# Patient Record
Sex: FEMALE | Race: WHITE | Employment: OTHER | URBAN - METROPOLITAN AREA
[De-identification: names, ages, dates, MRNs, and addresses within clinical notes are randomized per-mention and may not be internally consistent; named-entity substitution may affect disease eponyms.]

---

## 2018-03-27 ENCOUNTER — ASSISTED LIVING VISIT (OUTPATIENT)
Dept: GERIATRICS | Facility: CLINIC | Age: 78
End: 2018-03-27

## 2018-03-27 DIAGNOSIS — Z53.9 ERRONEOUS ENCOUNTER--DISREGARD: Primary | ICD-10-CM

## 2018-04-02 NOTE — PROGRESS NOTES
[place] [facility]  PRIMARY CARE PROVIDER AND CLINIC:  Hannah Gardner [address] / [facility] [address]  Chief Complaint   Patient presents with     [facility]       HPI:    Chelsey Casillas is a 78 year old  (1940), is seen today in her apartment at [facility].  [facility].  Due to multiple falls and worsening cognitive function, she was admitted to this facility for  medical management and nursing care.  HPI information obtained from: facility chart records, facility staff, patient report (unreliable), [facility] chart review and Care Everywhere [facility] chart review.      Current issues are:      Severe major depression with psychotic features (H)  Dementia with psychosis  States she is not happy with her current living situation, preferred her former apartment.  Unable to elaborate why.  She is preoccupied with being hot in her apartment.  When asked if she knew who to report the problem to, she just wanted the window open.  Essentially she is tracking with her present situation, but lacks insight and problem solving.  She does not appear to be hallucinating.    Essential hypertension  Reasonable control with medications    Primary osteoarthritis involving multiple joints  Chronic low back pain without sciatica, unspecified back pain laterality  Falling episodes  Reviewed care everywhere x-rays over the years, no fractures, but + for osteoarthritis, knees, hips, spine.  She does take tylenol which only takes the edge off.  Due to progressing dementia, narcotics would be used cautiously    Functional urinary incontinence  Wears pads.  She denies it being much of a problem      CODE STATUS/ADVANCE DIRECTIVES DISCUSSION:   DNR / DNI  Patient's living condition: lives in an assisted living facility    ALLERGIES:Amlodipine  PAST MEDICAL HISTORY:  has a past medical history of Arthritis; Chronic low back pain  (3/17/2015); COPD (chronic obstructive pulmonary disease) (H); Dementia with psychosis (11/1/2016); Fall (2/27/2013); Falling episodes (9/24/2016); Functional urinary incontinence (3/7/2013); GERD (gastroesophageal reflux disease) (4/3/2018); HTN (hypertension) (4/3/2018); Knee pain (3/7/2013); Major depression, single episode (4/16/2015); Pain in both knees (1/30/2014); Physical deconditioning (3/7/2013); Severe major depression with psychotic features (H) (7/12/2016); Skin tear (3/7/2013); and Weakness (6/18/2015).  PAST SURGICAL HISTORY:  has no past surgical history on file.  FAMILY HISTORY: Family history is unknown by patient.  SOCIAL HISTORY:  reports that she has been smoking Cigarettes.  She has never used smokeless tobacco. She reports that she does not drink alcohol.    Post Discharge Medication Reconciliation Status: patient was not discharged from an inpatient facility.  Current Outpatient Prescriptions   Medication Sig Dispense Refill     albuterol (PROAIR HFA/PROVENTIL HFA/VENTOLIN HFA) 108 (90 BASE) MCG/ACT Inhaler Inhale 1-2 puffs into the lungs every 4 hours as needed for shortness of breath / dyspnea or wheezing       ASPIRIN EC PO Take 81 mg by mouth daily       CARVEDILOL PO Take 6.25 mg by mouth 2 times daily       ferrous sulfate (IRON) 325 (65 FE) MG tablet Take 325 mg by mouth daily       FOLIC ACID PO Take 1 mg by mouth daily       LISINOPRIL PO Take 5 mg by mouth daily       Acetaminophen (TYLENOL PO) Take 1,000 mg by mouth 2 times daily       OMEPRAZOLE PO Take 20 mg by mouth daily       RISPERIDONE PO Take 0.5 mg by mouth daily       SERTRALINE HCL PO Take 100 mg by mouth daily       vitamin D3 (CHOLECALCIFEROL) 31953 UNITS capsule Take 50,000 Units by mouth every 7 days         ROS:  Limited secondary to cognitive impairment but today pt reports back and knees hurt    Exam:  /84  Pulse 63  Temp 98.2  F (36.8  C)  Resp 20  Ht 5' (1.524 m)  Wt 150 lb (68 kg)  LMP   BMI 29.29  kg/m2  GENERAL APPEARANCE:  Alert, cooperative  ENT:  Mouth and posterior oropharynx normal, moist mucous membranes, normal hearing acuity  EYES:  EOM, conjunctivae, lids, pupils and irises normal  NECK:  No adenopathy,masses or thyromegaly  RESP:  respiratory effort and palpation of chest normal, lungs clear to auscultation , no respiratory distress  CV:  Palpation and auscultation of heart done , regular rate and rhythm, no murmur, rub, or gallop, no edema  ABDOMEN:  normal bowel sounds, soft, nontender, no hepatosplenomegaly or other masses  M/S:   Gait and station abnormal requires 4 wheeled walker for balance  Digits and nails normal  SKIN:  Inspection of skin and subcutaneous tissue baseline, Palpation of skin and subcutaneous tissue baseline  NEURO:   Examination of sensation by touch normal  PSYCH:  insight and judgement impaired, memory impaired     Lab/Diagnostic data: None available since 2016, reviewed care everywhere       ASSESSMENT/PLAN:  (F03.91) Dementia with psychosis  (primary encounter diagnosis)  (F32.3) Severe major depression with psychotic features (H)  (R44.3) Hallucinations  Comment: requires 24 hour observation with medication administration  Plan: medication administration per contract.  Will continue current Risperidone, hallucinations are controlled at current dose of 0.5 mg daily.  No apparent adverse side effects noted per staff or patient.  Sertraline 100 mg daily appropriate dose and no noted tremors or adverse side effects noted.  Will reassess both medications with each visit.      (I10) Essential hypertension  Comment: Based on JNC-8 goals,  patients age of 78 year old, no presence of diabetes or CKD, and goals of care goal BP is <150/90 mm Hg. Patient is stable with current plan of care and routine assessment..  Plan: continue Carvedilol, Lisinopril.  BMP next lab day    (M15.0) Primary osteoarthritis involving multiple joints  (M54.5,  G89.29) Chronic low back pain without  sciatica, unspecified back pain laterality  (R29.6) Falling episodes  Comment: chronic  Plan: ambulate with walker for safety, Tylenol for pain.      (R39.81) Functional urinary incontinence  Comment: chronic, increased risk for UTI  Plan: assist with incontinent brief changes per plan contract with [facility]        Electronically signed by:  ALCIRA Lopez CNP

## 2018-04-03 ENCOUNTER — ASSISTED LIVING VISIT (OUTPATIENT)
Dept: GERIATRICS | Facility: CLINIC | Age: 78
End: 2018-04-03
Payer: COMMERCIAL

## 2018-04-03 DIAGNOSIS — R39.81 FUNCTIONAL URINARY INCONTINENCE: ICD-10-CM

## 2018-04-03 DIAGNOSIS — R44.3 HALLUCINATIONS: ICD-10-CM

## 2018-04-03 DIAGNOSIS — R29.6 FALLING EPISODES: ICD-10-CM

## 2018-04-03 DIAGNOSIS — F32.3 SEVERE MAJOR DEPRESSION WITH PSYCHOTIC FEATURES (H): ICD-10-CM

## 2018-04-03 DIAGNOSIS — F03.92 DEMENTIA WITH PSYCHOSIS (H): Primary | ICD-10-CM

## 2018-04-03 DIAGNOSIS — G89.29 CHRONIC LOW BACK PAIN WITHOUT SCIATICA, UNSPECIFIED BACK PAIN LATERALITY: ICD-10-CM

## 2018-04-03 DIAGNOSIS — M15.0 PRIMARY OSTEOARTHRITIS INVOLVING MULTIPLE JOINTS: ICD-10-CM

## 2018-04-03 DIAGNOSIS — M54.50 CHRONIC LOW BACK PAIN WITHOUT SCIATICA, UNSPECIFIED BACK PAIN LATERALITY: ICD-10-CM

## 2018-04-03 DIAGNOSIS — I10 ESSENTIAL HYPERTENSION: ICD-10-CM

## 2018-04-03 PROBLEM — K21.9 GERD (GASTROESOPHAGEAL REFLUX DISEASE): Status: ACTIVE | Noted: 2018-04-03

## 2018-04-03 RX ORDER — ALBUTEROL SULFATE 90 UG/1
2 AEROSOL, METERED RESPIRATORY (INHALATION) EVERY 4 HOURS PRN
COMMUNITY

## 2018-04-03 RX ORDER — FERROUS SULFATE 325(65) MG
325 TABLET ORAL DAILY
COMMUNITY
End: 2019-02-04

## 2018-04-03 RX ORDER — CHOLECALCIFEROL (VITAMIN D3) 1250 MCG
50000 CAPSULE ORAL
COMMUNITY
End: 2018-09-17

## 2018-04-10 VITALS
DIASTOLIC BLOOD PRESSURE: 84 MMHG | RESPIRATION RATE: 20 BRPM | TEMPERATURE: 98.2 F | WEIGHT: 150 LBS | HEART RATE: 63 BPM | BODY MASS INDEX: 29.45 KG/M2 | SYSTOLIC BLOOD PRESSURE: 143 MMHG | HEIGHT: 60 IN

## 2018-04-10 PROBLEM — M15.0 PRIMARY OSTEOARTHRITIS INVOLVING MULTIPLE JOINTS: Status: ACTIVE | Noted: 2018-04-10

## 2018-06-19 ENCOUNTER — ASSISTED LIVING VISIT (OUTPATIENT)
Dept: GERIATRICS | Facility: CLINIC | Age: 78
End: 2018-06-19
Payer: COMMERCIAL

## 2018-06-19 VITALS
SYSTOLIC BLOOD PRESSURE: 131 MMHG | RESPIRATION RATE: 18 BRPM | HEART RATE: 69 BPM | TEMPERATURE: 97.5 F | DIASTOLIC BLOOD PRESSURE: 92 MMHG

## 2018-06-19 DIAGNOSIS — F03.92 DEMENTIA WITH PSYCHOSIS (H): Primary | ICD-10-CM

## 2018-06-19 DIAGNOSIS — M15.0 PRIMARY OSTEOARTHRITIS INVOLVING MULTIPLE JOINTS: ICD-10-CM

## 2018-06-19 RX ORDER — LOPERAMIDE HCL 2 MG
2-4 CAPSULE ORAL PRN
COMMUNITY
End: 2018-07-27

## 2018-06-19 NOTE — PROGRESS NOTES
[place] [facility]    Chief Complaint   Patient presents with     RAI       [place] Medical Record Number:  [id number]    HPI:    Chelsey Casillas is a 78 year old  (1940), who is being seen today for an episodic care visit at [facility].      HPI information obtained from: facility chart records, facility staff, patient report and [facility] chart review.    Today's concern is:  Dementia with psychosis  Has had increased delusion that a snake is trying to get in to her apartment an eat her cat.  She has her windows taped so it can't get in.  She has had a couple of falls in her apartment forgetting to use her walker.      Primary osteoarthritis involving multiple joints  Likely increased knee pain from falls.      ALLERGIES: Amlodipine  Past Medical, Surgical, Family and Social History reviewed and updated in [facility].    Current Outpatient Prescriptions   Medication Sig Dispense Refill     Acetaminophen (TYLENOL PO) Take 1,000 mg by mouth 2 times daily       albuterol (PROAIR HFA/PROVENTIL HFA/VENTOLIN HFA) 108 (90 BASE) MCG/ACT Inhaler Inhale 1-2 puffs into the lungs every 4 hours as needed for shortness of breath / dyspnea or wheezing       ASPIRIN EC PO Take 81 mg by mouth daily       CARVEDILOL PO Take 6.25 mg by mouth 2 times daily       ferrous sulfate (IRON) 325 (65 FE) MG tablet Take 325 mg by mouth daily       FOLIC ACID PO Take 1 mg by mouth daily       LISINOPRIL PO Take 5 mg by mouth daily       loperamide (IMODIUM) 2 MG capsule Take 2-4 mg by mouth as needed for diarrhea       OMEPRAZOLE PO Take 20 mg by mouth daily       RISPERIDONE PO Take 0.5 mg by mouth 2 times daily        SERTRALINE HCL PO Take 100 mg by mouth daily       vitamin D3 (CHOLECALCIFEROL) 42729 UNITS capsule Take 50,000 Units by mouth every 7 days       Medications reconciled to facility chart and changes were made to reflect current medications as identified as above med list. Below are the  changes that were made:   Medications stopped since last EPIC medication reconciliation:   There are no discontinued medications.    Medications started since last [facility] medication reconciliation:  Orders Placed This Encounter   Medications     loperamide (IMODIUM) 2 MG capsule     Sig: Take 2-4 mg by mouth as needed for diarrhea       REVIEW OF SYSTEMS:  Unobtainable secondary to cognitive impairment.     Physical Exam:  BP (!) 131/92  Pulse 69  Temp 97.5  F (36.4  C)  Resp 18  GENERAL APPEARANCE:  Alert, in no distress  ENT:  Mouth and posterior oropharynx normal, moist mucous membranes, [place]  RESP:  respiratory effort and palpation of chest normal, lungs clear to auscultation , no respiratory distress  CV:  Palpation and auscultation of heart done , regular rate and rhythm, no murmur, rub, or gallop  ABDOMEN:  normal bowel sounds, soft, nontender, no hepatosplenomegaly or other masses  M/S:   Gait and station abnormal unsteady without walker  Examination of:   right lower extremity and left lower extremity  Inspection, ROM, stability and muscle strength no bruising noted, + crepitus, limited extension and flexion  PSYCH:  insight and judgement impaired, memory impaired     Recent Labs: None available at this time.     Assessment/Plan:  (F03.91) Dementia with psychosis  (primary encounter diagnosis)  Comment: acute exacerbation  Plan: will increase Risperidone by adding a morning dose.  Monitor for side effects.  F/U in 2-4 weeks.    (M15.0) Primary osteoarthritis involving multiple joints  Comment: increased knee pain  Plan: Tylenol PRN    Electronically signed by  ALCIRA Wynn CNP

## 2018-07-02 ENCOUNTER — TELEPHONE (OUTPATIENT)
Dept: GERIATRICS | Facility: CLINIC | Age: 78
End: 2018-07-02

## 2018-07-02 DIAGNOSIS — I10 HYPERTENSION, UNCONTROLLED: Primary | ICD-10-CM

## 2018-07-02 RX ORDER — LISINOPRIL 10 MG/1
10 TABLET ORAL DAILY
Qty: 90 TABLET | Refills: 1 | Status: SHIPPED | OUTPATIENT
Start: 2018-07-02 | End: 2018-07-04 | Stop reason: DRUGHIGH

## 2018-07-02 NOTE — TELEPHONE ENCOUNTER
[place] [facility] TELEPHONE ENCOUNTER    Chief Complaint   Patient presents with     uncontrolled hypertension       Chelsey Casillas is a 78 year old  (1940),Nurse called today to report: blood pressures twice systolic is > 100    ASSESSMENT/PLAN  Hypertension, uncontrolled    - lisinopril (PRINIVIL/ZESTRIL) 10 MG tablet; Take 1 tablet (10 mg) by mouth daily    Check blood pressure both arms twice daily for 2 days    F/u in 2 weeks for blood pressure check    Hannah Gardner, ALCIRA LONG  j

## 2018-07-04 ENCOUNTER — TELEPHONE (OUTPATIENT)
Dept: GERIATRICS | Facility: CLINIC | Age: 78
End: 2018-07-04

## 2018-07-04 DIAGNOSIS — I10 HYPERTENSION, UNSPECIFIED TYPE: Primary | ICD-10-CM

## 2018-07-04 DIAGNOSIS — I48.20 CHRONIC ATRIAL FIBRILLATION (H): ICD-10-CM

## 2018-07-04 RX ORDER — LISINOPRIL 10 MG/1
15 TABLET ORAL DAILY
Qty: 45 TABLET | Refills: 1 | Status: SHIPPED | OUTPATIENT
Start: 2018-07-04 | End: 2018-12-19

## 2018-07-04 RX ORDER — CARVEDILOL 3.12 MG/1
3.12 TABLET ORAL 2 TIMES DAILY WITH MEALS
Qty: 60 TABLET | Refills: 3 | Status: SHIPPED | OUTPATIENT
Start: 2018-07-04 | End: 2020-04-24 | Stop reason: SINTOL

## 2018-07-04 NOTE — TELEPHONE ENCOUNTER
[place] [facility] TELEPHONE ENCOUNTER    Chief Complaint   Patient presents with     fall with low heart rate/^ BP       Chelsey Casillas is a 78 year old  (1940),Nurse called today to report: had a fall walking to the bathroom without her walker.  Chelsey told the RA she just got tired and sat down, HR was 55 and /90.    ASSESSMENT/PLAN  Hypertension, unspecified type    - lisinopril (PRINIVIL/ZESTRIL) 10 MG tablet; Take 1.5 tablets (15 mg) by mouth daily  - carvedilol (COREG) 3.125 MG tablet; Take 1 tablet (3.125 mg) by mouth 2 times daily (with meals)    Chronic atrial fibrillation (H)    - carvedilol (COREG) 3.125 MG tablet; Take 1 tablet (3.125 mg) by mouth 2 times daily (with meals)    ALCIRA Jerome CNP

## 2018-07-27 DIAGNOSIS — R19.7 DIARRHEA, UNSPECIFIED TYPE: Primary | ICD-10-CM

## 2018-07-30 RX ORDER — LOPERAMIDE HCL 2 MG
CAPSULE ORAL
Qty: 20 CAPSULE | Refills: 98 | Status: SHIPPED | OUTPATIENT
Start: 2018-07-30 | End: 2019-12-14

## 2018-08-15 ENCOUNTER — PATIENT OUTREACH (OUTPATIENT)
Dept: GERIATRIC MEDICINE | Facility: CLINIC | Age: 78
End: 2018-08-15

## 2018-08-15 PROBLEM — Z76.89 HEALTH CARE HOME: Status: ACTIVE | Noted: 2018-08-15

## 2018-08-15 NOTE — PROGRESS NOTES
[facility] Care Coordination Contact  Member new to [facility] with [employer]. CC change letter sent.   Alyssa Flores  Case Management Specialist  [facility]  [phone]

## 2018-08-15 NOTE — LETTER
August 15, 2018    Important Plan Information    [facility] [address]  [address]  [facility] [address]    Your New Care Coordinator  Dear Chelsey,  My name is MYA Gamez and I am your new Care Coordinator. You may reach me by calling [phone]. I will be in touch with you shortly to address any questions you may have.   I have also been in contact with Josi Ortiz, your previous care coordinator, to ensure a smooth transition.  Questions?  Call me at [phone] Monday-Friday between 8am and 5pm. TTY/TDD: 711. I look forward to working with you as a [employer]  member.  Sincerely,      MYA Gamez  [facility]  [phone]    cc: member records            American Indians can continue to use [place] and [facility] Services ([facility]) clinics. We will not require prior approval or impose any conditions for you to get services at these clinics. For elders age 65 years and older this includes Elderly Waiver (EW) services accessed through the [facility]. If a doctor or other provider in a [place] or S clinic refers you to a provider in our network, we will not require you to see your primary care provider prior to the referral.    For accessible formats of this publication or assistance with equal or access to our services, visit [email or web address], or call [phone] (toll free) or use your preferred relay service.    Auxiliary Aids and Services.   [employer] provides auxiliary aids and services, like qualified interpreters or information in accessible formats, free of charge and in a timely manner, to ensure an equal opportunity to participate in our health care programs. Contact [employer] Customer Service at [email or web address] or call [phone] (toll free) or use your preferred relay service.    Language Assistance Services.   [employer] provides translated documents and spoken language interpreting, free of charge and in a timely manner, when  language assistance services are necessary to ensure limited English speakers have meaningful access to our information and services. Contact [employer] Customer Service at [email or web address] or call [phone] (toll free) or use your preferred relay service.     Civil Rights Notice  Discrimination is against the law. [employer] does not discriminate on the basis of any of the following:    Race    Color    National Origin    Creed    Pentecostalism    Age    Public Assistance Status    Receipt of Health Care Services    Disability (including physical or mental impairment)    Sex (including sex stereotypes and gender identity)    Marital Status    Political Beliefs    Medical Condition    Genetic Information    Sexual Orientation    Claims Experience    Medical History    Health Status    Civil Rights Complaints.   You have the right to file a discrimination complaint if you believe you were treated in a discriminatory way by [employer]. You may contact any of the following four agencies directly to file a discrimination complaint.    U.S. Department of Health and Human Services  Office for Civil Rights (OCR)  You have the right to file a complaint with the OCR, a federal agency, if you believe you have been discriminated against because of any of the following:    Race    Disability    Color    Sex (including sex stereotypes and gender identity)    National Origin    Age    Contact the OCR directly to file a complaint:         Director         U.S. Department of Health and Human Services  Office for Civil Rights         [address] [place] [address]         [phone] (Voice)         [phone] (TDD)         Complaint Portal - https://ocrportal.hhs.gov/ocr/portal/lobby.jsf     [place] Department of Human Rights (MDHR)  In [place], you have the right to file a complaint with the MD if you believe you have been discriminated against because of any  of the following:      Race    Color    National Origin    Christian    Creed    Sex    Sexual Orientation    Marital Status    Public Assistance Status    Contact the [facility] directly to file a complaint:         [place] Department of Human Rights         [facility], [address] [place] [address]         [phone] (voice)          [phone] (toll free)         711 or [phone] ([place] Relay)         [phone] (Fax)         [email or web address] (Email)     [place] Department of Human Services (DHS)  You have the right to file a complaint with [facility] if you believe you have been discriminated against in our health care programs because of any of the following:    Race    Color    National Origin    Creed    Christian    Age    Public Assistance Status    Receipt of Health Care Services    Disability (including physical or mental impairment)    Sex (including sex stereotypes and gender identity)    Marital Status    Political Beliefs    Medical Condition    Genetic Information    Sexual Orientation    Claims Experience    Medical History    Health Status    Complaints must be in writing and filed within 180 days of the date you discovered the alleged discrimination. The complaint must contain your name and address and describe the discrimination you are complaining about. After we get your complaint, we will review it and notify you in writing about whether we have authority to investigate. If we do, we will investigate the complaint.      [facility] will notify you in writing of the investigation s outcome. You have a right to appeal the outcome if you disagree with the decision. To appeal, you must send a written request to have [facility] review the investigation outcome period. Be brief and state why you disagree with the decision. Include additional information you think is important.      If you file a complaint in this way, the people who work for the agency named in the complaint cannot  retaliate against you. This means they cannot punish you in any way for filing a complaint. Filing a complaint in this way does not stop you from seeking out other legal or administration actions.     Contact DHS directly to file a discrimination complaint:        ATTN: Civil Rights Coordinator        [facility]        Equal Opportunity and Access Division        [address] [address]        [place] [address] [phone] (voice) or use your preferred relay service     [employer] Complaint Notice   Contact [employer] directly to file a discrimination complaint:  [employer] Civil Rights Coordinator  Mail Route   [address]  [place] [address] [phone] (voice) or use your preferred relay service  [email or web address]

## 2018-08-20 ENCOUNTER — PATIENT OUTREACH (OUTPATIENT)
Dept: GERIATRIC MEDICINE | Facility: CLINIC | Age: 78
End: 2018-08-20

## 2018-08-20 NOTE — PROGRESS NOTES
[facility] Care Coordination Contact  Communication History     User: Rina George, LSW Date/time: 8/20/2018 11:09 AM    Context:  Outcome: Left Message    Phone number: [phone] Phone Type:     Comm. type: Telephone Call type: Outgoing    Contact: [facility]: [facility] Relation to patient: Other    User: Rina George, LSW Date/time: 8/20/2018 11:07 AM    Context:  Outcome: Left Message    Phone number: [phone] Phone Type:     Comm. type: Telephone Call type: Outgoing    Contact: [facility]: Gian -  Rep Payee Relation to patient: Other    User: Rina George, LSW Date/time: 8/20/2018 10:56 AM    Context:  Outcome:     Phone number: [phone] Phone Type:     Comm. type: Telephone Call type: Incoming    Contact: [employer]: john Prater CM Relation to patient: Other    User: Rina George, LSW Date/time: 8/20/2018 10:50 AM    Context:  Outcome: Left Message    Phone number: [phone] Phone Type:     Comm. type: Telephone Call type: Outgoing    Contact: [facility]:  Sheri Relation to patient: Other    User: Rina George, LSW Date/time: 8/20/2018 10:50 AM    Context:  Outcome: Left Message    Phone number: [phone] Phone Type:     Comm. type: Telephone Call type: Outgoing    Contact: Oly Newsome: Ada, Director of Health Services Relation to patient: Assisted Living    User: Rina George, LSW Date/time: 8/20/2018 10:49 AM    Context:  Outcome:     Phone number: [phone] Phone Type:     Comm. type: Telephone Call type: Outgoing    Contact: [employer] Relation to patient: Other    User: Rina George, LSW Date/time: 8/20/2018 10:49 AM    Context:  Outcome: Left Message    Phone number: [phone] Phone Type: Home Phone    Comm. type: Telephone Call type: Outgoing    Contact: Chelsey Casillas Relation to patient: Self            Client is new enrollee to [facility] effective 08- with [employer] [facility] health plan. [facility]    No home visit required because this CM has received all required documentation's from the previous CM.    First Attempt to reach Chelsey. LM introducing self and requested a call back.    Spoke w/ [employer] and verified monthly supply orders.  LM for volunteer service [facility] and [facility]isted Living introducing self as new CM. Left contact information.    Requested a call back from the Assisted Living to see if they would provide shopping services for Chelsey if time was given on the RS tool for it. Chelsey currently has ILS and ILS isn't an appropriate service for her current needs.    Received a call from Chelsey's previous CM providing the following update:  - Previous CM received call from AL stating that Chelsey is needing more assistance.  - AL has gone over and above to provide assistance for Chelsey.  - Chelsey does what she wants in her own time.  - Chelsey has a cat named CowGridPointjo-ann that is her entire world. Chelsey isn't able to care for the cat very well, but her ILS worker and AL staff assist with this. There have been conversations about her giving the cat to someone that can care for the cat.  - Has a hospital bed that she doesn't use. Mattress is on the floor so the cat doesn't jump up on the hospital bed. Chelsey prefers to sleep in her lift chair.  - Chelsey just received a new lift chair, because the previous one was urine soaked even after being cleaned. Now has washable incontinent pads and staff place pull-ups on the lift chair to prevent the chair from becoming urine soaked.  - Previous CM states ILS is vital to Chelsey remaining independent, because they shop for her and assist with MA paperwork. Before ILS the CM was assisting with MA paperwork.  - Chelsey doesn't have anyone in her life. Has a sister that she isn't able to locate, an ex that lives out of state, and an elderly friend that has health issues.  "Previously CM described Chelsey as an \"orphaned senior.\"   - Chelsey is unable to transfer into vehicles or go to the store d/t the amount of assistance is needed.    LM for Rep Payee Gian at [facility] to verify that he is the rep payee and to give him writer's name/contact info.  LM with [employer] requesting a call back with the name/contact info of Chelsey's ILS worker. Also updated [employer] w/ writer's name/contact info.    MYA Gamez  [employer]  [phone]  Fax: [phone]      "

## 2018-08-20 NOTE — PROGRESS NOTES
[facility] Care Coordination Contact  Communication History     User: Rina George [facility] [place] Date/time: 8/20/2018 12:27 PM    Context:  Outcome:     Phone number: [phone] Phone Type:     Comm. type: Telephone Call type: Incoming    Contact: [facility]: Ada RN Relation to patient: Assisted Living     Received call back from AL. AL reported the following in re:to Chelsey:    - Requires a lot of cares. EXTREMELY time consuming (emphasized)  - Exhibits Behaviors - Refusing cares/meds  - AL is barely able to continue to care for her. Border line on needing SNF.  - 100% incontinent of bowl and bladder. AL toilets Chelsey, but she will refuse. Chelsey will try to toilet herself after refusing and falls.  - Falls A LOT. Emphasized a lot a falls.  - Has a cat that she can't care for. Is extremely devoted to the cat. Cat has been used as bargaining for Chelsey to allow staff to care for her and her cat.  - Refuses to take her medication. Will only take one medication at a time 5 minutes apart.  - Is sort of still ambulating on her own. Emphasized walking VERY slowly.    Chelsey had a friend that was helping her, but is now not part of the picture. Friend is elderly and has their own health issues.    Writer questioned if Chelsey was in need of a SNF referral now or if the AL was willing to continue to provide care for her. Ada thought that a CC at least needed to be set up. CC arranged for Thursday, August 23rd at 10:00am.    Rina George DANIEL  [facility]  [phone]  Fax: [phone]

## 2018-08-23 ENCOUNTER — PATIENT OUTREACH (OUTPATIENT)
Dept: GERIATRIC MEDICINE | Facility: CLINIC | Age: 78
End: 2018-08-23

## 2018-08-24 ENCOUNTER — TRANSFERRED RECORDS (OUTPATIENT)
Dept: HEALTH INFORMATION MANAGEMENT | Facility: CLINIC | Age: 78
End: 2018-08-24

## 2018-08-24 ENCOUNTER — PATIENT OUTREACH (OUTPATIENT)
Dept: GERIATRIC MEDICINE | Facility: CLINIC | Age: 78
End: 2018-08-24

## 2018-09-04 ENCOUNTER — PATIENT OUTREACH (OUTPATIENT)
Dept: GERIATRIC MEDICINE | Facility: CLINIC | Age: 78
End: 2018-09-04

## 2018-09-05 ENCOUNTER — PATIENT OUTREACH (OUTPATIENT)
Dept: GERIATRIC MEDICINE | Facility: CLINIC | Age: 78
End: 2018-09-05

## 2018-09-05 NOTE — PROGRESS NOTES
"[facility] Care Coordination Contact  CC attended care conference for member on 08- at [facility]  Present at care conference member, this care coordinator, Ada Carbone (Director of Health Services), and Chaor Pinon (ED).    Discussed w/ Chelsey that she needs to allow staff to take her to the bathroom. Will often refused help from staff, get up on her own, and fall while trying to get to/from the bathroom. Chelsey expressed that she didn't like all of the staff that worked with her.     It takes approximately 30 minutes for Chelsey to take her medications. She needs a drink of water in between each medication. Chelsey will refuse to take medications at time. AL staff report that it takes 60 minutes to get Chelsey out of bed, dressed, toileted, give her medications, and to bring her down to the dining room.    Chelsey feels that the staff are \"pushy\" and make her feel \"rushed.\" Ada said that she would speak with the staff about allowing Chelsey more time, but she also has to allow the staff to assist her.    Ada told Chelsey that her behaviors will decrease for awhile and than start to escalate after awhile. Chelsey was informed that she would have to move to [facility] if she doesn't allow the staff to help her and decrease her behaviors.    Chelsey named one particular staff member that she doesn't like over the rest. Chelsey admitted \"I look for things to do to piss her off.\"     Chelsey did demonstrate how to use her medical emergency button to alert staff that she needs to use the bathroom. Chelsey said that sometimes it takes the staff awhile to get to her room or they will leave the bathroom and come back. Chelsey stated \"I'm very outspoken.\"    Ada told Chelsey that if she doesn't comply with allowing staff to help her that she is not complying with the homecare agreement that she signed. She told Chelsey that she will be discharged to [facility] " "if she doesn't comply and she will not be able to bring her cat Cowboy with her.    Ada asked Chelsey if she could agree to call for help to prevent her from falling in the future. Chelsey stated 'I don't always fall. I suppose so.\"    Ada asked Chelsey if she could be faster with getting dressed in the morning. Ada reminded Chelsey that they have had this discussion in the past and if she continues to refuse she won't be able to continue to live at [facility] on [place].    After Chelsey left the office, discussed increasing time for ambulation and incontinence on the AL tool. Adjusted the AL tool per conversation and requested for CMS to process.    Rina Correa, [facility]  [phone]  Fax: [phone]    "

## 2018-09-05 NOTE — PROGRESS NOTES
"[facility] Care Coordination Contact  Email received from Ada:    \"From: Ada Juarez   Sent: Tuesday, September 04, 2018 3:51 PM  To: Rina Correa  Subject: RE: MR Mcknight,  Did you meant tomorrow Wed 9/5?  I can do that, otherwise anytime the week of 9/17.  I don t know about the cat.  Lucio Wing doesn t have much for options.  Her friend Tasha is no longer an option.  It really breaks my heart, but she is noncompliant seems to understand that her actions have consequences.  I don t know of nursing homes that accept cats, have you heard of any options?    Ada Juarez   Director of Health Services    [address] [place] [address]  Direct: [phone]  Fax: [phone]  [email or web address]\"    MYA Evangelista  [facility]  [phone]  Fax: [phone]    "

## 2018-09-05 NOTE — PROGRESS NOTES
"Email Received from Ada Juarez, Director of Health Services at [facility]    \"From: Ada Juarez   Sent: Tuesday, September 04, 2018 2:51 PM  To: Rina Correa  Cc: Charo Pinon  Subject: MR Juan Luis Flynn,  So Chelsey is continuing her pattern of behavior.  She is refusing cares, has fallen while trying to toilet herself, several incontinence episodes, rude to staff, taking an excessive amount of time to take meds and get up and ready.  We have reminded her of the agreement at the care conference she needs to allow us to help her and to help move along, but she continues.  Can we have a follow up care conference to discuss plans to discharge her?  I am very reluctant to move in that direction because of her cat Cowboy.  However,  she really does take a long time and she has moved past the point where we can meet her needs.    Please let me know your thoughts and when you may be available.  I am off 9/7 to 9/14, so kind of bad timing, but if I m not available, our nurse Kayla and Charo HUNT will sit in.    Ada Juarez   Director of Health Services    [address] [place] [address]  Direct: [phone]  Fax: [phone]  [email or web address] \"    MYA Evangelista  [facility]  [phone]  Fax: [phone]    "

## 2018-09-05 NOTE — PROGRESS NOTES
"[facility] Care Coordination Contact  Email sent back to Ada:    \"From: Rina Correa   Sent: Tuesday, September 04, 2018 4:07 PM  To: Ada Juarez  Subject: RE: MR Caballero,    Yes, tomorrow 9/5 works for me any time between 9:00am-3:00pm. I don t know any nursing homes off the top of my head that accept cats, but I will ask my co-workers to see if they might know of any.      Rina Correa, [facility]  Care Coordinator  [facility]  [address] [place] [address]  [email or web address]   [email or web address]   Office: [phone]  Cell: [phone]  Fax [phone]\"    MYA Evangelista  [facility]  [phone]  Fax: [phone]    "

## 2018-09-05 NOTE — PROGRESS NOTES
"[facility] Care Coordination Contact  Email received from Ada:    \"How does 1pm sound?    Ada Juarez   Director of [facility] Services    [address] [place] [address]  Direct: [phone]  Fax: [phone]  [email or web address] \"    MYA Evangelista  [facility]  [phone]  Fax: [phone]    "

## 2018-09-05 NOTE — PROGRESS NOTES
"[facility] Care Coordination Contact  Email sent back to Ada:    \"From: Rina Correa   Sent: Tuesday, September 04, 2018 3:13 PM  To: Ada Juarez  Cc: Charo Pinon  Subject: RE: MR Juan Luis Caballero,    Yes, we can set up another care conference to discuss d/c. I can usually coordinate a transfer to the nursing home fairly quickly, but there would have to be a plan for her cat. Below are available days/times I currently have in the next few weeks.     Wed 9/4/18: 9:00am-3:00pm  Fri 9/6/18: 9:00am-3:00pm  Mon 9/10/18: 9:00am-3:00pm  Wed 9/12/18: 9:00am-10:00am or 2:00pm-3:00pm  Thurs 9/13 - Fri 9/14/18: 9:00am-3:00pm  Mon 9/17/18-Wed 9/19/18: 9:00am-3:00pm  Thurs 9/20/18: 9:00am-10:00am, 2:00pm-3:00pm  Fri 9/21/18: 9:00am-3:00pm      Rina Correa [facility]  Care Coordinator  [address] [address]  [email or web address]   [email or web address]   Office: [phone]  Cell: [phone]  Fax [phone]\"    Rina Correa DANIEL  [facility]  [phone]  Fax: [phone]    "

## 2018-09-05 NOTE — PROGRESS NOTES
"[facility] Care Coordination Contact  Email sent back to Ada    \"Works for me. I can be there at 1:00pm.    Rina\"    Care conference scheduled for 9/5/18 at 1:00pm.    MYA Evangelista  [facility]  [phone]  Fax: [phone]    "

## 2018-09-07 DIAGNOSIS — F33.41 RECURRENT MAJOR DEPRESSIVE DISORDER, IN PARTIAL REMISSION (H): ICD-10-CM

## 2018-09-07 DIAGNOSIS — F10.20 ALCOHOLISM (H): Primary | ICD-10-CM

## 2018-09-07 NOTE — PROGRESS NOTES
"[facility] Care Coordination Contact  2nd Email sent to Kayla:    \"Kayla,    Any day is fine with me.  Whichever day is most convenient for the doctor,  the care center, and your facility is fine with me!     Rina Correa [facility]  Care Coordinator  [address] [address]  [email or web address]   [email or web address]   Office: [phone]  Cell: [phone]  Fax [phone]\"    MYA Evangelista  [facility]  [phone]  Fax: [phone]    "

## 2018-09-07 NOTE — PROGRESS NOTES
"[facility] Care Coordination Contact  Email sent to Kayla BOYD) at [facility]    \"Juan Luis Garza,    Wanted to send you a quick email so you had my contact information also. Feel free to email or call me with any questions or requests!    Thank you  Rina Correa, [facility]  Care Coordinator  [address] [address]  [email or web address]   [email or web address]   Office: [phone]  Cell: [phone]  Fax [phone]\"    Rina Correa [facility]  [phone]  Fax: [phone]    "

## 2018-09-07 NOTE — PROGRESS NOTES
"[facility] Care Coordination Contact    Email received from Kayla Aldridge on [place]  \"From: Kayla Moeller   Sent: Friday, September 07, 2018 2:06 PM  To: Rina Correa  Subject: RE: Contact Info     I m glad you, that was good timing!  We can transfer Chelsey as soon as next week. Her recliner is cloth and she is only wanting to take some small items (such as clothes) with her. We can assist her with packing up some bags or boxes to take. I can get the physician orders fairly quickly from her provider. Is there a particular day that works best for you for her transfer?  Thank you!    Kayla Moeller RN    [facility] [address]  Direct: [phone]    Fax: [phone]  [email or web address]  \"    MAY Evangelisat  [facility]  [phone]  Fax: [phone]    "

## 2018-09-07 NOTE — PROGRESS NOTES
[facility] Care Coordination Contact  Received call from Citlalli at [facility] on 9/7/18 stating they had a LTC bed for Chelsey.     Ada the Director of Health Services is out of the office until 9/17/18. KELVIN Garza at the AL will be handling Jeff move to the SNF.     Citlalli had reached out to Kayla letting her know that Chelsey could move to LTC. Citlalli is sending the KELVIN Garza discharge paperwork that needs to be signed by Chelsey's PCP. Chelsey is seen by an in house [facility] physician.    Writer will be completing the PAS and OBRA Screen once a discharge date has been determined. Chelsey will probably move sometime next week.    SNF is wondering what kind of fabric the recliner chair has. They only accept recliner chairs with specific fabric. Kayla is going to look into this and let the SNF know.    Kayla's direct work cell phone number is [phone] and her email is [email or web address]. Sent Kayla an email giving her writer's contact info.    Rina Correa DANIEL  [facility]  [phone]  Fax: [phone]

## 2018-09-07 NOTE — PROGRESS NOTES
[facility] Care Coordination Contact  Received call from [facility] 9/6/18 re: assessing Chelsey for LTC.    LM for Josi or Citlalli in admissions at [facility] informing them that Chelsey should be assessed for LTC and gave them Chelsey's Epic MRN number.    Rina Correa, MYA  [facility]  [phone]  Fax: [phone]

## 2018-09-07 NOTE — PROGRESS NOTES
CC attended care conference for member on 9/5/18 at [facility] .   Present at care conference member, this care coordinator, AL Director of Health Services (Ada Juarez) and ED (Charo Pinon).    Chelsey has had behaviors and has been non-compliant since the CC on 8/23/18 discussing that she needed to change her behaviors otherwise [facility] would not be able to care for her any longer.    Chelsey has fallen since 8/23/18 after using the bathroom by herself and not calling for assistance from staff.    Chelsey has been refusing medications and not allowing staff to assist her with ADL's.    [facility] is going to be discharging Chelsey from home care which means she is no longer able to continue to reside at the AL. Informed Chelsey that she would have to move to a SNF. Chelsey was in agreement with moving to [facility] for LTC.    Discussed with Chelsey what she wanted to do with her cat Cowboy since she would be unable to bring her cat with her. Chelsey was okay with a staff member or another person that liked cats taking Cowboy. Chelsey does not want Cowboy to end up at the [facility]. The AL is going to work on finding a [facility] for Cowboy.    Chelsey doesn't have any family or friends to assist her with moving items to the nursing home. Unable to authorize more time in her budget for the AL to assist with moving items d/t being maxed out. Unable to provide another service to assist Chelsey in moving d/t it being duplication of services. AL said they could move small items and clothing, but not the recliner the Chelsey sleeps in.    Will check with SNF if they will accept the recliner and move it for her if accepted for a LTC admission.    Rina Correa, [facility]  [phone]  Fax: [phone]

## 2018-09-08 RX ORDER — SERTRALINE HYDROCHLORIDE 100 MG/1
TABLET, FILM COATED ORAL
Qty: 31 TABLET | Refills: 98 | Status: SHIPPED | OUTPATIENT
Start: 2018-09-08 | End: 2020-05-14

## 2018-09-08 RX ORDER — FOLIC ACID 1 MG/1
TABLET ORAL
Qty: 31 TABLET | Refills: 98 | Status: SHIPPED | OUTPATIENT
Start: 2018-09-08 | End: 2020-02-07

## 2018-09-12 ENCOUNTER — PATIENT OUTREACH (OUTPATIENT)
Dept: GERIATRIC MEDICINE | Facility: CLINIC | Age: 78
End: 2018-09-12

## 2018-09-12 NOTE — PROGRESS NOTES
Received call from Kayla, RN at [facility] inquiring if there was assistance available to help Chelsey pack her belongings, move belongings, and discard of old items that she won't be bringing with her.    Informed Kayla that there weren't any outside services that could be authorized, because that would be a duplication of services.    Did offer to increase heavy housekeeping hours on the RS Tool from 60 min per day to 85 min per day to help with cost/labor of moving and discarding of items.  Kayla is going to speak with a supervisor and let writer know with in the next day if they would like this increase.    Unable to increase housekeeping more on the RS Tool d/t being at budget cap.    Rina Correa, [facility]  [phone]  Fax: [phone]

## 2018-09-12 NOTE — PROGRESS NOTES
"[facility] Care Coordination Contact  Email sent back to Kayla:    \"Great news! I will complete the PAS and OBRA screen this week and send it to [facility]! Thank you for all of your help.\"    MYA Evangelista  [facility]  [phone]  Fax: [phone]    "

## 2018-09-12 NOTE — PROGRESS NOTES
"[facility] Care Coordination Contact  Email received from KELVIN moreno [facility] [place]:    \"Monday is the day for Chelsey to move in to the new [facility] facility. I am working on getting orders and staff to assist with packing a few bags over the weekend. Let me know what else you need from me.  Thanks!    Kayla Moeller RN    [facility] [address]  Direct: [phone]    Fax: [phone]  [email or web address]  \"    MYA Evangelista  [facility]  [phone]  Fax: [phone]    "

## 2018-09-13 NOTE — PROGRESS NOTES
[facility] Care Coordination Contact  Completed and faxed PAS and OBRA to admissions at [facility].      MYA Evangelista  [facility]  [phone]  Fax: [phone]

## 2018-09-17 ENCOUNTER — TELEPHONE (OUTPATIENT)
Dept: GERIATRICS | Facility: CLINIC | Age: 78
End: 2018-09-17

## 2018-09-17 DIAGNOSIS — Z71.89 ADVANCED DIRECTIVES, COUNSELING/DISCUSSION: ICD-10-CM

## 2018-09-17 RX ORDER — CHOLECALCIFEROL (VITAMIN D3) 1250 MCG
50000 CAPSULE ORAL
Qty: 4 CAPSULE | Refills: 11 | COMMUNITY
Start: 2018-09-17 | End: 2019-02-27 | Stop reason: DRUGHIGH

## 2018-09-17 NOTE — PROGRESS NOTES
[facility] Discharge Orders    Name: Chelsey Casillas  : 1940  Planned Discharge Date: 2018  Discharged to: [facility]    MEDICAL FOLLOW UP  Follow up with PCP in 1-2 weeks     Past Histories   has a past medical history of Arthritis; Chronic low back pain (3/17/2015); COPD (chronic obstructive pulmonary disease) (H); Dementia with psychosis (2016); Fall (2013); Falling episodes (2016); Functional urinary incontinence (3/7/2013); GERD (gastroesophageal reflux disease) (4/3/2018); HTN (hypertension) (4/3/2018); Knee pain (3/7/2013); Major depression, single episode (2015); Pain in both knees (2014); Physical deconditioning (3/7/2013); 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  No surgical history on file  Family history positive for type 2 diabetes in mother.    CODE STATUS:  DNR/DNI OK FOR IV/IM ANTIBIOTICS, LIMITED IV HYDRATION. NO FEEDING TUBE  DISCHARGE MEDICATIONS:  The patient s pharmacy is authorized to dispense a 30-day supply of medications. Refill requests should be directed to the primary provider.  No narcotics are prescribed at time of discharge.   Current Outpatient Prescriptions   Medication Sig Dispense Refill     Acetaminophen (TYLENOL PO)  DX: PAIN   Take 1,000 mg by mouth 2 times daily 100 3     albuterol (PROAIR HFA/PROVENTIL HFA/VENTOLIN HFA) 108 (90 BASE) MCG/ACT Inhaler  DX: COPD Inhale 1-2 puffs into the lungs every 4 hours as needed for shortness of breath / dyspnea or wheezing 1 inhaler 3     ASPIRIN EC PO  DX: A - FIB, CHRONIC Take 81 mg by mouth daily 100 5     carvedilol (COREG) 3.125 MG tablet  DX: A - FIB, CHRONIC Take 1 tablet (3.125 mg) by mouth 2 times daily (with meals) 60 tablet 3     ferrous sulfate (IRON) 325 (65 FE) MG tablet  DX: ANEMIA Take 325 mg by mouth daily 30 11     folic acid (FOLVITE) 1 MG tablet  DX:  ANEMIA TAKE 1 TABLET BY MOUTH ONCE DAILY 31 tablet 11     lisinopril (PRINIVIL/ZESTRIL) 10 MG tablet  DX: ESSENTIAL HTN Take 1.5 tablets (15 mg) by mouth daily 45 tablet 1     loperamide (IMODIUM) 2 MG capsule    DX: DIARRHEA TAKE TWO CAPSULES (4MG) BY MOUTH WITH FIRST LOOSE STOOL ; THEN TAKE ONE CAPSULE (2MG) BY MOUTH AFTER EACH LOOSE STOOL +MAX:16MG/24HRS+ 20 capsule 11     omeprazole (PRILOSEC) 20 MG CR capsule  DX: GERD TAKE 1 CAPSULE BY MOUTH ONCE DAILY DO NOT CRUSH** 31 capsule 11     risperiDONE (RISPERDAL) 0.5 MG tablet  DX: DEPRESSION WITH PSYCHOSIS- HALLUCINATIONS Take 1 tablet (0.5 mg) by mouth 2 times daily 62 tablet 3     sertraline (ZOLOFT) 100 MG tablet  DX: DEPRESSION TAKE 1 TABLET BY MOUTH ONCE DAILY 31 tablet 11     vitamin D3 (CHOLECALCIFEROL) 55649 UNITS capsule  DX: VITAMIN D DEFICIENCY Take 50,000 Units by mouth every 7 days 7 capsules 11       SERVICES:  PT/OT evaluate and treat    ALCIRA [facility] CNP  This document was electronically signed on September 17, 2018

## 2018-09-18 ENCOUNTER — PATIENT OUTREACH (OUTPATIENT)
Dept: GERIATRIC MEDICINE | Facility: CLINIC | Age: 78
End: 2018-09-18

## 2018-09-18 NOTE — PROGRESS NOTES
[facility] Care Coordination Contact  Dr. Gardner    I am the [facility] care coordinator for Chelsey Casillas, and I am writing to notify you of a change in services.   Assisted Living, ILS, and EW services have been terminated.   This change has occurred because Chelsey moved to [facility] for LTC.    I am required by the health plan to notify you of this change. No action is required on your part. Please do not hesitate to contact me with any questions or concerns. Thank you.    MYA Evangelista  [facility]  [phone]  Fax: [phone]

## 2018-09-18 NOTE — PROGRESS NOTES
[facility] Care Coordination Contact  Faxed [phone] to [facility] notifying [facility]'s move to LT.    Requested for CMS to close waiver with date of 9-.    MYA Evangelista  [facility]  [phone]  Fax: [phone]

## 2018-09-18 NOTE — PROGRESS NOTES
Chelsey did not admit to [facility] on 9/17/18. Admission to SNF is planned for today (9/18/18). TOSHA Caballero Director of Health Services at [facility] requesting to be called once Chelsey leaves the AL, so writer can terminate services.    Rina Correa [facility]  [phone]  Fax: [phone]

## 2018-09-19 ENCOUNTER — NURSING HOME VISIT (OUTPATIENT)
Dept: GERIATRICS | Facility: CLINIC | Age: 78
End: 2018-09-19
Payer: COMMERCIAL

## 2018-09-19 VITALS
SYSTOLIC BLOOD PRESSURE: 119 MMHG | HEIGHT: 60 IN | DIASTOLIC BLOOD PRESSURE: 67 MMHG | TEMPERATURE: 98.5 F | HEART RATE: 80 BPM | BODY MASS INDEX: 29.29 KG/M2 | RESPIRATION RATE: 18 BRPM | OXYGEN SATURATION: 96 %

## 2018-09-19 DIAGNOSIS — R39.81 FUNCTIONAL URINARY INCONTINENCE: ICD-10-CM

## 2018-09-19 DIAGNOSIS — M54.50 CHRONIC LOW BACK PAIN WITHOUT SCIATICA, UNSPECIFIED BACK PAIN LATERALITY: ICD-10-CM

## 2018-09-19 DIAGNOSIS — K21.9 GASTROESOPHAGEAL REFLUX DISEASE WITHOUT ESOPHAGITIS: ICD-10-CM

## 2018-09-19 DIAGNOSIS — F32.3 SEVERE MAJOR DEPRESSION WITH PSYCHOTIC FEATURES (H): ICD-10-CM

## 2018-09-19 DIAGNOSIS — I10 ESSENTIAL HYPERTENSION: Primary | ICD-10-CM

## 2018-09-19 DIAGNOSIS — G89.29 CHRONIC LOW BACK PAIN WITHOUT SCIATICA, UNSPECIFIED BACK PAIN LATERALITY: ICD-10-CM

## 2018-09-19 DIAGNOSIS — J44.9 CHRONIC OBSTRUCTIVE PULMONARY DISEASE, UNSPECIFIED COPD TYPE (H): ICD-10-CM

## 2018-09-19 DIAGNOSIS — F03.92 DEMENTIA WITH PSYCHOSIS (H): ICD-10-CM

## 2018-09-19 PROCEDURE — 99309 SBSQ NF CARE MODERATE MDM 30: CPT | Performed by: NURSE PRACTITIONER

## 2018-09-19 NOTE — LETTER
9/19/2018        RE: [facility]  [address]  [facility] [address]        [facility]  PRIMARY CARE PROVIDER AND CLINIC:  Hannah Gardner [address] [place] [address] 400 / [facility] [address]  Chief Complaint   Patient presents with     [facility]     [place] Medical Record Number:  [id number]    HPI:    Chelsey Casillas is a 78 year old  (1940),admitted to the [facility]  from Assisted Living  [facility].  Admitted to this facility for  medical management and nursing care. Will be staying LTC due to frequent falls, worsening dementia with hallucinations.    HPI information obtained from: facility chart records, facility staff, patient report and [place] Epic chart review.      Current issues are:      Essential hypertension  On lisinopril and carvedilol  BP readings range:  119/67  118/80  118/68    Dementia with psychosis  Patient says she was just about to go to her apartment. She just wanted to hang out there. She is advised to check with nursing. 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     Chronic obstructive pulmonary disease, unspecified COPD type (H)  Denies cough, SOB, wheezing. No hypoxia    Chronic low back pain without sciatica, unspecified back pain laterality  She is asking about getting her Tylenol #3 back, says it wasn't sent here. Per chart review, she has not had this for at least several months. She has pain in her low back. No pain in legs    Functional urinary incontinence  Patient has no acute concerns    Gastroesophageal reflux disease, esophagitis presence not specified  Current regimen Omeprazole 20mg po qday. Denies dyspepsia      CODE STATUS/ADVANCE DIRECTIVES DISCUSSION:   DNR / DNI    ALLERGIES:Amlodipine  PAST MEDICAL HISTORY:  has a past medical  history of Arthritis; Chronic low back pain (3/17/2015); COPD (chronic obstructive pulmonary disease) (H); Dementia with psychosis (11/1/2016); Fall (2/27/2013); Falling episodes (9/24/2016); Functional urinary incontinence (3/7/2013); GERD (gastroesophageal reflux disease) (4/3/2018); HTN (hypertension) (4/3/2018); Knee pain (3/7/2013); Major depression, single episode (4/16/2015); Pain in both knees (1/30/2014); Physical deconditioning (3/7/2013); Severe major depression with psychotic features (H) (7/12/2016); Skin tear (3/7/2013); and Weakness (6/18/2015).  PAST SURGICAL HISTORY:  has no past surgical history on file.  FAMILY HISTORY: Family history is unknown by patient.  SOCIAL HISTORY:  reports that she has been smoking Cigarettes.  She has never used smokeless tobacco. She reports that she does not drink alcohol or use illicit drugs.    Post Discharge Medication Reconciliation Status: discharge medications reconciled, continue medications without change.  Current Outpatient Prescriptions   Medication Sig Dispense Refill     Acetaminophen (TYLENOL PO) Take 1,000 mg by mouth 2 times daily       albuterol (PROAIR HFA/PROVENTIL HFA/VENTOLIN HFA) 108 (90 BASE) MCG/ACT Inhaler Inhale 2 puffs into the lungs every 4 hours as needed for shortness of breath / dyspnea or wheezing        ASPIRIN EC PO Take 81 mg by mouth daily       carvedilol (COREG) 3.125 MG tablet Take 1 tablet (3.125 mg) by mouth 2 times daily (with meals) 60 tablet 3     ferrous sulfate (IRON) 325 (65 FE) MG tablet Take 325 mg by mouth daily       folic acid (FOLVITE) 1 MG tablet TAKE 1 TABLET BY MOUTH ONCE DAILY 31 tablet 98     lisinopril (PRINIVIL/ZESTRIL) 10 MG tablet Take 1.5 tablets (15 mg) by mouth daily 45 tablet 1     loperamide (IMODIUM) 2 MG capsule TAKE TWO CAPSULES (4MG) BY MOUTH WITH FIRST LOOSE STOOL ; THEN TAKE ONE CAPSULE (2MG) BY MOUTH AFTER EACH LOOSE STOOL +MAX:16MG/24HRS+ 20 capsule 98     omeprazole (PRILOSEC) 20 MG CR capsule  Take 1 capsule (20 mg) by mouth daily 31 capsule 11     risperiDONE (RISPERDAL) 0.5 MG tablet Take 1 tablet (0.5 mg) by mouth 2 times daily 62 tablet 3     sertraline (ZOLOFT) 100 MG tablet TAKE 1 TABLET BY MOUTH ONCE DAILY 31 tablet 98     vitamin D3 (CHOLECALCIFEROL) 16822 UNITS capsule Take 1 capsule (50,000 Units) by mouth every 7 days 4 capsule 11       ROS:  Reliability questionable secondary to cognitive impairment. Denies chest pain, shortness of breath, fevers, chills, headache, nausea, vomiting, dysuria or bowel abnormalities.      Exam:  /67  Pulse 80  Temp 98.5  F (36.9  C)  Resp 18  Ht 5' (1.524 m)  SpO2 96%  BMI 29.29 kg/m2  GENERAL APPEARANCE:  Alert, in no distress, morbidly obese  ENT:  Mouth and posterior oropharynx normal, moist mucous membranes, normal hearing acuity  EYES:  EOM, conjunctivae, lids, pupils and irises normal  NECK:  No adenopathy,masses or thyromegaly  RESP:  respiratory effort and palpation of chest normal, lungs clear to auscultation , no respiratory distress  CV:  Palpation and auscultation of heart done , regular rate and rhythm, no murmur, rub, or gallop, peripheral edema 1+ in BLE  ABDOMEN:  normal bowel sounds, soft, nontender, no hepatosplenomegaly or other masses  SKIN:  Inspection of skin and subcutaneous tissue baseline, Palpation of skin and subcutaneous tissue baseline  PSYCH:  oriented X 3, insight and judgement impaired, memory impaired , affect abnormal flat    Lab/Diagnostic data: None available at this time.       ASSESSMENT/PLAN:  (I10) Essential hypertension  (primary encounter diagnosis)  Comment: Controlled, possibly too tightly given fall risk. Will monitor for now  Plan: Continue current POC with no changes at this time. Monitor BP. Adjust medication as clinically indicated.    (F03.91) Dementia with psychosis  Comment: Poor judgement, insight, and memory are evident on exam. Therapy will be evaluating for care plan. 24 hour care is most  appropriate.  Plan: OT/SLP eval and treat.     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 Will need to monitor depression, likely will involve house psych  Plan: Monitor mood and affect, PHQ9    (J44.9) Chronic obstructive pulmonary disease, unspecified COPD type (H)  Comment: Chronic condition being managed with medications and is currently asymptomatic.  Plan: Continue current POC with no changes at this time and adjustments as needed.    (M54.5,  G89.29) Chronic low back pain without sciatica, unspecified back pain laterality  Comment: Pain not controlled per patient, but she is a poor candidate for opioids given fall risk, risk for delirium, hallucinations. Goal is to manage pain enough for her to sleep well, stay somewhat active, and not be in distress  Plan: Continue current POC with no changes at this time and adjustments as needed.    (R39.81) Functional urinary incontinence  Comment: Chronic.   Plan: Continue current POC with no changes at this time and adjustments as needed.    (K21.9) Gastroesophageal reflux disease without esophagitis  Comment: Asymptomatic  Plan: Continue current POC with no changes at this time and adjustments as needed.      Electronically signed by:  ALCIRA Sweet [facility]  Pager: [phone]

## 2018-09-19 NOTE — PROGRESS NOTES
[place] [facility]  PRIMARY CARE PROVIDER AND CLINIC:  Hannah Gardner [address] / [facility] [address]  Chief Complaint   Patient presents with     [facility] Care     [place] Medical Record Number:  [id number]    HPI:    Chelsey Casillas is a 78 year old  (1940),admitted to the [facility]  from Assisted Living  [facility].  Admitted to this facility for  medical management and nursing care. Will be staying LTC due to frequent falls, worsening dementia with hallucinations.    HPI information obtained from: facility chart records, facility staff, patient report and [facility] chart review.      Current issues are:      Essential hypertension  On lisinopril and carvedilol  BP readings range:  119/67  118/80  118/68    Dementia with psychosis  Patient says she was just about to go to her apartment. She just wanted to hang out there. She is advised to check with nursing. 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     Chronic obstructive pulmonary disease, unspecified COPD type (H)  Denies cough, SOB, wheezing. No hypoxia    Chronic low back pain without sciatica, unspecified back pain laterality  She is asking about getting her Tylenol #3 back, says it wasn't sent here. Per chart review, she has not had this for at least several months. She has pain in her low back. No pain in legs    Functional urinary incontinence  Patient has no acute concerns    Gastroesophageal reflux disease, esophagitis presence not specified  Current regimen Omeprazole 20mg po qday. Denies dyspepsia      CODE STATUS/ADVANCE DIRECTIVES DISCUSSION:   DNR / DNI    ALLERGIES:Amlodipine  PAST MEDICAL HISTORY:  has a past medical history of Arthritis; Chronic low back pain (3/17/2015); COPD (chronic obstructive pulmonary disease)  (H); Dementia with psychosis (11/1/2016); Fall (2/27/2013); Falling episodes (9/24/2016); Functional urinary incontinence (3/7/2013); GERD (gastroesophageal reflux disease) (4/3/2018); HTN (hypertension) (4/3/2018); Knee pain (3/7/2013); Major depression, single episode (4/16/2015); Pain in both knees (1/30/2014); Physical deconditioning (3/7/2013); Severe major depression with psychotic features (H) (7/12/2016); Skin tear (3/7/2013); and Weakness (6/18/2015).  PAST SURGICAL HISTORY:  has no past surgical history on file.  FAMILY HISTORY: Family history is unknown by patient.  SOCIAL HISTORY:  reports that she has been smoking Cigarettes.  She has never used smokeless tobacco. She reports that she does not drink alcohol or use illicit drugs.    Post Discharge Medication Reconciliation Status: discharge medications reconciled, continue medications without change.  Current Outpatient Prescriptions   Medication Sig Dispense Refill     Acetaminophen (TYLENOL PO) Take 1,000 mg by mouth 2 times daily       albuterol (PROAIR HFA/PROVENTIL HFA/VENTOLIN HFA) 108 (90 BASE) MCG/ACT Inhaler Inhale 2 puffs into the lungs every 4 hours as needed for shortness of breath / dyspnea or wheezing        ASPIRIN EC PO Take 81 mg by mouth daily       carvedilol (COREG) 3.125 MG tablet Take 1 tablet (3.125 mg) by mouth 2 times daily (with meals) 60 tablet 3     ferrous sulfate (IRON) 325 (65 FE) MG tablet Take 325 mg by mouth daily       folic acid (FOLVITE) 1 MG tablet TAKE 1 TABLET BY MOUTH ONCE DAILY 31 tablet 98     lisinopril (PRINIVIL/ZESTRIL) 10 MG tablet Take 1.5 tablets (15 mg) by mouth daily 45 tablet 1     loperamide (IMODIUM) 2 MG capsule TAKE TWO CAPSULES (4MG) BY MOUTH WITH FIRST LOOSE STOOL ; THEN TAKE ONE CAPSULE (2MG) BY MOUTH AFTER EACH LOOSE STOOL +MAX:16MG/24HRS+ 20 capsule 98     omeprazole (PRILOSEC) 20 MG CR capsule Take 1 capsule (20 mg) by mouth daily 31 capsule 11     risperiDONE (RISPERDAL) 0.5 MG tablet Take 1  tablet (0.5 mg) by mouth 2 times daily 62 tablet 3     sertraline (ZOLOFT) 100 MG tablet TAKE 1 TABLET BY MOUTH ONCE DAILY 31 tablet 98     vitamin D3 (CHOLECALCIFEROL) 90434 UNITS capsule Take 1 capsule (50,000 Units) by mouth every 7 days 4 capsule 11       ROS:  Reliability questionable secondary to cognitive impairment. Denies chest pain, shortness of breath, fevers, chills, headache, nausea, vomiting, dysuria or bowel abnormalities.      Exam:  /67  Pulse 80  Temp 98.5  F (36.9  C)  Resp 18  Ht 5' (1.524 m)  SpO2 96%  BMI 29.29 kg/m2  GENERAL APPEARANCE:  Alert, in no distress, morbidly obese  ENT:  Mouth and posterior oropharynx normal, moist mucous membranes, normal hearing acuity  EYES:  EOM, conjunctivae, lids, pupils and irises normal  NECK:  No adenopathy,masses or thyromegaly  RESP:  respiratory effort and palpation of chest normal, lungs clear to auscultation , no respiratory distress  CV:  Palpation and auscultation of heart done , regular rate and rhythm, no murmur, rub, or gallop, peripheral edema 1+ in BLE  ABDOMEN:  normal bowel sounds, soft, nontender, no hepatosplenomegaly or other masses  SKIN:  Inspection of skin and subcutaneous tissue baseline, Palpation of skin and subcutaneous tissue baseline  PSYCH:  oriented X 3, insight and judgement impaired, memory impaired , affect abnormal flat    Lab/Diagnostic data: None available at this time.       ASSESSMENT/PLAN:  (I10) Essential hypertension  (primary encounter diagnosis)  Comment: Controlled, possibly too tightly given fall risk. Will monitor for now  Plan: Continue current POC with no changes at this time. Monitor BP. Adjust medication as clinically indicated.    (F03.91) Dementia with psychosis  Comment: Poor judgement, insight, and memory are evident on exam. Therapy will be evaluating for care plan. 24 hour care is most appropriate.  Plan: OT/SLP eval and treat.     (F32.3) Severe major depression with psychotic features  (H)  Comment: It is reasonable that she is not happy to be moved into LTC. Will need to monitor depression, likely will involve house psych  Plan: Monitor mood and affect, PHQ9    (J44.9) Chronic obstructive pulmonary disease, unspecified COPD type (H)  Comment: Chronic condition being managed with medications and is currently asymptomatic.  Plan: Continue current POC with no changes at this time and adjustments as needed.    (M54.5,  G89.29) Chronic low back pain without sciatica, unspecified back pain laterality  Comment: Pain not controlled per patient, but she is a poor candidate for opioids given fall risk, risk for delirium, hallucinations. Goal is to manage pain enough for her to sleep well, stay somewhat active, and not be in distress  Plan: Continue current POC with no changes at this time and adjustments as needed.    (R39.81) Functional urinary incontinence  Comment: Chronic.   Plan: Continue current POC with no changes at this time and adjustments as needed.    (K21.9) Gastroesophageal reflux disease without esophagitis  Comment: Asymptomatic  Plan: Continue current POC with no changes at this time and adjustments as needed.      Electronically signed by:  ALCIRA Sweet [facility]  Pager: [phone]

## 2018-09-21 ENCOUNTER — PATIENT OUTREACH (OUTPATIENT)
Dept: GERIATRIC MEDICINE | Facility: CLINIC | Age: 78
End: 2018-09-21

## 2018-09-21 NOTE — PROGRESS NOTES
[facility] Care Coordination Contact  Received a request to submit a DTR for the termination of FPC, ILS and EW. Documentation completed and faxed to the health plan.  aware.    Gabriela Hardy RN  Utilization   [facility]  [phone]

## 2018-09-27 ENCOUNTER — PATIENT OUTREACH (OUTPATIENT)
Dept: GERIATRIC MEDICINE | Facility: CLINIC | Age: 78
End: 2018-09-27

## 2018-09-27 ENCOUNTER — TELEPHONE (OUTPATIENT)
Dept: GERIATRICS | Facility: CLINIC | Age: 78
End: 2018-09-27

## 2018-09-27 ENCOUNTER — NURSING HOME VISIT (OUTPATIENT)
Dept: GERIATRICS | Facility: CLINIC | Age: 78
End: 2018-09-27
Payer: COMMERCIAL

## 2018-09-27 DIAGNOSIS — S42.402A CLOSED FRACTURE OF LEFT ELBOW, INITIAL ENCOUNTER: Primary | ICD-10-CM

## 2018-09-27 NOTE — PROGRESS NOTES
[facility] Care Coordination Contact  No longer active with [facility] community case management effective 10-.  Reason for community Disenrollment: Move to LTC.    LM for KIA Ospina at [facility] informing her of Chelsey's Disenrollment.    Sent a private in-box message to MARIANO Rodas informing her of Chelsey's Disenrollment.    LM for Sheri at [facility] informing her of Chelsey's new address. [facility] do continue there program at the [facility]. Gave them the KIA's contact info at [facility].    MYA Evangelista  [facility]  [phone]  Fax: [phone]

## 2018-09-27 NOTE — TELEPHONE ENCOUNTER
Patient fell this afternoon. Landed on her left arm. She is unable to move her arm due to pain. Staff have placed arm in immobilizer    PLAN:  STAT xray of left arm, then will decide if trip to the ER is needed

## 2018-09-27 NOTE — PROGRESS NOTES
[facility] Care Coordination Contact  Received notification that Chelsey did admit to [facility] on 9/18/18.    Left message for KIA Ospina at [facility] giving her writer's contact information.    MYA Evangelista  [facility]  [phone]  Fax: [phone]

## 2018-09-28 NOTE — PROGRESS NOTES
[place] [facility]    Chief Complaint   Patient presents with     RECHECK       [place] Medical Record Number:  [id number]    HPI:    Chelsey Casillas is a 78 year old  (1940), who is being seen today for an episodic care visit at [facility] .      HPI information obtained from: facility chart records, facility staff, patient report and [facility] chart review.    Today's concern is:  Closed fracture of left elbow, sequela  Sustained in mechanical fall on 9/27/18. Ortho PA was able to see her at the nursing home. Diagnosed with transverse fracture of Left elbow, distal humerus. Orders given for immobilizer at all times, except for bathing, recheck xray in 4 weeks. Patient has only intermittently worn the immobilizer. Says she doesn't want to wear it, doesn't need it.    Essential hypertension  On lisinopril and carvedilol  BP readings range:  183/92  168/92  183/92  162/70  160/76  164/74  164/78  160/78  168/80  170/82  170/80  172/80  175/90  119/67  118/80    Dementia with psychosis  Patient moved here from Assisted Living on 9/18/18 due to frequent falls, worsening cognition. Since arriving here, she continues to talk about not being happy, wanting to go back to her apartment. No elopement attempts, but did have several falls in the first week. Last fall was 9/27 when she fractured her arm.    Chronic obstructive pulmonary disease, unspecified COPD type (H)  Denies cough, SOB, wheezing. No hypoxia    Chronic low back pain without sciatica, unspecified back pain laterality  She is asking about getting her Tylenol #3 back. Per chart review, she has not had this for at least several months. She has pain in her low back. No pain in legs      ALLERGIES: Amlodipine  Past Medical, Surgical, Family and Social History reviewed and updated in [facility].    Current Outpatient Prescriptions   Medication Sig Dispense Refill     Acetaminophen (TYLENOL PO) Take 1,000 mg by mouth 2 times daily        albuterol (PROAIR HFA/PROVENTIL HFA/VENTOLIN HFA) 108 (90 BASE) MCG/ACT Inhaler Inhale 2 puffs into the lungs every 4 hours as needed for shortness of breath / dyspnea or wheezing        ASPIRIN EC PO Take 81 mg by mouth daily       carvedilol (COREG) 3.125 MG tablet Take 1 tablet (3.125 mg) by mouth 2 times daily (with meals) 60 tablet 3     ferrous sulfate (IRON) 325 (65 FE) MG tablet Take 325 mg by mouth daily       folic acid (FOLVITE) 1 MG tablet TAKE 1 TABLET BY MOUTH ONCE DAILY 31 tablet 98     lisinopril (PRINIVIL/ZESTRIL) 10 MG tablet Take 1.5 tablets (15 mg) by mouth daily 45 tablet 1     loperamide (IMODIUM) 2 MG capsule TAKE TWO CAPSULES (4MG) BY MOUTH WITH FIRST LOOSE STOOL ; THEN TAKE ONE CAPSULE (2MG) BY MOUTH AFTER EACH LOOSE STOOL +MAX:16MG/24HRS+ 20 capsule 98     omeprazole (PRILOSEC) 20 MG CR capsule Take 1 capsule (20 mg) by mouth daily 31 capsule 11     risperiDONE (RISPERDAL) 0.5 MG tablet Take 1 tablet (0.5 mg) by mouth 2 times daily 62 tablet 3     sertraline (ZOLOFT) 100 MG tablet TAKE 1 TABLET BY MOUTH ONCE DAILY 31 tablet 98     vitamin D3 (CHOLECALCIFEROL) 81648 UNITS capsule Take 1 capsule (50,000 Units) by mouth every 7 days 4 capsule 11     Medications reviewed:  Medications reconciled to facility chart and changes were made to reflect current medications as identified as above med list.     REVIEW OF SYSTEMS:  10 point ROS of systems including Constitutional, Eyes, Respiratory, Cardiovascular, Gastroenterology, Genitourinary, Integumentary, Muscularskeletal, Psychiatric were all negative except for pertinent positives noted in my HPI.    Physical Exam:  BP (!) 183/92  Pulse 76  Temp 98.2  F (36.8  C)  Resp 18  Ht 5' (1.524 m)  Wt 167 lb 8 oz (76 kg)  SpO2 97%  BMI 32.71 kg/m2   GENERAL APPEARANCE:  Alert, in no distress, morbidly obese  ENT:  Mouth and posterior oropharynx normal, moist mucous membranes, normal hearing acuity  EYES:  EOM, conjunctivae, lids, pupils and irises  normal  NECK:  No adenopathy,masses or thyromegaly  RESP:  respiratory effort and palpation of chest normal, lungs clear to auscultation , no respiratory distress  CV:  Palpation and auscultation of heart done , regular rate and rhythm, no murmur, rub, or gallop, peripheral edema 1+ in BLE  ABDOMEN:  normal bowel sounds, soft, nontender, no hepatosplenomegaly or other masses  SKIN:  Inspection of skin and subcutaneous tissue baseline, Palpation of skin and subcutaneous tissue baseline  PSYCH:  oriented X 3, insight and judgement impaired, memory impaired , affect abnormal flat      Recent Labs: none available at this time.     Assessment/Plan:  (S42.402S) Closed fracture of left elbow, sequela  (primary encounter diagnosis)  Comment: Patient is not very compliant with treatment plan. Hopefully this will not result in worsening of injury.   Plan: Continue current POC with no changes at this time. Continue to educate on risks, encourage use of immobilizer.    (I10) Essential hypertension  (primary encounter diagnosis)  Comment: Not well controlled for the past week, this may be related to anxiety, pain. Given her recent falls, would want to avoid dropping her BP too much at this time. Will monitor for now.  Plan: Continue current POC with no changes at this time. Monitor BP. Adjust medication as clinically indicated.    (F03.91) Dementia with psychosis  Comment: Poor judgement, insight, and memory are evident on exam. Therapy will be evaluating for care plan. 24 hour care is most appropriate.  Plan: OT/SLP eval and treat.     (J44.9) Chronic obstructive pulmonary disease, unspecified COPD type (H)  Comment: Chronic condition being managed with medications and is currently asymptomatic.  Plan: Continue current POC with no changes at this time and adjustments as needed.    (M54.5,  G89.29) Chronic low back pain without sciatica, unspecified back pain laterality  Comment: Pain not controlled per patient, but she is a  poor candidate for opioids given fall risk, risk for delirium, hallucinations. Goal is to manage pain enough for her to sleep well, stay somewhat active, and not be in distress  Plan: Continue current POC with no changes at this time and adjustments as needed.        Electronically signed by  ALCIRA Sweet [facility]  Cell: [phone]

## 2018-10-01 ENCOUNTER — NURSING HOME VISIT (OUTPATIENT)
Dept: GERIATRICS | Facility: CLINIC | Age: 78
End: 2018-10-01
Payer: COMMERCIAL

## 2018-10-01 VITALS
HEART RATE: 76 BPM | TEMPERATURE: 98.2 F | WEIGHT: 167.5 LBS | RESPIRATION RATE: 18 BRPM | BODY MASS INDEX: 32.89 KG/M2 | DIASTOLIC BLOOD PRESSURE: 92 MMHG | OXYGEN SATURATION: 97 % | HEIGHT: 60 IN | SYSTOLIC BLOOD PRESSURE: 183 MMHG

## 2018-10-01 DIAGNOSIS — S42.402S CLOSED FRACTURE OF LEFT ELBOW, SEQUELA: Primary | ICD-10-CM

## 2018-10-01 DIAGNOSIS — G89.29 CHRONIC LOW BACK PAIN WITHOUT SCIATICA, UNSPECIFIED BACK PAIN LATERALITY: ICD-10-CM

## 2018-10-01 DIAGNOSIS — M54.50 CHRONIC LOW BACK PAIN WITHOUT SCIATICA, UNSPECIFIED BACK PAIN LATERALITY: ICD-10-CM

## 2018-10-01 DIAGNOSIS — F03.92 DEMENTIA WITH PSYCHOSIS (H): ICD-10-CM

## 2018-10-01 DIAGNOSIS — I10 ESSENTIAL HYPERTENSION: ICD-10-CM

## 2018-10-01 DIAGNOSIS — J44.9 CHRONIC OBSTRUCTIVE PULMONARY DISEASE, UNSPECIFIED COPD TYPE (H): ICD-10-CM

## 2018-10-01 PROCEDURE — 99309 SBSQ NF CARE MODERATE MDM 30: CPT | Performed by: NURSE PRACTITIONER

## 2018-10-01 NOTE — PROGRESS NOTES
[facility] Care Coordination Contact  Received message from Anai ([phone]) KIA at [facility] stating that Chelsey was being followed by Rebekah Rodas.  Informed writer of Chelsey's CC scheduled for 10:45am on 10/2/18, but didn't expect writer to be there since Chelsey is disenrolled. Chelsey gave writer her direct phone number in case writer needs to contact her in the future.    MYA Evangelista  [facility]  [phone]  Fax: [phone]

## 2018-10-01 NOTE — LETTER
10/1/2018        RE: Chelsey [address]  [address] [facility] [address]        [facility]    Chief Complaint   Patient presents with     RECHECK       [facility] Medical Record Number:  [id number]    HPI:    Chelsey Casillas is a 78 year old  (1940), who is being seen today for an episodic care visit at [facility] .      HPI information obtained from: facility chart records, facility staff, patient report and [facility] chart review.    Today's concern is:  Closed fracture of left elbow, sequela  Sustained in mechanical fall on 9/27/18. Ortho PA was able to see her at the nursing home. Diagnosed with transverse fracture of Left elbow, distal humerus. Orders given for immobilizer at all times, except for bathing, recheck xray in 4 weeks. Patient has only intermittently worn the immobilizer. Says she doesn't want to wear it, doesn't need it.    Essential hypertension  On lisinopril and carvedilol  BP readings range:  183/92  168/92  183/92  162/70  160/76  164/74  164/78  160/78  168/80  170/82  170/80  172/80  175/90  119/67  118/80    Dementia with psychosis  Patient moved here from Assisted Living on 9/18/18 due to frequent falls, worsening cognition. Since arriving here, she continues to talk about not being happy, wanting to go back to her apartment. No elopement attempts, but did have several falls in the first week. Last fall was 9/27 when she fractured her arm.    Chronic obstructive pulmonary disease, unspecified COPD type (H)  Denies cough, SOB, wheezing. No hypoxia    Chronic low back pain without sciatica, unspecified back pain laterality  She is asking about getting her Tylenol #3 back. Per chart review, she has not had this for at least several months. She has pain in her low back. No pain in legs      ALLERGIES: Amlodipine  Past Medical, Surgical, Family and Social History reviewed and updated in [facility].    Current Outpatient Prescriptions    Medication Sig Dispense Refill     Acetaminophen (TYLENOL PO) Take 1,000 mg by mouth 2 times daily       albuterol (PROAIR HFA/PROVENTIL HFA/VENTOLIN HFA) 108 (90 BASE) MCG/ACT Inhaler Inhale 2 puffs into the lungs every 4 hours as needed for shortness of breath / dyspnea or wheezing        ASPIRIN EC PO Take 81 mg by mouth daily       carvedilol (COREG) 3.125 MG tablet Take 1 tablet (3.125 mg) by mouth 2 times daily (with meals) 60 tablet 3     ferrous sulfate (IRON) 325 (65 FE) MG tablet Take 325 mg by mouth daily       folic acid (FOLVITE) 1 MG tablet TAKE 1 TABLET BY MOUTH ONCE DAILY 31 tablet 98     lisinopril (PRINIVIL/ZESTRIL) 10 MG tablet Take 1.5 tablets (15 mg) by mouth daily 45 tablet 1     loperamide (IMODIUM) 2 MG capsule TAKE TWO CAPSULES (4MG) BY MOUTH WITH FIRST LOOSE STOOL ; THEN TAKE ONE CAPSULE (2MG) BY MOUTH AFTER EACH LOOSE STOOL +MAX:16MG/24HRS+ 20 capsule 98     omeprazole (PRILOSEC) 20 MG CR capsule Take 1 capsule (20 mg) by mouth daily 31 capsule 11     risperiDONE (RISPERDAL) 0.5 MG tablet Take 1 tablet (0.5 mg) by mouth 2 times daily 62 tablet 3     sertraline (ZOLOFT) 100 MG tablet TAKE 1 TABLET BY MOUTH ONCE DAILY 31 tablet 98     vitamin D3 (CHOLECALCIFEROL) 03954 UNITS capsule Take 1 capsule (50,000 Units) by mouth every 7 days 4 capsule 11     Medications reviewed:  Medications reconciled to facility chart and changes were made to reflect current medications as identified as above med list.     REVIEW OF SYSTEMS:  10 point ROS of systems including Constitutional, Eyes, Respiratory, Cardiovascular, Gastroenterology, Genitourinary, Integumentary, Muscularskeletal, Psychiatric were all negative except for pertinent positives noted in my HPI.    Physical Exam:  BP (!) 183/92  Pulse 76  Temp 98.2  F (36.8  C)  Resp 18  Ht 5' (1.524 m)  Wt 167 lb 8 oz (76 kg)  SpO2 97%  BMI 32.71 kg/m2   GENERAL APPEARANCE:  Alert, in no distress, morbidly obese  ENT:  Mouth and posterior  oropharynx normal, moist mucous membranes, normal hearing acuity  EYES:  EOM, conjunctivae, lids, pupils and irises normal  NECK:  No adenopathy,masses or thyromegaly  RESP:  respiratory effort and palpation of chest normal, lungs clear to auscultation , no respiratory distress  CV:  Palpation and auscultation of heart done , regular rate and rhythm, no murmur, rub, or gallop, peripheral edema 1+ in BLE  ABDOMEN:  normal bowel sounds, soft, nontender, no hepatosplenomegaly or other masses  SKIN:  Inspection of skin and subcutaneous tissue baseline, Palpation of skin and subcutaneous tissue baseline  PSYCH:  oriented X 3, insight and judgement impaired, memory impaired , affect abnormal flat      Recent Labs: none available at this time.     Assessment/Plan:  (S42.402S) Closed fracture of left elbow, sequela  (primary encounter diagnosis)  Comment: Patient is not very compliant with treatment plan. Hopefully this will not result in worsening of injury.   Plan: Continue current POC with no changes at this time. Continue to educate on risks, encourage use of immobilizer.    (I10) Essential hypertension  (primary encounter diagnosis)  Comment: Not well controlled for the past week, this may be related to anxiety, pain. Given her recent falls, would want to avoid dropping her BP too much at this time. Will monitor for now.  Plan: Continue current POC with no changes at this time. Monitor BP. Adjust medication as clinically indicated.    (F03.91) Dementia with psychosis  Comment: Poor judgement, insight, and memory are evident on exam. Therapy will be evaluating for care plan. 24 hour care is most appropriate.  Plan: OT/SLP eval and treat.     (J44.9) Chronic obstructive pulmonary disease, unspecified COPD type (H)  Comment: Chronic condition being managed with medications and is currently asymptomatic.  Plan: Continue current POC with no changes at this time and adjustments as needed.    (M54.5,  G89.29) Chronic low back  pain without sciatica, unspecified back pain laterality  Comment: Pain not controlled per patient, but she is a poor candidate for opioids given fall risk, risk for delirium, hallucinations. Goal is to manage pain enough for her to sleep well, stay somewhat active, and not be in distress  Plan: Continue current POC with no changes at this time and adjustments as needed.        Electronically signed by  ALCIRA Sweet CNP   [facility]  Cell: [phone]

## 2018-10-16 VITALS
OXYGEN SATURATION: 96 % | HEIGHT: 60 IN | TEMPERATURE: 98.5 F | WEIGHT: 167.5 LBS | DIASTOLIC BLOOD PRESSURE: 85 MMHG | BODY MASS INDEX: 32.89 KG/M2 | SYSTOLIC BLOOD PRESSURE: 149 MMHG | HEART RATE: 77 BPM | RESPIRATION RATE: 18 BRPM

## 2018-10-18 ENCOUNTER — NURSING HOME VISIT (OUTPATIENT)
Dept: GERIATRICS | Facility: CLINIC | Age: 78
End: 2018-10-18
Payer: COMMERCIAL

## 2018-10-18 DIAGNOSIS — S42.402D CLOSED FRACTURE OF DISTAL END OF LEFT HUMERUS WITH ROUTINE HEALING, UNSPECIFIED FRACTURE MORPHOLOGY, SUBSEQUENT ENCOUNTER: Primary | ICD-10-CM

## 2018-10-18 DIAGNOSIS — R29.6 FALLS FREQUENTLY: ICD-10-CM

## 2018-10-18 DIAGNOSIS — M54.50 CHRONIC LOW BACK PAIN WITHOUT SCIATICA, UNSPECIFIED BACK PAIN LATERALITY: ICD-10-CM

## 2018-10-18 DIAGNOSIS — F03.91 DEMENTIA WITH BEHAVIORAL DISTURBANCE, UNSPECIFIED DEMENTIA TYPE: ICD-10-CM

## 2018-10-18 DIAGNOSIS — I10 BENIGN ESSENTIAL HYPERTENSION: ICD-10-CM

## 2018-10-18 DIAGNOSIS — K21.9 GASTROESOPHAGEAL REFLUX DISEASE WITHOUT ESOPHAGITIS: ICD-10-CM

## 2018-10-18 DIAGNOSIS — G89.29 CHRONIC LOW BACK PAIN WITHOUT SCIATICA, UNSPECIFIED BACK PAIN LATERALITY: ICD-10-CM

## 2018-10-18 DIAGNOSIS — R53.81 PHYSICAL DECONDITIONING: ICD-10-CM

## 2018-10-18 DIAGNOSIS — D50.9 IRON DEFICIENCY ANEMIA, UNSPECIFIED IRON DEFICIENCY ANEMIA TYPE: ICD-10-CM

## 2018-10-18 DIAGNOSIS — J44.9 CHRONIC OBSTRUCTIVE PULMONARY DISEASE, UNSPECIFIED COPD TYPE (H): ICD-10-CM

## 2018-10-18 PROCEDURE — 99306 1ST NF CARE HIGH MDM 50: CPT | Performed by: INTERNAL MEDICINE

## 2018-10-18 NOTE — LETTER
10/18/2018        RE: Chelsey Casillas  [facility]  [address] [facility] [place] [address]        [facility]  INITIAL VISIT NOTE  October 18, 2018    PRIMARY CARE PROVIDER AND CLINIC:  Rebekah Rodas [address] [address] / [place] [address]    Chief Complaint   Patient presents with     Establish Care       HPI:    Chelsey Casillas is a 78 year old  (1940) female who was seen at [facility] on October 18, 2018 for an initial visit. Medical history is notable for HTN, COPD, low back pain and dementia. She moved to this facility from [facility] on 9/17/18 due to need for higher level of care. On 9/27/18 she fell with resultant transverse fracture of the left distal humerus for which she is to be in an immobilizer x4 weeks with repeat XR at that time.     Today, Ms. Casillas is seen in her room. She did not have the immobilizer on, tells me it is being washed. She believes she broke part of her lower arm (pointed distal to her elbow). I reminded her not to move that arm and she stated she knows. Denies pain.     CODE STATUS:   DNR / DNI    ALLERGIES:     Allergies   Allergen Reactions     Amlodipine Other (See Comments)       PAST MEDICAL HISTORY:   Past Medical History:   Diagnosis Date     Arthritis      Chronic low back pain 3/17/2015     COPD (chronic obstructive pulmonary disease) (H)      Dementia with psychosis 11/1/2016     Fall 2/27/2013     Falling episodes 9/24/2016     Functional urinary incontinence 3/7/2013     GERD (gastroesophageal reflux disease) 4/3/2018     HTN (hypertension) 4/3/2018     Knee pain 3/7/2013     Major depression, single episode 4/16/2015     Pain in both knees 1/30/2014     Physical deconditioning 3/7/2013     Severe major depression with psychotic features (H) 7/12/2016     Skin tear 3/7/2013     Weakness 6/18/2015       PAST SURGICAL HISTORY:   No past surgical history on file.    FAMILY HISTORY:   Unable to  review due to cognitive impairment    SOCIAL HISTORY:   Lives in SNF    MEDICATIONS:  Current Outpatient Prescriptions   Medication Sig Dispense Refill     Acetaminophen (TYLENOL PO) Take 1,000 mg by mouth daily as needed for mild pain or fever       Acetaminophen (TYLENOL PO) Take 1,000 mg by mouth 2 times daily       albuterol (PROAIR HFA/PROVENTIL HFA/VENTOLIN HFA) 108 (90 BASE) MCG/ACT Inhaler Inhale 2 puffs into the lungs every 4 hours as needed for shortness of breath / dyspnea or wheezing        ASPIRIN EC PO Take 81 mg by mouth daily       carvedilol (COREG) 3.125 MG tablet Take 1 tablet (3.125 mg) by mouth 2 times daily (with meals) 60 tablet 3     ferrous sulfate (IRON) 325 (65 FE) MG tablet Take 325 mg by mouth daily       folic acid (FOLVITE) 1 MG tablet TAKE 1 TABLET BY MOUTH ONCE DAILY 31 tablet 98     lisinopril (PRINIVIL/ZESTRIL) 10 MG tablet Take 1.5 tablets (15 mg) by mouth daily 45 tablet 1     loperamide (IMODIUM) 2 MG capsule TAKE TWO CAPSULES (4MG) BY MOUTH WITH FIRST LOOSE STOOL ; THEN TAKE ONE CAPSULE (2MG) BY MOUTH AFTER EACH LOOSE STOOL +MAX:16MG/24HRS+ 20 capsule 98     omeprazole (PRILOSEC) 20 MG CR capsule Take 1 capsule (20 mg) by mouth daily 31 capsule 11     risperiDONE (RISPERDAL) 0.5 MG tablet Take 1 tablet (0.5 mg) by mouth 2 times daily 62 tablet 3     sertraline (ZOLOFT) 100 MG tablet TAKE 1 TABLET BY MOUTH ONCE DAILY 31 tablet 98     vitamin D3 (CHOLECALCIFEROL) 71103 UNITS capsule Take 1 capsule (50,000 Units) by mouth every 7 days 4 capsule 11       Post Discharge Medication Reconciliation Status: medication reconcilation previously completed during another office visit.    ROS:  Unable to obtain due to cognitive impairment or aphasia    PHYSICAL EXAM:  /85  Pulse 77  Temp 98.5  F (36.9  C)  Resp 18  Ht 5' (1.524 m)  Wt 167 lb 8 oz (76 kg)  SpO2 96%  BMI 32.71 kg/m2  Gen: sitting in wheelchair, alert, cooperative and in no acute distress  HEENT: normocephalic;  oropharynx clear  Card: RRR, S1, S2, no murmurs  Resp: lungs clear to auscultation bilaterally, no crackles or wheezes  GI: abdomen soft, not-tender  MSK: normal muscle tone, no LE edema; she did not have immobilizer on her L arm  Neuro: CX II-XII grossly in tact; ROM in all four extremities grossly in tact  Psych: memory, judgement and insight impaired    LABORATORY/IMAGING DATA:  Reviewed as per Epic    ASSESSMENT/PLAN:    Distal Left Humerus Fracture (9/27/18)  Secondary to a fall. Plan is for immobilizer x4 weeks; however, she wasn't wearing it today and overall has been only wearing it intermittently.   -- analgesia with APAP 1000 mg BID and daily PRN  -- continue to encourage her to wear the immobilizer  -- repeat XR around 10/27 per ortho    HTN  SBPs labile in 110s-160s  -- continues on carvedilol 3.125 mg BID and lisinopril 15 mg daily  -- follow BPs and adjust medications as needed    COPD  No signs of exacerbation. Lungs clear on auscultation today. Not on any controller meds  -- has albuterol available PRN    Dementia With Behavioral Disturbance  Has had psychosis, seeing snakes for example. Seems to be settling into facility well.   -- continues on risperidone 0.5 mg BID and sertraline 100 mg daily  -- ongoing 24/7 nursing and supportive cares    Fe Deficiency Anemia  Baseline Hgb not known. Hgb was 10.3 in late December   -- continues on FeSO4 325 mg daily    Chronic Low Back Pain  Denied back pain today.    -- analgesia with APAP 1000 mg BID and daily PRN    GERD  -- continues on omeprazole 20 mg daily     Frequent Falls / Physical Deconditioning  In setting of  underlying medical conditions  -- ongoing PT/OT      Electronically signed by:  Anisa Caballero MD                        Sincerely,        Anisa Caballero MD

## 2018-10-19 NOTE — PROGRESS NOTES
[facility]  INITIAL VISIT NOTE  October 18, 2018    PRIMARY CARE PROVIDER AND CLINIC:  Rebekah Rodas [address] [address] / [place] [address]    Chief Complaint   Patient presents with     [facility]       HPI:    Chelsey Casillas is a 78 year old  (1940) female who was seen at [facility] on October 18, 2018 for an initial visit. Medical history is notable for HTN, COPD, low back pain and dementia. She moved to this facility from [facility] on 9/17/18 due to need for higher level of care. On 9/27/18 she fell with resultant transverse fracture of the left distal humerus for which she is to be in an immobilizer x4 weeks with repeat XR at that time.     Today, Ms. Casillas is seen in her room. She did not have the immobilizer on, tells me it is being washed. She believes she broke part of her lower arm (pointed distal to her elbow). I reminded her not to move that arm and she stated she knows. Denies pain.     CODE STATUS:   DNR / DNI    ALLERGIES:     Allergies   Allergen Reactions     Amlodipine Other (See Comments)       PAST MEDICAL HISTORY:   Past Medical History:   Diagnosis Date     Arthritis      Chronic low back pain 3/17/2015     COPD (chronic obstructive pulmonary disease) (H)      Dementia with psychosis 11/1/2016     Fall 2/27/2013     Falling episodes 9/24/2016     Functional urinary incontinence 3/7/2013     GERD (gastroesophageal reflux disease) 4/3/2018     HTN (hypertension) 4/3/2018     Knee pain 3/7/2013     Major depression, single episode 4/16/2015     Pain in both knees 1/30/2014     Physical deconditioning 3/7/2013     Severe major depression with psychotic features (H) 7/12/2016     Skin tear 3/7/2013     Weakness 6/18/2015       PAST SURGICAL HISTORY:   No past surgical history on file.    FAMILY HISTORY:   Unable to review due to cognitive impairment    SOCIAL HISTORY:   Lives in SNF    MEDICATIONS:  Current Outpatient  Prescriptions   Medication Sig Dispense Refill     Acetaminophen (TYLENOL PO) Take 1,000 mg by mouth daily as needed for mild pain or fever       Acetaminophen (TYLENOL PO) Take 1,000 mg by mouth 2 times daily       albuterol (PROAIR HFA/PROVENTIL HFA/VENTOLIN HFA) 108 (90 BASE) MCG/ACT Inhaler Inhale 2 puffs into the lungs every 4 hours as needed for shortness of breath / dyspnea or wheezing        ASPIRIN EC PO Take 81 mg by mouth daily       carvedilol (COREG) 3.125 MG tablet Take 1 tablet (3.125 mg) by mouth 2 times daily (with meals) 60 tablet 3     ferrous sulfate (IRON) 325 (65 FE) MG tablet Take 325 mg by mouth daily       folic acid (FOLVITE) 1 MG tablet TAKE 1 TABLET BY MOUTH ONCE DAILY 31 tablet 98     lisinopril (PRINIVIL/ZESTRIL) 10 MG tablet Take 1.5 tablets (15 mg) by mouth daily 45 tablet 1     loperamide (IMODIUM) 2 MG capsule TAKE TWO CAPSULES (4MG) BY MOUTH WITH FIRST LOOSE STOOL ; THEN TAKE ONE CAPSULE (2MG) BY MOUTH AFTER EACH LOOSE STOOL +MAX:16MG/24HRS+ 20 capsule 98     omeprazole (PRILOSEC) 20 MG CR capsule Take 1 capsule (20 mg) by mouth daily 31 capsule 11     risperiDONE (RISPERDAL) 0.5 MG tablet Take 1 tablet (0.5 mg) by mouth 2 times daily 62 tablet 3     sertraline (ZOLOFT) 100 MG tablet TAKE 1 TABLET BY MOUTH ONCE DAILY 31 tablet 98     vitamin D3 (CHOLECALCIFEROL) 32078 UNITS capsule Take 1 capsule (50,000 Units) by mouth every 7 days 4 capsule 11       Post Discharge Medication Reconciliation Status: medication reconcilation previously completed during another office visit.    ROS:  Unable to obtain due to cognitive impairment or aphasia    PHYSICAL EXAM:  /85  Pulse 77  Temp 98.5  F (36.9  C)  Resp 18  Ht 5' (1.524 m)  Wt 167 lb 8 oz (76 kg)  SpO2 96%  BMI 32.71 kg/m2  Gen: sitting in wheelchair, alert, cooperative and in no acute distress  HEENT: normocephalic; oropharynx clear  Card: RRR, S1, S2, no murmurs  Resp: lungs clear to auscultation bilaterally, no crackles  or wheezes  GI: abdomen soft, not-tender  MSK: normal muscle tone, no LE edema; she did not have immobilizer on her L arm  Neuro: CX II-XII grossly in tact; ROM in all four extremities grossly in tact  Psych: memory, judgement and insight impaired    LABORATORY/IMAGING DATA:  Reviewed as per Epic    ASSESSMENT/PLAN:    Distal Left Humerus Fracture (9/27/18)  Secondary to a fall. Plan is for immobilizer x4 weeks; however, she wasn't wearing it today and overall has been only wearing it intermittently.   -- analgesia with APAP 1000 mg BID and daily PRN  -- continue to encourage her to wear the immobilizer  -- repeat XR around 10/27 per ortho    HTN  SBPs labile in 110s-160s  -- continues on carvedilol 3.125 mg BID and lisinopril 15 mg daily  -- follow BPs and adjust medications as needed    COPD  No signs of exacerbation. Lungs clear on auscultation today. Not on any controller meds  -- has albuterol available PRN    Dementia With Behavioral Disturbance  Has had psychosis, seeing snakes for example. Seems to be settling into facility well.   -- continues on risperidone 0.5 mg BID and sertraline 100 mg daily  -- ongoing 24/7 nursing and supportive cares    Fe Deficiency Anemia  Baseline Hgb not known. Hgb was 10.3 in late December   -- continues on FeSO4 325 mg daily    Chronic Low Back Pain  Denied back pain today.    -- analgesia with APAP 1000 mg BID and daily PRN    GERD  -- continues on omeprazole 20 mg daily     Frequent Falls / Physical Deconditioning  In setting of  underlying medical conditions  -- ongoing PT/OT      Electronically signed by:  Anisa Caballero MD

## 2018-10-26 ENCOUNTER — TELEPHONE (OUTPATIENT)
Dept: GERIATRICS | Facility: CLINIC | Age: 78
End: 2018-10-26

## 2018-10-26 ENCOUNTER — DOCUMENTATION ONLY (OUTPATIENT)
Dept: OTHER | Facility: CLINIC | Age: 78
End: 2018-10-26

## 2018-10-26 PROBLEM — Z71.89 ADVANCED DIRECTIVES, COUNSELING/DISCUSSION: Status: RESOLVED | Noted: 2018-09-17 | Resolved: 2018-10-26

## 2018-10-27 NOTE — TELEPHONE ENCOUNTER
"Ortho Nursing home visit    Chelsey Casillas is a 78 year old female who resides at [facility]    Patient has x-rays reviewed for Left elbow fx, now 4 weeks, S/P injury treated closed, in immobilizer, patient per reports is not compliant with using immobilizer.       Past Medical History:   Diagnosis Date     Arthritis      Chronic low back pain 3/17/2015     COPD (chronic obstructive pulmonary disease) (H)      Dementia with psychosis 11/1/2016     Fall 2/27/2013     Falling episodes 9/24/2016     Functional urinary incontinence 3/7/2013     GERD (gastroesophageal reflux disease) 4/3/2018     HTN (hypertension) 4/3/2018     Knee pain 3/7/2013     Major depression, single episode 4/16/2015     Pain in both knees 1/30/2014     Physical deconditioning 3/7/2013     Severe major depression with psychotic features (H) 7/12/2016     Skin tear 3/7/2013     Weakness 6/18/2015      No past surgical history on file.     Allergies   Allergen Reactions     Amlodipine Other (See Comments)      There were no vitals taken for this visit.         X-rays show Healing bone callus formation at transverse fracture distal humerus left elbow, remains non-displaced    ASSESSMENT / PLAN:  Will continue to restrict W/B x 2 weeks, for fracture healing, may discontinue immobilizer, and use Left UE for all ADL\"s    No future x-rays needed unless new onset of pain'      Kunal OPA-C  [phone]    "

## 2018-11-06 ENCOUNTER — CLINICAL UPDATE (OUTPATIENT)
Dept: PHARMACY | Facility: CLINIC | Age: 78
End: 2018-11-06

## 2018-11-06 NOTE — PROGRESS NOTES
This patient's medication list and chart were reviewed as part of the service provided by [facility].    Assessment/Recommendations:  1. (Dementia w/psychosis):  Pt currently on Risperidone 0.5mg bid.  Per previous notes, will have visual hallucinations, such as seeing snakes.  Due to limitations of chart review, I am unsure if these are still occurring, or if Risperidone has helped.  If no signs/sx of hallucinations, please consider gradual dose reduction of Risperidone.  May consider reduction to 0.25mg in the a.m. And 0.5mg in the p.m. For 3-4 weeks, and if tolerates, reduce to 0.25mg bid for another 3-4 weeks, then daily for 3-4 weeks, then d'c.  Antipsychotics increase risk of falls, and pt with significant h/o falls; also increase risk of mortality.    2. (Smoking cessation):  Per previous notes, appears pt has been smoking. Consider nicotine patches if pt willing/able to quit smoking.  Due to smoking status, appropriate to continue ASA due to increased CV risk.    Sara Lugo, Pharm.D.,[facility]  Board Certified Geriatric Pharmacist  Medication Therapy Management Pharmacist  [phone]

## 2018-11-12 ENCOUNTER — NURSING HOME VISIT (OUTPATIENT)
Dept: GERIATRICS | Facility: CLINIC | Age: 78
End: 2018-11-12
Payer: COMMERCIAL

## 2018-11-12 VITALS
DIASTOLIC BLOOD PRESSURE: 78 MMHG | HEART RATE: 77 BPM | BODY MASS INDEX: 33.28 KG/M2 | WEIGHT: 169.5 LBS | HEIGHT: 60 IN | TEMPERATURE: 98.2 F | OXYGEN SATURATION: 95 % | SYSTOLIC BLOOD PRESSURE: 155 MMHG | RESPIRATION RATE: 18 BRPM

## 2018-11-12 DIAGNOSIS — K21.9 GASTROESOPHAGEAL REFLUX DISEASE, ESOPHAGITIS PRESENCE NOT SPECIFIED: ICD-10-CM

## 2018-11-12 DIAGNOSIS — M54.50 CHRONIC LOW BACK PAIN WITHOUT SCIATICA, UNSPECIFIED BACK PAIN LATERALITY: ICD-10-CM

## 2018-11-12 DIAGNOSIS — S42.402S LEFT ELBOW FRACTURE, SEQUELA: Primary | ICD-10-CM

## 2018-11-12 DIAGNOSIS — G89.29 CHRONIC LOW BACK PAIN WITHOUT SCIATICA, UNSPECIFIED BACK PAIN LATERALITY: ICD-10-CM

## 2018-11-12 DIAGNOSIS — F03.92 DEMENTIA WITH PSYCHOSIS (H): ICD-10-CM

## 2018-11-12 DIAGNOSIS — I10 ESSENTIAL HYPERTENSION: ICD-10-CM

## 2018-11-12 DIAGNOSIS — F32.3 SEVERE MAJOR DEPRESSION WITH PSYCHOTIC FEATURES (H): ICD-10-CM

## 2018-11-12 DIAGNOSIS — J44.9 CHRONIC OBSTRUCTIVE PULMONARY DISEASE, UNSPECIFIED COPD TYPE (H): ICD-10-CM

## 2018-11-12 PROCEDURE — 99309 SBSQ NF CARE MODERATE MDM 30: CPT | Performed by: NURSE PRACTITIONER

## 2018-11-12 NOTE — LETTER
11/12/2018        RE: Chelsey Casillas  [facility]  [address] [address]          [place] [facility]    Chief Complaint   Patient presents with     longterm Regulatory       [place] Medical Record Number:  [id number]  Place of Service where encounter took place:  [facility] ([facility]) [[id number]]    HPI:    Chelsey Casillas is a 78 year old  (1940), who is being seen today for a federally mandated E/M visit.      HPI information obtained from: facility chart records, facility staff, patient report and [place] Epic chart review.     Today's concerns are:  Left elbow fracture, sequela  Sustained in mechanical fall on 9/27/18. Ortho PA was able to see her at the nursing home. Diagnosed with transverse fracture of Left elbow, distal humerus. Orders given for immobilizer at all times, except for bathing, recheck xray in 4 weeks. Patient refused to wear the immobilizer, but repeat xrays showed some healing. She says the arm feels much better. It still hurts if she tries to pick things up, but otherwise is ok.    Essential hypertension  On carvedilol and lisinopril.   BP readings range:  155/78  141/63  115/76  149/85  134/76    Dementia with psychosis  Patient moved here from Assisted Living on 9/18/18 due to frequent falls, worsening cognition. Since arriving here, she continues to talk about not being happy, wanting to go back to her apartment. No elopement attempts, but did have several falls in the first week. Last fall was 9/27 when she fractured her arm. She has been somewhat more calm in the past few weeks. Still unhappy all the time per staff.    Severe major depression with psychotic features (H)  On 10/18/18 PHQ-9 score = 0    Chronic obstructive pulmonary disease, unspecified COPD type (H)  Patient currently denies SOB, cough, wheezing. No hypoxia.    Chronic low back pain without sciatica, unspecified back pain laterality  Denies concerns today. Does not  ask about her Tylenol #3 today    Gastroesophageal reflux disease, esophagitis presence not specified  Current regimen Omeprazole 20mg po daily. Denies dyspepsia      ALLERGIES: Amlodipine  PAST MEDICAL HISTORY:  has a past medical history of Arthritis; Chronic low back pain (3/17/2015); COPD (chronic obstructive pulmonary disease) (H); Dementia with psychosis (11/1/2016); Fall (2/27/2013); Falling episodes (9/24/2016); Functional urinary incontinence (3/7/2013); GERD (gastroesophageal reflux disease) (4/3/2018); HTN (hypertension) (4/3/2018); Knee pain (3/7/2013); Major depression, single episode (4/16/2015); Pain in both knees (1/30/2014); Physical deconditioning (3/7/2013); Severe major depression with psychotic features (H) (7/12/2016); Skin tear (3/7/2013); and Weakness (6/18/2015).  PAST SURGICAL HISTORY:  has no past surgical history on file.  FAMILY HISTORY: Family history is unknown by patient.  SOCIAL HISTORY:  reports that she has been smoking Cigarettes.  She has never used smokeless tobacco. She reports that she does not drink alcohol or use illicit drugs.    MEDICATIONS:  Current Outpatient Prescriptions   Medication Sig Dispense Refill     Acetaminophen (TYLENOL PO) Take 1,000 mg by mouth daily as needed for mild pain or fever       Acetaminophen (TYLENOL PO) Take 1,000 mg by mouth 2 times daily       albuterol (PROAIR HFA/PROVENTIL HFA/VENTOLIN HFA) 108 (90 BASE) MCG/ACT Inhaler Inhale 2 puffs into the lungs every 4 hours as needed for shortness of breath / dyspnea or wheezing        ASPIRIN EC PO Take 81 mg by mouth daily       carvedilol (COREG) 3.125 MG tablet Take 1 tablet (3.125 mg) by mouth 2 times daily (with meals) 60 tablet 3     ferrous sulfate (IRON) 325 (65 FE) MG tablet Take 325 mg by mouth daily       folic acid (FOLVITE) 1 MG tablet TAKE 1 TABLET BY MOUTH ONCE DAILY 31 tablet 98     lisinopril (PRINIVIL/ZESTRIL) 10 MG tablet Take 1.5 tablets (15 mg) by mouth daily 45 tablet 1      loperamide (IMODIUM) 2 MG capsule TAKE TWO CAPSULES (4MG) BY MOUTH WITH FIRST LOOSE STOOL ; THEN TAKE ONE CAPSULE (2MG) BY MOUTH AFTER EACH LOOSE STOOL +MAX:16MG/24HRS+ 20 capsule 98     omeprazole (PRILOSEC) 20 MG CR capsule Take 1 capsule (20 mg) by mouth daily 31 capsule 11     risperiDONE (RISPERDAL) 0.5 MG tablet Take 1 tablet (0.5 mg) by mouth 2 times daily 62 tablet 3     sertraline (ZOLOFT) 100 MG tablet TAKE 1 TABLET BY MOUTH ONCE DAILY 31 tablet 98     vitamin D3 (CHOLECALCIFEROL) 60373 UNITS capsule Take 1 capsule (50,000 Units) by mouth every 7 days 4 capsule 11     Medications reviewed:  Medications reconciled to facility chart and changes were made to reflect current medications as identified as above med list.     Case Management:  I have reviewed the care plan and MDS and do agree with the plan. Patient's desire to return to the community is present, but is not able due to care needs .  Information reviewed:  Medications, vital signs, orders, and nursing notes.    ROS:  10 point ROS of systems including Constitutional, Eyes, Respiratory, Cardiovascular, Gastroenterology, Genitourinary, Integumentary, Musculoskeletal, Psychiatric were all negative except for pertinent positives noted in my HPI.    Exam:  Vitals: /78  Pulse 77  Temp 98.2  F (36.8  C)  Resp 18  Ht 5' (1.524 m)  Wt 169 lb 8 oz (76.9 kg)  SpO2 95%  BMI 33.1 kg/m2  BMI= Body mass index is 33.1 kg/(m^2).   GENERAL APPEARANCE:  Alert, in no distress, morbidly obese  ENT:  Mouth and posterior oropharynx normal, moist mucous membranes, normal hearing acuity  EYES:  EOM, conjunctivae, lids, pupils and irises normal  NECK:  No adenopathy,masses or thyromegaly  RESP:  respiratory effort and palpation of chest normal, lungs clear to auscultation , no respiratory distress  CV:  Palpation and auscultation of heart done , regular rate and rhythm, no murmur, rub, or gallop, peripheral edema 1+ in BLE  ABDOMEN:  normal bowel sounds, soft,  nontender, no hepatosplenomegaly or other masses  SKIN:  Inspection of skin and subcutaneous tissue baseline, Palpation of skin and subcutaneous tissue baseline  PSYCH:  oriented X 3, insight and judgement impaired, memory impaired , affect abnormal flat      Lab/Diagnostic data:  None available at this time.       ASSESSMENT/PLAN  (S42.402S) Left elbow fracture, sequela  (primary encounter diagnosis)  Comment: Appears to have healed despite her not following recommendations.   Plan: Continue current POC with no changes at this time and adjustments as needed.    (I10) Essential hypertension  Comment: Controlled. BP goals are <150/90 mm Hg. This is higher than ACC and AHA recommendations due to risk for hypotension, risk of dizziness and falls, risk of tissue/cerebral hypoperfusion and frailty. She also has no known CAD.  Plan: Continue current POC with no changes at this time and adjustments as needed.    (F03.91) Dementia with psychosis  Comment: Chronic, progressive. Needs 24 hour skilled nursing care. HPI/ROS somewhat unreliable with cognitive impairment. Expect further functional and cognitive decline.  Plan: Continue 24 hour care. Monitor functional status. Monitor for behavioral disturbances.    (F32.3) Severe major depression with psychotic features (H)  Comment: Controlled with medications per PHQ-9 report.   Plan: Continue current POC with no changes at this time and adjustments as needed.    (J44.9) Chronic obstructive pulmonary disease, unspecified COPD type (H)  Comment: Only has prn albuterol ordered. No pulmonary issues since admission. Likely early stage/mild  Plan: Continue current POC with no changes at this time and adjustments as needed.    (M54.5,  G89.29) Chronic low back pain without sciatica, unspecified back pain laterality  Comment: No complaints today. Poor candidate for opioids due to risk for falls, delirium. Not complaint with therapy  Plan: Continue current POC with no changes at this  time and adjustments as needed.    (K21.9) Gastroesophageal reflux disease, esophagitis presence not specified  Comment: Asymptomatic  Plan: Continue current POC with no changes at this time and adjustments as needed.        Electronically signed by:  ALCIRA Sweet [facility]  Cell: [phone]

## 2018-11-12 NOTE — PROGRESS NOTES
[place] [facility]    Chief Complaint   Patient presents with     assisted Regulatory       [place] Medical Record Number:  [id number]  Place of Service where encounter took place:  [facility] [place] ([facility]) [[id number]]    HPI:    Chelsey Casillas is a 78 year old  (1940), who is being seen today for a federally mandated E/M visit.      HPI information obtained from: facility chart records, facility staff, patient report and [facility] chart review.     Today's concerns are:  Left elbow fracture, sequela  Sustained in mechanical fall on 9/27/18. Ortho PA was able to see her at the nursing home. Diagnosed with transverse fracture of Left elbow, distal humerus. Orders given for immobilizer at all times, except for bathing, recheck xray in 4 weeks. Patient refused to wear the immobilizer, but repeat xrays showed some healing. She says the arm feels much better. It still hurts if she tries to pick things up, but otherwise is ok.    Essential hypertension  On carvedilol and lisinopril.   BP readings range:  155/78  141/63  115/76  149/85  134/76    Dementia with psychosis  Patient moved here from Assisted Living on 9/18/18 due to frequent falls, worsening cognition. Since arriving here, she continues to talk about not being happy, wanting to go back to her apartment. No elopement attempts, but did have several falls in the first week. Last fall was 9/27 when she fractured her arm. She has been somewhat more calm in the past few weeks. Still unhappy all the time per staff.    Severe major depression with psychotic features (H)  On 10/18/18 PHQ-9 score = 0    Chronic obstructive pulmonary disease, unspecified COPD type (H)  Patient currently denies SOB, cough, wheezing. No hypoxia.    Chronic low back pain without sciatica, unspecified back pain laterality  Denies concerns today. Does not ask about her Tylenol #3 today    Gastroesophageal reflux disease, esophagitis presence not specified  Current  regimen Omeprazole 20mg po daily. Denies dyspepsia      ALLERGIES: Amlodipine  PAST MEDICAL HISTORY:  has a past medical history of Arthritis; Chronic low back pain (3/17/2015); COPD (chronic obstructive pulmonary disease) (H); Dementia with psychosis (11/1/2016); Fall (2/27/2013); Falling episodes (9/24/2016); Functional urinary incontinence (3/7/2013); GERD (gastroesophageal reflux disease) (4/3/2018); HTN (hypertension) (4/3/2018); Knee pain (3/7/2013); Major depression, single episode (4/16/2015); Pain in both knees (1/30/2014); Physical deconditioning (3/7/2013); Severe major depression with psychotic features (H) (7/12/2016); Skin tear (3/7/2013); and Weakness (6/18/2015).  PAST SURGICAL HISTORY:  has no past surgical history on file.  FAMILY HISTORY: Family history is unknown by patient.  SOCIAL HISTORY:  reports that she has been smoking Cigarettes.  She has never used smokeless tobacco. She reports that she does not drink alcohol or use illicit drugs.    MEDICATIONS:  Current Outpatient Prescriptions   Medication Sig Dispense Refill     Acetaminophen (TYLENOL PO) Take 1,000 mg by mouth daily as needed for mild pain or fever       Acetaminophen (TYLENOL PO) Take 1,000 mg by mouth 2 times daily       albuterol (PROAIR HFA/PROVENTIL HFA/VENTOLIN HFA) 108 (90 BASE) MCG/ACT Inhaler Inhale 2 puffs into the lungs every 4 hours as needed for shortness of breath / dyspnea or wheezing        ASPIRIN EC PO Take 81 mg by mouth daily       carvedilol (COREG) 3.125 MG tablet Take 1 tablet (3.125 mg) by mouth 2 times daily (with meals) 60 tablet 3     ferrous sulfate (IRON) 325 (65 FE) MG tablet Take 325 mg by mouth daily       folic acid (FOLVITE) 1 MG tablet TAKE 1 TABLET BY MOUTH ONCE DAILY 31 tablet 98     lisinopril (PRINIVIL/ZESTRIL) 10 MG tablet Take 1.5 tablets (15 mg) by mouth daily 45 tablet 1     loperamide (IMODIUM) 2 MG capsule TAKE TWO CAPSULES (4MG) BY MOUTH WITH FIRST LOOSE STOOL ; THEN TAKE ONE CAPSULE  (2MG) BY MOUTH AFTER EACH LOOSE STOOL +MAX:16MG/24HRS+ 20 capsule 98     omeprazole (PRILOSEC) 20 MG CR capsule Take 1 capsule (20 mg) by mouth daily 31 capsule 11     risperiDONE (RISPERDAL) 0.5 MG tablet Take 1 tablet (0.5 mg) by mouth 2 times daily 62 tablet 3     sertraline (ZOLOFT) 100 MG tablet TAKE 1 TABLET BY MOUTH ONCE DAILY 31 tablet 98     vitamin D3 (CHOLECALCIFEROL) 44249 UNITS capsule Take 1 capsule (50,000 Units) by mouth every 7 days 4 capsule 11     Medications reviewed:  Medications reconciled to facility chart and changes were made to reflect current medications as identified as above med list.     Case Management:  I have reviewed the care plan and MDS and do agree with the plan. Patient's desire to return to the community is present, but is not able due to care needs .  Information reviewed:  Medications, vital signs, orders, and nursing notes.    ROS:  10 point ROS of systems including Constitutional, Eyes, Respiratory, Cardiovascular, Gastroenterology, Genitourinary, Integumentary, Musculoskeletal, Psychiatric were all negative except for pertinent positives noted in my HPI.    Exam:  Vitals: /78  Pulse 77  Temp 98.2  F (36.8  C)  Resp 18  Ht 5' (1.524 m)  Wt 169 lb 8 oz (76.9 kg)  SpO2 95%  BMI 33.1 kg/m2  BMI= Body mass index is 33.1 kg/(m^2).   GENERAL APPEARANCE:  Alert, in no distress, morbidly obese  ENT:  Mouth and posterior oropharynx normal, moist mucous membranes, normal hearing acuity  EYES:  EOM, conjunctivae, lids, pupils and irises normal  NECK:  No adenopathy,masses or thyromegaly  RESP:  respiratory effort and palpation of chest normal, lungs clear to auscultation , no respiratory distress  CV:  Palpation and auscultation of heart done , regular rate and rhythm, no murmur, rub, or gallop, peripheral edema 1+ in BLE  ABDOMEN:  normal bowel sounds, soft, nontender, no hepatosplenomegaly or other masses  SKIN:  Inspection of skin and subcutaneous tissue baseline,  Palpation of skin and subcutaneous tissue baseline  PSYCH:  oriented X 3, insight and judgement impaired, memory impaired , affect abnormal flat      Lab/Diagnostic data:  None available at this time.       ASSESSMENT/PLAN  (S42.402S) Left elbow fracture, sequela  (primary encounter diagnosis)  Comment: Appears to have healed despite her not following recommendations.   Plan: Continue current POC with no changes at this time and adjustments as needed.    (I10) Essential hypertension  Comment: Controlled. BP goals are <150/90 mm Hg. This is higher than ACC and AHA recommendations due to risk for hypotension, risk of dizziness and falls, risk of tissue/cerebral hypoperfusion and frailty. She also has no known CAD.  Plan: Continue current POC with no changes at this time and adjustments as needed.    (F03.91) Dementia with psychosis  Comment: Chronic, progressive. Needs 24 hour skilled nursing care. HPI/ROS somewhat unreliable with cognitive impairment. Expect further functional and cognitive decline.  Plan: Continue 24 hour care. Monitor functional status. Monitor for behavioral disturbances.    (F32.3) Severe major depression with psychotic features (H)  Comment: Controlled with medications per PHQ-9 report.   Plan: Continue current POC with no changes at this time and adjustments as needed.    (J44.9) Chronic obstructive pulmonary disease, unspecified COPD type (H)  Comment: Only has prn albuterol ordered. No pulmonary issues since admission. Likely early stage/mild  Plan: Continue current POC with no changes at this time and adjustments as needed.    (M54.5,  G89.29) Chronic low back pain without sciatica, unspecified back pain laterality  Comment: No complaints today. Poor candidate for opioids due to risk for falls, delirium. Not complaint with therapy  Plan: Continue current POC with no changes at this time and adjustments as needed.    (K21.9) Gastroesophageal reflux disease, esophagitis presence not  specified  Comment: Asymptomatic  Plan: Continue current POC with no changes at this time and adjustments as needed.        Electronically signed by:  ALCIRA Sweet [facility]  Cell: [phone]

## 2018-12-10 ENCOUNTER — TELEPHONE (OUTPATIENT)
Dept: GERIATRICS | Facility: CLINIC | Age: 78
End: 2018-12-10

## 2018-12-10 ENCOUNTER — NURSING HOME VISIT (OUTPATIENT)
Dept: GERIATRICS | Facility: CLINIC | Age: 78
End: 2018-12-10
Payer: COMMERCIAL

## 2018-12-10 VITALS
HEART RATE: 70 BPM | HEIGHT: 60 IN | SYSTOLIC BLOOD PRESSURE: 157 MMHG | DIASTOLIC BLOOD PRESSURE: 88 MMHG | TEMPERATURE: 98.7 F | BODY MASS INDEX: 31.67 KG/M2 | RESPIRATION RATE: 18 BRPM | WEIGHT: 161.3 LBS | OXYGEN SATURATION: 95 %

## 2018-12-10 DIAGNOSIS — K21.9 GASTROESOPHAGEAL REFLUX DISEASE WITHOUT ESOPHAGITIS: Primary | ICD-10-CM

## 2018-12-10 DIAGNOSIS — J44.9 CHRONIC OBSTRUCTIVE PULMONARY DISEASE, UNSPECIFIED COPD TYPE (H): ICD-10-CM

## 2018-12-10 DIAGNOSIS — I10 BENIGN ESSENTIAL HYPERTENSION: Primary | ICD-10-CM

## 2018-12-10 DIAGNOSIS — F03.92 DEMENTIA WITH PSYCHOSIS (H): ICD-10-CM

## 2018-12-10 PROCEDURE — 99309 SBSQ NF CARE MODERATE MDM 30: CPT | Performed by: INTERNAL MEDICINE

## 2018-12-10 RX ORDER — OMEPRAZOLE 10 MG/1
10 CAPSULE, DELAYED RELEASE ORAL DAILY
Qty: 90 CAPSULE | Refills: 3
Start: 2018-12-10 | End: 2019-02-27 | Stop reason: ALTCHOICE

## 2018-12-10 RX ORDER — NYSTATIN 100000 [USP'U]/G
POWDER TOPICAL 2 TIMES DAILY PRN
COMMUNITY
End: 2019-12-14

## 2018-12-10 ASSESSMENT — MIFFLIN-ST. JEOR: SCORE: 1143.15

## 2018-12-10 NOTE — PROGRESS NOTES
[place] [facility]  Nursing Home Regulatory Visit  December 10, 2018    Chief Complaint   Patient presents with     jail Regulatory       HPI:    Chelsey Casillas is a 78 year old  (1940), who is being seen today for a federally mandated E/M visit at [facility]. Today's concerns are:    1) HTN -- SBPs overall in 140s on carvedilol 3.125 mg BID and lisinopril 15 mg daily  2) Dementia With Psychosis -- History of visual hallucinations (ie. Seeing snakes). Requires 24/7 cares. Managed with risperidone 0.5 mg BID and sertraline 100 mg daily  3) COPD -- By history. No signs of exacerbation. She is not on any controller meds. Has albuterol available PRN    ALLERGIES: Amlodipine    PAST MEDICAL HISTORY:   Past Medical History:   Diagnosis Date     Arthritis      Chronic low back pain 3/17/2015     COPD (chronic obstructive pulmonary disease) (H)      Dementia with psychosis 11/1/2016     Fall 2/27/2013     Falling episodes 9/24/2016     Functional urinary incontinence 3/7/2013     GERD (gastroesophageal reflux disease) 4/3/2018     HTN (hypertension) 4/3/2018     Knee pain 3/7/2013     Major depression, single episode 4/16/2015     Pain in both knees 1/30/2014     Physical deconditioning 3/7/2013     Severe major depression with psychotic features (H) 7/12/2016     Skin tear 3/7/2013     Weakness 6/18/2015       PAST SURGICAL HISTORY:   No past surgical history on file.    FAMILY HISTORY:   Family History   Family history unknown: Yes       SOCIAL HISTORY:   Lives in a SNF    MEDICATIONS:  Current Outpatient Medications   Medication Sig Dispense Refill     Acetaminophen (TYLENOL PO) Take 1,000 mg by mouth daily as needed for mild pain or fever       Acetaminophen (TYLENOL PO) Take 1,000 mg by mouth 2 times daily       albuterol (PROAIR HFA/PROVENTIL HFA/VENTOLIN HFA) 108 (90 BASE) MCG/ACT Inhaler Inhale 2 puffs into the lungs every 4 hours as needed for shortness of breath / dyspnea or  wheezing        ASPIRIN EC PO Take 81 mg by mouth daily       carvedilol (COREG) 3.125 MG tablet Take 1 tablet (3.125 mg) by mouth 2 times daily (with meals) 60 tablet 3     ferrous sulfate (IRON) 325 (65 FE) MG tablet Take 325 mg by mouth daily       folic acid (FOLVITE) 1 MG tablet TAKE 1 TABLET BY MOUTH ONCE DAILY 31 tablet 98     lisinopril (PRINIVIL/ZESTRIL) 10 MG tablet Take 1.5 tablets (15 mg) by mouth daily 45 tablet 1     loperamide (IMODIUM) 2 MG capsule TAKE TWO CAPSULES (4MG) BY MOUTH WITH FIRST LOOSE STOOL ; THEN TAKE ONE CAPSULE (2MG) BY MOUTH AFTER EACH LOOSE STOOL +MAX:16MG/24HRS+ 20 capsule 98     omeprazole (PRILOSEC) 10 MG DR capsule Take 1 capsule (10 mg) by mouth daily 90 capsule 3     risperiDONE (RISPERDAL) 0.5 MG tablet Take 1 tablet (0.5 mg) by mouth 2 times daily 62 tablet 3     sertraline (ZOLOFT) 100 MG tablet TAKE 1 TABLET BY MOUTH ONCE DAILY 31 tablet 98     vitamin D3 (CHOLECALCIFEROL) 51615 UNITS capsule Take 1 capsule (50,000 Units) by mouth every 7 days 4 capsule 11       Medications reviewed:  Medications reconciled to facility chart and changes were made to reflect current medications as identified as above med list. Below are the changes that were made:   Medications stopped since last EPIC medication reconciliation:   There are no discontinued medications.  Medications started since last [facility] medication reconciliation:  No orders of the defined types were placed in this encounter.      Case Management:  I have reviewed the care plan and MDS and do agree with the plan.   Information reviewed:  Medications, vital signs, orders, and nursing notes.    ROS:  Unable to be obtained due to cognitive impairment or aphasia.     PHYSICAL EXAM:  /88   Pulse 70   Temp 98.7  F (37.1  C)   Resp 18   Ht 1.524 m (5')   Wt 73.2 kg (161 lb 4.8 oz)   SpO2 95%   BMI 31.50 kg/m    Gen: sitting in wheelchair, alert, cooperative and in no acute distress  Resp: breathing non-labored  GI:  abdomen soft  Ext: no LE edema  Neuro: CX II-XII grossly in tact; ROM in all four extremities grossly in tact  Psych: memory, judgement and insight impaired    Lab/Diagnostic data:  Reviewed as per Epic    ASSESSMENT/PLAN    1) HTN   SBPs overall in 140s  -- continues on carvedilol 3.125 mg BID and lisinopril 15 mg daily  -- follow BPs and adjust medications as needed    2) Dementia With Psychosis   History of visual hallucinations (ie. Seeing snakes).   -- continues on risperidone 0.5 mg BID and sertraline 100 mg daily  -- ongoing 24/7 nursing and supportive cares    3) COPD   By history. No signs of exacerbation. She is not on any controller meds.   -- has albuterol available PRN    Electronically signed by:  Anisa Caballero MD

## 2018-12-10 NOTE — TELEPHONE ENCOUNTER
Pharmacy recommendation received to reduce PPI if able. Diagnosis associated is GERD. No history of GI bleed found in record. She is on ASA 81mg, no other anticoagulation. No c/o dyspepsia    PLAN:  Decrease omeprazole to 10mg daily  Monitor for dyspepsia

## 2018-12-10 NOTE — LETTER
12/10/2018        RE: Chelsey Casillas  [facility]  [address] [address]        [facility]  Nursing Home Regulatory Visit  December 10, 2018    Chief Complaint   Patient presents with     long term Regulatory       HPI:    Chelsey Casillas is a 78 year old  (1940), who is being seen today for a federally mandated E/M visit at [facility]. Today's concerns are:    1) HTN -- SBPs overall in 140s on carvedilol 3.125 mg BID and lisinopril 15 mg daily  2) Dementia With Psychosis -- History of visual hallucinations (ie. Seeing snakes). Requires 24/7 cares. Managed with risperidone 0.5 mg BID and sertraline 100 mg daily  3) COPD -- By history. No signs of exacerbation. She is not on any controller meds. Has albuterol available PRN    ALLERGIES: Amlodipine    PAST MEDICAL HISTORY:   Past Medical History:   Diagnosis Date     Arthritis      Chronic low back pain 3/17/2015     COPD (chronic obstructive pulmonary disease) (H)      Dementia with psychosis 11/1/2016     Fall 2/27/2013     Falling episodes 9/24/2016     Functional urinary incontinence 3/7/2013     GERD (gastroesophageal reflux disease) 4/3/2018     HTN (hypertension) 4/3/2018     Knee pain 3/7/2013     Major depression, single episode 4/16/2015     Pain in both knees 1/30/2014     Physical deconditioning 3/7/2013     Severe major depression with psychotic features (H) 7/12/2016     Skin tear 3/7/2013     Weakness 6/18/2015       PAST SURGICAL HISTORY:   No past surgical history on file.    FAMILY HISTORY:   Family History   Family history unknown: Yes       SOCIAL HISTORY:   Lives in a SNF    MEDICATIONS:  Current Outpatient Medications   Medication Sig Dispense Refill     Acetaminophen (TYLENOL PO) Take 1,000 mg by mouth daily as needed for mild pain or fever       Acetaminophen (TYLENOL PO) Take 1,000 mg by mouth 2 times daily       albuterol (PROAIR HFA/PROVENTIL HFA/VENTOLIN HFA) 108 (90  BASE) MCG/ACT Inhaler Inhale 2 puffs into the lungs every 4 hours as needed for shortness of breath / dyspnea or wheezing        ASPIRIN EC PO Take 81 mg by mouth daily       carvedilol (COREG) 3.125 MG tablet Take 1 tablet (3.125 mg) by mouth 2 times daily (with meals) 60 tablet 3     ferrous sulfate (IRON) 325 (65 FE) MG tablet Take 325 mg by mouth daily       folic acid (FOLVITE) 1 MG tablet TAKE 1 TABLET BY MOUTH ONCE DAILY 31 tablet 98     lisinopril (PRINIVIL/ZESTRIL) 10 MG tablet Take 1.5 tablets (15 mg) by mouth daily 45 tablet 1     loperamide (IMODIUM) 2 MG capsule TAKE TWO CAPSULES (4MG) BY MOUTH WITH FIRST LOOSE STOOL ; THEN TAKE ONE CAPSULE (2MG) BY MOUTH AFTER EACH LOOSE STOOL +MAX:16MG/24HRS+ 20 capsule 98     omeprazole (PRILOSEC) 10 MG DR capsule Take 1 capsule (10 mg) by mouth daily 90 capsule 3     risperiDONE (RISPERDAL) 0.5 MG tablet Take 1 tablet (0.5 mg) by mouth 2 times daily 62 tablet 3     sertraline (ZOLOFT) 100 MG tablet TAKE 1 TABLET BY MOUTH ONCE DAILY 31 tablet 98     vitamin D3 (CHOLECALCIFEROL) 91069 UNITS capsule Take 1 capsule (50,000 Units) by mouth every 7 days 4 capsule 11       Medications reviewed:  Medications reconciled to facility chart and changes were made to reflect current medications as identified as above med list. Below are the changes that were made:   Medications stopped since last EPIC medication reconciliation:   There are no discontinued medications.  Medications started since last [facility] medication reconciliation:  No orders of the defined types were placed in this encounter.      Case Management:  I have reviewed the care plan and MDS and do agree with the plan.   Information reviewed:  Medications, vital signs, orders, and nursing notes.    ROS:  Unable to be obtained due to cognitive impairment or aphasia.     PHYSICAL EXAM:  /88   Pulse 70   Temp 98.7  F (37.1  C)   Resp 18   Ht 1.524 m (5')   Wt 73.2 kg (161 lb 4.8 oz)   SpO2 95%   BMI 31.50  kg/m     Gen: sitting in wheelchair, alert, cooperative and in no acute distress  Resp: breathing non-labored  GI: abdomen soft  Ext: no LE edema  Neuro: CX II-XII grossly in tact; ROM in all four extremities grossly in tact  Psych: memory, judgement and insight impaired    Lab/Diagnostic data:  Reviewed as per Epic    ASSESSMENT/PLAN    1) HTN   SBPs overall in 140s  -- continues on carvedilol 3.125 mg BID and lisinopril 15 mg daily  -- follow BPs and adjust medications as needed    2) Dementia With Psychosis   History of visual hallucinations (ie. Seeing snakes).   -- continues on risperidone 0.5 mg BID and sertraline 100 mg daily  -- ongoing 24/7 nursing and supportive cares    3) COPD   By history. No signs of exacerbation. She is not on any controller meds.   -- has albuterol available PRN    Electronically signed by:  Anisa Caballero MD        Sincerely,        Anisa Caballero MD

## 2018-12-19 ENCOUNTER — NURSING HOME VISIT (OUTPATIENT)
Dept: GERIATRICS | Facility: CLINIC | Age: 78
End: 2018-12-19
Payer: COMMERCIAL

## 2018-12-19 VITALS
HEIGHT: 60 IN | BODY MASS INDEX: 31.61 KG/M2 | WEIGHT: 161 LBS | TEMPERATURE: 98.4 F | SYSTOLIC BLOOD PRESSURE: 165 MMHG | RESPIRATION RATE: 20 BRPM | OXYGEN SATURATION: 95 % | HEART RATE: 77 BPM | DIASTOLIC BLOOD PRESSURE: 78 MMHG

## 2018-12-19 DIAGNOSIS — I10 HYPERTENSION, UNSPECIFIED TYPE: ICD-10-CM

## 2018-12-19 DIAGNOSIS — I10 ESSENTIAL HYPERTENSION: Primary | ICD-10-CM

## 2018-12-19 PROCEDURE — 99308 SBSQ NF CARE LOW MDM 20: CPT | Performed by: NURSE PRACTITIONER

## 2018-12-19 RX ORDER — LISINOPRIL 20 MG/1
20 TABLET ORAL DAILY
Start: 2018-12-19 | End: 2020-02-07

## 2018-12-19 ASSESSMENT — MIFFLIN-ST. JEOR: SCORE: 1141.79

## 2018-12-19 NOTE — PROGRESS NOTES
[facility]    Chief Complaint   Patient presents with     Hypertension       [facility] Medical Record Number:  [id number]  Place of Service where encounter took place:  [facility] ([facility]) [[id number]]    HPI:    Chelsey Casillas is a 78 year old  (1940), who is being seen today for an episodic care visit.  HPI information obtained from: facility chart records, facility staff and patient report.       Today's concern is:  Essential hypertension  BP readings range:  165/78   161/79  157/88  149/84  133/81  144/81  143/75  131/79  108/58  174/53  160/92  168/81  152/92  168/96  156/96    REVIEW OF SYSTEMS:  4 point ROS including Respiratory, CV, GI and , other than that noted in the HPI,  is negative    EXAM:  /78   Pulse 77   Temp 98.4  F (36.9  C)   Resp 20   Ht 1.524 m (5')   Wt 73 kg (161 lb)   SpO2 95%   BMI 31.44 kg/m    GENERAL APPEARANCE:  Alert, in no distress      ASSESSMENT/PLAN:  (I10) Essential hypertension  (primary encounter diagnosis)  Comment: BP consistently above goal. BP goals are <150/90 mm Hg.This is higher than ACC and AHA recommendations due to goals of care, risk for hypotension, risk of dizziness and falls and risk of tissue/cerebral hypoperfusion.   Plan: Increase lisinopril to 20mg. Monitor BP. Recheck BMP next week if patient allows        Electronically signed by:  ALCIRA Sweet CNP   [facility]  Cell: [phone]

## 2018-12-19 NOTE — LETTER
12/19/2018        RE: Chelsey Casillas  [facility]  [address] [address]        [facility]    Chief Complaint   Patient presents with     Hypertension       [facility] Record Number:  [id number]  Place of Service where encounter took place:  [facility] ([facility]) [[id number]]    HPI:    Chelsey Casillas is a 78 year old  (1940), who is being seen today for an episodic care visit.  HPI information obtained from: facility chart records, facility staff and patient report.       Today's concern is:  Essential hypertension  BP readings range:  165/78   161/79  157/88  149/84  133/81  144/81  143/75  131/79  108/58  174/53  160/92  168/81  152/92  168/96  156/96    REVIEW OF SYSTEMS:  4 point ROS including Respiratory, CV, GI and , other than that noted in the HPI,  is negative    EXAM:  /78   Pulse 77   Temp 98.4  F (36.9  C)   Resp 20   Ht 1.524 m (5')   Wt 73 kg (161 lb)   SpO2 95%   BMI 31.44 kg/m     GENERAL APPEARANCE:  Alert, in no distress      ASSESSMENT/PLAN:  (I10) Essential hypertension  (primary encounter diagnosis)  Comment: BP consistently above goal. BP goals are <150/90 mm Hg.This is higher than ACC and AHA recommendations due to goals of care, risk for hypotension, risk of dizziness and falls and risk of tissue/cerebral hypoperfusion.   Plan: Increase lisinopril to 20mg. Monitor BP. Recheck BMP next week if patient allows        Electronically signed by:  ALCIRA Sweet CNP   [facility]  Cell: [phone]

## 2018-12-27 ENCOUNTER — TRANSFERRED RECORDS (OUTPATIENT)
Dept: HEALTH INFORMATION MANAGEMENT | Facility: CLINIC | Age: 78
End: 2018-12-27

## 2018-12-27 LAB
ANION GAP SERPL CALCULATED.3IONS-SCNC: 8 MMOL/L (ref 3–14)
BUN SERPL-MCNC: 11 MG/DL (ref 7–30)
CALCIUM SERPL-MCNC: 9 MG/DL (ref 8.5–10.1)
CHLORIDE SERPLBLD-SCNC: 105 MMOL/L (ref 94–109)
CO2 SERPL-SCNC: 28 MMOL/L (ref 20–32)
CREAT SERPL-MCNC: 0.57 MG/DL (ref 0.52–1.04)
ERYTHROCYTE [DISTWIDTH] IN BLOOD BY AUTOMATED COUNT: 13.6 % (ref 10–15)
GFR SERPL CREATININE-BSD FRML MDRD: 89 ML/MIN/1.73M2
GLUCOSE SERPL-MCNC: 79 MG/DL (ref 70–99)
HCT VFR BLD AUTO: 35.1 % (ref 35–47)
HEMOGLOBIN: 11.7 G/DL (ref 11.7–15.7)
MCH RBC QN AUTO: 29.3 PG (ref 26.5–33)
MCHC RBC AUTO-ENTMCNC: 33.3 G/DL (ref 31.5–36.5)
MCV RBC AUTO: 88 FL (ref 78–100)
PLATELET # BLD AUTO: 159 10^9/L (ref 150–450)
POTASSIUM SERPL-SCNC: 3.9 MMOL/L (ref 3.4–5.3)
RBC # BLD AUTO: 4 10^12/L (ref 3.8–5.2)
SODIUM SERPL-SCNC: 141 MMOL/L (ref 133–144)
WBC # BLD AUTO: 5.8 10^9/L (ref 4–11)

## 2019-02-04 ENCOUNTER — NURSING HOME VISIT (OUTPATIENT)
Dept: GERIATRICS | Facility: CLINIC | Age: 79
End: 2019-02-04
Payer: COMMERCIAL

## 2019-02-04 VITALS
RESPIRATION RATE: 18 BRPM | DIASTOLIC BLOOD PRESSURE: 69 MMHG | TEMPERATURE: 98.5 F | BODY MASS INDEX: 33.96 KG/M2 | OXYGEN SATURATION: 95 % | SYSTOLIC BLOOD PRESSURE: 122 MMHG | HEIGHT: 60 IN | HEART RATE: 66 BPM | WEIGHT: 173 LBS

## 2019-02-04 DIAGNOSIS — I10 ESSENTIAL HYPERTENSION: Primary | ICD-10-CM

## 2019-02-04 DIAGNOSIS — K21.9 GASTROESOPHAGEAL REFLUX DISEASE WITHOUT ESOPHAGITIS: ICD-10-CM

## 2019-02-04 DIAGNOSIS — G89.29 CHRONIC LOW BACK PAIN WITHOUT SCIATICA, UNSPECIFIED BACK PAIN LATERALITY: ICD-10-CM

## 2019-02-04 DIAGNOSIS — D64.9 ANEMIA, UNSPECIFIED TYPE: ICD-10-CM

## 2019-02-04 DIAGNOSIS — J44.9 CHRONIC OBSTRUCTIVE PULMONARY DISEASE, UNSPECIFIED COPD TYPE (H): ICD-10-CM

## 2019-02-04 DIAGNOSIS — M54.50 CHRONIC LOW BACK PAIN WITHOUT SCIATICA, UNSPECIFIED BACK PAIN LATERALITY: ICD-10-CM

## 2019-02-04 DIAGNOSIS — F32.9 MAJOR DEPRESSIVE DISORDER, REMISSION STATUS UNSPECIFIED, UNSPECIFIED WHETHER RECURRENT: ICD-10-CM

## 2019-02-04 DIAGNOSIS — F03.92 DEMENTIA WITH PSYCHOSIS (H): ICD-10-CM

## 2019-02-04 PROCEDURE — 99318 ZZC ANNUAL NURSING FAC ASSESSMNT, STABLE: CPT | Performed by: NURSE PRACTITIONER

## 2019-02-04 RX ORDER — FERROUS SULFATE 325(65) MG
325 TABLET ORAL EVERY OTHER DAY
Start: 2019-02-04 | End: 2019-03-25

## 2019-02-04 ASSESSMENT — MIFFLIN-ST. JEOR: SCORE: 1191.22

## 2019-02-04 NOTE — LETTER
2/4/2019        RE: Chelsey Casillas  [facility]  [address] [address]        [facility]  Chief Complaint   Patient presents with     Annual Comprehensive Nursing Home       [place] Medical Record Number:  [id number]  Place of Service where encounter took place:  [facility] () [[id number]]      HPI:    Chelsey Casillas is a 79 year old  (1940), who is being seen today for an annual comprehensive visit.        HPI information obtained from: facility chart records, facility staff, patient report and [facility] chart review.        Today's concerns are:  Essential hypertension  On lisinopril and carvedilol. Lisinopril was increased from 15mg to 20mg on 12/19/18 due to poorly controlled HTN.   BP readings range:  122/69  98/61  111/80  160/83  135/80  165/78  161/79  157/88  149/84  133/81  144/81  143/75    Dementia with psychosis  Patient moved here from Assisted Living on 9/18/18 due to frequent falls, worsening cognition. No elopement attempts, but did have several falls in the first week. She has been more calm in the past few weeks. No recent falls. Today she does not even mention going back to her apartment.    Chronic obstructive pulmonary disease, unspecified COPD type (H)  Patient reports chronic cough for years. Denies SOB, chest tightness, wheezing. She refuses to use nebs.     Chronic low back pain without sciatica, unspecified back pain laterality  Denies pain today. Does not ask about Tylenol #3 as in the past.    Major depressive disorder, remission status unspecified, unspecified whether recurrent  PHQ-9 = 6. Unhappy all the time per staff.     Gastroesophageal reflux disease without esophagitis  Omeprazole decreased to 10mg on 12/10/19. History of this is unknown. She is on ASA and iron, it is possible it was started due to gastritis. Patient denies dyspepsia.    Anemia, unspecified type  Patient is on iron and folate. Last Hgb 11.7.  History unknown.        ALLERGIES: Amlodipine  PROBLEM LIST:  Patient Active Problem List   Diagnosis     Chronic low back pain     Dementia with psychosis     Fall     Falling episodes     Functional urinary incontinence     GERD (gastroesophageal reflux disease)     HTN (hypertension)     Knee pain     Pain in both knees     Major depression, single episode     Physical deconditioning     Severe major depression with psychotic features (H)     Skin tear     Weakness     Primary osteoarthritis involving multiple joints     PAST MEDICAL HISTORY:  has a past medical history of Arthritis, Chronic low back pain (3/17/2015), COPD (chronic obstructive pulmonary disease) (H), Dementia with psychosis (11/1/2016), Fall (2/27/2013), Falling episodes (9/24/2016), Functional urinary incontinence (3/7/2013), GERD (gastroesophageal reflux disease) (4/3/2018), HTN (hypertension) (4/3/2018), Knee pain (3/7/2013), Major depression, single episode (4/16/2015), Pain in both knees (1/30/2014), Physical deconditioning (3/7/2013), 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  PAST SURGICAL HISTORY:  has no past surgical history on file.  FAMILY HISTORY: Family history is unknown by patient.  SOCIAL HISTORY:  reports that she has been smoking cigarettes.  she has never used smokeless tobacco. She reports that she does not drink alcohol or use drugs.  IMMUNIZATIONS:  Most Recent Immunizations   Administered Date(s) Administered     FLU 6-35 months 09/10/2013     Influenza (High Dose) 3 valent vaccine 11/28/2017   Deferred Date(s) Deferred     Influenza (IIV3) PF 12/12/2018     Pneumo Conj 13-V (2010&after) 12/12/2018     Pneumococcal 23 valent 12/12/2018     TDAP Vaccine (Adacel) 12/12/2018     Above immunizations pulled from [facility]. [facility] and facility records also reconciled. Future immunizations are not needed at this point as all recommended immunizations are up  to date.   MEDICATIONS:  Current Outpatient Medications   Medication Sig Dispense Refill     Acetaminophen (TYLENOL PO) Take 1,000 mg by mouth daily as needed for mild pain or fever       Acetaminophen (TYLENOL PO) Take 1,000 mg by mouth 2 times daily       albuterol (PROAIR HFA/PROVENTIL HFA/VENTOLIN HFA) 108 (90 BASE) MCG/ACT Inhaler Inhale 2 puffs into the lungs every 4 hours as needed for shortness of breath / dyspnea or wheezing        ASPIRIN EC PO Take 81 mg by mouth daily       carvedilol (COREG) 3.125 MG tablet Take 1 tablet (3.125 mg) by mouth 2 times daily (with meals) 60 tablet 3     ferrous sulfate (IRON) 325 (65 FE) MG tablet Take 325 mg by mouth daily       folic acid (FOLVITE) 1 MG tablet TAKE 1 TABLET BY MOUTH ONCE DAILY 31 tablet 98     lisinopril (PRINIVIL/ZESTRIL) 20 MG tablet Take 1 tablet (20 mg) by mouth daily       loperamide (IMODIUM) 2 MG capsule TAKE TWO CAPSULES (4MG) BY MOUTH WITH FIRST LOOSE STOOL ; THEN TAKE ONE CAPSULE (2MG) BY MOUTH AFTER EACH LOOSE STOOL +MAX:16MG/24HRS+ 20 capsule 98     nystatin (MYCOSTATIN) 665207 UNIT/GM external powder Apply topically 2 times daily as needed       omeprazole (PRILOSEC) 10 MG DR capsule Take 1 capsule (10 mg) by mouth daily 90 capsule 3     risperiDONE (RISPERDAL) 0.5 MG tablet Take 1 tablet (0.5 mg) by mouth 2 times daily 62 tablet 3     sertraline (ZOLOFT) 100 MG tablet TAKE 1 TABLET BY MOUTH ONCE DAILY 31 tablet 98     vitamin D3 (CHOLECALCIFEROL) 09692 UNITS capsule Take 1 capsule (50,000 Units) by mouth every 7 days 4 capsule 11     Medications reviewed:  Medications reconciled to facility chart and changes were made to reflect current medications as identified as above med list.     Case Management:  I have reviewed the facility/SNF care plan/MDS which was done 1/1/19, including the falls risk, nutrition and pain screening. I also reviewed the current immunizations, and preventive care..Future cancer screening is not clinically indicated  secondary to age/goals of care Patient's desire to return to the community is not present. Current Level of Care is appropriate.      Advance Directive Discussion:    I reviewed the current advanced directives as reflected in EPIC, the POLST and the facility chart, and verified the congruency of orders. I contacted the first party, the patient herself, and discussed the plan of Care.      Team Discussion:  I communicated with the appropriate disciplines involved with the Plan of Care:   Nursing      Patient Goal:  Patient's goal is pain control and comfort.    Information reviewed:  Medications, vital signs, orders, and nursing notes.    ROS:  10 point ROS of systems including Constitutional, Eyes, Respiratory, Cardiovascular, Gastroenterology, Genitourinary, Integumentary, Musculoskeletal, Psychiatric were all negative except for pertinent positives noted in my HPI.    Exam:  /69   Pulse 66   Temp 98.5  F (36.9  C)   Resp 18   Ht 1.524 m (5')   Wt 78.5 kg (173 lb)   SpO2 95%   BMI 33.79 kg/m     GENERAL APPEARANCE:  Alert, in no distress, morbidly obese, cooperative  ENT:  Mouth and posterior oropharynx normal, moist mucous membranes, normal hearing acuity  EYES:  EOM, conjunctivae, lids, pupils and irises normal  NECK:  No adenopathy,masses or thyromegaly  RESP:  respiratory effort and palpation of chest normal, no respiratory distress, expiratory wheezes  CV:  Palpation and auscultation of heart done , regular rate and rhythm, no murmur, rub, or gallop  ABDOMEN:  normal bowel sounds, soft, nontender, no hepatosplenomegaly or other masses  SKIN:  Inspection of skin and subcutaneous tissue baseline, Palpation of skin and subcutaneous tissue baseline  PSYCH:  oriented X 3, memory impaired , affect and mood normal     Lab/Diagnostic data:   12/27/18  WBC 5.8  HGB 11.7      Na 141  K 3.9  BUN 11  Creat 0.57      ASSESSMENT/PLAN  (I10) Essential hypertension  (primary encounter diagnosis)  Comment:  Patient has had lower BP readings lately. It seems unlikely that the slight increase in ACEI has caused this, but it may have. Will continue to monitor a little longer.  Plan: Monitor BP. Adjust medication as clinically indicated.    (F03.91) Dementia with psychosis  Comment: Patient seems to have settled in more to the LTC setting. Fall risk is reduced. There is minimal documentation regarding behavioral issues. She does go to some activities. BIMS 12/15.  Plan: Continue current POC with no changes at this time and adjustments as needed.    (J44.9) Chronic obstructive pulmonary disease, unspecified COPD type (H)  Comment: Patient was not aware of any wheezing, denied any symptoms. She does not appear acutely ill. She refuses nebs, so will not order any at this time  Plan: Monitor respiratory status. Adjust medication as clinically indicated.    (M54.5,  G89.29) Chronic low back pain without sciatica, unspecified back pain laterality  Comment: Controlled  Plan: Continue current POC with no changes at this time and adjustments as needed.    (F32.9) Major depressive disorder, remission status unspecified, unspecified whether recurrent  Comment: Chronic. Adequately controlled.   Plan: Continue current POC with no changes at this time and adjustments as needed.    (K21.9) Gastroesophageal reflux disease without esophagitis  Comment: Asymptomatic  Plan: Continue PPI for now until further history obtained.    (D64.9) Anemia, unspecified type  Comment: Hgb WNL. Will attempt to taper iron  Plan: Decrease iron to every other day. Recheck Hgb in one month        After visit was completed and medications reviewed more closely, provider is considering stopping ASA and PPI. Unfortunately there is no information available via Care Everywhere. It is unknown if she has a history of MI or CAD. It was reported that she is on ASA for afib, but rhythm has been normal at every visit. If patient is on ASA for primary prevention only,  this could likely be stopped. Will attempt to get more history from patient at next visit. She does not have any family to contact.      Orders:  Decrease iron to every other day    Electronically signed by:  ALCIRA Sweet [facility]  Cell: [phone]

## 2019-02-04 NOTE — PROGRESS NOTES
[facility] [place] [facility]  Chief Complaint   Patient presents with     Annual Comprehensive Nursing Home       [place] Medical Record Number:  [id number]  Place of Service where encounter took place:  [facility] ([facility]) [[id number]]      HPI:    Chelsey Casillas is a 79 year old  (1940), who is being seen today for an annual comprehensive visit.        HPI information obtained from: facility chart records, facility staff, patient report and [facility] [place] Epic chart review.        Today's concerns are:  Essential hypertension  On lisinopril and carvedilol. Lisinopril was increased from 15mg to 20mg on 12/19/18 due to poorly controlled HTN.   BP readings range:  122/69  98/61  111/80  160/83  135/80  165/78  161/79  157/88  149/84  133/81  144/81  143/75    Dementia with psychosis  Patient moved here from Assisted Living on 9/18/18 due to frequent falls, worsening cognition. No elopement attempts, but did have several falls in the first week. She has been more calm in the past few weeks. No recent falls. Today she does not even mention going back to her apartment.    Chronic obstructive pulmonary disease, unspecified COPD type (H)  Patient reports chronic cough for years. Denies SOB, chest tightness, wheezing. She refuses to use nebs.     Chronic low back pain without sciatica, unspecified back pain laterality  Denies pain today. Does not ask about Tylenol #3 as in the past.    Major depressive disorder, remission status unspecified, unspecified whether recurrent  PHQ-9 = 6. Unhappy all the time per staff.     Gastroesophageal reflux disease without esophagitis  Omeprazole decreased to 10mg on 12/10/19. History of this is unknown. She is on ASA and iron, it is possible it was started due to gastritis. Patient denies dyspepsia.    Anemia, unspecified type  Patient is on iron and folate. Last Hgb 11.7. History unknown.        ALLERGIES: Amlodipine  PROBLEM LIST:  Patient Active Problem List   Diagnosis     Chronic  low back pain     Dementia with psychosis     Fall     Falling episodes     Functional urinary incontinence     GERD (gastroesophageal reflux disease)     HTN (hypertension)     Knee pain     Pain in both knees     Major depression, single episode     Physical deconditioning     Severe major depression with psychotic features (H)     Skin tear     Weakness     Primary osteoarthritis involving multiple joints     PAST MEDICAL HISTORY:  has a past medical history of Arthritis, Chronic low back pain (3/17/2015), COPD (chronic obstructive pulmonary disease) (H), Dementia with psychosis (11/1/2016), Fall (2/27/2013), Falling episodes (9/24/2016), Functional urinary incontinence (3/7/2013), GERD (gastroesophageal reflux disease) (4/3/2018), HTN (hypertension) (4/3/2018), Knee pain (3/7/2013), Major depression, single episode (4/16/2015), Pain in both knees (1/30/2014), Physical deconditioning (3/7/2013), 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  PAST SURGICAL HISTORY:  has no past surgical history on file.  FAMILY HISTORY: Family history is unknown by patient.  SOCIAL HISTORY:  reports that she has been smoking cigarettes.  she has never used smokeless tobacco. She reports that she does not drink alcohol or use drugs.  IMMUNIZATIONS:  Most Recent Immunizations   Administered Date(s) Administered     FLU 6-35 months 09/10/2013     Influenza (High Dose) 3 valent vaccine 11/28/2017   Deferred Date(s) Deferred     Influenza (IIV3) PF 12/12/2018     Pneumo Conj 13-V (2010&after) 12/12/2018     Pneumococcal 23 valent 12/12/2018     TDAP Vaccine (Adacel) 12/12/2018     Above immunizations pulled from [facility]. [facility] and facility records also reconciled. Future immunizations are not needed at this point as all recommended immunizations are up to date.   MEDICATIONS:  Current Outpatient Medications   Medication Sig Dispense Refill     Acetaminophen  (TYLENOL PO) Take 1,000 mg by mouth daily as needed for mild pain or fever       Acetaminophen (TYLENOL PO) Take 1,000 mg by mouth 2 times daily       albuterol (PROAIR HFA/PROVENTIL HFA/VENTOLIN HFA) 108 (90 BASE) MCG/ACT Inhaler Inhale 2 puffs into the lungs every 4 hours as needed for shortness of breath / dyspnea or wheezing        ASPIRIN EC PO Take 81 mg by mouth daily       carvedilol (COREG) 3.125 MG tablet Take 1 tablet (3.125 mg) by mouth 2 times daily (with meals) 60 tablet 3     ferrous sulfate (IRON) 325 (65 FE) MG tablet Take 325 mg by mouth daily       folic acid (FOLVITE) 1 MG tablet TAKE 1 TABLET BY MOUTH ONCE DAILY 31 tablet 98     lisinopril (PRINIVIL/ZESTRIL) 20 MG tablet Take 1 tablet (20 mg) by mouth daily       loperamide (IMODIUM) 2 MG capsule TAKE TWO CAPSULES (4MG) BY MOUTH WITH FIRST LOOSE STOOL ; THEN TAKE ONE CAPSULE (2MG) BY MOUTH AFTER EACH LOOSE STOOL +MAX:16MG/24HRS+ 20 capsule 98     nystatin (MYCOSTATIN) 774497 UNIT/GM external powder Apply topically 2 times daily as needed       omeprazole (PRILOSEC) 10 MG DR capsule Take 1 capsule (10 mg) by mouth daily 90 capsule 3     risperiDONE (RISPERDAL) 0.5 MG tablet Take 1 tablet (0.5 mg) by mouth 2 times daily 62 tablet 3     sertraline (ZOLOFT) 100 MG tablet TAKE 1 TABLET BY MOUTH ONCE DAILY 31 tablet 98     vitamin D3 (CHOLECALCIFEROL) 27734 UNITS capsule Take 1 capsule (50,000 Units) by mouth every 7 days 4 capsule 11     Medications reviewed:  Medications reconciled to facility chart and changes were made to reflect current medications as identified as above med list.     Case Management:  I have reviewed the facility/SNF care plan/MDS which was done 1/1/19, including the falls risk, nutrition and pain screening. I also reviewed the current immunizations, and preventive care..Future cancer screening is not clinically indicated secondary to age/goals of care Patient's desire to return to the community is not present. Current Level of  Care is appropriate.      Advance Directive Discussion:    I reviewed the current advanced directives as reflected in EPIC, the POLST and the facility chart, and verified the congruency of orders. I contacted the first party, the patient herself, and discussed the plan of Care.      Team Discussion:  I communicated with the appropriate disciplines involved with the Plan of Care:   Nursing      Patient Goal:  Patient's goal is pain control and comfort.    Information reviewed:  Medications, vital signs, orders, and nursing notes.    ROS:  10 point ROS of systems including Constitutional, Eyes, Respiratory, Cardiovascular, Gastroenterology, Genitourinary, Integumentary, Musculoskeletal, Psychiatric were all negative except for pertinent positives noted in my HPI.    Exam:  /69   Pulse 66   Temp 98.5  F (36.9  C)   Resp 18   Ht 1.524 m (5')   Wt 78.5 kg (173 lb)   SpO2 95%   BMI 33.79 kg/m    GENERAL APPEARANCE:  Alert, in no distress, morbidly obese, cooperative  ENT:  Mouth and posterior oropharynx normal, moist mucous membranes, normal hearing acuity  EYES:  EOM, conjunctivae, lids, pupils and irises normal  NECK:  No adenopathy,masses or thyromegaly  RESP:  respiratory effort and palpation of chest normal, no respiratory distress, expiratory wheezes  CV:  Palpation and auscultation of heart done , regular rate and rhythm, no murmur, rub, or gallop  ABDOMEN:  normal bowel sounds, soft, nontender, no hepatosplenomegaly or other masses  SKIN:  Inspection of skin and subcutaneous tissue baseline, Palpation of skin and subcutaneous tissue baseline  PSYCH:  oriented X 3, memory impaired , affect and mood normal     Lab/Diagnostic data:   12/27/18  WBC 5.8  HGB 11.7      Na 141  K 3.9  BUN 11  Creat 0.57      ASSESSMENT/PLAN  (I10) Essential hypertension  (primary encounter diagnosis)  Comment: Patient has had lower BP readings lately. It seems unlikely that the slight increase in ACEI has caused this,  but it may have. Will continue to monitor a little longer.  Plan: Monitor BP. Adjust medication as clinically indicated.    (F03.91) Dementia with psychosis  Comment: Patient seems to have settled in more to the LTC setting. Fall risk is reduced. There is minimal documentation regarding behavioral issues. She does go to some activities. BIMS 12/15.  Plan: Continue current POC with no changes at this time and adjustments as needed.    (J44.9) Chronic obstructive pulmonary disease, unspecified COPD type (H)  Comment: Patient was not aware of any wheezing, denied any symptoms. She does not appear acutely ill. She refuses nebs, so will not order any at this time  Plan: Monitor respiratory status. Adjust medication as clinically indicated.    (M54.5,  G89.29) Chronic low back pain without sciatica, unspecified back pain laterality  Comment: Controlled  Plan: Continue current POC with no changes at this time and adjustments as needed.    (F32.9) Major depressive disorder, remission status unspecified, unspecified whether recurrent  Comment: Chronic. Adequately controlled.   Plan: Continue current POC with no changes at this time and adjustments as needed.    (K21.9) Gastroesophageal reflux disease without esophagitis  Comment: Asymptomatic  Plan: Continue PPI for now until further history obtained.    (D64.9) Anemia, unspecified type  Comment: Hgb WNL. Will attempt to taper iron  Plan: Decrease iron to every other day. Recheck Hgb in one month        After visit was completed and medications reviewed more closely, provider is considering stopping ASA and PPI. Unfortunately there is no information available via Care Everywhere. It is unknown if she has a history of MI or CAD. It was reported that she is on ASA for afib, but rhythm has been normal at every visit. If patient is on ASA for primary prevention only, this could likely be stopped. Will attempt to get more history from patient at next visit. She does not have any  family to contact.      Orders:  Decrease iron to every other day    Electronically signed by:  ALCIRA Sweet [facility]  Cell: [phone]

## 2019-02-19 ENCOUNTER — TRANSFERRED RECORDS (OUTPATIENT)
Dept: HEALTH INFORMATION MANAGEMENT | Facility: CLINIC | Age: 79
End: 2019-02-19

## 2019-02-19 ENCOUNTER — NURSING HOME VISIT (OUTPATIENT)
Dept: GERIATRICS | Facility: CLINIC | Age: 79
End: 2019-02-19
Payer: COMMERCIAL

## 2019-02-19 VITALS
BODY MASS INDEX: 33.96 KG/M2 | HEIGHT: 60 IN | TEMPERATURE: 98.3 F | HEART RATE: 80 BPM | WEIGHT: 173 LBS | DIASTOLIC BLOOD PRESSURE: 58 MMHG | RESPIRATION RATE: 18 BRPM | SYSTOLIC BLOOD PRESSURE: 105 MMHG | OXYGEN SATURATION: 95 %

## 2019-02-19 DIAGNOSIS — R93.89 ABNORMAL CHEST X-RAY: Primary | ICD-10-CM

## 2019-02-19 LAB
ANION GAP SERPL CALCULATED.3IONS-SCNC: 7 MMOL/L (ref 3–14)
BUN SERPL-MCNC: 22 MG/DL (ref 7–30)
CALCIUM SERPL-MCNC: 9.6 MG/DL (ref 8.5–10.1)
CHLORIDE SERPLBLD-SCNC: 104 MMOL/L (ref 94–109)
CO2 SERPL-SCNC: 28 MMOL/L (ref 20–32)
CREAT SERPL-MCNC: 0.66 MG/DL (ref 0.52–1.04)
DIFFERENTIAL: ABNORMAL
ERYTHROCYTE [DISTWIDTH] IN BLOOD BY AUTOMATED COUNT: 13.7 % (ref 10–15)
GFR SERPL CREATININE-BSD FRML MDRD: 85 ML/MIN/1.73M2
GLUCOSE SERPL-MCNC: 110 MG/DL (ref 70–99)
HCT VFR BLD AUTO: 38.4 % (ref 35–47)
HEMOGLOBIN: 12.8 G/DL (ref 11.7–15.7)
MCH RBC QN AUTO: 29.8 PG (ref 26.5–33)
MCHC RBC AUTO-ENTMCNC: 33.3 G/DL (ref 31.5–36.5)
MCV RBC AUTO: 90 FL (ref 78–100)
PLATELET # BLD AUTO: 201 10^9/L (ref 150–450)
POTASSIUM SERPL-SCNC: 4.3 MMOL/L (ref 3.4–5.3)
RBC # BLD AUTO: 4.29 10^12/L (ref 3.8–5.2)
SODIUM SERPL-SCNC: 139 MMOL/L (ref 133–144)
WBC # BLD AUTO: 12.4 10^9/L (ref 4–11)

## 2019-02-19 PROCEDURE — 99308 SBSQ NF CARE LOW MDM 20: CPT | Performed by: NURSE PRACTITIONER

## 2019-02-19 ASSESSMENT — MIFFLIN-ST. JEOR: SCORE: 1191.22

## 2019-02-19 NOTE — PROGRESS NOTES
[place] [facility]  Chief Complaint   Patient presents with     RECHECK     Abnormal CXR     [place] Medical Record Number:  [id number]  Place of Service where encounter took place:  [facility] ([facility]) [[id number]]    HPI:    Chelsey Casillas  is a 79 year old (1940), who is being seen today for an episodic care visit.  HPI information obtained from: facility chart records, facility staff and patient report.       Today's concern is:  Abnormal chest x-ray  Patient had a positive PPD, so CXR was ordered. Results showed an upper lobe infiltrate, TB could not be ruled out. She has not had any cough, hypoxia, SOB since admission. At every visit she reports that she does have a cough, but cannot give further details. She has never been witnessed to have a cough. She does say that she is not feeling well today, but cannot specify any symptoms.       Past Medical and Surgical History reviewed in Epic today.    REVIEW OF SYSTEMS:  4 point ROS including Respiratory, CV, GI and , other than that noted in the HPI,  is negative    Physical Exam:  /58   Pulse 80   Temp 98.3  F (36.8  C)   Resp 18   Ht 1.524 m (5')   Wt 78.5 kg (173 lb)   SpO2 95%   BMI 33.79 kg/m    GENERAL APPEARANCE:  Alert, in no distress  ENT:  Mouth and posterior oropharynx normal, moist mucous membranes  EYES:  EOM, conjunctivae, lids, pupils and irises normal  RESP:  exam limited due to prone position, lack of deep inspiration, but no respiratory distress, lungs clear  ABDOMEN:  normal bowel sounds, soft, nontender, no hepatosplenomegaly or other masses  SKIN:  Inspection of skin and subcutaneous tissue baseline, Palpation of skin and subcutaneous tissue baseline  PSYCH:  oriented to self, place, insight and judgement impaired, memory impaired     Labs:   None recent    ASSESSMENT/PLAN:  (R93.89) Abnormal chest x-ray  (primary encounter diagnosis)  Comment: Patient does not appear acutely ill. Although she reports not  feeling well, staff have not noted any changes.  Plan: CBC, BMP in a.m. Check vitals TID x 3 days. Recheck CXR next week        Electronically signed by:  ALCIRA Sweet [facility]  Cell: [phone]

## 2019-02-19 NOTE — LETTER
2/19/2019        RE: Chelsey Casillas  [facility]  [address] [address]        [place] [facility]  Chief Complaint   Patient presents with     RECHECK     Abnormal CXR     [facility] Medical Record Number:  [id number]  Place of Service where encounter took place:  [facility] [place] ([facility]) [[id number]]    HPI:    Chelsey Casillas  is a 79 year old (1940), who is being seen today for an episodic care visit.  HPI information obtained from: facility chart records, facility staff and patient report.       Today's concern is:  Abnormal chest x-ray  Patient had a positive PPD, so CXR was ordered. Results showed an upper lobe infiltrate, TB could not be ruled out. She has not had any cough, hypoxia, SOB since admission. At every visit she reports that she does have a cough, but cannot give further details. She has never been witnessed to have a cough. She does say that she is not feeling well today, but cannot specify any symptoms.       Past Medical and Surgical History reviewed in Epic today.    REVIEW OF SYSTEMS:  4 point ROS including Respiratory, CV, GI and , other than that noted in the HPI,  is negative    Physical Exam:  /58   Pulse 80   Temp 98.3  F (36.8  C)   Resp 18   Ht 1.524 m (5')   Wt 78.5 kg (173 lb)   SpO2 95%   BMI 33.79 kg/m     GENERAL APPEARANCE:  Alert, in no distress  ENT:  Mouth and posterior oropharynx normal, moist mucous membranes  EYES:  EOM, conjunctivae, lids, pupils and irises normal  RESP:  exam limited due to prone position, lack of deep inspiration, but no respiratory distress, lungs clear  ABDOMEN:  normal bowel sounds, soft, nontender, no hepatosplenomegaly or other masses  SKIN:  Inspection of skin and subcutaneous tissue baseline, Palpation of skin and subcutaneous tissue baseline  PSYCH:  oriented to self, place, insight and judgement impaired, memory impaired     Labs:   None recent    ASSESSMENT/PLAN:  (R93.89) Abnormal chest  x-ray  (primary encounter diagnosis)  Comment: Patient does not appear acutely ill. Although she reports not feeling well, staff have not noted any changes.  Plan: CBC, BMP in a.m. Check vitals TID x 3 days. Recheck CXR next week        Electronically signed by:  ALCIRA Sweet [facility]  Cell: [phone]

## 2019-02-27 ENCOUNTER — NURSING HOME VISIT (OUTPATIENT)
Dept: GERIATRICS | Facility: CLINIC | Age: 79
End: 2019-02-27
Payer: COMMERCIAL

## 2019-02-27 ENCOUNTER — TRANSFERRED RECORDS (OUTPATIENT)
Dept: HEALTH INFORMATION MANAGEMENT | Facility: CLINIC | Age: 79
End: 2019-02-27

## 2019-02-27 VITALS
TEMPERATURE: 98.5 F | BODY MASS INDEX: 32.89 KG/M2 | OXYGEN SATURATION: 96 % | RESPIRATION RATE: 20 BRPM | WEIGHT: 167.5 LBS | SYSTOLIC BLOOD PRESSURE: 107 MMHG | DIASTOLIC BLOOD PRESSURE: 67 MMHG | HEIGHT: 60 IN | HEART RATE: 69 BPM

## 2019-02-27 DIAGNOSIS — F03.92 DEMENTIA WITH PSYCHOSIS (H): ICD-10-CM

## 2019-02-27 DIAGNOSIS — K21.9 GASTROESOPHAGEAL REFLUX DISEASE WITHOUT ESOPHAGITIS: ICD-10-CM

## 2019-02-27 DIAGNOSIS — I10 ESSENTIAL HYPERTENSION: Primary | ICD-10-CM

## 2019-02-27 DIAGNOSIS — J44.9 CHRONIC OBSTRUCTIVE PULMONARY DISEASE, UNSPECIFIED COPD TYPE (H): ICD-10-CM

## 2019-02-27 DIAGNOSIS — E55.9 VITAMIN D DEFICIENCY: ICD-10-CM

## 2019-02-27 PROCEDURE — 99309 SBSQ NF CARE MODERATE MDM 30: CPT | Performed by: NURSE PRACTITIONER

## 2019-02-27 RX ORDER — CHOLECALCIFEROL (VITAMIN D3) 50 MCG
1 TABLET ORAL DAILY
Qty: 30 TABLET
Start: 2019-02-27 | End: 2020-05-14

## 2019-02-27 ASSESSMENT — MIFFLIN-ST. JEOR: SCORE: 1166.28

## 2019-02-27 NOTE — PROGRESS NOTES
[place] [facility]  [place] Medical Record Number:  [id number]  Place of Service where encounter took place:  [facility] ([facility]) [[id number]]  Chief Complaint   Patient presents with     RECHECK     HPI:    Chelsey Casillas  is a 79 year old (1940), who is being seen today for an episodic care visit.      HPI information obtained from: facility chart records, facility staff, patient report and [facility] chart review.        Today's concerns are:  Essential hypertension  On lisinopril and carvedilol. Lisinopril was increased from 15mg to 20mg on 12/19/18 due to poorly controlled HTN. Provider is considering stopping Aspirin. She says she is on it for her heart. She denies any history of MI, CAD, CVA.   BP readings range:  122/69  98/61  111/80  160/83  135/80  165/78  161/79  157/88  149/84  133/81  144/81  143/75    Dementia with psychosis  Patient moved here from Assisted Living on 9/18/18 due to frequent falls, worsening cognition. No elopement attempts, but did have several falls in the first week. She has been more calm in the past few months. No recent falls. Today she does not even mention going back to her apartment. Her risperidone was increased 6/2018 due to her seeing snakes and thinking someone was trying to get into her apartment.    Chronic obstructive pulmonary disease, unspecified COPD type (H)  Denies SOB, chest tightness, wheezing, cough. She refuses to use nebs.     Gastroesophageal reflux disease without esophagitis  Omeprazole decreased to 10mg on 12/10/19. Patient says that she had indigestion last night, but normally it does not happen often. She says she has had ulcers in the past, but then upon further questioning she changes her mind, cannot give any details on this. When she has had indigestion in the past, she thinks zantac has helped.    Vitamin D deficiency  It is noted that prior to admission, she was started on Vit D 50,000 units weekly.        Past Medical and  Surgical History reviewed in [facility] today.    REVIEW OF SYSTEMS:  10 point ROS of systems including Constitutional, Eyes, Respiratory, Cardiovascular, Gastroenterology, Genitourinary, Integumentary, Musculoskeletal, Psychiatric were all negative except for pertinent positives noted in my HPI.    Physical Exam:  /67   Pulse 69   Temp 98.5  F (36.9  C)   Resp 20   Ht 1.524 m (5')   Wt 76 kg (167 lb 8 oz)   SpO2 96%   BMI 32.71 kg/m    GENERAL APPEARANCE:  Alert, in no distress, morbidly obese  ENT:  Mouth and posterior oropharynx normal, moist mucous membranes, normal hearing acuity  EYES:  EOM, conjunctivae, lids, pupils and irises normal  NECK:  No adenopathy,masses or thyromegaly  RESP:  respiratory effort and palpation of chest normal, lungs clear to auscultation , no respiratory distress  CV:  Palpation and auscultation of heart done , regular rate and rhythm, no murmur, rub, or gallop, no edema  ABDOMEN:  normal bowel sounds, soft, nontender, no hepatosplenomegaly or other masses  SKIN:  Inspection of skin and subcutaneous tissue baseline, Palpation of skin and subcutaneous tissue baseline  PSYCH:  insight and judgement impaired, memory impaired , affect and mood normal    Labs:   2/19/19  HGB 12.8    K 4.3  BUN 22  Creat 0.66      ASSESSMENT/PLAN:  (I10) Essential hypertension  (primary encounter diagnosis)  Comment: Adequate control. BP goals are <150/90 mm Hg.This is higher than ACC and AHA recommendations due to goals of care, risk for hypotension and risk of dizziness and falls. Aspirin appears to be for primary prevention. Due to risk of adverse effects associated with unnecessary aspirin use in elderly, risk outweighs benefit of continued therapy.   Plan: Continue coreg and lisinopril. Discontinue ASA. Monitor BP. Adjust medication as clinically indicated.    (F03.91) Dementia with psychosis  Comment: Hallucinations/delusions controlled on risperidone at lowest effective dose with no side  effects noted. Benefits of current treatment outweigh risks. GDR not recommended as this could result in emotional harm, but could attempt in a few months.   Plan: Continue current POC with no changes at this time and adjustments as needed.    (J44.9) Chronic obstructive pulmonary disease, unspecified COPD type (H)  Comment: Asymptomatic  Plan: Continue current POC with no changes at this time and adjustments as needed.    (K21.9) Gastroesophageal reflux disease without esophagitis  Comment: No known history of GI bleed. PPIs increase risk of pneumonia, C.Diff., fractures, hypomagnesemia. Risk outweighs benefit of continued therapy. Will order prn zantac for intermittent dyspepsia  Plan: Discontinue omeprazole. Zantac 150mg daily prn    (E55.9) Vitamin D deficiency  Comment: Patient gets no sun exposure, so likely needs chronic vitamin D replacement. She has certainly completed the recommend course of high dose replacement  Plan: Change Vit D to 2,000 units daily      Orders:  Discontinue ASA  Change Vit D to 2,000 units daily  Discontinue omeprazole  Zantac 150mg daily prn      Electronically signed by:  ALCIRA Sweet [facility]  Cell: [phone]

## 2019-02-27 NOTE — LETTER
2/27/2019        RE: Chelsey Casillas  [facility]  [address] [address]        [place] [facility]  [place] Medical Record Number:  [id number]  Place of Service where encounter took place:  [facility] [place] ([facility]) [[id number]]  Chief Complaint   Patient presents with     RECHECK     HPI:    Chelsey Casillas  is a 79 year old (1940), who is being seen today for an episodic care visit.      HPI information obtained from: facility chart records, facility staff, patient report and [facility] chart review.        Today's concerns are:  Essential hypertension  On lisinopril and carvedilol. Lisinopril was increased from 15mg to 20mg on 12/19/18 due to poorly controlled HTN. Provider is considering stopping Aspirin. She says she is on it for her heart. She denies any history of MI, CAD, CVA.   BP readings range:  122/69  98/61  111/80  160/83  135/80  165/78  161/79  157/88  149/84  133/81  144/81  143/75    Dementia with psychosis  Patient moved here from [facility] on 9/18/18 due to frequent falls, worsening cognition. No elopement attempts, but did have several falls in the first week. She has been more calm in the past few months. No recent falls. Today she does not even mention going back to her apartment. Her risperidone was increased 6/2018 due to her seeing snakes and thinking someone was trying to get into her apartment.    Chronic obstructive pulmonary disease, unspecified COPD type (H)  Denies SOB, chest tightness, wheezing, cough. She refuses to use nebs.     Gastroesophageal reflux disease without esophagitis  Omeprazole decreased to 10mg on 12/10/19. Patient says that she had indigestion last night, but normally it does not happen often. She says she has had ulcers in the past, but then upon further questioning she changes her mind, cannot give any details on this. When she has had indigestion in the past, she thinks zantac has helped.    Vitamin D  deficiency  It is noted that prior to admission, she was started on Vit D 50,000 units weekly.        Past Medical and Surgical History reviewed in Epic today.    REVIEW OF SYSTEMS:  10 point ROS of systems including Constitutional, Eyes, Respiratory, Cardiovascular, Gastroenterology, Genitourinary, Integumentary, Musculoskeletal, Psychiatric were all negative except for pertinent positives noted in my HPI.    Physical Exam:  /67   Pulse 69   Temp 98.5  F (36.9  C)   Resp 20   Ht 1.524 m (5')   Wt 76 kg (167 lb 8 oz)   SpO2 96%   BMI 32.71 kg/m     GENERAL APPEARANCE:  Alert, in no distress, morbidly obese  ENT:  Mouth and posterior oropharynx normal, moist mucous membranes, normal hearing acuity  EYES:  EOM, conjunctivae, lids, pupils and irises normal  NECK:  No adenopathy,masses or thyromegaly  RESP:  respiratory effort and palpation of chest normal, lungs clear to auscultation , no respiratory distress  CV:  Palpation and auscultation of heart done , regular rate and rhythm, no murmur, rub, or gallop, no edema  ABDOMEN:  normal bowel sounds, soft, nontender, no hepatosplenomegaly or other masses  SKIN:  Inspection of skin and subcutaneous tissue baseline, Palpation of skin and subcutaneous tissue baseline  PSYCH:  insight and judgement impaired, memory impaired , affect and mood normal    Labs:   2/19/19  HGB 12.8    K 4.3  BUN 22  Creat 0.66      ASSESSMENT/PLAN:  (I10) Essential hypertension  (primary encounter diagnosis)  Comment: Adequate control. BP goals are <150/90 mm Hg.This is higher than ACC and AHA recommendations due to goals of care, risk for hypotension and risk of dizziness and falls. Aspirin appears to be for primary prevention. Due to risk of adverse effects associated with unnecessary aspirin use in elderly, risk outweighs benefit of continued therapy.   Plan: Continue coreg and lisinopril. Discontinue ASA. Monitor BP. Adjust medication as clinically indicated.    (F03.91)  Dementia with psychosis  Comment: Hallucinations/delusions controlled on risperidone at lowest effective dose with no side effects noted. Benefits of current treatment outweigh risks. GDR not recommended as this could result in emotional harm, but could attempt in a few months.   Plan: Continue current POC with no changes at this time and adjustments as needed.    (J44.9) Chronic obstructive pulmonary disease, unspecified COPD type (H)  Comment: Asymptomatic  Plan: Continue current POC with no changes at this time and adjustments as needed.    (K21.9) Gastroesophageal reflux disease without esophagitis  Comment: No known history of GI bleed. PPIs increase risk of pneumonia, C.Diff., fractures, hypomagnesemia. Risk outweighs benefit of continued therapy. Will order prn zantac for intermittent dyspepsia  Plan: Discontinue omeprazole. Zantac 150mg daily prn    (E55.9) Vitamin D deficiency  Comment: Patient gets no sun exposure, so likely needs chronic vitamin D replacement. She has certainly completed the recommend course of high dose replacement  Plan: Change Vit D to 2,000 units daily      Orders:  Discontinue ASA  Change Vit D to 2,000 units daily  Discontinue omeprazole  Zantac 150mg daily prn      Electronically signed by:  ALCIRA Sweet [facility]  Cell: [phone]

## 2019-03-18 ENCOUNTER — NURSING HOME VISIT (OUTPATIENT)
Dept: GERIATRICS | Facility: CLINIC | Age: 79
End: 2019-03-18
Payer: COMMERCIAL

## 2019-03-18 VITALS
WEIGHT: 168 LBS | HEART RATE: 72 BPM | RESPIRATION RATE: 20 BRPM | HEIGHT: 60 IN | OXYGEN SATURATION: 94 % | BODY MASS INDEX: 32.98 KG/M2 | TEMPERATURE: 98.4 F | SYSTOLIC BLOOD PRESSURE: 110 MMHG | DIASTOLIC BLOOD PRESSURE: 61 MMHG

## 2019-03-18 DIAGNOSIS — R13.10 DYSPHAGIA, UNSPECIFIED TYPE: Primary | ICD-10-CM

## 2019-03-18 PROCEDURE — 99308 SBSQ NF CARE LOW MDM 20: CPT | Performed by: NURSE PRACTITIONER

## 2019-03-18 ASSESSMENT — MIFFLIN-ST. JEOR: SCORE: 1168.54

## 2019-03-18 NOTE — LETTER
3/18/2019        RE: Chelsey Casillas  [facility]  [address] [address]        [place] [facility]  [place] [facility] Record Number:  [id number]  Place of Service where encounter took place:  [facility] ([facility]) [[id number]]  Chief Complaint   Patient presents with     RECHECK       HPI:    Chelsey Casillas  is a 79 year old (1940), who is being seen today for an episodic care visit.  HPI information obtained from: facility chart records, facility staff, patient report and [facility] chart review.       Today's concern is:  Dysphagia, unspecified type  Last week resident was noted to have some drooling and difficulty with swallowing liquids. Orders given for SLP, they will be evaluating today. Patient denies any issues with swallowing. She only complains of cough today, but she says this at every visit and has never been observed coughing. Of note, ASA was stopped 2/27 due to no known indication.       Past Medical and Surgical History reviewed in Epic today.      REVIEW OF SYSTEMS:  10 point ROS of systems including Constitutional, Eyes, Respiratory, Cardiovascular, Gastroenterology, Genitourinary, Integumentary, Musculoskeletal, Psychiatric were all negative except for pertinent positives noted in my HPI.    Objective:  /61   Pulse 72   Temp 98.4  F (36.9  C)   Resp 20   Ht 1.524 m (5')   Wt 76.2 kg (168 lb)   SpO2 94%   BMI 32.81 kg/m     Exam:  GENERAL APPEARANCE:  Sleeping in bed, easily aroused by voice, alert, no apparent distress  ENT:  Mouth and posterior oropharynx normal, moist mucous membranes  EYES:  EOM, conjunctivae, lids, pupils and irises normal  RESP:  lungs clear to auscultation , no respiratory distress  CV:  Palpation and auscultation of heart done , regular rate and rhythm, no murmur, rub, or gallop  NEURO:   speech clear and coherent, no drooling noted. She is not very participatory in exam, she is leaning to the left in  bed    Labs:   Recent labs in [facility] reviewed by me today.     ASSESSMENT/PLAN:  (R13.10) Dysphagia, unspecified type  (primary encounter diagnosis)  Comment: Limited history available. There is mention of atrial fibrillation, but her HR has been regular consistently here. There is concern that she may have had a TIA or CVA. Goal is comfort care, so would not send for any imaging. Will have therapy evaluate. Will restart ASA      Electronically signed by:  ALCIRA Sweet [facility]  Cell: [phone]

## 2019-03-18 NOTE — PROGRESS NOTES
[place] [facility]  [place] [facility] Record Number:  [id number]  Place of Service where encounter took place:  [facility] ([facility]) [[id number]]  Chief Complaint   Patient presents with     RECHECK       HPI:    Chelsey Casillas  is a 79 year old (1940), who is being seen today for an episodic care visit.  HPI information obtained from: facility chart records, facility staff, patient report and [facility] chart review.       Today's concern is:  Dysphagia, unspecified type  Last week resident was noted to have some drooling and difficulty with swallowing liquids. Orders given for SLP, they will be evaluating today. Patient denies any issues with swallowing. She only complains of cough today, but she says this at every visit and has never been observed coughing. Of note, ASA was stopped 2/27 due to no known indication.       Past Medical and Surgical History reviewed in Epic today.      REVIEW OF SYSTEMS:  10 point ROS of systems including Constitutional, Eyes, Respiratory, Cardiovascular, Gastroenterology, Genitourinary, Integumentary, Musculoskeletal, Psychiatric were all negative except for pertinent positives noted in my HPI.    Objective:  /61   Pulse 72   Temp 98.4  F (36.9  C)   Resp 20   Ht 1.524 m (5')   Wt 76.2 kg (168 lb)   SpO2 94%   BMI 32.81 kg/m    Exam:  GENERAL APPEARANCE:  Sleeping in bed, easily aroused by voice, alert, no apparent distress  ENT:  Mouth and posterior oropharynx normal, moist mucous membranes  EYES:  EOM, conjunctivae, lids, pupils and irises normal  RESP:  lungs clear to auscultation , no respiratory distress  CV:  Palpation and auscultation of heart done , regular rate and rhythm, no murmur, rub, or gallop  NEURO:   speech clear and coherent, no drooling noted. She is not very participatory in exam, she is leaning to the left in bed    Labs:   Recent labs in [facility] reviewed by me today.     ASSESSMENT/PLAN:  (R13.10) Dysphagia, unspecified type   (primary encounter diagnosis)  Comment: Limited history available. There is mention of atrial fibrillation, but her HR has been regular consistently here. There is concern that she may have had a TIA or CVA. Goal is comfort care, so would not send for any imaging. Will have therapy evaluate. Will restart ASA      Electronically signed by:  ALCIRA Sweet [facility]  Cell: [phone]

## 2019-03-20 ENCOUNTER — TRANSFERRED RECORDS (OUTPATIENT)
Dept: HEALTH INFORMATION MANAGEMENT | Facility: CLINIC | Age: 79
End: 2019-03-20

## 2019-03-25 ENCOUNTER — NURSING HOME VISIT (OUTPATIENT)
Dept: GERIATRICS | Facility: CLINIC | Age: 79
End: 2019-03-25
Payer: COMMERCIAL

## 2019-03-25 VITALS
TEMPERATURE: 98.7 F | OXYGEN SATURATION: 94 % | BODY MASS INDEX: 32.49 KG/M2 | RESPIRATION RATE: 16 BRPM | HEART RATE: 75 BPM | SYSTOLIC BLOOD PRESSURE: 124 MMHG | HEIGHT: 60 IN | DIASTOLIC BLOOD PRESSURE: 71 MMHG | WEIGHT: 165.5 LBS

## 2019-03-25 DIAGNOSIS — D64.9 ANEMIA, UNSPECIFIED TYPE: ICD-10-CM

## 2019-03-25 DIAGNOSIS — F03.92 DEMENTIA WITH PSYCHOSIS (H): ICD-10-CM

## 2019-03-25 DIAGNOSIS — J44.9 CHRONIC OBSTRUCTIVE PULMONARY DISEASE, UNSPECIFIED COPD TYPE (H): ICD-10-CM

## 2019-03-25 DIAGNOSIS — I10 ESSENTIAL HYPERTENSION: ICD-10-CM

## 2019-03-25 DIAGNOSIS — R13.10 DYSPHAGIA, UNSPECIFIED TYPE: Primary | ICD-10-CM

## 2019-03-25 DIAGNOSIS — K21.9 GASTROESOPHAGEAL REFLUX DISEASE WITHOUT ESOPHAGITIS: ICD-10-CM

## 2019-03-25 PROCEDURE — 99309 SBSQ NF CARE MODERATE MDM 30: CPT | Performed by: NURSE PRACTITIONER

## 2019-03-25 ASSESSMENT — MIFFLIN-ST. JEOR: SCORE: 1157.2

## 2019-03-25 NOTE — PROGRESS NOTES
[place] [facility]  [place] [facility] Record Number:  [id number]  Place of Service where encounter took place:  [facility] ([facility]) [[id number]]  Chief Complaint   Patient presents with     RECHECK       HPI:    Chelsey Casillas  is a 79 year old (1940), who is being seen today for an episodic care visit.  HPI information obtained from: facility chart records, facility staff, patient report and [facility] chart review.     Today's concern is:  Dysphagia, unspecified type  2 weeks ago resident was noted to have some drooling and difficulty with swallowing liquids. Orders given for SLP, they are working with her. Of note ASA had been stopped 2/27 due to no known indication, this was restarted 3/18/19. There have been no other neurological changes noted.    Dementia with psychosis  No recent falls. Patient has been more calm than she was on admission. No longer asking to leave. She is on risperidone due to hallucinations and delusions. She has been spending more time in bed for a few weeks. She refused to take her medications the past 2 nights.  Wt Readings from Last 4 Encounters:   03/25/19 75.1 kg (165 lb 8 oz)   03/18/19 76.2 kg (168 lb)   02/27/19 76 kg (167 lb 8 oz)   02/19/19 78.5 kg (173 lb)     Chronic obstructive pulmonary disease, unspecified COPD type (H)  At every single visit, patient complains that she has a cough, but then she is rarely witnessed to be coughing. Again c/o cough today. Denies SOB. No hypoxia, fever. She refuses to use nebs    Gastroesophageal reflux disease without esophagitis  PPI weaned off. She denies dyspepsia today    Essential hypertension  On lisinopril and carvedilol. BP range 104/70 to 141/69    Anemia, unspecified type  Iron was changed to every other day on 2/4/19  Hemoglobin   Date Value Ref Range Status   02/19/2019 12.8 11.7 - 15.7 g/dL Final   12/27/2018 11.7 11.7 - 15.7 g/dL Final         Past Medical and Surgical History reviewed in Epic  today.    MEDICATIONS:  Current Outpatient Medications   Medication Sig Dispense Refill     Acetaminophen (TYLENOL PO) Take 1,000 mg by mouth daily as needed for mild pain or fever       Acetaminophen (TYLENOL PO) Take 1,000 mg by mouth 2 times daily       albuterol (PROAIR HFA/PROVENTIL HFA/VENTOLIN HFA) 108 (90 BASE) MCG/ACT Inhaler Inhale 2 puffs into the lungs every 4 hours as needed for shortness of breath / dyspnea or wheezing        aspirin (ASA) 81 MG EC tablet Take 1 tablet (81 mg) by mouth daily       carvedilol (COREG) 3.125 MG tablet Take 1 tablet (3.125 mg) by mouth 2 times daily (with meals) 60 tablet 3     ferrous sulfate (FEROSUL) 325 (65 Fe) MG tablet Take 1 tablet (325 mg) by mouth every other day       folic acid (FOLVITE) 1 MG tablet TAKE 1 TABLET BY MOUTH ONCE DAILY 31 tablet 98     lisinopril (PRINIVIL/ZESTRIL) 20 MG tablet Take 1 tablet (20 mg) by mouth daily       loperamide (IMODIUM) 2 MG capsule TAKE TWO CAPSULES (4MG) BY MOUTH WITH FIRST LOOSE STOOL ; THEN TAKE ONE CAPSULE (2MG) BY MOUTH AFTER EACH LOOSE STOOL +MAX:16MG/24HRS+ 20 capsule 98     nystatin (MYCOSTATIN) 216303 UNIT/GM external powder Apply topically 2 times daily as needed       ranitidine (ZANTAC) 150 MG tablet Take 1 tablet (150 mg) by mouth daily as needed for heartburn 60 tablet      risperiDONE (RISPERDAL) 0.5 MG tablet Take 1 tablet (0.5 mg) by mouth 2 times daily 62 tablet 3     sertraline (ZOLOFT) 100 MG tablet TAKE 1 TABLET BY MOUTH ONCE DAILY 31 tablet 98     vitamin D3 (CHOLECALCIFEROL) 2000 units tablet Take 1 tablet by mouth daily 30 tablet        REVIEW OF SYSTEMS:  4 point ROS including Respiratory, CV, GI and , other than that noted in the HPI,  is negative    Objective:  /71   Pulse 75   Temp 98.7  F (37.1  C)   Resp 16   Ht 1.524 m (5')   Wt 75.1 kg (165 lb 8 oz)   SpO2 94%   BMI 32.32 kg/m    Exam:  GENERAL APPEARANCE:  Alert, in no distress, morbidly obese  ENT:  Mouth and posterior  oropharynx normal, moist mucous membranes  EYES:  EOM, conjunctivae, lids, pupils and irises normal  NECK:  No adenopathy,masses or thyromegaly  RESP:  respiratory effort and palpation of chest normal, lungs clear to auscultation , no respiratory distress  CV:  Palpation and auscultation of heart done , regular rate and rhythm, no murmur, rub, or gallop, no edema  ABDOMEN:  normal bowel sounds, soft, nontender, no hepatosplenomegaly or other masses  SKIN:  Inspection of skin and subcutaneous tissue baseline, Palpation of skin and subcutaneous tissue baseline  PSYCH:  insight and judgement impaired, memory impaired , affect abnormal flat    Labs:     Most Recent 3 CBC's:  Recent Labs   Lab Test 02/19/19 12/27/18   WBC 12.4* 5.8   HGB 12.8 11.7   MCV 90 88    159     Most Recent 3 BMP's:  Recent Labs   Lab Test 02/19/19 12/27/18    141   POTASSIUM 4.3 3.9   CHLORIDE 104 105   CO2 28 28   BUN 22 11   CR 0.66 0.57   ANIONGAP 7 8   MILA 9.6 9.0   * 79       ASSESSMENT/PLAN:  (R13.10) Dysphagia, unspecified type  (primary encounter diagnosis)  Comment: Acute, possibly related to CVA or advancing dementia.   Plan: SLP eval and treat    (F03.91) Dementia with psychosis  Comment: Chronic, progressive. Needs 24 hour skilled nursing care. HPI/ROS unreliable due to cognitive impairment. Expect further functional and cognitive decline. Expect weight loss.  Plan: Continue 24 hour care. Monitor weight. Monitor functional status. Monitor for behavioral disturbances.    (J44.9) Chronic obstructive pulmonary disease, unspecified COPD type (H)  Comment: Despite ongoing c/o cough, patient does not appear ill, COPD appears controlled  Plan: Continue current POC with no changes at this time and adjustments as needed.    (K21.9) Gastroesophageal reflux disease without esophagitis  Comment: Asymptomatic off PPI, but she is also not very reliable.   Plan: Monitor for any c/o dyspepsia    (I10) Essential  hypertension  Comment: Well controlled  Plan: Continue current POC with no changes at this time and adjustments as needed.    (D64.9) Anemia, unspecified type  Comment: May no longer need iron  Plan: Discont iron supplement. Monitor Hgb. Adjust medication as clinically indicated.    Orders written by provider at facility  Discontinue iron  CBC, BMP 5/20/19      Electronically signed by:  ALCIRA Sweet [facility]  Cell: [phone]

## 2019-03-25 NOTE — LETTER
3/25/2019        RE: Chelsey Casillas  [facility]  [address] [address]        [place] [facility]  [place] Medical Record Number:  [id number]  Place of Service where encounter took place:  [facility] ([facility]) [[id number]]  Chief Complaint   Patient presents with     RECHECK       HPI:    Chelsey Casillas  is a 79 year old (1940), who is being seen today for an episodic care visit.  HPI information obtained from: facility chart records, facility staff, patient report and [facility] chart review.     Today's concern is:  Dysphagia, unspecified type  2 weeks ago resident was noted to have some drooling and difficulty with swallowing liquids. Orders given for SLP, they are working with her. Of note ASA had been stopped 2/27 due to no known indication, this was restarted 3/18/19. There have been no other neurological changes noted.    Dementia with psychosis  No recent falls. Patient has been more calm than she was on admission. No longer asking to leave. She is on risperidone due to hallucinations and delusions. She has been spending more time in bed for a few weeks. She refused to take her medications the past 2 nights.  Wt Readings from Last 4 Encounters:   03/25/19 75.1 kg (165 lb 8 oz)   03/18/19 76.2 kg (168 lb)   02/27/19 76 kg (167 lb 8 oz)   02/19/19 78.5 kg (173 lb)     Chronic obstructive pulmonary disease, unspecified COPD type (H)  At every single visit, patient complains that she has a cough, but then she is rarely witnessed to be coughing. Again c/o cough today. Denies SOB. No hypoxia, fever. She refuses to use nebs    Gastroesophageal reflux disease without esophagitis  PPI weaned off. She denies dyspepsia today    Essential hypertension  On lisinopril and carvedilol. BP range 104/70 to 141/69    Anemia, unspecified type  Iron was changed to every other day on 2/4/19  Hemoglobin   Date Value Ref Range Status   02/19/2019 12.8 11.7 - 15.7 g/dL Final    12/27/2018 11.7 11.7 - 15.7 g/dL Final         Past Medical and Surgical History reviewed in Epic today.    MEDICATIONS:  Current Outpatient Medications   Medication Sig Dispense Refill     Acetaminophen (TYLENOL PO) Take 1,000 mg by mouth daily as needed for mild pain or fever       Acetaminophen (TYLENOL PO) Take 1,000 mg by mouth 2 times daily       albuterol (PROAIR HFA/PROVENTIL HFA/VENTOLIN HFA) 108 (90 BASE) MCG/ACT Inhaler Inhale 2 puffs into the lungs every 4 hours as needed for shortness of breath / dyspnea or wheezing        aspirin (ASA) 81 MG EC tablet Take 1 tablet (81 mg) by mouth daily       carvedilol (COREG) 3.125 MG tablet Take 1 tablet (3.125 mg) by mouth 2 times daily (with meals) 60 tablet 3     ferrous sulfate (FEROSUL) 325 (65 Fe) MG tablet Take 1 tablet (325 mg) by mouth every other day       folic acid (FOLVITE) 1 MG tablet TAKE 1 TABLET BY MOUTH ONCE DAILY 31 tablet 98     lisinopril (PRINIVIL/ZESTRIL) 20 MG tablet Take 1 tablet (20 mg) by mouth daily       loperamide (IMODIUM) 2 MG capsule TAKE TWO CAPSULES (4MG) BY MOUTH WITH FIRST LOOSE STOOL ; THEN TAKE ONE CAPSULE (2MG) BY MOUTH AFTER EACH LOOSE STOOL +MAX:16MG/24HRS+ 20 capsule 98     nystatin (MYCOSTATIN) 317475 UNIT/GM external powder Apply topically 2 times daily as needed       ranitidine (ZANTAC) 150 MG tablet Take 1 tablet (150 mg) by mouth daily as needed for heartburn 60 tablet      risperiDONE (RISPERDAL) 0.5 MG tablet Take 1 tablet (0.5 mg) by mouth 2 times daily 62 tablet 3     sertraline (ZOLOFT) 100 MG tablet TAKE 1 TABLET BY MOUTH ONCE DAILY 31 tablet 98     vitamin D3 (CHOLECALCIFEROL) 2000 units tablet Take 1 tablet by mouth daily 30 tablet        REVIEW OF SYSTEMS:  4 point ROS including Respiratory, CV, GI and , other than that noted in the HPI,  is negative    Objective:  /71   Pulse 75   Temp 98.7  F (37.1  C)   Resp 16   Ht 1.524 m (5')   Wt 75.1 kg (165 lb 8 oz)   SpO2 94%   BMI 32.32 kg/m      Exam:  GENERAL APPEARANCE:  Alert, in no distress, morbidly obese  ENT:  Mouth and posterior oropharynx normal, moist mucous membranes  EYES:  EOM, conjunctivae, lids, pupils and irises normal  NECK:  No adenopathy,masses or thyromegaly  RESP:  respiratory effort and palpation of chest normal, lungs clear to auscultation , no respiratory distress  CV:  Palpation and auscultation of heart done , regular rate and rhythm, no murmur, rub, or gallop, no edema  ABDOMEN:  normal bowel sounds, soft, nontender, no hepatosplenomegaly or other masses  SKIN:  Inspection of skin and subcutaneous tissue baseline, Palpation of skin and subcutaneous tissue baseline  PSYCH:  insight and judgement impaired, memory impaired , affect abnormal flat    Labs:     Most Recent 3 CBC's:  Recent Labs   Lab Test 02/19/19 12/27/18   WBC 12.4* 5.8   HGB 12.8 11.7   MCV 90 88    159     Most Recent 3 BMP's:  Recent Labs   Lab Test 02/19/19 12/27/18    141   POTASSIUM 4.3 3.9   CHLORIDE 104 105   CO2 28 28   BUN 22 11   CR 0.66 0.57   ANIONGAP 7 8   MILA 9.6 9.0   * 79       ASSESSMENT/PLAN:  (R13.10) Dysphagia, unspecified type  (primary encounter diagnosis)  Comment: Acute, possibly related to CVA or advancing dementia.   Plan: SLP eval and treat    (F03.91) Dementia with psychosis  Comment: Chronic, progressive. Needs 24 hour skilled nursing care. HPI/ROS unreliable due to cognitive impairment. Expect further functional and cognitive decline. Expect weight loss.  Plan: Continue 24 hour care. Monitor weight. Monitor functional status. Monitor for behavioral disturbances.    (J44.9) Chronic obstructive pulmonary disease, unspecified COPD type (H)  Comment: Despite ongoing c/o cough, patient does not appear ill, COPD appears controlled  Plan: Continue current POC with no changes at this time and adjustments as needed.    (K21.9) Gastroesophageal reflux disease without esophagitis  Comment: Asymptomatic off PPI, but she is also  not very reliable.   Plan: Monitor for any c/o dyspepsia    (I10) Essential hypertension  Comment: Well controlled  Plan: Continue current POC with no changes at this time and adjustments as needed.    (D64.9) Anemia, unspecified type  Comment: May no longer need iron  Plan: Discont iron supplement. Monitor Hgb. Adjust medication as clinically indicated.    Orders written by provider at facility  Discontinue iron  CBC, BMP 5/20/19      Electronically signed by:  ALCIRA Sweet [facility]  Cell: [phone]

## 2019-04-10 ENCOUNTER — NURSING HOME VISIT (OUTPATIENT)
Dept: GERIATRICS | Facility: CLINIC | Age: 79
End: 2019-04-10
Payer: COMMERCIAL

## 2019-04-10 VITALS
SYSTOLIC BLOOD PRESSURE: 126 MMHG | RESPIRATION RATE: 18 BRPM | WEIGHT: 162.5 LBS | BODY MASS INDEX: 31.9 KG/M2 | DIASTOLIC BLOOD PRESSURE: 72 MMHG | OXYGEN SATURATION: 94 % | TEMPERATURE: 98.2 F | HEART RATE: 87 BPM | HEIGHT: 60 IN

## 2019-04-10 DIAGNOSIS — I10 ESSENTIAL HYPERTENSION: ICD-10-CM

## 2019-04-10 DIAGNOSIS — M54.50 CHRONIC LOW BACK PAIN WITHOUT SCIATICA, UNSPECIFIED BACK PAIN LATERALITY: ICD-10-CM

## 2019-04-10 DIAGNOSIS — G89.29 CHRONIC LOW BACK PAIN WITHOUT SCIATICA, UNSPECIFIED BACK PAIN LATERALITY: ICD-10-CM

## 2019-04-10 DIAGNOSIS — F03.92 DEMENTIA WITH PSYCHOSIS (H): Primary | ICD-10-CM

## 2019-04-10 PROCEDURE — 99309 SBSQ NF CARE MODERATE MDM 30: CPT | Performed by: INTERNAL MEDICINE

## 2019-04-10 ASSESSMENT — MIFFLIN-ST. JEOR: SCORE: 1143.6

## 2019-04-10 NOTE — PROGRESS NOTES
[place] [facility]  Chief Complaint   Patient presents with     long term Regulatory     [facility] Record Number:  [id number]  Place of Service where encounter took place:  [facility] ([facility]) [[id number]]      HPI:    Chelsey Casillas  is 79 year old (1940), who is being seen today for a federally mandated E/M visit.  HPI information obtained from: {FGS HPI:604544}.       Today's concerns are:  {FGS DX:222652}    ALLERGIES:Amlodipine  PAST MEDICAL HISTORY:   has a past medical history of Arthritis, Chronic low back pain (3/17/2015), COPD (chronic obstructive pulmonary disease) (H), Dementia with psychosis (11/1/2016), Fall (2/27/2013), Falling episodes (9/24/2016), Functional urinary incontinence (3/7/2013), GERD (gastroesophageal reflux disease) (4/3/2018), HTN (hypertension) (4/3/2018), Knee pain (3/7/2013), Major depression, single episode (4/16/2015), Pain in both knees (1/30/2014), Physical deconditioning (3/7/2013), 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  PAST SURGICAL HISTORY:   has no past surgical history on file.  FAMILY HISTORY: Family history is unknown by patient.  SOCIAL HISTORY:  reports that she has been smoking cigarettes.  She has never used smokeless tobacco. She reports that she does not drink alcohol or use drugs.    MEDICATIONS:  Current Outpatient Medications   Medication Sig Dispense Refill     Acetaminophen (TYLENOL PO) Take 1,000 mg by mouth daily as needed for mild pain or fever       Acetaminophen (TYLENOL PO) Take 1,000 mg by mouth 2 times daily       albuterol (PROAIR HFA/PROVENTIL HFA/VENTOLIN HFA) 108 (90 BASE) MCG/ACT Inhaler Inhale 2 puffs into the lungs every 4 hours as needed for shortness of breath / dyspnea or wheezing        aspirin (ASA) 81 MG EC tablet Take 1 tablet (81 mg) by mouth daily       carvedilol (COREG) 3.125 MG tablet Take 1 tablet (3.125 mg) by mouth 2 times daily  (with meals) 60 tablet 3     folic acid (FOLVITE) 1 MG tablet TAKE 1 TABLET BY MOUTH ONCE DAILY 31 tablet 98     lisinopril (PRINIVIL/ZESTRIL) 20 MG tablet Take 1 tablet (20 mg) by mouth daily       loperamide (IMODIUM) 2 MG capsule TAKE TWO CAPSULES (4MG) BY MOUTH WITH FIRST LOOSE STOOL ; THEN TAKE ONE CAPSULE (2MG) BY MOUTH AFTER EACH LOOSE STOOL +MAX:16MG/24HRS+ 20 capsule 98     nystatin (MYCOSTATIN) 514490 UNIT/GM external powder Apply topically 2 times daily as needed       ranitidine (ZANTAC) 150 MG tablet Take 1 tablet (150 mg) by mouth daily as needed for heartburn 60 tablet      risperiDONE (RISPERDAL) 0.5 MG tablet Take 1 tablet (0.5 mg) by mouth 2 times daily 62 tablet 3     sertraline (ZOLOFT) 100 MG tablet TAKE 1 TABLET BY MOUTH ONCE DAILY 31 tablet 98     vitamin D3 (CHOLECALCIFEROL) 2000 units tablet Take 1 tablet by mouth daily 30 tablet      ***  Case Management:  I have reviewed the care plan and MDS and do agree with the plan. Patient's desire to return to the community is {FGS RETURN TO COMMUNITY:879509}. Information reviewed:  Medications, vital signs, orders, and nursing notes.    ROS:  {ROS FGS:202860}    Vitals:  /72   Pulse 87   Temp 98.2  F (36.8  C)   Resp 18   Ht 1.524 m (5')   Wt 73.7 kg (162 lb 8 oz)   SpO2 94%   BMI 31.74 kg/m    Body mass index is 31.74 kg/m .  Exam:  {Nursing home physical exam :199601}    Lab/Diagnostic data:   {fgslab:726677}    ASSESSMENT/PLAN  {FGS DX:631580}    {fgsorders:303106}  ***    {FGS TIME SPENT:833354}    Electronically signed by:  Sharlene Ambrosio***  [facility]  Cell: [phone]    {providers Please double check the med list (in the plan section >> meds & orders tab) and Discontinue any of the meds flagged by the TC to be discontinued}

## 2019-04-10 NOTE — PROGRESS NOTES
[place] [facility]  Nursing Home Regulatory Visit  April 10, 2019    Chief Complaint   Patient presents with     FPC Regulatory       HPI:    Chelsey Casillas is a 79 year old  (1940), who is being seen today for a federally mandated E/M visit at [facility]:3[facility]}. Today's concerns are:    1) ***  2) ***  3) ***    ALLERGIES: Amlodipine    PAST MEDICAL HISTORY:   Past Medical History:   Diagnosis Date     Arthritis      Chronic low back pain 3/17/2015     COPD (chronic obstructive pulmonary disease) (H)      Dementia with psychosis 11/1/2016     Fall 2/27/2013     Falling episodes 9/24/2016     Functional urinary incontinence 3/7/2013     GERD (gastroesophageal reflux disease) 4/3/2018     HTN (hypertension) 4/3/2018     Knee pain 3/7/2013     Major depression, single episode 4/16/2015     Pain in both knees 1/30/2014     Physical deconditioning 3/7/2013     Severe major depression with psychotic features (H) 7/12/2016     Skin tear 3/7/2013     Weakness 6/18/2015       PAST SURGICAL HISTORY:   No past surgical history on file.    FAMILY HISTORY:   Family History   Family history unknown: Yes       SOCIAL HISTORY:   Lives in a SNF    MEDICATIONS:  Current Outpatient Medications   Medication Sig Dispense Refill     Acetaminophen (TYLENOL PO) Take 1,000 mg by mouth daily as needed for mild pain or fever       Acetaminophen (TYLENOL PO) Take 1,000 mg by mouth 2 times daily       albuterol (PROAIR HFA/PROVENTIL HFA/VENTOLIN HFA) 108 (90 BASE) MCG/ACT Inhaler Inhale 2 puffs into the lungs every 4 hours as needed for shortness of breath / dyspnea or wheezing        aspirin (ASA) 81 MG EC tablet Take 1 tablet (81 mg) by mouth daily       carvedilol (COREG) 3.125 MG tablet Take 1 tablet (3.125 mg) by mouth 2 times daily (with meals) 60 tablet 3     folic acid (FOLVITE) 1 MG tablet TAKE 1 TABLET BY MOUTH ONCE DAILY 31 tablet 98     lisinopril (PRINIVIL/ZESTRIL) 20 MG tablet Take 1  tablet (20 mg) by mouth daily       loperamide (IMODIUM) 2 MG capsule TAKE TWO CAPSULES (4MG) BY MOUTH WITH FIRST LOOSE STOOL ; THEN TAKE ONE CAPSULE (2MG) BY MOUTH AFTER EACH LOOSE STOOL +MAX:16MG/24HRS+ 20 capsule 98     nystatin (MYCOSTATIN) 563704 UNIT/GM external powder Apply topically 2 times daily as needed       ranitidine (ZANTAC) 150 MG tablet Take 1 tablet (150 mg) by mouth daily as needed for heartburn 60 tablet      risperiDONE (RISPERDAL) 0.5 MG tablet Take 1 tablet (0.5 mg) by mouth 2 times daily 62 tablet 3     sertraline (ZOLOFT) 100 MG tablet TAKE 1 TABLET BY MOUTH ONCE DAILY 31 tablet 98     vitamin D3 (CHOLECALCIFEROL) 2000 units tablet Take 1 tablet by mouth daily 30 tablet        Medications reviewed:  Medications reconciled to facility chart and changes were made to reflect current medications as identified as above med list. Below are the changes that were made:   Medications stopped since last EPIC medication reconciliation:   There are no discontinued medications.  Medications started since last [facility] medication reconciliation:  No orders of the defined types were placed in this encounter.    ***    Case Management:  I have reviewed the care plan and MDS and do agree with the plan.   Information reviewed:  Medications, vital signs, orders, and nursing notes.    ROS:  10 point ROS neg other than the symptoms noted above in the HPI.***  Unable to be obtained due to cognitive impairment or aphasia.     PHYSICAL EXAM:  /72   Pulse 87   Temp 98.2  F (36.8  C)   Resp 18   Ht 1.524 m (5')   Wt 73.7 kg (162 lb 8 oz)   SpO2 94%   BMI 31.74 kg/m    Gen: sitting in bed, alert, cooperative and in no acute distress***  HEENT: normocephalic; oropharynx clear  Card: RRR, S1, S2, no murmurs  Resp: lungs clear to auscultation bilaterally  GI: abdomen soft, not-tender, non-distended, +BS  Ext: no LE edema  Neuro: CX II-XII grossly in tact; ROM in all four extremities grossly in tact  Psych: alert  and oriented x3; normal affect    Lab/Diagnostic data:  Reviewed as per Epic    ASSESSMENT/PLAN  ***    Electronically signed by:  Anisa Caballero MD

## 2019-04-10 NOTE — LETTER
4/10/2019        RE: Chelsey Casillas  [facility], [place]  [address] [address]        [place] [facility]  Nursing Home Regulatory Visit  April 10, 2019    Chief Complaint   Patient presents with     long term Regulatory       HPI:    Chelsey Casillas is a 79 year old  (1940), who is being seen today for a federally mandated E/M visit at [facility] [place]. Today's concerns are:    1) Dementia With Psychosis -- Moved to [facility] in September from an assisted living. History of visual hallucinations (ie. Seeing snakes) that are managed with risperidone  2) HTN -- SBPs 110s-120s on carvedilol and lisinopril. Lisinopril was increased in December due to poorly controlled HTN.   3) Chronic Low Back Pain -- Appears comfortable. Denies pain today. Managed with scheduled APAP.    ALLERGIES: Amlodipine    PAST MEDICAL HISTORY:   Past Medical History:   Diagnosis Date     Arthritis      Chronic low back pain 3/17/2015     COPD (chronic obstructive pulmonary disease) (H)      Dementia with psychosis 11/1/2016     Fall 2/27/2013     Falling episodes 9/24/2016     Functional urinary incontinence 3/7/2013     GERD (gastroesophageal reflux disease) 4/3/2018     HTN (hypertension) 4/3/2018     Knee pain 3/7/2013     Major depression, single episode 4/16/2015     Pain in both knees 1/30/2014     Physical deconditioning 3/7/2013     Severe major depression with psychotic features (H) 7/12/2016     Skin tear 3/7/2013     Weakness 6/18/2015       PAST SURGICAL HISTORY:   No past surgical history on file.    FAMILY HISTORY:   Family History   Family history unknown: Yes       SOCIAL HISTORY:   Lives in a SNF    MEDICATIONS:  Current Outpatient Medications   Medication Sig Dispense Refill     Acetaminophen (TYLENOL PO) Take 1,000 mg by mouth daily as needed for mild pain or fever       Acetaminophen (TYLENOL PO) Take 1,000 mg by mouth 2 times daily       albuterol (PROAIR HFA/PROVENTIL  HFA/VENTOLIN HFA) 108 (90 BASE) MCG/ACT Inhaler Inhale 2 puffs into the lungs every 4 hours as needed for shortness of breath / dyspnea or wheezing        aspirin (ASA) 81 MG EC tablet Take 1 tablet (81 mg) by mouth daily       carvedilol (COREG) 3.125 MG tablet Take 1 tablet (3.125 mg) by mouth 2 times daily (with meals) 60 tablet 3     folic acid (FOLVITE) 1 MG tablet TAKE 1 TABLET BY MOUTH ONCE DAILY 31 tablet 98     lisinopril (PRINIVIL/ZESTRIL) 20 MG tablet Take 1 tablet (20 mg) by mouth daily       loperamide (IMODIUM) 2 MG capsule TAKE TWO CAPSULES (4MG) BY MOUTH WITH FIRST LOOSE STOOL ; THEN TAKE ONE CAPSULE (2MG) BY MOUTH AFTER EACH LOOSE STOOL +MAX:16MG/24HRS+ 20 capsule 98     nystatin (MYCOSTATIN) 101715 UNIT/GM external powder Apply topically 2 times daily as needed       ranitidine (ZANTAC) 150 MG tablet Take 1 tablet (150 mg) by mouth daily as needed for heartburn 60 tablet      risperiDONE (RISPERDAL) 0.5 MG tablet Take 1 tablet (0.5 mg) by mouth 2 times daily 62 tablet 3     sertraline (ZOLOFT) 100 MG tablet TAKE 1 TABLET BY MOUTH ONCE DAILY 31 tablet 98     vitamin D3 (CHOLECALCIFEROL) 2000 units tablet Take 1 tablet by mouth daily 30 tablet        Medications reviewed:  Medications reconciled to facility chart and changes were made to reflect current medications as identified as above med list. Below are the changes that were made:   Medications stopped since last EPIC medication reconciliation:   There are no discontinued medications.  Medications started since last [facility] medication reconciliation:  No orders of the defined types were placed in this encounter.      Case Management:  I have reviewed the care plan and MDS and do agree with the plan.   Information reviewed:  Medications, vital signs, orders, and nursing notes.    ROS:  Unable to be obtained due to cognitive impairment or aphasia.     PHYSICAL EXAM:  /72   Pulse 87   Temp 98.2  F (36.8  C)   Resp 18   Ht 1.524 m (5')   Wt  73.7 kg (162 lb 8 oz)   SpO2 94%   BMI 31.74 kg/m     Gen: laying in bed, alert and in no acute distress  Resp: breathing non-labored  GI: abdomen soft, not-tender  Ext: no LE edema  Neuro: CX II-XII grossly in tact; ROM in all four extremities grossly in tact  Psych: memory, judgement and insight impaired    Lab/Diagnostic data:  Reviewed as per Epic    ASSESSMENT/PLAN    1) Dementia With Psychosis  Chronic and progressive. Hallucinations seem to be well managed with current dose of risperidone.   -- continues on risperidone 0.5 mg BID and sertraline 100 mg daily  -- ongoing 24/7 nursing and supportive cares    2) HTN   SBPs 110s-120s. Lisinopril was increased from 15 mg in December due to poorly controlled HTN.   -- continues carvedilol 3.125 mg BID and lisinopril 20 mg daily.  -- follow BPs and adjust medications as needed    3) Chronic Low Back Pain   Appears comfortable. Denies pain today.   -- continues on APAP 1000 mg BID and 1000 mg daily PRN    Electronically signed by:  Anisa Caballero MD        Sincerely,        Anisa Caballero MD

## 2019-04-11 NOTE — PROGRESS NOTES
[place] [facility]  Nursing Home Regulatory Visit  April 10, 2019    Chief Complaint   Patient presents with     long-term Regulatory       HPI:    Chelsey Casillas is a 79 year old  (1940), who is being seen today for a federally mandated E/M visit at [facility] [place]. Today's concerns are:    1) Dementia With Psychosis -- Moved to [facility] in September from an assisted living. History of visual hallucinations (ie. Seeing snakes) that are managed with risperidone  2) HTN -- SBPs 110s-120s on carvedilol and lisinopril. Lisinopril was increased in December due to poorly controlled HTN.   3) Chronic Low Back Pain -- Appears comfortable. Denies pain today. Managed with scheduled APAP.    ALLERGIES: Amlodipine    PAST MEDICAL HISTORY:   Past Medical History:   Diagnosis Date     Arthritis      Chronic low back pain 3/17/2015     COPD (chronic obstructive pulmonary disease) (H)      Dementia with psychosis 11/1/2016     Fall 2/27/2013     Falling episodes 9/24/2016     Functional urinary incontinence 3/7/2013     GERD (gastroesophageal reflux disease) 4/3/2018     HTN (hypertension) 4/3/2018     Knee pain 3/7/2013     Major depression, single episode 4/16/2015     Pain in both knees 1/30/2014     Physical deconditioning 3/7/2013     Severe major depression with psychotic features (H) 7/12/2016     Skin tear 3/7/2013     Weakness 6/18/2015       PAST SURGICAL HISTORY:   No past surgical history on file.    FAMILY HISTORY:   Family History   Family history unknown: Yes       SOCIAL HISTORY:   Lives in a SNF    MEDICATIONS:  Current Outpatient Medications   Medication Sig Dispense Refill     Acetaminophen (TYLENOL PO) Take 1,000 mg by mouth daily as needed for mild pain or fever       Acetaminophen (TYLENOL PO) Take 1,000 mg by mouth 2 times daily       albuterol (PROAIR HFA/PROVENTIL HFA/VENTOLIN HFA) 108 (90 BASE) MCG/ACT Inhaler Inhale 2 puffs into the lungs every 4 hours as needed for shortness of  breath / dyspnea or wheezing        aspirin (ASA) 81 MG EC tablet Take 1 tablet (81 mg) by mouth daily       carvedilol (COREG) 3.125 MG tablet Take 1 tablet (3.125 mg) by mouth 2 times daily (with meals) 60 tablet 3     folic acid (FOLVITE) 1 MG tablet TAKE 1 TABLET BY MOUTH ONCE DAILY 31 tablet 98     lisinopril (PRINIVIL/ZESTRIL) 20 MG tablet Take 1 tablet (20 mg) by mouth daily       loperamide (IMODIUM) 2 MG capsule TAKE TWO CAPSULES (4MG) BY MOUTH WITH FIRST LOOSE STOOL ; THEN TAKE ONE CAPSULE (2MG) BY MOUTH AFTER EACH LOOSE STOOL +MAX:16MG/24HRS+ 20 capsule 98     nystatin (MYCOSTATIN) 207409 UNIT/GM external powder Apply topically 2 times daily as needed       ranitidine (ZANTAC) 150 MG tablet Take 1 tablet (150 mg) by mouth daily as needed for heartburn 60 tablet      risperiDONE (RISPERDAL) 0.5 MG tablet Take 1 tablet (0.5 mg) by mouth 2 times daily 62 tablet 3     sertraline (ZOLOFT) 100 MG tablet TAKE 1 TABLET BY MOUTH ONCE DAILY 31 tablet 98     vitamin D3 (CHOLECALCIFEROL) 2000 units tablet Take 1 tablet by mouth daily 30 tablet        Medications reviewed:  Medications reconciled to facility chart and changes were made to reflect current medications as identified as above med list. Below are the changes that were made:   Medications stopped since last EPIC medication reconciliation:   There are no discontinued medications.  Medications started since last [facility] medication reconciliation:  No orders of the defined types were placed in this encounter.      Case Management:  I have reviewed the care plan and MDS and do agree with the plan.   Information reviewed:  Medications, vital signs, orders, and nursing notes.    ROS:  Unable to be obtained due to cognitive impairment or aphasia.     PHYSICAL EXAM:  /72   Pulse 87   Temp 98.2  F (36.8  C)   Resp 18   Ht 1.524 m (5')   Wt 73.7 kg (162 lb 8 oz)   SpO2 94%   BMI 31.74 kg/m    Gen: laying in bed, alert and in no acute distress  Resp:  breathing non-labored  GI: abdomen soft, not-tender  Ext: no LE edema  Neuro: CX II-XII grossly in tact; ROM in all four extremities grossly in tact  Psych: memory, judgement and insight impaired    Lab/Diagnostic data:  Reviewed as per Epic    ASSESSMENT/PLAN    1) Dementia With Psychosis  Chronic and progressive. Hallucinations seem to be well managed with current dose of risperidone.   -- continues on risperidone 0.5 mg BID and sertraline 100 mg daily  -- ongoing 24/7 nursing and supportive cares    2) HTN   SBPs 110s-120s. Lisinopril was increased from 15 mg in December due to poorly controlled HTN.   -- continues carvedilol 3.125 mg BID and lisinopril 20 mg daily.  -- follow BPs and adjust medications as needed    3) Chronic Low Back Pain   Appears comfortable. Denies pain today.   -- continues on APAP 1000 mg BID and 1000 mg daily PRN    Electronically signed by:  Anisa Caballero MD

## 2019-06-19 ENCOUNTER — NURSING HOME VISIT (OUTPATIENT)
Dept: GERIATRICS | Facility: CLINIC | Age: 79
End: 2019-06-19
Payer: COMMERCIAL

## 2019-06-19 VITALS
SYSTOLIC BLOOD PRESSURE: 147 MMHG | BODY MASS INDEX: 28.47 KG/M2 | HEART RATE: 78 BPM | RESPIRATION RATE: 18 BRPM | DIASTOLIC BLOOD PRESSURE: 81 MMHG | WEIGHT: 145 LBS | TEMPERATURE: 98.8 F | OXYGEN SATURATION: 95 % | HEIGHT: 60 IN

## 2019-06-19 DIAGNOSIS — K21.9 GASTROESOPHAGEAL REFLUX DISEASE WITHOUT ESOPHAGITIS: ICD-10-CM

## 2019-06-19 DIAGNOSIS — D64.9 ANEMIA, UNSPECIFIED TYPE: ICD-10-CM

## 2019-06-19 DIAGNOSIS — I10 ESSENTIAL HYPERTENSION: ICD-10-CM

## 2019-06-19 DIAGNOSIS — J44.9 CHRONIC OBSTRUCTIVE PULMONARY DISEASE, UNSPECIFIED COPD TYPE (H): Primary | ICD-10-CM

## 2019-06-19 DIAGNOSIS — F03.92 DEMENTIA WITH PSYCHOSIS (H): ICD-10-CM

## 2019-06-19 DIAGNOSIS — R44.3 HALLUCINATIONS: ICD-10-CM

## 2019-06-19 DIAGNOSIS — R13.10 DYSPHAGIA, UNSPECIFIED TYPE: ICD-10-CM

## 2019-06-19 PROCEDURE — 99309 SBSQ NF CARE MODERATE MDM 30: CPT | Performed by: NURSE PRACTITIONER

## 2019-06-19 ASSESSMENT — MIFFLIN-ST. JEOR: SCORE: 1064.22

## 2019-06-19 NOTE — PROGRESS NOTES
[place] [facility]  Chief Complaint   Patient presents with     USP Regulatory     [place] [facility] Record Number:  [id number]  Place of Service where encounter took place:  [facility] ([facility]) [[id number]]    HPI:    Chelsey Casillas  is 79 year old (1940), who is being seen today for a federally mandated E/M visit.  HPI information obtained from: facility chart records, facility staff and patient report.       Today's concerns are:  Chronic obstructive pulmonary disease, unspecified COPD type (H)  Staff have not noted any cough, SOB, wheeze, hypoxia. Patient denies all of these as well    Dementia with psychosis  Since admission, patient has become more lethargic, sleeps most of the day. She mostly stays in her room and watches TV. She is on risperidone. This was increased from at bedtime to BID 6/2018 at her [facility] due to her seeing snakes and thinking someone was trying to get into her apartment.  Wt Readings from Last 4 Encounters:   06/19/19 65.8 kg (145 lb)   04/10/19 73.7 kg (162 lb 8 oz)   03/25/19 75.1 kg (165 lb 8 oz)   03/18/19 76.2 kg (168 lb)     Gastroesophageal reflux disease without esophagitis  Patient denies any dyspepsia    Essential hypertension  Lisinopril was increased a few months ago due to poorly controlled HTN. BP range currently 96/58 to 170/77. Most frequent BP readings 140s/80s.    Dysphagia, unspecified type  First noted 3/2019. After SLP evaluation, she was placed on mechanical soft diet, but remained on thin liquids. She recently signed a risk to benefit document because she wanted regular textures, diet was upgraded 6/11/19. She has not had any s/sx aspiration or been treated for pneumonia.    Anemia, unspecified type  Iron supplement was stopped 3/2019  Hemoglobin   Date Value Ref Range Status   02/19/2019 12.8 11.7 - 15.7 g/dL Final   12/27/2018 11.7 11.7 - 15.7 g/dL Final         ALLERGIES:Amlodipine  PAST MEDICAL HISTORY:   has a past medical history of  Arthritis, Chronic low back pain (3/17/2015), COPD (chronic obstructive pulmonary disease) (H), Dementia with psychosis (11/1/2016), Fall (2/27/2013), Falling episodes (9/24/2016), Functional urinary incontinence (3/7/2013), GERD (gastroesophageal reflux disease) (4/3/2018), HTN (hypertension) (4/3/2018), Knee pain (3/7/2013), Major depression, single episode (4/16/2015), Pain in both knees (1/30/2014), Physical deconditioning (3/7/2013), 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  PAST SURGICAL HISTORY:   has no past surgical history on file.  FAMILY HISTORY: Family history is unknown by patient.  SOCIAL HISTORY:  reports that she has been smoking cigarettes.  She has never used smokeless tobacco. She reports that she does not drink alcohol or use drugs.    MEDICATIONS:  Current Outpatient Medications   Medication Sig Dispense Refill     Acetaminophen (TYLENOL PO) Take 1,000 mg by mouth daily as needed for mild pain or fever       Acetaminophen (TYLENOL PO) Take 1,000 mg by mouth 2 times daily       albuterol (PROAIR HFA/PROVENTIL HFA/VENTOLIN HFA) 108 (90 BASE) MCG/ACT Inhaler Inhale 2 puffs into the lungs every 4 hours as needed for shortness of breath / dyspnea or wheezing        aspirin (ASA) 81 MG EC tablet Take 1 tablet (81 mg) by mouth daily       carvedilol (COREG) 3.125 MG tablet Take 1 tablet (3.125 mg) by mouth 2 times daily (with meals) 60 tablet 3     folic acid (FOLVITE) 1 MG tablet TAKE 1 TABLET BY MOUTH ONCE DAILY 31 tablet 98     lisinopril (PRINIVIL/ZESTRIL) 20 MG tablet Take 1 tablet (20 mg) by mouth daily       loperamide (IMODIUM) 2 MG capsule TAKE TWO CAPSULES (4MG) BY MOUTH WITH FIRST LOOSE STOOL ; THEN TAKE ONE CAPSULE (2MG) BY MOUTH AFTER EACH LOOSE STOOL +MAX:16MG/24HRS+ 20 capsule 98     nystatin (MYCOSTATIN) 260258 UNIT/GM external powder Apply topically 2 times daily as needed       ranitidine (ZANTAC) 150 MG tablet  Take 1 tablet (150 mg) by mouth daily as needed for heartburn 60 tablet      risperiDONE (RISPERDAL) 0.5 MG tablet Take 1 tablet (0.5 mg) by mouth 2 times daily 62 tablet 3     sertraline (ZOLOFT) 100 MG tablet TAKE 1 TABLET BY MOUTH ONCE DAILY 31 tablet 98     vitamin D3 (CHOLECALCIFEROL) 2000 units tablet Take 1 tablet by mouth daily 30 tablet        Case Management:  I have reviewed the care plan and MDS and do agree with the plan. Patient's desire to return to the community is not assessible due to cognitive impairment. Information reviewed:  Medications, vital signs, orders, and nursing notes.    ROS:  Reliability questionable secondary to cognitive impairment. Denies chest pain, shortness of breath, fevers, chills, headache, nausea, vomiting, dysuria or bowel abnormalities. No pain.    Vitals:  /81   Pulse 78   Temp 98.8  F (37.1  C)   Resp 18   Ht 1.524 m (5')   Wt 65.8 kg (145 lb)   SpO2 95%   BMI 28.32 kg/m    Body mass index is 28.32 kg/m .  Exam:  GENERAL APPEARANCE:  Sleeping in chair, drooling, did arouse after her name was called a few times. Answered a few questions, but did not open eyes  ENT:  Mouth and posterior oropharynx normal, moist mucous membranes  RESP:  lungs clear to auscultation , no respiratory distress  CV:  Palpation and auscultation of heart done , regular rate and rhythm, no murmur, rub, or gallop, no edema  ABDOMEN:  normal bowel sounds, soft, nontender, no hepatosplenomegaly or other masses  SKIN:  Inspection of skin and subcutaneous tissue baseline, Palpation of skin and subcutaneous tissue baseline  NEURO:   patient was leaning to the right in her chair when sleeping, when aroused she did make slight movements in her arms, legs, and torso, but would not participate in neuro exam.  PSYCH:  insight and judgement impaired, memory impaired , affect abnormal flat    Lab/Diagnostic data:   Recent labs in [facility] reviewed by me today.       ASSESSMENT/PLAN  (J44.9) Chronic  obstructive pulmonary disease, unspecified COPD type (H)  (primary encounter diagnosis)  Comment: Chronic condition being managed with medications and is currently asymptomatic.  Plan: Continue current POC with no changes at this time and adjustments as needed.    (F03.91) Dementia with psychosis  Comment: Significant decline cognitively since admission as well as recent weight loss. It is certainly possible that she had a stroke when she was first noted to be sleepier and drooling, but based on goals of care, no diagnostic testing was recommended or pursued. Therapy was attempted and she was reasonably participatory, but made little improvement. It is also possible that her risperidone is contributing. Given that there have not been any observed hallucinations, will try decreasing this.  Plan: Decrease risperidone to 0.5mg at bedtime. Continue 24 hour care. Monitor weight. Monitor functional status. Monitor for behavioral disturbances.    (K21.9) Gastroesophageal reflux disease without esophagitis  Comment: Appears to be controlled  Plan: Continue current POC with no changes at this time and adjustments as needed.    (I10) Essential hypertension  Comment: Control is widely variable. Risperidone may be contributing to hypotension. Given that this is being reduced, will not adjust BP meds at this time. To avoid risk of hypotension, falls, dizziness and tissue hypoperfusion, recommend  BP goal is < 150/90mmHg.  Plan: Continue current POC with no changes at this time and adjustments as needed.    (R13.10) Dysphagia, unspecified type  Comment: No s/sx aspiration. This could be due to dementia, CVA, or medications  Plan: Continue current POC with no changes at this time and adjustments as needed.    (D64.9) Anemia, unspecified type  Comment: History unknown, but no longer needs iron  Plan: Hgb prn    Orders written by provider at facility  Decrease risperidone to 0.5mg qhs      Electronically signed by:  Rebekah Rodas,  [facility]  Cell: [phone]

## 2019-06-19 NOTE — LETTER
6/19/2019        RE: Chelsey Casillas  [facility] [place]  [address] [address]        [place] [facility]  Chief Complaint   Patient presents with     jail Regulatory     [place] Medical Record Number:  [id number]  Place of Service where encounter took place:  [facility] ([facility]) [[id number]]    HPI:    Chelsey Casillas  is 79 year old (1940), who is being seen today for a federally mandated E/M visit.  HPI information obtained from: facility chart records, facility staff and patient report.       Today's concerns are:  Chronic obstructive pulmonary disease, unspecified COPD type (H)  Staff have not noted any cough, SOB, wheeze, hypoxia. Patient denies all of these as well    Dementia with psychosis  Since admission, patient has become more lethargic, sleeps most of the day. She mostly stays in her room and watches TV. She is on risperidone. This was increased from at bedtime to BID 6/2018 at her [facility] due to her seeing snakes and thinking someone was trying to get into her apartment.  Wt Readings from Last 4 Encounters:   06/19/19 65.8 kg (145 lb)   04/10/19 73.7 kg (162 lb 8 oz)   03/25/19 75.1 kg (165 lb 8 oz)   03/18/19 76.2 kg (168 lb)     Gastroesophageal reflux disease without esophagitis  Patient denies any dyspepsia    Essential hypertension  Lisinopril was increased a few months ago due to poorly controlled HTN. BP range currently 96/58 to 170/77. Most frequent BP readings 140s/80s.    Dysphagia, unspecified type  First noted 3/2019. After SLP evaluation, she was placed on mechanical soft diet, but remained on thin liquids. She recently signed a risk to benefit document because she wanted regular textures, diet was upgraded 6/11/19. She has not had any s/sx aspiration or been treated for pneumonia.    Anemia, unspecified type  Iron supplement was stopped 3/2019  Hemoglobin   Date Value Ref Range Status   02/19/2019 12.8 11.7 - 15.7 g/dL Final   12/27/2018  11.7 11.7 - 15.7 g/dL Final         ALLERGIES:Amlodipine  PAST MEDICAL HISTORY:   has a past medical history of Arthritis, Chronic low back pain (3/17/2015), COPD (chronic obstructive pulmonary disease) (H), Dementia with psychosis (11/1/2016), Fall (2/27/2013), Falling episodes (9/24/2016), Functional urinary incontinence (3/7/2013), GERD (gastroesophageal reflux disease) (4/3/2018), HTN (hypertension) (4/3/2018), Knee pain (3/7/2013), Major depression, single episode (4/16/2015), Pain in both knees (1/30/2014), Physical deconditioning (3/7/2013), 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  PAST SURGICAL HISTORY:   has no past surgical history on file.  FAMILY HISTORY: Family history is unknown by patient.  SOCIAL HISTORY:  reports that she has been smoking cigarettes.  She has never used smokeless tobacco. She reports that she does not drink alcohol or use drugs.    MEDICATIONS:  Current Outpatient Medications   Medication Sig Dispense Refill     Acetaminophen (TYLENOL PO) Take 1,000 mg by mouth daily as needed for mild pain or fever       Acetaminophen (TYLENOL PO) Take 1,000 mg by mouth 2 times daily       albuterol (PROAIR HFA/PROVENTIL HFA/VENTOLIN HFA) 108 (90 BASE) MCG/ACT Inhaler Inhale 2 puffs into the lungs every 4 hours as needed for shortness of breath / dyspnea or wheezing        aspirin (ASA) 81 MG EC tablet Take 1 tablet (81 mg) by mouth daily       carvedilol (COREG) 3.125 MG tablet Take 1 tablet (3.125 mg) by mouth 2 times daily (with meals) 60 tablet 3     folic acid (FOLVITE) 1 MG tablet TAKE 1 TABLET BY MOUTH ONCE DAILY 31 tablet 98     lisinopril (PRINIVIL/ZESTRIL) 20 MG tablet Take 1 tablet (20 mg) by mouth daily       loperamide (IMODIUM) 2 MG capsule TAKE TWO CAPSULES (4MG) BY MOUTH WITH FIRST LOOSE STOOL ; THEN TAKE ONE CAPSULE (2MG) BY MOUTH AFTER EACH LOOSE STOOL +MAX:16MG/24HRS+ 20 capsule 98     nystatin (MYCOSTATIN)  979731 UNIT/GM external powder Apply topically 2 times daily as needed       ranitidine (ZANTAC) 150 MG tablet Take 1 tablet (150 mg) by mouth daily as needed for heartburn 60 tablet      risperiDONE (RISPERDAL) 0.5 MG tablet Take 1 tablet (0.5 mg) by mouth 2 times daily 62 tablet 3     sertraline (ZOLOFT) 100 MG tablet TAKE 1 TABLET BY MOUTH ONCE DAILY 31 tablet 98     vitamin D3 (CHOLECALCIFEROL) 2000 units tablet Take 1 tablet by mouth daily 30 tablet        Case Management:  I have reviewed the care plan and MDS and do agree with the plan. Patient's desire to return to the community is not assessible due to cognitive impairment. Information reviewed:  Medications, vital signs, orders, and nursing notes.    ROS:  Reliability questionable secondary to cognitive impairment. Denies chest pain, shortness of breath, fevers, chills, headache, nausea, vomiting, dysuria or bowel abnormalities. No pain.    Vitals:  /81   Pulse 78   Temp 98.8  F (37.1  C)   Resp 18   Ht 1.524 m (5')   Wt 65.8 kg (145 lb)   SpO2 95%   BMI 28.32 kg/m     Body mass index is 28.32 kg/m .  Exam:  GENERAL APPEARANCE:  Sleeping in chair, drooling, did arouse after her name was called a few times. Answered a few questions, but did not open eyes  ENT:  Mouth and posterior oropharynx normal, moist mucous membranes  RESP:  lungs clear to auscultation , no respiratory distress  CV:  Palpation and auscultation of heart done , regular rate and rhythm, no murmur, rub, or gallop, no edema  ABDOMEN:  normal bowel sounds, soft, nontender, no hepatosplenomegaly or other masses  SKIN:  Inspection of skin and subcutaneous tissue baseline, Palpation of skin and subcutaneous tissue baseline  NEURO:   patient was leaning to the right in her chair when sleeping, when aroused she did make slight movements in her arms, legs, and torso, but would not participate in neuro exam.  PSYCH:  insight and judgement impaired, memory impaired , affect abnormal  flat    Lab/Diagnostic data:   Recent labs in [facility] reviewed by me today.       ASSESSMENT/PLAN  (J44.9) Chronic obstructive pulmonary disease, unspecified COPD type (H)  (primary encounter diagnosis)  Comment: Chronic condition being managed with medications and is currently asymptomatic.  Plan: Continue current POC with no changes at this time and adjustments as needed.    (F03.91) Dementia with psychosis  Comment: Significant decline cognitively since admission as well as recent weight loss. It is certainly possible that she had a stroke when she was first noted to be sleepier and drooling, but based on goals of care, no diagnostic testing was recommended or pursued. Therapy was attempted and she was reasonably participatory, but made little improvement. It is also possible that her risperidone is contributing. Given that there have not been any observed hallucinations, will try decreasing this.  Plan: Decrease risperidone to 0.5mg at bedtime. Continue 24 hour care. Monitor weight. Monitor functional status. Monitor for behavioral disturbances.    (K21.9) Gastroesophageal reflux disease without esophagitis  Comment: Appears to be controlled  Plan: Continue current POC with no changes at this time and adjustments as needed.    (I10) Essential hypertension  Comment: Control is widely variable. Risperidone may be contributing to hypotension. Given that this is being reduced, will not adjust BP meds at this time. To avoid risk of hypotension, falls, dizziness and tissue hypoperfusion, recommend  BP goal is < 150/90mmHg.  Plan: Continue current POC with no changes at this time and adjustments as needed.    (R13.10) Dysphagia, unspecified type  Comment: No s/sx aspiration. This could be due to dementia, CVA, or medications  Plan: Continue current POC with no changes at this time and adjustments as needed.    (D64.9) Anemia, unspecified type  Comment: History unknown, but no longer needs iron  Plan: Hgb prn    Orders  written by provider at facility  Decrease risperidone to 0.5mg qhs      Electronically signed by:  [facility]  Cell: [phone]

## 2019-06-20 ENCOUNTER — TRANSFERRED RECORDS (OUTPATIENT)
Dept: HEALTH INFORMATION MANAGEMENT | Facility: CLINIC | Age: 79
End: 2019-06-20

## 2019-06-20 LAB
ANION GAP SERPL CALCULATED.3IONS-SCNC: 7 MMOL/L (ref 3–14)
BUN SERPL-MCNC: 10 MG/DL (ref 7–30)
CALCIUM SERPL-MCNC: 8.8 MG/DL (ref 8.5–10.1)
CHLORIDE SERPLBLD-SCNC: 106 MMOL/L (ref 94–109)
CO2 SERPL-SCNC: 27 MMOL/L (ref 20–32)
CREAT SERPL-MCNC: 0.48 MG/DL (ref 0.52–1.04)
ERYTHROCYTE [DISTWIDTH] IN BLOOD BY AUTOMATED COUNT: 13.1 % (ref 10–15)
GFR SERPL CREATININE-BSD FRML MDRD: >90 ML/MIN/1.73M2
GLUCOSE SERPL-MCNC: 100 MG/DL (ref 70–99)
HCT VFR BLD AUTO: 34.4 % (ref 35–47)
HEMOGLOBIN: 11.4 G/DL (ref 11.7–15.7)
MCH RBC QN AUTO: 29.4 PG (ref 26.5–33)
MCHC RBC AUTO-ENTMCNC: 33.1 G/DL (ref 31.5–36.5)
MCV RBC AUTO: 89 FL (ref 78–100)
PLATELET # BLD AUTO: 167 10E9/L (ref 150–450)
POTASSIUM SERPL-SCNC: 4 MMOL/L (ref 3.4–5.3)
RBC # BLD AUTO: 3.88 (ref 3.8–5.2)
SODIUM SERPL-SCNC: 140 MMOL/L (ref 133–144)
WBC # BLD AUTO: 5.6 10E9/L (ref 4–11)

## 2019-06-20 RX ORDER — RISPERIDONE 0.5 MG/1
0.5 TABLET ORAL AT BEDTIME
Qty: 62 TABLET | Refills: 3
Start: 2019-06-20 | End: 2019-09-24

## 2019-08-23 ENCOUNTER — NURSING HOME VISIT (OUTPATIENT)
Dept: GERIATRICS | Facility: CLINIC | Age: 79
End: 2019-08-23
Payer: COMMERCIAL

## 2019-08-23 VITALS
SYSTOLIC BLOOD PRESSURE: 128 MMHG | DIASTOLIC BLOOD PRESSURE: 60 MMHG | WEIGHT: 144.7 LBS | TEMPERATURE: 98.6 F | BODY MASS INDEX: 28.41 KG/M2 | HEIGHT: 60 IN | OXYGEN SATURATION: 98 % | HEART RATE: 62 BPM | RESPIRATION RATE: 18 BRPM

## 2019-08-23 DIAGNOSIS — I10 ESSENTIAL HYPERTENSION: ICD-10-CM

## 2019-08-23 DIAGNOSIS — J44.9 CHRONIC OBSTRUCTIVE PULMONARY DISEASE, UNSPECIFIED COPD TYPE (H): ICD-10-CM

## 2019-08-23 DIAGNOSIS — R63.4 WEIGHT LOSS: ICD-10-CM

## 2019-08-23 DIAGNOSIS — F03.92 DEMENTIA WITH PSYCHOSIS (H): Primary | ICD-10-CM

## 2019-08-23 PROCEDURE — 99308 SBSQ NF CARE LOW MDM 20: CPT | Performed by: INTERNAL MEDICINE

## 2019-08-23 ASSESSMENT — MIFFLIN-ST. JEOR: SCORE: 1062.85

## 2019-08-23 NOTE — PROGRESS NOTES
"[place] [facility]  PHYSICIAN NOTE    Chief Complaint   Patient presents with     long-term Regulatory       HPI:    Chelsey Casillas is a 79 year old  (1940), who is being seen today for a federally mandated E/M visit at [facility] . Admitted to LTC in September 2018 from [facility]. She has h/o dementia and often isolates in her room watching TV and/or sleeping. She has h/o delusions (ex: snakes) and is on Risperidone, however, Risperidone was able to be decreased in June without difficulty. She isn't bothered by delusions on report today. Does love cats and says she had a cat here which is now roaming the halls (possible). Does have stuffed cat named \"Pretty Boy\" that purs and blinks its eyes sitting on her bed and references that she knows he isn't real. She talks about the other cat here named \"Clementina\". Says her mood is \"so-so\". Has some dysphagia noted per speech eval and notes reference its possible she had a mild stroke earlier this year. She has been losing weight and she doesn't know why. Denies any pain, significant cough or dyspnea. Has rollator but says she doesn't use it much. H/o COPD but reports hasn't smoked in a couple years.    ALLERGIES: Amlodipine    Past Medical History:   Diagnosis Date     Arthritis      Chronic low back pain 3/17/2015     COPD (chronic obstructive pulmonary disease) (H)      Dementia with psychosis 11/1/2016     Fall 2/27/2013     Falling episodes 9/24/2016     Functional urinary incontinence 3/7/2013     GERD (gastroesophageal reflux disease) 4/3/2018     HTN (hypertension) 4/3/2018     Knee pain 3/7/2013     Major depression, single episode 4/16/2015     Pain in both knees 1/30/2014     Physical deconditioning 3/7/2013     Severe major depression with psychotic features (H) 7/12/2016     Skin tear 3/7/2013     Weakness 6/18/2015      Social History     Tobacco Use     Smoking status: Former Smoker     Types: Cigarettes     Smokeless tobacco: Never " Used   Substance Use Topics     Alcohol use: No     Drug use: No     CODE STATUS: DNR/I    MEDICATIONS: Medications reviewed in [facility]  Current Outpatient Medications   Medication Sig Dispense Refill     Acetaminophen (TYLENOL PO) Take 1,000 mg by mouth daily as needed for mild pain or fever       Acetaminophen (TYLENOL PO) Take 1,000 mg by mouth 2 times daily       albuterol (PROAIR HFA/PROVENTIL HFA/VENTOLIN HFA) 108 (90 BASE) MCG/ACT Inhaler Inhale 2 puffs into the lungs every 4 hours as needed for shortness of breath / dyspnea or wheezing        aspirin (ASA) 81 MG EC tablet Take 1 tablet (81 mg) by mouth daily       carvedilol (COREG) 3.125 MG tablet Take 1 tablet (3.125 mg) by mouth 2 times daily (with meals) 60 tablet 3     folic acid (FOLVITE) 1 MG tablet TAKE 1 TABLET BY MOUTH ONCE DAILY 31 tablet 98     lisinopril (PRINIVIL/ZESTRIL) 20 MG tablet Take 1 tablet (20 mg) by mouth daily       loperamide (IMODIUM) 2 MG capsule TAKE TWO CAPSULES (4MG) BY MOUTH WITH FIRST LOOSE STOOL ; THEN TAKE ONE CAPSULE (2MG) BY MOUTH AFTER EACH LOOSE STOOL +MAX:16MG/24HRS+ 20 capsule 98     nystatin (MYCOSTATIN) 164098 UNIT/GM external powder Apply topically 2 times daily as needed       ranitidine (ZANTAC) 150 MG tablet Take 1 tablet (150 mg) by mouth daily as needed for heartburn 60 tablet      risperiDONE (RISPERDAL) 0.5 MG tablet Take 1 tablet (0.5 mg) by mouth At Bedtime 62 tablet 3     sertraline (ZOLOFT) 100 MG tablet TAKE 1 TABLET BY MOUTH ONCE DAILY 31 tablet 98     vitamin D3 (CHOLECALCIFEROL) 2000 units tablet Take 1 tablet by mouth daily 30 tablet        ROS:  Limited secondary to cognitive impairment but today pt reports as above in HPI    Exam:  /60   Pulse 62   Temp 98.6  F (37  C)   Resp 18   Ht 1.524 m (5')   Wt 65.6 kg (144 lb 11.2 oz)   SpO2 98%   BMI 28.26 kg/m    Vitals reviewed in [facility]: Afebrile, 110-140/60-80s, 60-90s  Alert, pleasant, NAD, sitting up in wheelchair watching TV but she had to  contemplate what it was she was watching when I asked her  Mild odor, casually dressed  No icterus  Moist oral mucosa  HRRR without mrg  No edema  Lungs clear, no cough  Abdomen non-tender  Normal speech, no tremor    Lab/Diagnostic Data:    Last Basic Metabolic Panel:  Sodium   Date Value Ref Range Status   06/20/2019 140 133 - 144 mmol/L Final     Potassium   Date Value Ref Range Status   06/20/2019 4.0 3.4 - 5.3 mmol/L Final     Chloride   Date Value Ref Range Status   06/20/2019 106 94 - 109 mmol/L Final     Carbon Dioxide   Date Value Ref Range Status   06/20/2019 27 20 - 32 mmol/L Final     Anion Gap   Date Value Ref Range Status   06/20/2019 7 3 - 14 mmol/L Final     Glucose   Date Value Ref Range Status   06/20/2019 100 (A) 70 - 99 mg/dL Final     Urea Nitrogen   Date Value Ref Range Status   06/20/2019 10 7 - 30 mg/dL Final     Creatinine   Date Value Ref Range Status   06/20/2019 0.48 (A) 0.52 - 1.04 mg/dL Final     GFR Estimate   Date Value Ref Range Status   06/20/2019 >90 >60 ml/min/1.73m2 Final     Calcium   Date Value Ref Range Status   06/20/2019 8.8 8.5 - 10.1 mg/dL Final     Lab Results   Component Value Date    WBC 5.6 06/20/2019     Lab Results   Component Value Date    RBC 3.88 06/20/2019     Lab Results   Component Value Date    HGB 11.4 06/20/2019     Lab Results   Component Value Date    HCT 34.4 06/20/2019     Lab Results   Component Value Date    MCV 89 06/20/2019     Lab Results   Component Value Date    MCH 29.4 06/20/2019     Lab Results   Component Value Date    MCHC 33.1 06/20/2019     Lab Results   Component Value Date    RDW 13.1 06/20/2019     Lab Results   Component Value Date     06/20/2019         ASSESSMENT/PLAN:  Dementia with psychosis  Was able to have dose reduction of Risperidone without recurrence of her troublesome delusions; continue to slowly taper as able with goal not to be bothered by hallucinations/delusions from a comfort perspective  Benefiting from the  care/support of LTC environment    Chronic obstructive pulmonary disease   Former smoker  Breathing comfortably on room air and has PRN Albuterol available    Essential hypertension  At good control overall for age; goal <150/90 to avoid hypotension  Recent labs as noted above were excellent    Weight loss  Does have some reported dysphagia as well per chart review  Denies symptoms   Could be r/t underlying dementia and thus if continues to lose weight and/or has other complications would consider discussing hospice with her       Electronically signed by:  Rina Gardner DO

## 2019-08-23 NOTE — LETTER
"    8/23/2019        RE: Chelsey Casillas  [facility]  [address] [address]        [place] [facility]  PHYSICIAN NOTE    Chief Complaint   Patient presents with     [facility] Regulatory       HPI:    Chelsey Casillas is a 79 year old  (1940), who is being seen today for a federally mandated E/M visit at [facility] . Admitted to LTC in September 2018 from [facility]. She has h/o dementia and often isolates in her room watching TV and/or sleeping. She has h/o delusions (ex: snakes) and is on Risperidone, however, Risperidone was able to be decreased in June without difficulty. She isn't bothered by delusions on report today. Does love cats and says she had a cat here which is now roaming the halls (possible). Does have stuffed cat named \"Pretty Boy\" that purs and blinks its eyes sitting on her bed and references that she knows he isn't real. She talks about the other cat here named \"Clementina\". Says her mood is \"so-so\". Has some dysphagia noted per speech eval and notes reference its possible she had a mild stroke earlier this year. She has been losing weight and she doesn't know why. Denies any pain, significant cough or dyspnea. Has rollator but says she doesn't use it much. H/o COPD but reports hasn't smoked in a couple years.    ALLERGIES: Amlodipine    Past Medical History:   Diagnosis Date     Arthritis      Chronic low back pain 3/17/2015     COPD (chronic obstructive pulmonary disease) (H)      Dementia with psychosis 11/1/2016     Fall 2/27/2013     Falling episodes 9/24/2016     Functional urinary incontinence 3/7/2013     GERD (gastroesophageal reflux disease) 4/3/2018     HTN (hypertension) 4/3/2018     Knee pain 3/7/2013     Major depression, single episode 4/16/2015     Pain in both knees 1/30/2014     Physical deconditioning 3/7/2013     Severe major depression with psychotic features (H) 7/12/2016     Skin tear 3/7/2013     Weakness 6/18/2015    "   Social History     Tobacco Use     Smoking status: Former Smoker     Types: Cigarettes     Smokeless tobacco: Never Used   Substance Use Topics     Alcohol use: No     Drug use: No     CODE STATUS: DNR/I    MEDICATIONS: Medications reviewed in [facility]  Current Outpatient Medications   Medication Sig Dispense Refill     Acetaminophen (TYLENOL PO) Take 1,000 mg by mouth daily as needed for mild pain or fever       Acetaminophen (TYLENOL PO) Take 1,000 mg by mouth 2 times daily       albuterol (PROAIR HFA/PROVENTIL HFA/VENTOLIN HFA) 108 (90 BASE) MCG/ACT Inhaler Inhale 2 puffs into the lungs every 4 hours as needed for shortness of breath / dyspnea or wheezing        aspirin (ASA) 81 MG EC tablet Take 1 tablet (81 mg) by mouth daily       carvedilol (COREG) 3.125 MG tablet Take 1 tablet (3.125 mg) by mouth 2 times daily (with meals) 60 tablet 3     folic acid (FOLVITE) 1 MG tablet TAKE 1 TABLET BY MOUTH ONCE DAILY 31 tablet 98     lisinopril (PRINIVIL/ZESTRIL) 20 MG tablet Take 1 tablet (20 mg) by mouth daily       loperamide (IMODIUM) 2 MG capsule TAKE TWO CAPSULES (4MG) BY MOUTH WITH FIRST LOOSE STOOL ; THEN TAKE ONE CAPSULE (2MG) BY MOUTH AFTER EACH LOOSE STOOL +MAX:16MG/24HRS+ 20 capsule 98     nystatin (MYCOSTATIN) 363807 UNIT/GM external powder Apply topically 2 times daily as needed       ranitidine (ZANTAC) 150 MG tablet Take 1 tablet (150 mg) by mouth daily as needed for heartburn 60 tablet      risperiDONE (RISPERDAL) 0.5 MG tablet Take 1 tablet (0.5 mg) by mouth At Bedtime 62 tablet 3     sertraline (ZOLOFT) 100 MG tablet TAKE 1 TABLET BY MOUTH ONCE DAILY 31 tablet 98     vitamin D3 (CHOLECALCIFEROL) 2000 units tablet Take 1 tablet by mouth daily 30 tablet        ROS:  Limited secondary to cognitive impairment but today pt reports as above in HPI    Exam:  /60   Pulse 62   Temp 98.6  F (37  C)   Resp 18   Ht 1.524 m (5')   Wt 65.6 kg (144 lb 11.2 oz)   SpO2 98%   BMI 28.26 kg/m     Vitals reviewed  in PCC: Afebrile, 110-140/60-80s, 60-90s  Alert, pleasant, NAD, sitting up in wheelchair watching TV but she had to contemplate what it was she was watching when I asked her  Mild odor, casually dressed  No icterus  Moist oral mucosa  HRRR without mrg  No edema  Lungs clear, no cough  Abdomen non-tender  Normal speech, no tremor    Lab/Diagnostic Data:    Last Basic Metabolic Panel:  Sodium   Date Value Ref Range Status   06/20/2019 140 133 - 144 mmol/L Final     Potassium   Date Value Ref Range Status   06/20/2019 4.0 3.4 - 5.3 mmol/L Final     Chloride   Date Value Ref Range Status   06/20/2019 106 94 - 109 mmol/L Final     Carbon Dioxide   Date Value Ref Range Status   06/20/2019 27 20 - 32 mmol/L Final     Anion Gap   Date Value Ref Range Status   06/20/2019 7 3 - 14 mmol/L Final     Glucose   Date Value Ref Range Status   06/20/2019 100 (A) 70 - 99 mg/dL Final     Urea Nitrogen   Date Value Ref Range Status   06/20/2019 10 7 - 30 mg/dL Final     Creatinine   Date Value Ref Range Status   06/20/2019 0.48 (A) 0.52 - 1.04 mg/dL Final     GFR Estimate   Date Value Ref Range Status   06/20/2019 >90 >60 ml/min/1.73m2 Final     Calcium   Date Value Ref Range Status   06/20/2019 8.8 8.5 - 10.1 mg/dL Final     Lab Results   Component Value Date    WBC 5.6 06/20/2019     Lab Results   Component Value Date    RBC 3.88 06/20/2019     Lab Results   Component Value Date    HGB 11.4 06/20/2019     Lab Results   Component Value Date    HCT 34.4 06/20/2019     Lab Results   Component Value Date    MCV 89 06/20/2019     Lab Results   Component Value Date    MCH 29.4 06/20/2019     Lab Results   Component Value Date    MCHC 33.1 06/20/2019     Lab Results   Component Value Date    RDW 13.1 06/20/2019     Lab Results   Component Value Date     06/20/2019         ASSESSMENT/PLAN:  Dementia with psychosis  Was able to have dose reduction of Risperidone without recurrence of her troublesome delusions; continue to slowly taper  as able with goal not to be bothered by hallucinations/delusions from a comfort perspective  Benefiting from the care/support of LTC environment    Chronic obstructive pulmonary disease   Former smoker  Breathing comfortably on room air and has PRN Albuterol available    Essential hypertension  At good control overall for age; goal <150/90 to avoid hypotension  Recent labs as noted above were excellent    Weight loss  Does have some reported dysphagia as well per chart review  Denies symptoms   Could be r/t underlying dementia and thus if continues to lose weight and/or has other complications would consider discussing hospice with her       Electronically signed by:  Rina Gardner DO      Sincerely,        Rina Gardner DO

## 2019-09-18 NOTE — PROGRESS NOTES
[place] [facility]  [place] Medical Record Number:  [id number]  Place of Service where encounter took place:  [facility] ([facility]) [[id number]]  Chief Complaint   Patient presents with     RECHECK       HPI:    Chelsey Casillas  is a 79 year old (1940), who is being seen today for an episodic care visit.  HPI information obtained from: facility chart records, facility staff and patient report.     Today's concern is:  Generalized weakness  Dementia with psychosis  Risperidone was decreased 6/19/19 due to increased lethargy, reduced intake. This had originally been started due to hallucinations of snakes and people trying to get into her apartment. Pharmacy recommendation received that another GDR should be considered. Today she says she is fine, she just wants to sleep. Does not participate in much conversation. Patient had 3 incidents in August when she was found on the floor after attempting self-transfer. She stays in her room most of the time, keeps to herself. No reports of hallucinations or behavioral issues other than her self-transfers.      Past Medical and Surgical History reviewed in [employer] today.      REVIEW OF SYSTEMS:  Unobtainable secondary to cognitive impairment.     Objective:  /70   Pulse 80   Temp 97.8  F (36.6  C)   Resp 18   Wt 65.6 kg (144 lb 11.2 oz)   SpO2 94%   BMI 28.26 kg/m    Exam:  GENERAL APPEARANCE:  Alert, in no distress, morbidly obese  PSYCH:  insight and judgement impaired, memory impaired , affect abnormal flat      ASSESSMENT/PLAN:  (R53.1) Generalized weakness  (primary encounter diagnosis)  (F03.91) Dementia with psychosis  Comment: Patient's falls are likely due to generalized weakness and poor judgement. Risperidone probably not contributing, but she has not had any s/sx of psychosis, so it would be worthwhile to attempt further GDR.   Plan: Decrease risperidone to 0.25mg at bedtime. Monitor for any s/sx hallucinations, delusions, emotional  distress      Electronically signed by:  ALCIRA Sweet [facility]  Phone: [phone]

## 2019-09-23 ENCOUNTER — NURSING HOME VISIT (OUTPATIENT)
Dept: GERIATRICS | Facility: CLINIC | Age: 79
End: 2019-09-23
Payer: COMMERCIAL

## 2019-09-23 VITALS
SYSTOLIC BLOOD PRESSURE: 112 MMHG | HEART RATE: 80 BPM | BODY MASS INDEX: 28.26 KG/M2 | RESPIRATION RATE: 18 BRPM | WEIGHT: 144.7 LBS | DIASTOLIC BLOOD PRESSURE: 70 MMHG | TEMPERATURE: 97.8 F | OXYGEN SATURATION: 94 %

## 2019-09-23 DIAGNOSIS — R44.3 HALLUCINATIONS: ICD-10-CM

## 2019-09-23 DIAGNOSIS — F03.92 DEMENTIA WITH PSYCHOSIS (H): ICD-10-CM

## 2019-09-23 DIAGNOSIS — R53.1 GENERALIZED WEAKNESS: Primary | ICD-10-CM

## 2019-09-23 PROCEDURE — 99308 SBSQ NF CARE LOW MDM 20: CPT | Performed by: NURSE PRACTITIONER

## 2019-09-23 NOTE — LETTER
9/23/2019        RE: Chelsey Casillas  [facility]  [address] [address]        [place] [facility]  [place] [facility] Record Number:  [id number]  Place of Service where encounter took place:  [facility] ([facility]) [[id number]]  Chief Complaint   Patient presents with     RECHECK       HPI:    Chelsey Casillas  is a 79 year old (1940), who is being seen today for an episodic care visit.  HPI information obtained from: facility chart records, facility staff and patient report.     Today's concern is:  Generalized weakness  Dementia with psychosis  Risperidone was decreased 6/19/19 due to increased lethargy, reduced intake. This had originally been started due to hallucinations of snakes and people trying to get into her apartment. Pharmacy recommendation received that another GDR should be considered. Today she says she is fine, she just wants to sleep. Does not participate in much conversation. Patient had 3 incidents in August when she was found on the floor after attempting self-transfer. She stays in her room most of the time, keeps to herself. No reports of hallucinations or behavioral issues other than her self-transfers.      Past Medical and Surgical History reviewed in [facility] today.      REVIEW OF SYSTEMS:  Unobtainable secondary to cognitive impairment.     Objective:  /70   Pulse 80   Temp 97.8  F (36.6  C)   Resp 18   Wt 65.6 kg (144 lb 11.2 oz)   SpO2 94%   BMI 28.26 kg/m     Exam:  GENERAL APPEARANCE:  Alert, in no distress, morbidly obese  PSYCH:  insight and judgement impaired, memory impaired , affect abnormal flat      ASSESSMENT/PLAN:  (R53.1) Generalized weakness  (primary encounter diagnosis)  (F03.91) Dementia with psychosis  Comment: Patient's falls are likely due to generalized weakness and poor judgement. Risperidone probably not contributing, but she has not had any s/sx of psychosis, so it would be worthwhile to attempt further GDR.    Plan: Decrease risperidone to 0.25mg at bedtime. Monitor for any s/sx hallucinations, delusions, emotional distress      Electronically signed by:  ALCIRA Sweet [facility]  Phone: [phone]

## 2019-09-24 RX ORDER — RISPERIDONE 0.5 MG/1
0.25 TABLET ORAL AT BEDTIME
Qty: 62 TABLET | Refills: 3
Start: 2019-09-24 | End: 2019-12-20

## 2019-10-09 ENCOUNTER — NURSING HOME VISIT (OUTPATIENT)
Dept: GERIATRICS | Facility: CLINIC | Age: 79
End: 2019-10-09
Payer: COMMERCIAL

## 2019-10-09 VITALS
BODY MASS INDEX: 32 KG/M2 | RESPIRATION RATE: 18 BRPM | SYSTOLIC BLOOD PRESSURE: 91 MMHG | HEART RATE: 67 BPM | OXYGEN SATURATION: 98 % | HEIGHT: 60 IN | WEIGHT: 163 LBS | DIASTOLIC BLOOD PRESSURE: 63 MMHG | TEMPERATURE: 98.5 F

## 2019-10-09 DIAGNOSIS — J44.9 CHRONIC OBSTRUCTIVE PULMONARY DISEASE, UNSPECIFIED COPD TYPE (H): Primary | ICD-10-CM

## 2019-10-09 DIAGNOSIS — K21.9 GASTROESOPHAGEAL REFLUX DISEASE WITHOUT ESOPHAGITIS: ICD-10-CM

## 2019-10-09 DIAGNOSIS — F03.92 DEMENTIA WITH PSYCHOSIS (H): ICD-10-CM

## 2019-10-09 DIAGNOSIS — I10 ESSENTIAL HYPERTENSION: ICD-10-CM

## 2019-10-09 DIAGNOSIS — R13.10 DYSPHAGIA, UNSPECIFIED TYPE: ICD-10-CM

## 2019-10-09 PROCEDURE — 99309 SBSQ NF CARE MODERATE MDM 30: CPT | Performed by: NURSE PRACTITIONER

## 2019-10-09 ASSESSMENT — MIFFLIN-ST. JEOR: SCORE: 1145.86

## 2019-10-09 NOTE — LETTER
10/9/2019        RE: Chelsey Casillas  [facility]  [address] [address]        [place] [facility]  Chief Complaint   Patient presents with     [facility] Regulatory     [place] [facility] Record Number:  [id number]  Place of Service where encounter took place:  [facility] ([facility]) [[id number]]    HPI:    Chelsey Casillas  is 79 year old (1940), who is being seen today for a federally mandated E/M visit.  HPI information obtained from: facility chart records, facility staff, patient report and [place] Epic chart review.     Today's concerns are:  Chronic obstructive pulmonary disease, unspecified COPD type (H)  Staff have not noted any cough, SOB, wheeze, hypoxia. Patient denies all of these as well    Dementia with psychosis (H)  Risperidone has been reduced due to lethargy, falls. It is being given at bedtime only now and dose was reduced 2 weeks ago. Staff have not noted any behavioral issues. No one is aware of any hallucinations, which was her issue previously    Essential hypertension  -130s/60-70s    Dysphagia, unspecified type  First noted 3/2019. After SLP evaluation, she was placed on mechanical soft diet, but remained on thin liquids. She signed a risk to benefit document because she wanted regular textures, diet was upgraded 6/11/19. She has not had any s/sx aspiration or been treated for pneumonia.    Gastroesophageal reflux disease without esophagitis  Denies dyspepsia      ALLERGIES:Amlodipine  PAST MEDICAL HISTORY:   has a past medical history of Arthritis, Chronic low back pain (3/17/2015), COPD (chronic obstructive pulmonary disease) (H), Dementia with psychosis (11/1/2016), Fall (2/27/2013), Falling episodes (9/24/2016), Functional urinary incontinence (3/7/2013), GERD (gastroesophageal reflux disease) (4/3/2018), HTN (hypertension) (4/3/2018), Knee pain (3/7/2013), Major depression, single episode (4/16/2015), Pain in both knees (1/30/2014),  Physical deconditioning (3/7/2013), 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  PAST SURGICAL HISTORY:   has no past surgical history on file.  FAMILY HISTORY: Family history is unknown by patient.  SOCIAL HISTORY:  reports that she has quit smoking. Her smoking use included cigarettes. She has never used smokeless tobacco. She reports that she does not drink alcohol or use drugs.    MEDICATIONS:  Current Outpatient Medications   Medication Sig Dispense Refill     Acetaminophen (TYLENOL PO) Take 1,000 mg by mouth daily as needed for mild pain or fever       Acetaminophen (TYLENOL PO) Take 1,000 mg by mouth 2 times daily       albuterol (PROAIR HFA/PROVENTIL HFA/VENTOLIN HFA) 108 (90 BASE) MCG/ACT Inhaler Inhale 2 puffs into the lungs every 4 hours as needed for shortness of breath / dyspnea or wheezing        aspirin (ASA) 81 MG EC tablet Take 1 tablet (81 mg) by mouth daily       carvedilol (COREG) 3.125 MG tablet Take 1 tablet (3.125 mg) by mouth 2 times daily (with meals) 60 tablet 3     folic acid (FOLVITE) 1 MG tablet TAKE 1 TABLET BY MOUTH ONCE DAILY 31 tablet 98     lisinopril (PRINIVIL/ZESTRIL) 20 MG tablet Take 1 tablet (20 mg) by mouth daily       risperiDONE (RISPERDAL) 0.5 MG tablet Take 0.5 tablets (0.25 mg) by mouth At Bedtime 62 tablet 3     sertraline (ZOLOFT) 100 MG tablet TAKE 1 TABLET BY MOUTH ONCE DAILY 31 tablet 98     vitamin D3 (CHOLECALCIFEROL) 2000 units tablet Take 1 tablet by mouth daily 30 tablet      loperamide (IMODIUM) 2 MG capsule TAKE TWO CAPSULES (4MG) BY MOUTH WITH FIRST LOOSE STOOL ; THEN TAKE ONE CAPSULE (2MG) BY MOUTH AFTER EACH LOOSE STOOL +MAX:16MG/24HRS+ 20 capsule 98     nystatin (MYCOSTATIN) 413992 UNIT/GM external powder Apply topically 2 times daily as needed       ranitidine (ZANTAC) 150 MG tablet Take 1 tablet (150 mg) by mouth daily as needed for heartburn 60 tablet        Case Management:  I have  reviewed the care plan and MDS and do agree with the plan. Patient's desire to return to the community is not assessible due to cognitive impairment. Information reviewed:  Medications, vital signs, orders, and nursing notes.    ROS:  Reliability questionable secondary to cognitive impairment. Denies chest pain, shortness of breath, fevers, chills, headache, nausea, vomiting, dysuria or bowel abnormalities.      Vitals:  BP 91/63   Pulse 67   Temp 98.5  F (36.9  C)   Resp 18   Ht 1.524 m (5')   Wt 73.9 kg (163 lb)   SpO2 98%   BMI 31.83 kg/m     Body mass index is 31.83 kg/m .  Exam:  GENERAL APPEARANCE:  Alert, in no distress, morbidly obese, cooperative  ENT:  Mouth and posterior oropharynx normal, moist mucous membranes, normal hearing acuity  EYES:  EOM, conjunctivae, lids, pupils and irises normal  NECK:  No adenopathy,masses or thyromegaly  RESP:  respiratory effort and palpation of chest normal, no respiratory distress, lungs clear to auscultation  CV:  Palpation and auscultation of heart done , regular rate and rhythm, no murmur, rub, or gallop  ABDOMEN:  normal bowel sounds, soft, nontender, no hepatosplenomegaly or other masses  SKIN:  Inspection of skin and subcutaneous tissue baseline, Palpation of skin and subcutaneous tissue baseline  PSYCH:  oriented to self, memory, insight, and judgement impaired , affect and mood flat      Lab/Diagnostic data:   Recent labs in [facility] reviewed by me today.       ASSESSMENT/PLAN  (J44.9) Chronic obstructive pulmonary disease, unspecified COPD type (H)  (primary encounter diagnosis)  Comment: Chronic condition being managed with medications and is currently asymptomatic.  Plan: Continue current POC with no changes at this time and adjustments as needed.    (F03.91) Dementia with psychosis (H)  Comment: Chronic, progressive. Needs 24 hour skilled nursing care. HPI/ROS difficult to obtain with cognitive impairment. Expect further functional and cognitive decline.  Expect weight loss. No behavioral or emotional distress noted. Would not stop risperidone for a few weeks at least. Due to her previously distressing hallucinations, would first want to make sure these do not recur  Plan: Continue 24 hour care. Monitor weight. Monitor functional status. Monitor for behavioral disturbances.    (I10) Essential hypertension  Comment: Controlled  Plan: Continue current POC with no changes at this time and adjustments as needed.    (R13.10) Dysphagia, unspecified type  Comment: Tolerating regular diet without any choking or aspiration issues  Plan: Continue current POC with no changes at this time and adjustments as needed.    (K21.9) Gastroesophageal reflux disease without esophagitis  Comment: Asymptomatic  Plan: Continue current POC with no changes at this time and adjustments as needed.      Electronically signed by:  ALCIRA Sweet [facility]  Phone: [phone]

## 2019-10-09 NOTE — PROGRESS NOTES
[facility]  Chief Complaint   Patient presents with     care home Regulatory     [facility] Medical Record Number:  [id number]  Place of Service where encounter took place:  [facility] ([facility]) [[id number]]    HPI:    Chelsey Casillas  is 79 year old (1940), who is being seen today for a federally mandated E/M visit.  HPI information obtained from: facility chart records, facility staff, patient report and [facility] Epic chart review.     Today's concerns are:  Chronic obstructive pulmonary disease, unspecified COPD type (H)  Staff have not noted any cough, SOB, wheeze, hypoxia. Patient denies all of these as well    Dementia with psychosis (H)  Risperidone has been reduced due to lethargy, falls. It is being given at bedtime only now and dose was reduced 2 weeks ago. Staff have not noted any behavioral issues. No one is aware of any hallucinations, which was her issue previously    Essential hypertension  -130s/60-70s    Dysphagia, unspecified type  First noted 3/2019. After SLP evaluation, she was placed on mechanical soft diet, but remained on thin liquids. She signed a risk to benefit document because she wanted regular textures, diet was upgraded 6/11/19. She has not had any s/sx aspiration or been treated for pneumonia.    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  PAST SURGICAL HISTORY:   has no past surgical history on file.  FAMILY HISTORY: Family history is unknown by patient.  SOCIAL HISTORY:  reports that she has quit smoking. Her smoking use included cigarettes. She has never used smokeless tobacco. She reports that she does not drink alcohol or use drugs.    MEDICATIONS:  Current Outpatient Medications   Medication Sig Dispense Refill     Acetaminophen (TYLENOL PO) Take 1,000 mg by mouth daily as needed for mild pain or fever       Acetaminophen (TYLENOL PO) Take 1,000 mg by mouth 2 times daily       albuterol (PROAIR HFA/PROVENTIL HFA/VENTOLIN HFA) 108 (90 BASE) MCG/ACT Inhaler Inhale 2 puffs into the lungs every 4 hours as needed for shortness of breath / dyspnea or wheezing        aspirin (ASA) 81 MG EC tablet Take 1 tablet (81 mg) by mouth daily       carvedilol (COREG) 3.125 MG tablet Take 1 tablet (3.125 mg) by mouth 2 times daily (with meals) 60 tablet 3     folic acid (FOLVITE) 1 MG tablet TAKE 1 TABLET BY MOUTH ONCE DAILY 31 tablet 98     lisinopril (PRINIVIL/ZESTRIL) 20 MG tablet Take 1 tablet (20 mg) by mouth daily       risperiDONE (RISPERDAL) 0.5 MG tablet Take 0.5 tablets (0.25 mg) by mouth At Bedtime 62 tablet 3     sertraline (ZOLOFT) 100 MG tablet TAKE 1 TABLET BY MOUTH ONCE DAILY 31 tablet 98     vitamin D3 (CHOLECALCIFEROL) 2000 units tablet Take 1 tablet by mouth daily 30 tablet      loperamide (IMODIUM) 2 MG capsule TAKE TWO CAPSULES (4MG) BY MOUTH WITH FIRST LOOSE STOOL ; THEN TAKE ONE CAPSULE (2MG) BY MOUTH AFTER EACH LOOSE STOOL +MAX:16MG/24HRS+ 20 capsule 98     nystatin (MYCOSTATIN) 712923 UNIT/GM external powder Apply topically 2 times daily as needed       ranitidine (ZANTAC) 150 MG tablet Take 1 tablet (150 mg) by mouth daily as needed for heartburn 60 tablet        Case Management:  I have reviewed the care plan and MDS and do agree with the plan. Patient's desire to return to the community is not  assessible due to cognitive impairment. Information reviewed:  Medications, vital signs, orders, and nursing notes.    ROS:  Reliability questionable secondary to cognitive impairment. Denies chest pain, shortness of breath, fevers, chills, headache, nausea, vomiting, dysuria or bowel abnormalities.      Vitals:  BP 91/63   Pulse 67   Temp 98.5  F (36.9  C)   Resp 18   Ht 1.524 m (5')   Wt 73.9 kg (163 lb)   SpO2 98%   BMI 31.83 kg/m    Body mass index is 31.83 kg/m .  Exam:  GENERAL APPEARANCE:  Alert, in no distress, morbidly obese, cooperative  ENT:  Mouth and posterior oropharynx normal, moist mucous membranes, normal hearing acuity  EYES:  EOM, conjunctivae, lids, pupils and irises normal  NECK:  No adenopathy,masses or thyromegaly  RESP:  respiratory effort and palpation of chest normal, no respiratory distress, lungs clear to auscultation  CV:  Palpation and auscultation of heart done , regular rate and rhythm, no murmur, rub, or gallop  ABDOMEN:  normal bowel sounds, soft, nontender, no hepatosplenomegaly or other masses  SKIN:  Inspection of skin and subcutaneous tissue baseline, Palpation of skin and subcutaneous tissue baseline  PSYCH:  oriented to self, memory, insight, and judgement impaired , affect and mood flat      Lab/Diagnostic data:   Recent labs in [facility] reviewed by me today.       ASSESSMENT/PLAN  (J44.9) Chronic obstructive pulmonary disease, unspecified COPD type (H)  (primary encounter diagnosis)  Comment: Chronic condition being managed with medications and is currently asymptomatic.  Plan: Continue current POC with no changes at this time and adjustments as needed.    (F03.91) Dementia with psychosis (H)  Comment: Chronic, progressive. Needs 24 hour skilled nursing care. HPI/ROS difficult to obtain with cognitive impairment. Expect further functional and cognitive decline. Expect weight loss. No behavioral or emotional distress noted. Would not stop risperidone for a few weeks at  least. Due to her previously distressing hallucinations, would first want to make sure these do not recur  Plan: Continue 24 hour care. Monitor weight. Monitor functional status. Monitor for behavioral disturbances.    (I10) Essential hypertension  Comment: Controlled  Plan: Continue current POC with no changes at this time and adjustments as needed.    (R13.10) Dysphagia, unspecified type  Comment: Tolerating regular diet without any choking or aspiration issues  Plan: Continue current POC with no changes at this time and adjustments as needed.    (K21.9) Gastroesophageal reflux disease without esophagitis  Comment: Asymptomatic  Plan: Continue current POC with no changes at this time and adjustments as needed.      Electronically signed by:  ALCIRA Sweet [facility]  Phone: [phone]

## 2019-12-13 ENCOUNTER — NURSING HOME VISIT (OUTPATIENT)
Dept: GERIATRICS | Facility: CLINIC | Age: 79
End: 2019-12-13
Payer: COMMERCIAL

## 2019-12-13 VITALS
HEART RATE: 76 BPM | HEIGHT: 60 IN | RESPIRATION RATE: 18 BRPM | WEIGHT: 157 LBS | DIASTOLIC BLOOD PRESSURE: 70 MMHG | SYSTOLIC BLOOD PRESSURE: 128 MMHG | BODY MASS INDEX: 30.82 KG/M2 | TEMPERATURE: 98.7 F | OXYGEN SATURATION: 96 %

## 2019-12-13 DIAGNOSIS — R63.4 WEIGHT LOSS: ICD-10-CM

## 2019-12-13 DIAGNOSIS — F03.92 DEMENTIA WITH PSYCHOSIS (H): ICD-10-CM

## 2019-12-13 DIAGNOSIS — J44.9 CHRONIC OBSTRUCTIVE PULMONARY DISEASE, UNSPECIFIED COPD TYPE (H): Primary | ICD-10-CM

## 2019-12-13 PROCEDURE — 99308 SBSQ NF CARE LOW MDM 20: CPT | Performed by: INTERNAL MEDICINE

## 2019-12-13 ASSESSMENT — MIFFLIN-ST. JEOR: SCORE: 1118.65

## 2019-12-13 NOTE — LETTER
"    12/13/2019        RE: Chelsey Casillas  [facility]  [address] [address]        [facility]  PHYSICIAN NOTE    Chief Complaint   Patient presents with     alf Regulatory       HPI:    Chelsey Casillas is a 79 year old  (1940), who is being seen today for a federally mandated E/M visit at [facility] . Admitted to LTC in September 2018 from [facility]. She has h/o dementia and often isolates in her room watching TV and/or sleeping. She has h/o delusions (ex: snakes) and is on Risperidone, however, Risperidone has slowly been able to be decreased successfully (last decrease Sept). She isn't bothered by delusions on report today nor has nursing staff noted them. H/o COPD but reports hasn't smoked in a couple years. She c/o cough for about 4 weeks that is productive but unsure color of mucous. Denies chest pain or dyspnea. Feels its about the same but when I spoke with her nurse she feels the cough was much worse a couple weeks ago and now has improved with less coughing. Did have house cough syrup PRN during that time. Chelsey doesn't feel chilled or febrile. She also c/o some GERD and diarrhea but when I spoke with staff, they say there hasn't been diarrhea. She has low appetite and intermittently concerns of weight loss though wts recorded in [facility] are variable 140-160s this year with most recent 157#. Chelsey says her mood is \"so-so\" and she gets bored and doesn't look forward to anything. However, she is known to throw out a few jokes at times per staff.    ALLERGIES: Amlodipine    Past Medical History:   Diagnosis Date     Arthritis      Chronic low back pain 3/17/2015     COPD (chronic obstructive pulmonary disease) (H)      Dementia with psychosis (H) 11/1/2016     Fall 2/27/2013     Falling episodes 9/24/2016     Functional urinary incontinence 3/7/2013     GERD (gastroesophageal reflux disease) 4/3/2018     HTN (hypertension) 4/3/2018     " Knee pain 3/7/2013     Major depression, single episode 4/16/2015     Pain in both knees 1/30/2014     Physical deconditioning 3/7/2013     Severe major depression with psychotic features (H) 7/12/2016     Skin tear 3/7/2013     Weakness 6/18/2015      Social History     Tobacco Use     Smoking status: Former Smoker     Types: Cigarettes     Smokeless tobacco: Never Used   Substance Use Topics     Alcohol use: No     Drug use: No     CODE STATUS: DNR/I    MEDICATIONS: Medications reviewed in [facility]  Current Outpatient Medications   Medication Sig Dispense Refill     Acetaminophen (TYLENOL PO) Take 1,000 mg by mouth daily as needed for mild pain or fever       Acetaminophen (TYLENOL PO) Take 1,000 mg by mouth 2 times daily       albuterol (PROAIR HFA/PROVENTIL HFA/VENTOLIN HFA) 108 (90 BASE) MCG/ACT Inhaler Inhale 2 puffs into the lungs every 4 hours as needed for shortness of breath / dyspnea or wheezing        aspirin (ASA) 81 MG EC tablet Take 1 tablet (81 mg) by mouth daily       carvedilol (COREG) 3.125 MG tablet Take 1 tablet (3.125 mg) by mouth 2 times daily (with meals) 60 tablet 3     folic acid (FOLVITE) 1 MG tablet TAKE 1 TABLET BY MOUTH ONCE DAILY 31 tablet 98     lisinopril (PRINIVIL/ZESTRIL) 20 MG tablet Take 1 tablet (20 mg) by mouth daily       risperiDONE (RISPERDAL) 0.5 MG tablet Take 0.5 tablets (0.25 mg) by mouth At Bedtime 62 tablet 3     sertraline (ZOLOFT) 100 MG tablet TAKE 1 TABLET BY MOUTH ONCE DAILY 31 tablet 98     vitamin D3 (CHOLECALCIFEROL) 2000 units tablet Take 1 tablet by mouth daily 30 tablet        ROS:  Limited secondary to cognitive impairment but today pt reports as above in HPI    Exam:  /70   Pulse 76   Temp 98.7  F (37.1  C)   Resp 18   Ht 1.524 m (5')   Wt 71.2 kg (157 lb)   SpO2 96%   BMI 30.66 kg/m     Vitals reviewed in [facility] and stable  Older appearing than stated age, very frail, assist of one to sit upright on edge of bed  No scleral icterus  HRRR without  mrg  Clear lungs but poor respiratory effort so dim at bases, had one raspy cough during our visit  Abdomen soft, NT, +BS, ?subcutaneous bumpiness, no HSM appreciated  No edema  Flat affect, no tremor    Lab/Diagnostic Data:    Last Basic Metabolic Panel:  Sodium   Date Value Ref Range Status   06/20/2019 140 133 - 144 mmol/L Final     Potassium   Date Value Ref Range Status   06/20/2019 4.0 3.4 - 5.3 mmol/L Final     Chloride   Date Value Ref Range Status   06/20/2019 106 94 - 109 mmol/L Final     Carbon Dioxide   Date Value Ref Range Status   06/20/2019 27 20 - 32 mmol/L Final     Anion Gap   Date Value Ref Range Status   06/20/2019 7 3 - 14 mmol/L Final     Glucose   Date Value Ref Range Status   06/20/2019 100 (A) 70 - 99 mg/dL Final     Urea Nitrogen   Date Value Ref Range Status   06/20/2019 10 7 - 30 mg/dL Final     Creatinine   Date Value Ref Range Status   06/20/2019 0.48 (A) 0.52 - 1.04 mg/dL Final     GFR Estimate   Date Value Ref Range Status   06/20/2019 >90 >60 ml/min/1.73m2 Final     Calcium   Date Value Ref Range Status   06/20/2019 8.8 8.5 - 10.1 mg/dL Final     Lab Results   Component Value Date    WBC 5.6 06/20/2019     Lab Results   Component Value Date    RBC 3.88 06/20/2019     Lab Results   Component Value Date    HGB 11.4 06/20/2019     Lab Results   Component Value Date    HCT 34.4 06/20/2019     Lab Results   Component Value Date    MCV 89 06/20/2019     Lab Results   Component Value Date    MCH 29.4 06/20/2019     Lab Results   Component Value Date    MCHC 33.1 06/20/2019     Lab Results   Component Value Date    RDW 13.1 06/20/2019     Lab Results   Component Value Date     06/20/2019         ASSESSMENT/PLAN:  Dementia with psychosis  Was able to have dose reduction of Risperidone without recurrence of her troublesome delusions; continue to slowly taper as able with goal not to be bothered by hallucinations/delusions from a comfort perspective  Benefiting from the care/support of  LTC environment  Also demonstrating some depressive features and is on Sertraline 100 mg daily; may likely have apathy in conjunction with dementia which is difficult to treat  Try to encourage activity as she reports she is bored yet tends to isolate in her room    Chronic obstructive pulmonary disease   Former smoker  Breathing comfortably on room air and has PRN Albuterol available  Sounds per staff that cough is better and she has house cough syrup available PRN; suspect lung findings above are baseline  She is also due for PCV 23 and infection control nurse plans to administer    Weight loss  Fluctuating weight this year - most recent weight 157# (ranges 140s-160s) with some h/o dysphagia  Could be r/t underlying dementia and thus if continues to lose weight and/or has other complications would consider discussing hospice with her   I spoke with her today too regarding this and she voluntarily wants to focus on comfort and avoid hospitalizations  She has DNR/I code status      Electronically signed by:  Rina [facility] DO      Sincerely,        Rina Gardner DO

## 2019-12-14 NOTE — PROGRESS NOTES
"[facility]  PHYSICIAN NOTE    Chief Complaint   Patient presents with     intermediate Regulatory       HPI:    Chelsey Casillas is a 79 year old  (1940), who is being seen today for a federally mandated E/M visit at [facility] . Admitted to LTC in September 2018 from [facility]. She has h/o dementia and often isolates in her room watching TV and/or sleeping. She has h/o delusions (ex: snakes) and is on Risperidone, however, Risperidone has slowly been able to be decreased successfully (last decrease Sept). She isn't bothered by delusions on report today nor has nursing staff noted them. H/o COPD but reports hasn't smoked in a couple years. She c/o cough for about 4 weeks that is productive but unsure color of mucous. Denies chest pain or dyspnea. Feels its about the same but when I spoke with her nurse she feels the cough was much worse a couple weeks ago and now has improved with less coughing. Did have house cough syrup PRN during that time. Chelsey doesn't feel chilled or febrile. She also c/o some GERD and diarrhea but when I spoke with staff, they say there hasn't been diarrhea. She has low appetite and intermittently concerns of weight loss though wts recorded in [facility] are variable 140-160s this year with most recent 157#. Chelsey says her mood is \"so-so\" and she gets bored and doesn't look forward to anything. However, she is known to throw out a few jokes at times per staff.    ALLERGIES: Amlodipine    Past Medical History:   Diagnosis Date     Arthritis      Chronic low back pain 3/17/2015     COPD (chronic obstructive pulmonary disease) (H)      Dementia with psychosis (H) 11/1/2016     Fall 2/27/2013     Falling episodes 9/24/2016     Functional urinary incontinence 3/7/2013     GERD (gastroesophageal reflux disease) 4/3/2018     HTN (hypertension) 4/3/2018     Knee pain 3/7/2013     Major depression, single episode 4/16/2015     Pain in both knees 1/30/2014     Physical " deconditioning 3/7/2013     Severe major depression with psychotic features (H) 7/12/2016     Skin tear 3/7/2013     Weakness 6/18/2015      Social History     Tobacco Use     Smoking status: Former Smoker     Types: Cigarettes     Smokeless tobacco: Never Used   Substance Use Topics     Alcohol use: No     Drug use: No     CODE STATUS: DNR/I    MEDICATIONS: Medications reviewed in [facility]  Current Outpatient Medications   Medication Sig Dispense Refill     Acetaminophen (TYLENOL PO) Take 1,000 mg by mouth daily as needed for mild pain or fever       Acetaminophen (TYLENOL PO) Take 1,000 mg by mouth 2 times daily       albuterol (PROAIR HFA/PROVENTIL HFA/VENTOLIN HFA) 108 (90 BASE) MCG/ACT Inhaler Inhale 2 puffs into the lungs every 4 hours as needed for shortness of breath / dyspnea or wheezing        aspirin (ASA) 81 MG EC tablet Take 1 tablet (81 mg) by mouth daily       carvedilol (COREG) 3.125 MG tablet Take 1 tablet (3.125 mg) by mouth 2 times daily (with meals) 60 tablet 3     folic acid (FOLVITE) 1 MG tablet TAKE 1 TABLET BY MOUTH ONCE DAILY 31 tablet 98     lisinopril (PRINIVIL/ZESTRIL) 20 MG tablet Take 1 tablet (20 mg) by mouth daily       risperiDONE (RISPERDAL) 0.5 MG tablet Take 0.5 tablets (0.25 mg) by mouth At Bedtime 62 tablet 3     sertraline (ZOLOFT) 100 MG tablet TAKE 1 TABLET BY MOUTH ONCE DAILY 31 tablet 98     vitamin D3 (CHOLECALCIFEROL) 2000 units tablet Take 1 tablet by mouth daily 30 tablet        ROS:  Limited secondary to cognitive impairment but today pt reports as above in HPI    Exam:  /70   Pulse 76   Temp 98.7  F (37.1  C)   Resp 18   Ht 1.524 m (5')   Wt 71.2 kg (157 lb)   SpO2 96%   BMI 30.66 kg/m    Vitals reviewed in [facility] and stable  Older appearing than stated age, very frail, assist of one to sit upright on edge of bed  No scleral icterus  HRRR without mrg  Clear lungs but poor respiratory effort so dim at bases, had one raspy cough during our visit  Abdomen soft, NT,  +BS, ?subcutaneous bumpiness, no HSM appreciated  No edema  Flat affect, no tremor    Lab/Diagnostic Data:    Last Basic Metabolic Panel:  Sodium   Date Value Ref Range Status   06/20/2019 140 133 - 144 mmol/L Final     Potassium   Date Value Ref Range Status   06/20/2019 4.0 3.4 - 5.3 mmol/L Final     Chloride   Date Value Ref Range Status   06/20/2019 106 94 - 109 mmol/L Final     Carbon Dioxide   Date Value Ref Range Status   06/20/2019 27 20 - 32 mmol/L Final     Anion Gap   Date Value Ref Range Status   06/20/2019 7 3 - 14 mmol/L Final     Glucose   Date Value Ref Range Status   06/20/2019 100 (A) 70 - 99 mg/dL Final     Urea Nitrogen   Date Value Ref Range Status   06/20/2019 10 7 - 30 mg/dL Final     Creatinine   Date Value Ref Range Status   06/20/2019 0.48 (A) 0.52 - 1.04 mg/dL Final     GFR Estimate   Date Value Ref Range Status   06/20/2019 >90 >60 ml/min/1.73m2 Final     Calcium   Date Value Ref Range Status   06/20/2019 8.8 8.5 - 10.1 mg/dL Final     Lab Results   Component Value Date    WBC 5.6 06/20/2019     Lab Results   Component Value Date    RBC 3.88 06/20/2019     Lab Results   Component Value Date    HGB 11.4 06/20/2019     Lab Results   Component Value Date    HCT 34.4 06/20/2019     Lab Results   Component Value Date    MCV 89 06/20/2019     Lab Results   Component Value Date    MCH 29.4 06/20/2019     Lab Results   Component Value Date    MCHC 33.1 06/20/2019     Lab Results   Component Value Date    RDW 13.1 06/20/2019     Lab Results   Component Value Date     06/20/2019         ASSESSMENT/PLAN:  Dementia with psychosis  Was able to have dose reduction of Risperidone without recurrence of her troublesome delusions; continue to slowly taper as able with goal not to be bothered by hallucinations/delusions from a comfort perspective  Benefiting from the care/support of LTC environment  Also demonstrating some depressive features and is on Sertraline 100 mg daily; may likely have apathy  in conjunction with dementia which is difficult to treat  Try to encourage activity as she reports she is bored yet tends to isolate in her room    Chronic obstructive pulmonary disease   Former smoker  Breathing comfortably on room air and has PRN Albuterol available  Sounds per staff that cough is better and she has house cough syrup available PRN; suspect lung findings above are baseline  She is also due for PCV 23 and infection control nurse plans to administer    Weight loss  Fluctuating weight this year - most recent weight 157# (ranges 140s-160s) with some h/o dysphagia  Could be r/t underlying dementia and thus if continues to lose weight and/or has other complications would consider discussing hospice with her   I spoke with her today too regarding this and she voluntarily wants to focus on comfort and avoid hospitalizations  She has DNR/I code status      Electronically signed by:  Rina Gardner DO

## 2019-12-19 ENCOUNTER — NURSING HOME VISIT (OUTPATIENT)
Dept: GERIATRICS | Facility: CLINIC | Age: 79
End: 2019-12-19
Payer: COMMERCIAL

## 2019-12-19 VITALS
TEMPERATURE: 98.7 F | SYSTOLIC BLOOD PRESSURE: 128 MMHG | DIASTOLIC BLOOD PRESSURE: 70 MMHG | RESPIRATION RATE: 18 BRPM | OXYGEN SATURATION: 96 % | HEIGHT: 60 IN | BODY MASS INDEX: 30.82 KG/M2 | HEART RATE: 76 BPM | WEIGHT: 157 LBS

## 2019-12-19 DIAGNOSIS — F03.92 DEMENTIA WITH PSYCHOSIS (H): Primary | ICD-10-CM

## 2019-12-19 DIAGNOSIS — R44.3 HALLUCINATIONS: ICD-10-CM

## 2019-12-19 PROCEDURE — 99308 SBSQ NF CARE LOW MDM 20: CPT | Performed by: NURSE PRACTITIONER

## 2019-12-19 ASSESSMENT — MIFFLIN-ST. JEOR: SCORE: 1118.65

## 2019-12-19 NOTE — LETTER
12/19/2019        RE: Chelsey Casillas  [facility]  [address] [address]        [place] [facility]  [facility] Record Number:  [id number]  Place of Service where encounter took place:  [facility] ([facility]) [[id number]]  Chief Complaint   Patient presents with     RECHECK       HPI:    Chelsey Casillas  is a 79 year old (1940), who is being seen today for an episodic care visit.  HPI information obtained from: facility chart records, facility staff and patient report.     Today's concern is:  Dementia with psychosis (H)  Risperidone was slowly tapered from 0.5mg BID to 0.25mg at bedtime due to lethargy, hypotension, falls, dysphagia. For a couple of weeks now, patient has had increased agitation, yelling, swearing, refusing cares at times.      Past Medical and Surgical History reviewed in Epic today.      REVIEW OF SYSTEMS:  Reliability questionable secondary to cognitive impairment. Denies chest pain, shortness of breath, fevers, chills, headache, nausea, vomiting, dysuria or bowel abnormalities.    Objective:  /70   Pulse 76   Temp 98.7  F (37.1  C)   Resp 18   Ht 1.524 m (5')   Wt 71.2 kg (157 lb)   SpO2 96%   BMI 30.66 kg/m     Exam:  GENERAL APPEARANCE:  Alert, in no distress, morbidly obese, cooperative  RESP:  respiratory effort and palpation of chest normal, no respiratory distress, lungs clear to auscultation  CV:  Palpation and auscultation of heart done , regular rate and rhythm, no murmur, rub, or gallop  ABDOMEN:  normal bowel sounds, soft, nontender, no hepatosplenomegaly or other masses  PSYCH:  oriented to self, memory, insight, and judgement impaired , affect and mood flat      Labs:   Labs done in [facility] are in [facility]. Please refer to them using [employer] Everywhere.    ASSESSMENT/PLAN:  (F03.91) Dementia with psychosis (H)  (primary encounter diagnosis)  Comment: Patient now with some emotional distress after reduction in  risperidone. Will increase dose back up slightly in an effort to improve her quality of life  Plan: Increase Risperidone to 0.5mg qhs        Electronically signed by:  ALCIRA Sweet [facility]  Phone: [phone]

## 2019-12-19 NOTE — PROGRESS NOTES
[place] [facility]  [place] Medical Record Number:  [id number]  Place of Service where encounter took place:  [facility] ([facility]) [[id number]]  Chief Complaint   Patient presents with     RECHECK       HPI:    Chelsey Casillas  is a 79 year old (1940), who is being seen today for an episodic care visit.  HPI information obtained from: facility chart records, facility staff and patient report.     Today's concern is:  Dementia with psychosis (H)  Risperidone was slowly tapered from 0.5mg BID to 0.25mg at bedtime due to lethargy, hypotension, falls, dysphagia. For a couple of weeks now, patient has had increased agitation, yelling, swearing, refusing cares at times.      Past Medical and Surgical History reviewed in Epic today.      REVIEW OF SYSTEMS:  Reliability questionable secondary to cognitive impairment. Denies chest pain, shortness of breath, fevers, chills, headache, nausea, vomiting, dysuria or bowel abnormalities.    Objective:  /70   Pulse 76   Temp 98.7  F (37.1  C)   Resp 18   Ht 1.524 m (5')   Wt 71.2 kg (157 lb)   SpO2 96%   BMI 30.66 kg/m    Exam:  GENERAL APPEARANCE:  Alert, in no distress, morbidly obese, cooperative  RESP:  respiratory effort and palpation of chest normal, no respiratory distress, lungs clear to auscultation  CV:  Palpation and auscultation of heart done , regular rate and rhythm, no murmur, rub, or gallop  ABDOMEN:  normal bowel sounds, soft, nontender, no hepatosplenomegaly or other masses  PSYCH:  oriented to self, memory, insight, and judgement impaired , affect and mood flat      Labs:   Labs done in [facility] are in [facility]. Please refer to them using [employer]/Care Everywhere.    ASSESSMENT/PLAN:  (F03.91) Dementia with psychosis (H)  (primary encounter diagnosis)  Comment: Patient now with some emotional distress after reduction in risperidone. Will increase dose back up slightly in an effort to improve her quality of life  Plan: Increase  Risperidone to 0.5mg qhs        Electronically signed by:  ALCIRA Sweet [facility]  Phone: [phone]

## 2019-12-20 RX ORDER — RISPERIDONE 0.5 MG/1
0.5 TABLET ORAL AT BEDTIME
Qty: 62 TABLET | Refills: 3
Start: 2019-12-20 | End: 2021-08-22 | Stop reason: DRUGHIGH

## 2020-01-06 ENCOUNTER — TRANSFERRED RECORDS (OUTPATIENT)
Dept: HEALTH INFORMATION MANAGEMENT | Facility: CLINIC | Age: 80
End: 2020-01-06

## 2020-01-06 LAB
ALBUMIN SERPL-MCNC: 3 G/DL (ref 3.4–5)
ALP SERPL-CCNC: 220 U/L (ref 40–150)
ALT SERPL-CCNC: 36 U/L (ref 0–50)
ANION GAP SERPL CALCULATED.3IONS-SCNC: 6 MMOL/L (ref 3–14)
AST SERPL-CCNC: 18 U/L (ref 0–45)
BILIRUB SERPL-MCNC: 0.4 MG/DL (ref 0.2–1.3)
BUN SERPL-MCNC: 13 MG/DL (ref 7–30)
CALCIUM SERPL-MCNC: 8.9 MG/DL (ref 8.5–10.1)
CHLORIDE SERPLBLD-SCNC: 105 MMOL/L (ref 94–109)
CO2 SERPL-SCNC: 26 MMOL/L (ref 20–32)
CREAT SERPL-MCNC: 0.52 MG/DL (ref 0.52–1.04)
DIFFERENTIAL: ABNORMAL
ERYTHROCYTE [DISTWIDTH] IN BLOOD BY AUTOMATED COUNT: 14.5 % (ref 10–15)
GFR SERPL CREATININE-BSD FRML MDRD: >90 ML/MIN/1.73_M2
GLUCOSE SERPL-MCNC: 88 MG/DL (ref 70–99)
HCT VFR BLD AUTO: 30.2 % (ref 35–47)
HEMOGLOBIN: 9.6 G/DL (ref 11.7–15.7)
MCH RBC QN AUTO: 27 PG (ref 26.5–33)
MCHC RBC AUTO-ENTMCNC: 31.8 G/DL (ref 31.5–36.5)
MCV RBC AUTO: 85 FL (ref 78–100)
PLATELET # BLD AUTO: 209 10E9/L (ref 150–450)
POTASSIUM SERPL-SCNC: 4 MMOL/L (ref 3.4–5.3)
PROT SERPL-MCNC: 6.8 G/DL (ref 6.8–8.8)
RBC # BLD AUTO: 3.56 10E12/L (ref 3.8–5.2)
SODIUM SERPL-SCNC: 137 MMOL/L (ref 133–144)
WBC # BLD AUTO: 6.5 10E9/L (ref 4–11)

## 2020-01-27 ENCOUNTER — NURSING HOME VISIT (OUTPATIENT)
Dept: GERIATRICS | Facility: CLINIC | Age: 80
End: 2020-01-27
Payer: COMMERCIAL

## 2020-01-27 VITALS
HEART RATE: 67 BPM | WEIGHT: 156 LBS | SYSTOLIC BLOOD PRESSURE: 126 MMHG | TEMPERATURE: 98.4 F | BODY MASS INDEX: 30.63 KG/M2 | HEIGHT: 60 IN | DIASTOLIC BLOOD PRESSURE: 83 MMHG | OXYGEN SATURATION: 95 % | RESPIRATION RATE: 16 BRPM

## 2020-01-27 DIAGNOSIS — J44.9 CHRONIC OBSTRUCTIVE PULMONARY DISEASE, UNSPECIFIED COPD TYPE (H): Primary | ICD-10-CM

## 2020-01-27 DIAGNOSIS — I10 ESSENTIAL HYPERTENSION: ICD-10-CM

## 2020-01-27 DIAGNOSIS — K21.9 GASTROESOPHAGEAL REFLUX DISEASE, ESOPHAGITIS PRESENCE NOT SPECIFIED: ICD-10-CM

## 2020-01-27 DIAGNOSIS — R13.10 DYSPHAGIA, UNSPECIFIED TYPE: ICD-10-CM

## 2020-01-27 DIAGNOSIS — F03.92 DEMENTIA WITH PSYCHOSIS (H): ICD-10-CM

## 2020-01-27 PROCEDURE — 99309 SBSQ NF CARE MODERATE MDM 30: CPT | Performed by: NURSE PRACTITIONER

## 2020-01-27 PROCEDURE — 99207 ZZC CDG-CODE CATEGORY CHANGED: CPT | Performed by: NURSE PRACTITIONER

## 2020-01-27 ASSESSMENT — MIFFLIN-ST. JEOR: SCORE: 1109.11

## 2020-01-27 NOTE — PROGRESS NOTES
[place] [facility]  Chief Complaint   Patient presents with     Annual Comprehensive Nursing Home     [place] Medical Record Number:  [id number]  Place of Service where encounter took place:  [facility] ([facility]) [[id number]]    HPI:    Chelsey Casillas  is a 80 year old  (1940), who is being seen today for an annual comprehensive visit. HPI information obtained from: facility chart records, facility staff and patient report.      Today's concerns are:  Chronic obstructive pulmonary disease, unspecified COPD type (H)  Patient denies SOB or cough. Staff have not noted any wheezing or hypoxia.    Dementia with psychosis (H)  Risperidone was slowly tapered from 0.5mg BID to 0.25mg at bedtime due to lethargy, hypotension, falls, dysphagia. Due to increased agitation, yelling, swearing, refusing cares at times, dose increased back to 0.5mg at bedtime on 12/19/19. There are no behavioral issues documented this month. She remains more alert, conversational. She did have one fall this month when attempting to self-transfer, no injuries.    Essential hypertension  On lisinopril and carvedilol. Cardiac history unknown, she denies any history of CAD/MI/CVA, possible history of afib. BP 90-120s/60-70s    Dysphagia, unspecified type  Mechanical soft diet recommended, she signed waiver to have regular food. No issues noted with aspiration or pocketing of food.    Gastroesophageal reflux disease, esophagitis presence not specified  Denies dyspepsia. 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  PAST SURGICAL HISTORY:  has no past surgical history on file.  IMMUNIZATIONS:  Immunization History   Administered Date(s) Administered     FLU 6-35 months 09/10/2013     Influenza (High Dose) 3 valent vaccine 11/28/2017     Pneumo Conj 13-V (2010&after) 12/13/2018     Above immunizations pulled from [facility]. [facility]IC and facility records also reconciled. Outstanding information sent to  to update [employer] (Pneumovax on 12/15/19, refused tetanus). Future immunizations are not needed at this point as all recommended immunizations are up to date.     Current Outpatient Medications   Medication Sig Dispense Refill     Acetaminophen (TYLENOL PO) Take 1,000 mg by mouth daily as needed for mild pain        Acetaminophen (TYLENOL PO) Take 1,000 mg by mouth 2 times daily       albuterol (PROAIR HFA/PROVENTIL HFA/VENTOLIN HFA) 108 (90 BASE) MCG/ACT Inhaler Inhale 2 puffs into the lungs every 4 hours as needed for shortness of breath / dyspnea or wheezing        aspirin (ASA) 81 MG EC tablet Take 1 tablet (81 mg) by mouth daily       carvedilol (COREG) 3.125 MG tablet Take 1 tablet (3.125 mg) by mouth 2 times daily (with meals) 60 tablet 3     folic acid (FOLVITE) 1 MG tablet TAKE 1 TABLET BY MOUTH ONCE DAILY 31 tablet 98     lisinopril (PRINIVIL/ZESTRIL) 20 MG tablet Take 1 tablet (20 mg) by mouth daily       risperiDONE (RISPERDAL) 0.5 MG tablet Take 1 tablet (0.5 mg) by mouth At Bedtime 62 tablet 3     sertraline (ZOLOFT) 100 MG tablet TAKE 1 TABLET BY MOUTH ONCE DAILY 31 tablet 98     vitamin D3 (CHOLECALCIFEROL) 2000 units tablet Take 1 tablet by mouth daily 30 tablet        Case Management:  I have reviewed the facility/SNF care plan/MDS, including the falls risk, nutrition and pain screening. I  also reviewed the current immunizations, and preventive care. .Future cancer screening is not clinically indicated secondary to age/goals of care Patient's desire to return to the community is not present. Current Level of Care is appropriate.    Advance Directive Discussion:    I reviewed the current advanced directives as reflected in EPIC, the POLST and the facility chart, and verified the congruency of orders. I contacted the first party and left a message regarding the plan of Care.  I did not due to cognitive impairment review the advance directives with the resident.     Team Discussion:  I communicated with the appropriate disciplines involved with the Plan of Care:   Nursing    Patient's goal is pain control and comfort.  Information reviewed:  Medications, vital signs, orders, and nursing notes.    ROS:  Reliability questionable secondary to cognitive impairment. Denies chest pain, shortness of breath, fevers, chills, headache, nausea, vomiting, dysuria or bowel abnormalities. No pain.    Vitals:  /83   Pulse 67   Temp 98.4  F (36.9  C)   Resp 16   Ht 1.524 m (5')   Wt 70.8 kg (156 lb)   SpO2 95%   BMI 30.47 kg/m   Body mass index is 30.47 kg/m .  Exam:  GENERAL APPEARANCE:  Alert, in no distress, morbidly obese, cooperative  ENT:  Mouth and posterior oropharynx normal, moist mucous membranes, normal hearing acuity  EYES:  EOM, conjunctivae, lids, pupils and irises normal  NECK:  No adenopathy,masses or thyromegaly  RESP:  respiratory effort and palpation of chest normal, no respiratory distress, lungs clear to auscultation  CV:  Palpation and auscultation of heart done , regular rate and rhythm, no murmur, rub, or gallop  ABDOMEN:  normal bowel sounds, soft, nontender, no hepatosplenomegaly or other masses  SKIN:  Inspection of skin and subcutaneous tissue baseline, Palpation of skin and subcutaneous tissue baseline  PSYCH:  oriented to self, memory, insight, and judgement impaired , affect  and mood flat      Lab/Diagnostic data:   Labs done in SNF are in [place] EPIC. Please refer to them using EPIC/Care Everywhere.      ASSESSMENT/PLAN  (J44.9) Chronic obstructive pulmonary disease, unspecified COPD type (H)  (primary encounter diagnosis)  Comment: Chronic condition being managed with medications and is currently asymptomatic.  Plan: Continue current POC with no changes at this time and adjustments as needed.    (F03.91) Dementia with psychosis (H)  Comment: Chronic, progressive. Needs 24 hour skilled nursing care. HPI/ROS unreliable with cognitive impairment. Expect further functional and cognitive decline. Expect weight loss. Behavioral/emotional issues controlled on risperidone at lowest effective dose with no side effects noted. Benefits of current treatment outweigh risks. GDR not recommended as this could result in emotional harm. Previous GDR attempts resulted in emotional distress.   Plan: Continue 24 hour care. Monitor weight. Monitor functional status. Monitor for behavioral disturbances.    (I10) Essential hypertension  Comment: BP lower than necessary. To avoid risk of hypotension, falls, dizziness and tissue hypoperfusion, recommend  BP goal is < 150/90mmHg.  Plan: Decrease lisinopril to 10mg daily. Monitor BP. Adjust medication as clinically indicated.    (R13.10) Dysphagia, unspecified type  Comment: No s/sx, was likely due to her lethargy from medications  Plan: Continue current POC with no changes at this time and adjustments as needed.    (K21.9) Gastroesophageal reflux disease, esophagitis presence not specified  Comment: Asymptomatic        Orders written by provider at facility  Decrease lisinopril to 10mg daily    Electronically signed by:  ALCIRA Sweet [facility]  Phone: [phone]

## 2020-01-27 NOTE — LETTER
1/27/2020        RE: Chelsey Casillas  [facility]  [address] [address]        [place] [facility]  Chief Complaint   Patient presents with     Annual Comprehensive Nursing Home     [place] Medical Record Number:  [id number]  Place of Service where encounter took place:  [facility] ([facility]) [[id number]]    HPI:    Chelsey Casillas  is a 80 year old  (1940), who is being seen today for an annual comprehensive visit. HPI information obtained from: facility chart records, facility staff and patient report.      Today's concerns are:  Chronic obstructive pulmonary disease, unspecified COPD type (H)  Patient denies SOB or cough. Staff have not noted any wheezing or hypoxia.    Dementia with psychosis (H)  Risperidone was slowly tapered from 0.5mg BID to 0.25mg at bedtime due to lethargy, hypotension, falls, dysphagia. Due to increased agitation, yelling, swearing, refusing cares at times, dose increased back to 0.5mg at bedtime on 12/19/19. There are no behavioral issues documented this month. She remains more alert, conversational. She did have one fall this month when attempting to self-transfer, no injuries.    Essential hypertension  On lisinopril and carvedilol. Cardiac history unknown, she denies any history of CAD/MI/CVA, possible history of afib. BP 90-120s/60-70s    Dysphagia, unspecified type  Mechanical soft diet recommended, she signed waiver to have regular food. No issues noted with aspiration or pocketing of food.    Gastroesophageal reflux disease, esophagitis presence not specified  Denies dyspepsia. 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  PAST SURGICAL HISTORY:  has no past surgical history on file.  IMMUNIZATIONS:  Immunization History   Administered Date(s) Administered     FLU 6-35 months 09/10/2013     Influenza (High Dose) 3 valent vaccine 11/28/2017     Pneumo Conj 13-V (2010&after) 12/13/2018     Above immunizations pulled from [employer]. MIIC and facility records also reconciled. Outstanding information sent to  to update [employer] (Pneumovax on 12/15/19, refused tetanus). Future immunizations are not needed at this point as all recommended immunizations are up to date.     Current Outpatient Medications   Medication Sig Dispense Refill     Acetaminophen (TYLENOL PO) Take 1,000 mg by mouth daily as needed for mild pain        Acetaminophen (TYLENOL PO) Take 1,000 mg by mouth 2 times daily       albuterol (PROAIR HFA/PROVENTIL HFA/VENTOLIN HFA) 108 (90 BASE) MCG/ACT Inhaler Inhale 2 puffs into the lungs every 4 hours as needed for shortness of breath / dyspnea or wheezing        aspirin (ASA) 81 MG EC tablet Take 1 tablet (81 mg) by mouth daily       carvedilol (COREG) 3.125 MG tablet Take 1 tablet (3.125 mg) by mouth 2 times daily (with meals) 60 tablet 3     folic acid (FOLVITE) 1 MG tablet TAKE 1 TABLET BY MOUTH ONCE DAILY 31 tablet 98     lisinopril (PRINIVIL/ZESTRIL) 20 MG tablet Take 1 tablet (20 mg) by mouth daily       risperiDONE (RISPERDAL) 0.5 MG tablet Take 1 tablet (0.5 mg) by mouth At Bedtime 62 tablet 3     sertraline (ZOLOFT) 100 MG tablet TAKE 1 TABLET BY MOUTH ONCE DAILY 31 tablet 98     vitamin D3 (CHOLECALCIFEROL) 2000 units tablet Take 1 tablet by mouth daily 30 tablet         Case Management:  I have reviewed the facility/SNF care plan/MDS, including the falls risk, nutrition and pain screening. I also reviewed the current immunizations, and preventive care. .Future cancer screening is not clinically indicated secondary to age/goals of care Patient's desire to return to the community is not present. Current Level of Care is appropriate.    Advance Directive Discussion:    I reviewed the current advanced directives as reflected in EPIC, the POLST and the facility chart, and verified the congruency of orders. I contacted the first party and left a message regarding the plan of Care.  I did not due to cognitive impairment review the advance directives with the resident.     Team Discussion:  I communicated with the appropriate disciplines involved with the Plan of Care:   Nursing    Patient's goal is pain control and comfort.  Information reviewed:  Medications, vital signs, orders, and nursing notes.    ROS:  Reliability questionable secondary to cognitive impairment. Denies chest pain, shortness of breath, fevers, chills, headache, nausea, vomiting, dysuria or bowel abnormalities. No pain.    Vitals:  /83   Pulse 67   Temp 98.4  F (36.9  C)   Resp 16   Ht 1.524 m (5')   Wt 70.8 kg (156 lb)   SpO2 95%   BMI 30.47 kg/m    Body mass index is 30.47 kg/m .  Exam:  GENERAL APPEARANCE:  Alert, in no distress, morbidly obese, cooperative  ENT:  Mouth and posterior oropharynx normal, moist mucous membranes, normal hearing acuity  EYES:  EOM, conjunctivae, lids, pupils and irises normal  NECK:  No adenopathy,masses or thyromegaly  RESP:  respiratory effort and palpation of chest normal, no respiratory distress, lungs clear to auscultation  CV:  Palpation and auscultation of heart done , regular rate and rhythm, no murmur, rub, or gallop  ABDOMEN:  normal bowel sounds, soft, nontender, no hepatosplenomegaly or other masses  SKIN:  Inspection of skin and subcutaneous tissue  baseline, Palpation of skin and subcutaneous tissue baseline  PSYCH:  oriented to self, memory, insight, and judgement impaired , affect and mood flat      Lab/Diagnostic data:   Labs done in SNF are in [facility]. Please refer to them using EPIC/Care Everywhere.      ASSESSMENT/PLAN  (J44.9) Chronic obstructive pulmonary disease, unspecified COPD type (H)  (primary encounter diagnosis)  Comment: Chronic condition being managed with medications and is currently asymptomatic.  Plan: Continue current POC with no changes at this time and adjustments as needed.    (F03.91) Dementia with psychosis (H)  Comment: Chronic, progressive. Needs 24 hour skilled nursing care. HPI/ROS unreliable with cognitive impairment. Expect further functional and cognitive decline. Expect weight loss. Behavioral/emotional issues controlled on risperidone at lowest effective dose with no side effects noted. Benefits of current treatment outweigh risks. GDR not recommended as this could result in emotional harm. Previous GDR attempts resulted in emotional distress.   Plan: Continue 24 hour care. Monitor weight. Monitor functional status. Monitor for behavioral disturbances.    (I10) Essential hypertension  Comment: BP lower than necessary. To avoid risk of hypotension, falls, dizziness and tissue hypoperfusion, recommend  BP goal is < 150/90mmHg.  Plan: Decrease lisinopril to 10mg daily. Monitor BP. Adjust medication as clinically indicated.    (R13.10) Dysphagia, unspecified type  Comment: No s/sx, was likely due to her lethargy from medications  Plan: Continue current POC with no changes at this time and adjustments as needed.    (K21.9) Gastroesophageal reflux disease, esophagitis presence not specified  Comment: Asymptomatic        Orders written by provider at facility  Decrease lisinopril to 10mg daily    Electronically signed by:  ALCIRA Sweet CNP   [place] [facility]  Phone: [phone]

## 2020-02-07 ENCOUNTER — NURSING HOME VISIT (OUTPATIENT)
Dept: GERIATRICS | Facility: CLINIC | Age: 80
End: 2020-02-07
Payer: COMMERCIAL

## 2020-02-07 VITALS
BODY MASS INDEX: 30.63 KG/M2 | RESPIRATION RATE: 18 BRPM | SYSTOLIC BLOOD PRESSURE: 125 MMHG | OXYGEN SATURATION: 97 % | WEIGHT: 156 LBS | HEART RATE: 69 BPM | HEIGHT: 60 IN | TEMPERATURE: 98.3 F | DIASTOLIC BLOOD PRESSURE: 78 MMHG

## 2020-02-07 DIAGNOSIS — J44.9 CHRONIC OBSTRUCTIVE PULMONARY DISEASE, UNSPECIFIED COPD TYPE (H): Primary | ICD-10-CM

## 2020-02-07 DIAGNOSIS — R13.10 DYSPHAGIA, UNSPECIFIED TYPE: ICD-10-CM

## 2020-02-07 DIAGNOSIS — I10 ESSENTIAL HYPERTENSION: ICD-10-CM

## 2020-02-07 DIAGNOSIS — F03.92 DEMENTIA WITH PSYCHOSIS (H): ICD-10-CM

## 2020-02-07 PROCEDURE — 99309 SBSQ NF CARE MODERATE MDM 30: CPT | Performed by: NURSE PRACTITIONER

## 2020-02-07 RX ORDER — LISINOPRIL 10 MG/1
10 TABLET ORAL DAILY
COMMUNITY
End: 2020-05-14 | Stop reason: SINTOL

## 2020-02-07 ASSESSMENT — MIFFLIN-ST. JEOR: SCORE: 1109.11

## 2020-02-07 NOTE — PROGRESS NOTES
[place] [facility]  Chief Complaint   Patient presents with     [facility]     [place] Medical Record Number:  [id number]  Place of Service where encounter took place:  [facility] ([facility]) [[id number]]    HPI:    Chelsey Casillas  is 80 year old (1940), who is being seen today for a federally mandated E/M visit.  HPI information obtained from: facility chart records, facility staff and patient report.     Today's concerns are:  Chronic obstructive pulmonary disease, unspecified COPD type (H)  No SOB, cough, wheeze, hypoxia    Dementia with psychosis (H)  Risperidone decreased a few months ago due to lethargy, dysphagia, speech issues. These side effects improved, but then dose was increased slightly again due to agitation, physical aggression, emotional distress. Dose is currently 0.5mg at bedtime and was previously 0.5mg BID. She is more alert and able to converse, pleasant. No behavioral issues per nursing.     Essential hypertension  Lisinopril decreased 1/27/20 due to BP too tightly controlled. BP 120s/70s since then.    Dysphagia, unspecified type  Mechanical soft diet recommended, she signed waiver to have regular food. No issues noted with aspiration or pocketing of food.        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  PAST SURGICAL HISTORY:   has no past surgical history on file.  FAMILY  HISTORY: Family history is unknown by patient.  SOCIAL HISTORY:  reports that she has quit smoking. Her smoking use included cigarettes. She has never used smokeless tobacco. She reports that she does not drink alcohol or use drugs.    MEDICATIONS:  Current Outpatient Medications   Medication Sig Dispense Refill     Acetaminophen (TYLENOL PO) Take 1,000 mg by mouth daily as needed for mild pain        Acetaminophen (TYLENOL PO) Take 1,000 mg by mouth 2 times daily       albuterol (PROAIR HFA/PROVENTIL HFA/VENTOLIN HFA) 108 (90 BASE) MCG/ACT Inhaler Inhale 2 puffs into the lungs every 4 hours as needed for shortness of breath / dyspnea or wheezing        aspirin (ASA) 81 MG EC tablet Take 1 tablet (81 mg) by mouth daily       carvedilol (COREG) 3.125 MG tablet Take 1 tablet (3.125 mg) by mouth 2 times daily (with meals) 60 tablet 3     lisinopril (PRINIVIL/ZESTRIL) 10 MG tablet Take 10 mg by mouth daily       risperiDONE (RISPERDAL) 0.5 MG tablet Take 1 tablet (0.5 mg) by mouth At Bedtime 62 tablet 3     sertraline (ZOLOFT) 100 MG tablet TAKE 1 TABLET BY MOUTH ONCE DAILY 31 tablet 98     vitamin D3 (CHOLECALCIFEROL) 2000 units tablet Take 1 tablet by mouth daily 30 tablet        Case Management:  I have reviewed the care plan and MDS and do agree with the plan. Patient's desire to return to the community is not present. Information reviewed:  Medications, vital signs, orders, and nursing notes.    ROS:  Reliability questionable secondary to cognitive impairment. Denies chest pain, shortness of breath, fevers, chills, headache, nausea, vomiting, dysuria or bowel abnormalities.     Vitals:  /78   Pulse 69   Temp 98.3  F (36.8  C)   Resp 18   Ht 1.524 m (5')   Wt 70.8 kg (156 lb)   SpO2 97%   BMI 30.47 kg/m    Body mass index is 30.47 kg/m .  Exam:  GENERAL APPEARANCE:  Alert, in no distress, morbidly obese, cooperative  ENT:  Mouth and posterior oropharynx normal, moist mucous membranes, normal hearing  acuity  EYES:  EOM, conjunctivae, lids, pupils and irises normal  NECK:  No adenopathy,masses or thyromegaly  RESP:  respiratory effort and palpation of chest normal, no respiratory distress, lungs clear to auscultation  CV:  Palpation and auscultation of heart done , regular rate and rhythm, no murmur, rub, or gallop  ABDOMEN:  normal bowel sounds, soft, nontender, no hepatosplenomegaly or other masses  SKIN:  Inspection of skin and subcutaneous tissue baseline, Palpation of skin and subcutaneous tissue baseline  PSYCH:  oriented to self, memory, insight, and judgement impaired , affect and mood smiling, pleasant    Lab/Diagnostic data:     Most Recent 3 BMP's:  Recent Labs   Lab Test 01/06/20 06/20/19 02/19/19    140 139   POTASSIUM 4.0 4.0 4.3   CHLORIDE 105 106 104   CO2 26 27 28   BUN 13 10 22   CR 0.52 0.48* 0.66   ANIONGAP 6 7 7   MILA 8.9 8.8 9.6   GLC 88 100* 110*       ASSESSMENT/PLAN  (J44.9) Chronic obstructive pulmonary disease, unspecified COPD type (H)  (primary encounter diagnosis)  Comment: Chronic condition being managed with medications and is currently asymptomatic.  Plan: Continue current POC with no changes at this time and adjustments as needed.    (F03.91) Dementia with psychosis (H)  Comment: Chronic, progressive. Needs 24 hour skilled nursing care. HPI/ROS unreliable with cognitive impairment. Expect further functional and cognitive decline. Expect weight loss.  Plan: Continue 24 hour care. Monitor weight. Monitor functional status. Monitor for behavioral disturbances.    (I10) Essential hypertension  Comment: Controlled. Hypotension improved with reduction in ACEI. To avoid risk of hypotension, falls, dizziness and tissue hypoperfusion, recommend  BP goal is < 150/90mmHg.  Plan: Continue current POC with no changes at this time and adjustments as needed.    (R13.10) Dysphagia, unspecified type  Comment: Was likely due to risperidone side effect. No recent issues. Given her dementia,  would expect that this could worsen again, so will need to monitor  Plan: Continue current POC with no changes at this time and adjustments as needed.        Electronically signed by:  ALCIRA Sweet [facility]  Phone: [phone]

## 2020-02-07 NOTE — LETTER
2/7/2020        RE: Chelsey Casillas  [facility]  [address] [facility] [address]        [place] [facility]  Chief Complaint   Patient presents with     residential Regulatory     [place] Medical Record Number:  [id number]  Place of Service where encounter took place:  [facility] ([facility]) [[id number]]    HPI:    Chelsey Casillas  is 80 year old (1940), who is being seen today for a federally mandated E/M visit.  HPI information obtained from: facility chart records, facility staff and patient report.     Today's concerns are:  Chronic obstructive pulmonary disease, unspecified COPD type (H)  No SOB, cough, wheeze, hypoxia    Dementia with psychosis (H)  Risperidone decreased a few months ago due to lethargy, dysphagia, speech issues. These side effects improved, but then dose was increased slightly again due to agitation, physical aggression, emotional distress. Dose is currently 0.5mg at bedtime and was previously 0.5mg BID. She is more alert and able to converse, pleasant. No behavioral issues per nursing.     Essential hypertension  Lisinopril decreased 1/27/20 due to BP too tightly controlled. BP 120s/70s since then.    Dysphagia, unspecified type  Mechanical soft diet recommended, she signed waiver to have regular food. No issues noted with aspiration or pocketing of food.        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  PAST SURGICAL HISTORY:   has no past surgical history on file.  FAMILY HISTORY: Family history is unknown by patient.  SOCIAL HISTORY:  reports that she has quit smoking. Her smoking use included cigarettes. She has never used smokeless tobacco. She reports that she does not drink alcohol or use drugs.    MEDICATIONS:  Current Outpatient Medications   Medication Sig Dispense Refill     Acetaminophen (TYLENOL PO) Take 1,000 mg by mouth daily as needed for mild pain        Acetaminophen (TYLENOL PO) Take 1,000 mg by mouth 2 times daily       albuterol (PROAIR HFA/PROVENTIL HFA/VENTOLIN HFA) 108 (90 BASE) MCG/ACT Inhaler Inhale 2 puffs into the lungs every 4 hours as needed for shortness of breath / dyspnea or wheezing        aspirin (ASA) 81 MG EC tablet Take 1 tablet (81 mg) by mouth daily       carvedilol (COREG) 3.125 MG tablet Take 1 tablet (3.125 mg) by mouth 2 times daily (with meals) 60 tablet 3     lisinopril (PRINIVIL/ZESTRIL) 10 MG tablet Take 10 mg by mouth daily       risperiDONE (RISPERDAL) 0.5 MG tablet Take 1 tablet (0.5 mg) by mouth At Bedtime 62 tablet 3     sertraline (ZOLOFT) 100 MG tablet TAKE 1 TABLET BY MOUTH ONCE DAILY 31 tablet 98     vitamin D3 (CHOLECALCIFEROL) 2000 units tablet Take 1 tablet by mouth daily 30 tablet        Case Management:  I have reviewed the care plan and MDS and do agree with the plan. Patient's desire to return to the community is not present. Information reviewed:  Medications, vital signs, orders, and nursing notes.    ROS:  Reliability questionable secondary to cognitive impairment. Denies chest pain, shortness of breath, fevers, chills, headache, nausea, vomiting, dysuria or bowel abnormalities.     Vitals:  /78   Pulse 69   Temp 98.3  F (36.8  C)   Resp 18   Ht 1.524 m (5')   Wt 70.8 kg (156 lb)   SpO2 97%   BMI 30.47 kg/m     Body mass index is 30.47 kg/m .  Exam:  GENERAL APPEARANCE:  Alert, in no distress,  morbidly obese, cooperative  ENT:  Mouth and posterior oropharynx normal, moist mucous membranes, normal hearing acuity  EYES:  EOM, conjunctivae, lids, pupils and irises normal  NECK:  No adenopathy,masses or thyromegaly  RESP:  respiratory effort and palpation of chest normal, no respiratory distress, lungs clear to auscultation  CV:  Palpation and auscultation of heart done , regular rate and rhythm, no murmur, rub, or gallop  ABDOMEN:  normal bowel sounds, soft, nontender, no hepatosplenomegaly or other masses  SKIN:  Inspection of skin and subcutaneous tissue baseline, Palpation of skin and subcutaneous tissue baseline  PSYCH:  oriented to self, memory, insight, and judgement impaired , affect and mood smiling, pleasant    Lab/Diagnostic data:     Most Recent 3 BMP's:  Recent Labs   Lab Test 01/06/20 06/20/19 02/19/19    140 139   POTASSIUM 4.0 4.0 4.3   CHLORIDE 105 106 104   CO2 26 27 28   BUN 13 10 22   CR 0.52 0.48* 0.66   ANIONGAP 6 7 7   MILA 8.9 8.8 9.6   GLC 88 100* 110*       ASSESSMENT/PLAN  (J44.9) Chronic obstructive pulmonary disease, unspecified COPD type (H)  (primary encounter diagnosis)  Comment: Chronic condition being managed with medications and is currently asymptomatic.  Plan: Continue current POC with no changes at this time and adjustments as needed.    (F03.91) Dementia with psychosis (H)  Comment: Chronic, progressive. Needs 24 hour skilled nursing care. HPI/ROS unreliable with cognitive impairment. Expect further functional and cognitive decline. Expect weight loss.  Plan: Continue 24 hour care. Monitor weight. Monitor functional status. Monitor for behavioral disturbances.    (I10) Essential hypertension  Comment: Controlled. Hypotension improved with reduction in ACEI. To avoid risk of hypotension, falls, dizziness and tissue hypoperfusion, recommend  BP goal is < 150/90mmHg.  Plan: Continue current POC with no changes at this time and adjustments as needed.    (R13.10)  Dysphagia, unspecified type  Comment: Was likely due to risperidone side effect. No recent issues. Given her dementia, would expect that this could worsen again, so will need to monitor  Plan: Continue current POC with no changes at this time and adjustments as needed.        Electronically signed by:  AL[facility]IR[facility]  Phone: [phone]

## 2020-04-21 NOTE — PROGRESS NOTES
"[place] [facility]  REGULATORY    Chelsey Casillas is being evaluated via a billable video visit due to the restrictions of the Covid-19 pandemic.   The patient has been notified of following:\"This video visit will be conducted via a call between you and your provider. We have found that certain health care needs can be provided without the need for an in-person physical exam.  This service lets us provide the care you need with a video conversation. If during the course of the call the provider feels a video visit is not appropriate, you will not be charged for this service.\"   The provider has received verbal consent for a Video Visit from the patient or first contact? Yes    Video Start Time: 10:45 AM    [place] Medical Record Number:  [id number]  Place of Location at the time of visit: [facility]   Chief Complaint   Patient presents with     intermediate Regulatory     HPI:  Chelsey Casillas  is a 80 year old (1940), who is being seen today for an E-REG visit.  HPI information obtained from: facility chart records, facility staff, patient report and [facility] chart review.      Chelsey was admitted to LTC in Sept 2018 from [facility]. She has h/o dementia and often isolates in her room watching TV. She has h/o delusions sometimes scary (ex: snakes, dogs, cats) and has been on Risperidone with success in GDR. H/o COPD and no longer smoking.  She is seen in her room today via telehealth visit and is lying in bed awake. Says she is \"ok\" and is rather flat, not adding much to conversation unless specifically asked. When asked she says she can be \"sad, anxious, tired but also still enjoy things\". Admits to watching a lot of TV. Wants her cat back (facility has some real cats on another floor and she used to have a stuffed cat); sounds like per staff the stuffed cat maybe ultimately confused her with hallucinations. Admits to a dry cough which doesn't bother her and feels her breathing is " "\"alright\". She denies any aches or pains or specific concerns.  In talking with nursing staff they don't feel there are any acute concerns but note overall general decline. She is very \"defensive and says cuss words to staff\" and has occasional hallucinations (dog on the roof, feeding cats) but is generally redirectable. They don't feel she is exhibiting depressive symptoms. She recently fell trying to self transfer from a chair so now staff have her out sitting by nurses station frequently during the day to monitor her more closely.    Case Management and Team Discussion:  I called and spoke with Nursing staff at the facility regarding the current Plan of Care. I have reviewed the facility/SNF care plan/MDS, including the falls risk, nutrition and pain screening. I also reviewed the current immunizations, and preventive care.Patient's desire to return to the community is not assessible due to cognitive impairment. Current Level of Care is appropriate at this time. The Plan of Care is appropriate at this time.     Advance Directive Discussion:    I reviewed the current advanced directives as reflected in EPIC, the POLST and the facility chart, and verified the congruency of orders.  I did not due to cognitive impairment review the advance directives with the resident.         Past Medical and Surgical History reviewed in Epic today.    MEDICATIONS: Reviewed in PCC  Current Outpatient Medications   Medication Sig Dispense Refill     Acetaminophen (TYLENOL PO) Take 1,000 mg by mouth 2 times daily And an additional 1000 mg daily PRN pain       albuterol (PROAIR HFA/PROVENTIL HFA/VENTOLIN HFA) 108 (90 BASE) MCG/ACT Inhaler Inhale 2 puffs into the lungs every 4 hours as needed for shortness of breath / dyspnea or wheezing        aspirin (ASA) 81 MG EC tablet Take 1 tablet (81 mg) by mouth daily       lisinopril (PRINIVIL/ZESTRIL) 10 MG tablet Take 10 mg by mouth daily       risperiDONE (RISPERDAL) 0.5 MG tablet Take 1 " tablet (0.5 mg) by mouth At Bedtime 62 tablet 3     sertraline (ZOLOFT) 100 MG tablet TAKE 1 TABLET BY MOUTH ONCE DAILY 31 tablet 98     vitamin D3 (CHOLECALCIFEROL) 2000 units tablet Take 1 tablet by mouth daily 30 tablet      REVIEW OF SYSTEMS: Limited secondary to cognitive impairment but today pt reports as above in HPI    Objective: /77   Pulse 65   Temp 98.3  F (36.8  C)   Resp 18   Ht 1.524 m (5')   Wt 71.7 kg (158 lb)   SpO2 96%   BMI 30.86 kg/m    Limited visit exam done given COVID-19 precautions.   Lying in bed awake, looks comfortable  No respiratory distress or cough on RA  Affect remains flat, vague historian    Labs:   Last Basic Metabolic Panel:  Sodium   Date Value Ref Range Status   01/06/2020 137 133 - 144 mmol/L Final     Potassium   Date Value Ref Range Status   01/06/2020 4.0 3.4 - 5.3 mmol/L Final     Chloride   Date Value Ref Range Status   01/06/2020 105 94 - 109 mmol/L Final     Carbon Dioxide   Date Value Ref Range Status   01/06/2020 26 20 - 32 mmol/L Final     Anion Gap   Date Value Ref Range Status   01/06/2020 6 3 - 14 mmol/L Final     Glucose   Date Value Ref Range Status   01/06/2020 88 70 - 99 mg/dL Final     Urea Nitrogen   Date Value Ref Range Status   01/06/2020 13 7 - 30 mg/dL Final     Creatinine   Date Value Ref Range Status   01/06/2020 0.52 0.52 - 1.04 mg/dL Final     GFR Estimate   Date Value Ref Range Status   01/06/2020 >90 ml/min/1.73_m2 Final     Calcium   Date Value Ref Range Status   01/06/2020 8.9 8.5 - 10.1 mg/dL Final     Lab Results   Component Value Date    WBC 6.5 01/06/2020     Lab Results   Component Value Date    RBC 3.56 01/06/2020     Lab Results   Component Value Date    HGB 9.6 01/06/2020     Lab Results   Component Value Date    HCT 30.2 01/06/2020     Lab Results   Component Value Date    MCV 85 01/06/2020     Lab Results   Component Value Date    MCH 27.0 01/06/2020     Lab Results   Component Value Date    MCHC 31.8 01/06/2020     Lab  "Results   Component Value Date    RDW 14.5 01/06/2020     Lab Results   Component Value Date     01/06/2020         ASSESSMENT/PLAN:  Dementia with h/o psychosis  Depression  Needs support of staff at LTC; tapering Risperidone as able and redirect delusions; however had to increase Risperidone slightly in December d/t \"increased agitation, yelling, swearing, refusing cares at times\"  Remains on Sertraline and affect is very flat but seems baseline from my last visit and per nursing staff  On ROS, she endorses being sad but also being able to enjoy things (though seems to isolate)  Advised staff to monitor and update us if progressive depressive symptoms  She may have apathy associated with dementia which is difficult to treat    COPD  Breathing comfortably on RA; reports chronic dry cough that doesn't bother her and isn't noted today during visit or listed as a concern by staff  Has PRN Albuterol inhaler if needed but hasn't needed to utilize it yet this month    Weight loss  Fluctuating weight this year though stable in the past several months  H/o dysphagia noted which was possibly worse on higher doses of Risperidone  Could be r/t underlying dementia and thus if continues to lose weight and/or has other complications would consider discussing hospice with her     HTN; goal <150/90  Some softer BPs (/60-70s with HR 60-80s) so Coreg discontinued by my colleague and Lisinopril had been reduced in January    Anemia, unspecified  Hgb down to 9.6 in January from 11.4 June 2019  No witnessed signs/sx of blood loss and she denies pain with weight currently stable  Goals of care are comfort focused; DNR/I  She is on ASA 81 mg daily; I don't know of any CAD/CVD in her history  With COVID-19 visitor restrictions we are trying to limit outside lab contact unless emergent  Will discontinue ASA and plan to recheck Hgb when may be a safer time to do so if still in line with goals of care at that time though " realistically would not advocate for invasive measures/work-up. She has also had some falls so another reason to discontinue ASA.    Electronically signed by:  Rina Gardner, DO     Video-Visit Details  Type of service:  Video Visit  Video End Time (time video stopped): 10:53 AM  Distant Location (provider location):  [facility]

## 2020-04-22 ENCOUNTER — VIRTUAL VISIT (OUTPATIENT)
Dept: GERIATRICS | Facility: CLINIC | Age: 80
End: 2020-04-22
Payer: COMMERCIAL

## 2020-04-22 VITALS
WEIGHT: 158 LBS | BODY MASS INDEX: 31.02 KG/M2 | HEART RATE: 65 BPM | SYSTOLIC BLOOD PRESSURE: 122 MMHG | OXYGEN SATURATION: 96 % | DIASTOLIC BLOOD PRESSURE: 77 MMHG | TEMPERATURE: 98.3 F | HEIGHT: 60 IN | RESPIRATION RATE: 18 BRPM

## 2020-04-22 DIAGNOSIS — I10 BENIGN ESSENTIAL HYPERTENSION: ICD-10-CM

## 2020-04-22 DIAGNOSIS — R68.89 FLUCTUATION OF WEIGHT: ICD-10-CM

## 2020-04-22 DIAGNOSIS — J44.9 CHRONIC OBSTRUCTIVE PULMONARY DISEASE, UNSPECIFIED COPD TYPE (H): ICD-10-CM

## 2020-04-22 DIAGNOSIS — Z86.59 HISTORY OF DEPRESSION: Primary | ICD-10-CM

## 2020-04-22 DIAGNOSIS — F03.92 DEMENTIA WITH PSYCHOSIS (H): ICD-10-CM

## 2020-04-22 PROCEDURE — 99309 SBSQ NF CARE MODERATE MDM 30: CPT | Mod: 95 | Performed by: INTERNAL MEDICINE

## 2020-04-22 ASSESSMENT — MIFFLIN-ST. JEOR: SCORE: 1118.18

## 2020-04-22 NOTE — LETTER
"    4/22/2020        RE: Chelsey Casillas  [facility]  [address] [address]        [place] [facility]  REGULATORY    Chelsey Casillas is being evaluated via a billable video visit due to the restrictions of the Covid-19 pandemic.   The patient has been notified of following:\"This video visit will be conducted via a call between you and your provider. We have found that certain health care needs can be provided without the need for an in-person physical exam.  This service lets us provide the care you need with a video conversation. If during the course of the call the provider feels a video visit is not appropriate, you will not be charged for this service.\"   The provider has received verbal consent for a Video Visit from the patient or first contact? Yes    Video Start Time: 10:45 AM    [place] Medical Record Number:  [id number]  Place of Location at the time of visit: [facility]   Chief Complaint   Patient presents with     [facility] Regulatory     HPI:  Chelsey Casillas  is a 80 year old (1940), who is being seen today for an E-REG visit.  HPI information obtained from: facility chart records, facility staff, patient report and [facility] chart review.      Chelsey was admitted to LTC in Sept 2018 from [facility]. She has h/o dementia and often isolates in her room watching TV. She has h/o delusions sometimes scary (ex: snakes, dogs, cats) and has been on Risperidone with success in GDR. H/o COPD and no longer smoking.  She is seen in her room today via telehealth visit and is lying in bed awake. Says she is \"ok\" and is rather flat, not adding much to conversation unless specifically asked. When asked she says she can be \"sad, anxious, tired but also still enjoy things\". Admits to watching a lot of TV. Wants her cat back (facility has some real cats on another floor and she used to have a stuffed cat); sounds like per staff the stuffed cat maybe " "ultimately confused her with hallucinations. Admits to a dry cough which doesn't bother her and feels her breathing is \"alright\". She denies any aches or pains or specific concerns.  In talking with nursing staff they don't feel there are any acute concerns but note overall general decline. She is very \"defensive and says cuss words to staff\" and has occasional hallucinations (dog on the roof, feeding cats) but is generally redirectable. They don't feel she is exhibiting depressive symptoms. She recently fell trying to self transfer from a chair so now staff have her out sitting by nurses station frequently during the day to monitor her more closely.    Case Management and Team Discussion:  I called and spoke with Nursing staff at the facility regarding the current Plan of Care. I have reviewed the facility/SNF care plan/MDS, including the falls risk, nutrition and pain screening. I also reviewed the current immunizations, and preventive care.Patient's desire to return to the community is not assessible due to cognitive impairment. Current Level of Care is appropriate at this time. The Plan of Care is appropriate at this time.     Advance Directive Discussion:    I reviewed the current advanced directives as reflected in EPIC, the POLST and the facility chart, and verified the congruency of orders.  I did not due to cognitive impairment review the advance directives with the resident.         Past Medical and Surgical History reviewed in Epic today.    MEDICATIONS: Reviewed in PCC  Current Outpatient Medications   Medication Sig Dispense Refill     Acetaminophen (TYLENOL PO) Take 1,000 mg by mouth 2 times daily And an additional 1000 mg daily PRN pain       albuterol (PROAIR HFA/PROVENTIL HFA/VENTOLIN HFA) 108 (90 BASE) MCG/ACT Inhaler Inhale 2 puffs into the lungs every 4 hours as needed for shortness of breath / dyspnea or wheezing        aspirin (ASA) 81 MG EC tablet Take 1 tablet (81 mg) by mouth daily       " lisinopril (PRINIVIL/ZESTRIL) 10 MG tablet Take 10 mg by mouth daily       risperiDONE (RISPERDAL) 0.5 MG tablet Take 1 tablet (0.5 mg) by mouth At Bedtime 62 tablet 3     sertraline (ZOLOFT) 100 MG tablet TAKE 1 TABLET BY MOUTH ONCE DAILY 31 tablet 98     vitamin D3 (CHOLECALCIFEROL) 2000 units tablet Take 1 tablet by mouth daily 30 tablet      REVIEW OF SYSTEMS: Limited secondary to cognitive impairment but today pt reports as above in HPI    Objective: /77   Pulse 65   Temp 98.3  F (36.8  C)   Resp 18   Ht 1.524 m (5')   Wt 71.7 kg (158 lb)   SpO2 96%   BMI 30.86 kg/m    Limited visit exam done given COVID-19 precautions.   Lying in bed awake, looks comfortable  No respiratory distress or cough on RA  Affect remains flat, vague historian    Labs:   Last Basic Metabolic Panel:  Sodium   Date Value Ref Range Status   01/06/2020 137 133 - 144 mmol/L Final     Potassium   Date Value Ref Range Status   01/06/2020 4.0 3.4 - 5.3 mmol/L Final     Chloride   Date Value Ref Range Status   01/06/2020 105 94 - 109 mmol/L Final     Carbon Dioxide   Date Value Ref Range Status   01/06/2020 26 20 - 32 mmol/L Final     Anion Gap   Date Value Ref Range Status   01/06/2020 6 3 - 14 mmol/L Final     Glucose   Date Value Ref Range Status   01/06/2020 88 70 - 99 mg/dL Final     Urea Nitrogen   Date Value Ref Range Status   01/06/2020 13 7 - 30 mg/dL Final     Creatinine   Date Value Ref Range Status   01/06/2020 0.52 0.52 - 1.04 mg/dL Final     GFR Estimate   Date Value Ref Range Status   01/06/2020 >90 ml/min/1.73_m2 Final     Calcium   Date Value Ref Range Status   01/06/2020 8.9 8.5 - 10.1 mg/dL Final     Lab Results   Component Value Date    WBC 6.5 01/06/2020     Lab Results   Component Value Date    RBC 3.56 01/06/2020     Lab Results   Component Value Date    HGB 9.6 01/06/2020     Lab Results   Component Value Date    HCT 30.2 01/06/2020     Lab Results   Component Value Date    MCV 85 01/06/2020     Lab Results  "  Component Value Date    MCH 27.0 01/06/2020     Lab Results   Component Value Date    MCHC 31.8 01/06/2020     Lab Results   Component Value Date    RDW 14.5 01/06/2020     Lab Results   Component Value Date     01/06/2020         ASSESSMENT/PLAN:  Dementia with h/o psychosis  Depression  Needs support of staff at LTC; tapering Risperidone as able and redirect delusions; however had to increase Risperidone slightly in December d/t \"increased agitation, yelling, swearing, refusing cares at times\"  Remains on Sertraline and affect is very flat but seems baseline from my last visit and per nursing staff  On ROS, she endorses being sad but also being able to enjoy things (though seems to isolate)  Advised staff to monitor and update us if progressive depressive symptoms  She may have apathy associated with dementia which is difficult to treat    COPD  Breathing comfortably on RA; reports chronic dry cough that doesn't bother her and isn't noted today during visit or listed as a concern by staff  Has PRN Albuterol inhaler if needed but hasn't needed to utilize it yet this month    Weight loss  Fluctuating weight this year though stable in the past several months  H/o dysphagia noted which was possibly worse on higher doses of Risperidone  Could be r/t underlying dementia and thus if continues to lose weight and/or has other complications would consider discussing hospice with her     HTN; goal <150/90  Some softer BPs (/60-70s with HR 60-80s) so Coreg discontinued by my colleague and Lisinopril had been reduced in January    Anemia, unspecified  Hgb down to 9.6 in January from 11.4 June 2019  No witnessed signs/sx of blood loss and she denies pain with weight currently stable  Goals of care are comfort focused; DNR/I  She is on ASA 81 mg daily; I don't know of any CAD/CVD in her history  With COVID-19 visitor restrictions we are trying to limit outside lab contact unless emergent  Will discontinue ASA and " plan to recheck Hgb when may be a safer time to do so if still in line with goals of care at that time though realistically would not advocate for invasive measures/work-up. She has also had some falls so another reason to discontinue ASA.    Electronically signed by:  Rina Gardner DO     Video-Visit Details  Type of service:  Video Visit  Video End Time (time video stopped): 10:53 AM  Distant Location (provider location):  [place] [facility]           Sincerely,        Rina Gardner DO

## 2020-04-24 ENCOUNTER — TELEPHONE (OUTPATIENT)
Dept: GERIATRICS | Facility: CLINIC | Age: 80
End: 2020-04-24

## 2020-04-24 NOTE — TELEPHONE ENCOUNTER
Due to COVID and trying to restrict patient interactions, pharmacist has made recommendations for medication changes.    BP has been running <120/80 and HR 50-80    Plan:  Discontinue carvedilol

## 2020-05-14 ENCOUNTER — TELEPHONE (OUTPATIENT)
Dept: GERIATRICS | Facility: CLINIC | Age: 80
End: 2020-05-14

## 2020-05-14 DIAGNOSIS — F33.41 RECURRENT MAJOR DEPRESSIVE DISORDER, IN PARTIAL REMISSION (H): ICD-10-CM

## 2020-05-14 DIAGNOSIS — E55.9 VITAMIN D DEFICIENCY: ICD-10-CM

## 2020-05-14 RX ORDER — CHOLECALCIFEROL (VITAMIN D3) 50 MCG
1 TABLET ORAL AT BEDTIME
Qty: 30 TABLET
Start: 2020-05-14

## 2020-05-14 RX ORDER — SERTRALINE HYDROCHLORIDE 100 MG/1
100 TABLET, FILM COATED ORAL AT BEDTIME
Qty: 31 TABLET | Refills: 98
Start: 2020-05-14

## 2020-05-14 NOTE — TELEPHONE ENCOUNTER
Nursing facility has COVID positive patients. In order to reduce interactions and spread of disease, pharmacist has made recommendations to reduce medications and/or administration times.    For this resident:  BP runs 110-120s/60-70s and she has had a couple of falls lately. Due to goals of care, will stop lisinopril to reduce risk for falls, hypotension. Recommend  BP goal is < 150/90mmHg.    Change zoloft administration to at bedtime    Change acetaminophen to prn, monitor for pain

## 2020-06-30 ENCOUNTER — NURSING HOME VISIT (OUTPATIENT)
Dept: GERIATRICS | Facility: CLINIC | Age: 80
End: 2020-06-30
Payer: COMMERCIAL

## 2020-06-30 DIAGNOSIS — I10 ESSENTIAL HYPERTENSION: ICD-10-CM

## 2020-06-30 DIAGNOSIS — F03.92 DEMENTIA WITH PSYCHOSIS (H): ICD-10-CM

## 2020-06-30 DIAGNOSIS — J44.9 CHRONIC OBSTRUCTIVE PULMONARY DISEASE, UNSPECIFIED COPD TYPE (H): Primary | ICD-10-CM

## 2020-06-30 PROCEDURE — 99207 ZZC NO CHARGE VISIT/PATIENT NOT SEEN: CPT | Performed by: NURSE PRACTITIONER

## 2020-06-30 NOTE — LETTER
6/30/2020        RE: Chelsey Casillas  [facility]  [address] [address]        [place] Regulatory     Encounter opened for chart review only. Patient due for regulatory visit, but provider unable to see patient.  Chelsey Casillas is unable to be seen due to staff unavailable for video, the restrictions of the Covid-19 pandemic and facility request that providers do not come into the building at this time.     [place] Medical Record Number:  [id number]  Place of Location at the time of visit: [facility]   Chief Complaint   Patient presents with     snf Regulatory     HPI:  Chelsey Casillas  is a 80 year old (1940), chart review for Regulatory visit.      HPI information obtained from: facility chart records and facility staff.     Today's concern is:  Chronic obstructive pulmonary disease, unspecified COPD type (H)  Patient tested positive for COVID 19 in May and then negative 6/6/20. Staff never reported any symptoms related to this. No hypoxia or respiratory distress.     Dementia with psychosis (H)  Risperidone decreased several months ago due to lethargy, dysphagia, speech issues. These side effects improved, but then dose was increased slightly again due to agitation, physical aggression, emotional distress. Dose is currently 0.5mg at bedtime and was previously 0.5mg BID. She is more alert and able to converse, pleasant. No behavioral issues per nursing.     Essential hypertension  Lisinopril was stopped 5/2020. -140s/70-80s      Case Management and Team Discussion:  I spoke with Nursing staff at the facility regarding the current Plan of Care. I have reviewed the facility/SNF care plan/MDS, including the falls risk, nutrition and pain screening. I also reviewed the current immunizations, and preventive care.Patient's desire to return to the community is not present. Current Level of Care is appropriate at this time. The  Plan of Care is appropriate at this time. I reviewed the current advanced directives as reflected in EPIC, the POLST and the facility chart, and verified the congruency of orders.     Past Medical and Surgical History reviewed in Epic today.  MEDICATIONS:    Current Outpatient Medications   Medication Sig Dispense Refill     Acetaminophen (TYLENOL PO) Take 1,000 mg by mouth 3 times daily as needed And an additional 1000 mg daily PRN pain       albuterol (PROAIR HFA/PROVENTIL HFA/VENTOLIN HFA) 108 (90 BASE) MCG/ACT Inhaler Inhale 2 puffs into the lungs every 4 hours as needed for shortness of breath / dyspnea or wheezing        risperiDONE (RISPERDAL) 0.5 MG tablet Take 1 tablet (0.5 mg) by mouth At Bedtime 62 tablet 3     sertraline (ZOLOFT) 100 MG tablet Take 1 tablet (100 mg) by mouth At Bedtime 31 tablet 98     vitamin D3 (CHOLECALCIFEROL) 2000 units (50 mcg) tablet Take 1 tablet (2,000 Units) by mouth At Bedtime 30 tablet        Labs:   Due to COVID and trying to restrict patient interactions, such as with phlebotomists coming from outside, will check labs only if needed.      ASSESSMENT/PLAN:  (J44.9) Chronic obstructive pulmonary disease, unspecified COPD type (H)  (primary encounter diagnosis)  Comment: Chronic condition being managed with medications and is currently asymptomatic.  Plan: Continue current POC with no changes at this time and adjustments as needed.    (F03.91) Dementia with psychosis (H)  Comment: Comment: Chronic, progressive. Needs 24 hour skilled nursing care. HPI/ROS difficult to obtain with cognitive impairment. Expect further functional and cognitive decline. Expect weight loss.  Plan: Continue 24 hour care. Monitor weight. Monitor functional status. Monitor for behavioral disturbances.    (I10) Essential hypertension  Comment: Controlled. To avoid risk of hypotension, falls, dizziness and tissue hypoperfusion, recommend  BP goal is < 150/90mmHg.  Plan: Continue without pharmacological  intervention. Monitor BP. Adjust medication as clinically indicated.      Electronically signed by:  ALCIRA Sweet CNP

## 2020-06-30 NOTE — PROGRESS NOTES
[facility] Regulatory     Encounter opened for chart review only. Patient due for regulatory visit, but provider unable to see patient.  Chelsey Casillas is unable to be seen due to staff unavailable for video, the restrictions of the Covid-19 pandemic and facility request that providers do not come into the building at this time.     [place] Medical Record Number:  [id number]  Place of Location at the time of visit: [facility]   Chief Complaint   Patient presents with     FPC Regulatory     HPI:  Chelsey Casillas  is a 80 year old (1940), chart review for Regulatory visit.      HPI information obtained from: facility chart records and facility staff.     Today's concern is:  Chronic obstructive pulmonary disease, unspecified COPD type (H)  Patient tested positive for COVID 19 in May and then negative 6/6/20. Staff never reported any symptoms related to this. No hypoxia or respiratory distress.     Dementia with psychosis (H)  Risperidone decreased several months ago due to lethargy, dysphagia, speech issues. These side effects improved, but then dose was increased slightly again due to agitation, physical aggression, emotional distress. Dose is currently 0.5mg at bedtime and was previously 0.5mg BID. She is more alert and able to converse, pleasant. No behavioral issues per nursing.     Essential hypertension  Lisinopril was stopped 5/2020. -140s/70-80s      Case Management and Team Discussion:  I spoke with Nursing staff at the facility regarding the current Plan of Care. I have reviewed the facility/SNF care plan/MDS, including the falls risk, nutrition and pain screening. I also reviewed the current immunizations, and preventive care.Patient's desire to return to the community is not present. Current Level of Care is appropriate at this time. The Plan of Care is appropriate at this time. I reviewed the current advanced directives as reflected in EPIC, the  POLST and the facility chart, and verified the congruency of orders.     Past Medical and Surgical History reviewed in Epic today.  MEDICATIONS:    Current Outpatient Medications   Medication Sig Dispense Refill     Acetaminophen (TYLENOL PO) Take 1,000 mg by mouth 3 times daily as needed And an additional 1000 mg daily PRN pain       albuterol (PROAIR HFA/PROVENTIL HFA/VENTOLIN HFA) 108 (90 BASE) MCG/ACT Inhaler Inhale 2 puffs into the lungs every 4 hours as needed for shortness of breath / dyspnea or wheezing        risperiDONE (RISPERDAL) 0.5 MG tablet Take 1 tablet (0.5 mg) by mouth At Bedtime 62 tablet 3     sertraline (ZOLOFT) 100 MG tablet Take 1 tablet (100 mg) by mouth At Bedtime 31 tablet 98     vitamin D3 (CHOLECALCIFEROL) 2000 units (50 mcg) tablet Take 1 tablet (2,000 Units) by mouth At Bedtime 30 tablet        Labs:   Due to COVID and trying to restrict patient interactions, such as with phlebotomists coming from outside, will check labs only if needed.      ASSESSMENT/PLAN:  (J44.9) Chronic obstructive pulmonary disease, unspecified COPD type (H)  (primary encounter diagnosis)  Comment: Chronic condition being managed with medications and is currently asymptomatic.  Plan: Continue current POC with no changes at this time and adjustments as needed.    (F03.91) Dementia with psychosis (H)  Comment: Comment: Chronic, progressive. Needs 24 hour skilled nursing care. HPI/ROS difficult to obtain with cognitive impairment. Expect further functional and cognitive decline. Expect weight loss.  Plan: Continue 24 hour care. Monitor weight. Monitor functional status. Monitor for behavioral disturbances.    (I10) Essential hypertension  Comment: Controlled. To avoid risk of hypotension, falls, dizziness and tissue hypoperfusion, recommend  BP goal is < 150/90mmHg.  Plan: Continue without pharmacological intervention. Monitor BP. Adjust medication as clinically indicated.      Electronically signed by:  Rebekah Cadena  Clark, ALCIRA CNP

## 2020-08-10 ENCOUNTER — NURSING HOME VISIT (OUTPATIENT)
Dept: GERIATRICS | Facility: CLINIC | Age: 80
End: 2020-08-10
Payer: COMMERCIAL

## 2020-08-10 VITALS
BODY MASS INDEX: 27.45 KG/M2 | HEART RATE: 83 BPM | RESPIRATION RATE: 16 BRPM | OXYGEN SATURATION: 97 % | WEIGHT: 139.8 LBS | DIASTOLIC BLOOD PRESSURE: 83 MMHG | SYSTOLIC BLOOD PRESSURE: 147 MMHG | HEIGHT: 60 IN | TEMPERATURE: 97.7 F

## 2020-08-10 DIAGNOSIS — U07.1 CLINICAL DIAGNOSIS OF COVID-19: ICD-10-CM

## 2020-08-10 DIAGNOSIS — D64.9 ANEMIA, UNSPECIFIED TYPE: ICD-10-CM

## 2020-08-10 DIAGNOSIS — J44.9 CHRONIC OBSTRUCTIVE PULMONARY DISEASE, UNSPECIFIED COPD TYPE (H): Primary | ICD-10-CM

## 2020-08-10 DIAGNOSIS — F33.41 RECURRENT MAJOR DEPRESSIVE DISORDER, IN PARTIAL REMISSION (H): ICD-10-CM

## 2020-08-10 DIAGNOSIS — I10 ESSENTIAL HYPERTENSION: ICD-10-CM

## 2020-08-10 DIAGNOSIS — L82.1 SEBORRHEIC KERATOSES: ICD-10-CM

## 2020-08-10 DIAGNOSIS — F03.92 DEMENTIA WITH PSYCHOSIS (H): ICD-10-CM

## 2020-08-10 PROCEDURE — 99309 SBSQ NF CARE MODERATE MDM 30: CPT | Performed by: INTERNAL MEDICINE

## 2020-08-10 ASSESSMENT — MIFFLIN-ST. JEOR: SCORE: 1035.63

## 2020-08-10 NOTE — LETTER
8/10/2020        RE: Chelsey Casillas  [facility]  [address] [address]        [place]  PHYSICIAN NOTE    Chief Complaint   Patient presents with     longterm Regulatory       HPI:    Chelsey Casillas is a 80 year old  (1940), who is being seen today for a federally mandated E/M visit at [facility] . Admitted to LTC in Sept 2018 from [facility]. 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 H/o COPD without O2. She has occasionally had behaviors and falls but in talking with nursing staff today, no current concerns in this regard. BPs have trended down over time and her anti-hypertensives have been able to be discontinued.    Chelsey is seen sitting up in her wheelchair eating a chocolate bar and watching TV. Its nice to see her up as often she is in bed when I happen to visit. She is more cheerful than usual though still grieving loss of her cat, Mecca, who lived with her in [facility]. She thinks [place] is still there but isn't sure. Her weights have been hard to track; recorded very variable in 140-160s range over the past year; currently 139.8#. She did have COVID-19 infection detected in May but remained asymptomatic from it despite her h/o COPD and other comorbidities. She had incidental noted mild anemia in January and ASA discontinued as no strong indication for its use. She denies any on-going abdominal pain or signs/sx of blood loss. Says only occasional diarrhea. Her only concern is itching of SKs under breasts. Says she used to have a powder that helped. I noted from nursing notes that she refused her shower last night.    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 according to facility MAR  Current Outpatient Medications   Medication Sig Dispense Refill     Acetaminophen (TYLENOL PO) Take 1,000 mg by mouth every 8 hours as needed for mild pain        albuterol (PROAIR HFA/PROVENTIL HFA/VENTOLIN HFA) 108 (90 BASE) MCG/ACT Inhaler Inhale 2 puffs into the lungs every 4 hours as needed for shortness of breath / dyspnea or wheezing        nystatin (MYCOSTATIN) 485572 UNIT/GM external powder Apply topically 2 times daily as needed (Itching and moisture under breasts)       risperiDONE (RISPERDAL) 0.5 MG tablet Take 1 tablet (0.5 mg) by mouth At Bedtime 62 tablet 3     sertraline (ZOLOFT) 100 MG tablet Take 1 tablet (100 mg) by mouth At Bedtime 31 tablet 98     vitamin D3 (CHOLECALCIFEROL) 2000 units (50 mcg) tablet Take 1 tablet (2,000 Units) by mouth At Bedtime 30 tablet        ROS:  Limited secondary to cognitive impairment but today pt reports as above in HPI    Exam:  BP (!) 147/83   Pulse 83   Temp 97.7  F (36.5  C)   Resp 16   Ht 1.524 m (5')   Wt 63.4 kg (139 lb 12.8 oz)   SpO2 97%   BMI 27.30 kg/m    Alert, pleasant, casually dressed, sitting up in wheelchair eating a chocolate candy bar  Brightest affect I've seen over the past year  Breathing non-labored, no cough  Not pale in appearance  Under bilateral breasts the skin has some moisture and several seborrheic keratoses but no rash  Abdomen soft, NT to deep palpation throughout  No  tremor, normal speech    Lab/Diagnostic Data:    +COVID-19 infection May 2020    Hemoglobin   Date Value Ref Range Status   01/06/2020 9.6 (A) 11.7 - 15.7 g/dL Final     ASSESSMENT/PLAN:  Chronic obstructive pulmonary disease, unspecified COPD type (H)  Clinical diagnosis of COVID-19  No breathing concerns today and not needing any PRN Albuterol this month  Did not clinically have significant symptoms from COVID-19 infection    Seborrheic keratoses  Added BID PRN Nystatin powder for moisture/itching under bilateral breasts (no current rash) as she requested powder which helped in the past    Dementia with psychosis (H)  Recurrent major depressive disorder, in partial remission (H)  Looks best I've seen her in awhile; staff noting less behaviors recently and no on-going delusions  Remains on Sertraline and Risperidone which have been helpful    Essential hypertension  Anti-hypertensives successfully discontinued previously; BPs range 100-140s/50-80s    Anemia, unspecified type  Noted in January; Hgb not rechecked d/t COVID-19 pandemic and limiting outside vendors  No active signs/sx of blood loss nor pale skin  Aspirin was discontinued 4 months ago as not necessary medication  Weight variable as noted in HPI and no consistent abdominal symptoms of concern  Goals of care are comfort based so will continue to monitor         Electronically signed by:  Rina Gardner DO        Sincerely,        Rina Gardner DO

## 2020-08-11 RX ORDER — NYSTATIN 100000 [USP'U]/G
POWDER TOPICAL 2 TIMES DAILY PRN
COMMUNITY
End: 2021-08-22

## 2020-08-11 NOTE — PROGRESS NOTES
[place] [facility]  PHYSICIAN NOTE    Chief Complaint   Patient presents with     care home Regulatory       HPI:    Chelsey Casillas is a 80 year old  (1940), who is being seen today for a federally mandated E/M visit at [facility] . Admitted to LTC in Sept 2018 from [facility]. 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 H/o COPD without O2. She has occasionally had behaviors and falls but in talking with nursing staff today, no current concerns in this regard. BPs have trended down over time and her anti-hypertensives have been able to be discontinued.    Chelsey is seen sitting up in her wheelchair eating a chocolate bar and watching TV. Its nice to see her up as often she is in bed when I happen to visit. She is more cheerful than usual though still grieving loss of her cat, Mecca, who lived with her in [facility]. She thinks [place]y is still there but isn't sure. Her weights have been hard to track; recorded very variable in 140-160s range over the past year; currently 139.8#. She did have COVID-19 infection detected in May but remained asymptomatic from it despite her h/o COPD and other comorbidities. She had incidental noted mild anemia in January and ASA discontinued as no strong indication for its use. She denies any on-going abdominal pain or signs/sx of blood loss. Says only occasional diarrhea. Her only concern is itching of SKs under breasts. Says she used to have a powder that helped. I noted from nursing notes that she refused her shower last night.    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) 7/12/2016     Skin tear 3/7/2013     Weakness 6/18/2015     CODE STATUS: DNR/I    MEDICATIONS: Reviewed and updated in [facility] according to facility MAR  Current Outpatient Medications   Medication Sig Dispense Refill     Acetaminophen (TYLENOL PO) Take 1,000 mg by mouth every 8 hours as needed for mild pain        albuterol (PROAIR HFA/PROVENTIL HFA/VENTOLIN HFA) 108 (90 BASE) MCG/ACT Inhaler Inhale 2 puffs into the lungs every 4 hours as needed for shortness of breath / dyspnea or wheezing        nystatin (MYCOSTATIN) 960868 UNIT/GM external powder Apply topically 2 times daily as needed (Itching and moisture under breasts)       risperiDONE (RISPERDAL) 0.5 MG tablet Take 1 tablet (0.5 mg) by mouth At Bedtime 62 tablet 3     sertraline (ZOLOFT) 100 MG tablet Take 1 tablet (100 mg) by mouth At Bedtime 31 tablet 98     vitamin D3 (CHOLECALCIFEROL) 2000 units (50 mcg) tablet Take 1 tablet (2,000 Units) by mouth At Bedtime 30 tablet        ROS:  Limited secondary to cognitive impairment but today pt reports as above in HPI    Exam:  BP (!) 147/83   Pulse 83   Temp 97.7  F (36.5  C)   Resp 16   Ht 1.524 m (5')   Wt 63.4 kg (139 lb 12.8 oz)   SpO2 97%   BMI 27.30 kg/m    Alert, pleasant, casually dressed, sitting up in wheelchair eating a chocolate candy bar  Brightest affect I've seen over the past year  Breathing non-labored, no cough  Not pale in appearance  Under bilateral breasts the skin has some moisture and several seborrheic keratoses but no rash  Abdomen soft, NT to deep palpation throughout  No tremor, normal speech    Lab/Diagnostic Data:    +COVID-19 infection May 2020    Hemoglobin   Date Value Ref Range  Status   01/06/2020 9.6 (A) 11.7 - 15.7 g/dL Final     ASSESSMENT/PLAN:  Chronic obstructive pulmonary disease, unspecified COPD type (H)  Clinical diagnosis of COVID-19  No breathing concerns today and not needing any PRN Albuterol this month  Did not clinically have significant symptoms from COVID-19 infection    Seborrheic keratoses  Added BID PRN Nystatin powder for moisture/itching under bilateral breasts (no current rash) as she requested powder which helped in the past    Dementia with psychosis (H)  Recurrent major depressive disorder, in partial remission (H)  Looks best I've seen her in awhile; staff noting less behaviors recently and no on-going delusions  Remains on Sertraline and Risperidone which have been helpful    Essential hypertension  Anti-hypertensives successfully discontinued previously; BPs range 100-140s/50-80s    Anemia, unspecified type  Noted in January; Hgb not rechecked d/t COVID-19 pandemic and limiting outside vendors  No active signs/sx of blood loss nor pale skin  Aspirin was discontinued 4 months ago as not necessary medication  Weight variable as noted in HPI and no consistent abdominal symptoms of concern  Goals of care are comfort based so will continue to monitor         Electronically signed by:  Rina Gardner DO

## 2020-09-30 ENCOUNTER — NURSING HOME VISIT (OUTPATIENT)
Dept: GERIATRICS | Facility: CLINIC | Age: 80
End: 2020-09-30
Payer: COMMERCIAL

## 2020-09-30 VITALS
TEMPERATURE: 96.4 F | SYSTOLIC BLOOD PRESSURE: 144 MMHG | WEIGHT: 153 LBS | HEIGHT: 60 IN | RESPIRATION RATE: 16 BRPM | DIASTOLIC BLOOD PRESSURE: 63 MMHG | OXYGEN SATURATION: 97 % | BODY MASS INDEX: 30.04 KG/M2 | HEART RATE: 61 BPM

## 2020-09-30 DIAGNOSIS — F32.3 SEVERE MAJOR DEPRESSION WITH PSYCHOTIC FEATURES (H): Primary | ICD-10-CM

## 2020-09-30 PROCEDURE — 99308 SBSQ NF CARE LOW MDM 20: CPT | Performed by: NURSE PRACTITIONER

## 2020-09-30 ASSESSMENT — MIFFLIN-ST. JEOR: SCORE: 1095.5

## 2020-09-30 NOTE — PROGRESS NOTES
[place] [facility]  [place] Medical Record Number:  [id number]  Place of Service where encounter took place:  [facility] ([facility]) [[id number]]  Chief Complaint   Patient presents with     RECHECK       HPI:    Chelsey Casillas  is a 80 year old (1940), who is being seen today for an episodic care visit.  HPI information obtained from: facility chart records, facility staff and patient report.     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 She is currently on sertraline and risperidone.     Chelsey is initially reluctant to speak, but then was happy to discuss her cat she had. Appetite is fair. She says she sleeps fairly well with occasional awakenings through the night. She denies thoughts of harming herself. Nursing has not noted any issues with mood or behavior.      Past Medical and Surgical History reviewed in Epic today.    MEDICATIONS:    Current Outpatient Medications   Medication Sig Dispense Refill     Acetaminophen (TYLENOL PO) Take 1,000 mg by mouth every 8 hours as needed for mild pain        albuterol (PROAIR HFA/PROVENTIL HFA/VENTOLIN HFA) 108 (90 BASE) MCG/ACT Inhaler Inhale 2 puffs into the lungs every 4 hours as needed for shortness of breath / dyspnea or wheezing        nystatin (MYCOSTATIN) 848516 UNIT/GM external powder Apply topically 2 times daily as needed (Itching and moisture under breasts)       risperiDONE (RISPERDAL) 0.5 MG tablet Take 1 tablet (0.5 mg) by mouth At Bedtime 62 tablet 3     sertraline (ZOLOFT) 100 MG tablet Take 1 tablet (100 mg) by mouth At Bedtime 31 tablet 98     vitamin D3 (CHOLECALCIFEROL) 2000 units (50 mcg) tablet Take 1 tablet (2,000 Units) by mouth At Bedtime 30 tablet        REVIEW OF SYSTEMS:  10 point ROS of systems including Constitutional, Eyes, Respiratory, Cardiovascular,  Gastroenterology, Genitourinary, Integumentary, Musculoskeletal, Psychiatric were all negative except for pertinent positives noted in my HPI.    Objective:  BP (!) 144/63   Pulse 61   Temp 96.4  F (35.8  C)   Resp 16   Ht 1.524 m (5')   Wt 69.4 kg (153 lb)   SpO2 97%   BMI 29.88 kg/m    Exam:  GENERAL APPEARANCE:  Alert, in no distress  PSYCH:  oriented X 3, insight and judgement impaired, memory impaired , affect abnormal flat    Labs:   Recent labs in [facility] reviewed by me today.     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 She has progressive dementia, which would contribute to her flat affect. Adding or increasing medications would likely have minimal affect on mood and would increase risk for side effects. PHQ9 has been stable for months.  Plan: Continue current POC with no changes at this time and adjustments as needed.        Electronically signed by:  ALCIRA Sweet [facility]  Phone: [phone]

## 2020-09-30 NOTE — NURSING NOTE
HPI:    Chelsey Casillas  is a 80 year old (1940), who is being seen today for an episodic care visit.      HPI information obtained from: facility chart records, facility staff, patient report and [facility] chart review.     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 Risperidone dose at 0.5 mg at HS for episodes of agitation, aggression and emotional distress. Previous BID dosing resulted in lethargy, dysphagia, and speech issues. She is alert and conversive. She is initially reluctant to speaking but is happy to discuss her cat named Mecca. Appetite is fair and she is eating well. She sleeps fairly well with occasional awakenings through the night. She denies thoughts of harming herself or others. Nursing has no concerns or report of depressed mood, delusions, or behaviors.  9/8 PHQ9 = 10 BIMS = 9     Past Medical and Surgical History reviewed in Epic today.     MEDICATIONS:    Current Outpatient Prescriptions          Current Outpatient Medications   Medication Sig Dispense Refill     Acetaminophen (TYLENOL PO) Take 1,000 mg by mouth every 8 hours as needed for mild pain          albuterol (PROAIR HFA/PROVENTIL HFA/VENTOLIN HFA) 108 (90 BASE) MCG/ACT Inhaler Inhale 2 puffs into the lungs every 4 hours as needed for shortness of breath / dyspnea or wheezing          nystatin (MYCOSTATIN) 214381 UNIT/GM external powder Apply topically 2 times daily as needed (Itching and moisture under breasts)         risperiDONE (RISPERDAL) 0.5 MG tablet Take 1 tablet (0.5 mg) by mouth At Bedtime 62 tablet 3     sertraline (ZOLOFT) 100 MG tablet Take 1 tablet (100 mg) by mouth At Bedtime 31 tablet 98     vitamin D3 (CHOLECALCIFEROL) 2000 units (50 mcg) tablet Take 1 tablet (2,000 Units) by mouth At Bedtime 30 tablet               REVIEW OF SYSTEMS:  10 point ROS of systems including Constitutional, Eyes, Respiratory, Cardiovascular, Gastroenterology,  Genitourinary, Integumentary, Musculoskeletal, Psychiatric were all negative except for pertinent positives noted in my HPI.     Objective:  BP (!) 66/41   Pulse 61   Temp 96.4  F (35.8  C)   Resp 16   Ht 1.524 m (5')   Wt 69.4 kg (153 lb)   SpO2 97%   BMI 29.88 kg/m    Exam:  GENERAL APPEARANCE:  Alert, in no distress, morbidly obese  EYES:  EOM, conjunctivae, lids, pupils and irises normal  RESP:  respiratory effort and palpation of chest normal, lungs clear to auscultation   CV:  Palpation and auscultation of heart done , regular rate and rhythm, no murmur, rub, or gallop  ABDOMEN:  normal bowel sounds, soft, nontender, no hepatosplenomegaly or other masses  PSYCH:  oriented X 3, flat affect     Labs:   Recent labs in [facility] reviewed by me today.      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 PHQ9 and BIMS unchanged since March, behavior and agitation well-controlled.   Plan: Continue current POC with no changes at this time and adjustments as needed.           Electronically signed by:  CHARO Morillo, RN  [facility] NP Student

## 2020-09-30 NOTE — LETTER
9/30/2020        RE: Chelsey Casillas  [facility]  [address] [address]        [place] [facility]  [place] Medical Record Number:  [id number]  Place of Service where encounter took place:  [facility] ([facility]) [[id number]]  Chief Complaint   Patient presents with     RECHECK       HPI:    Chelsey Casillas  is a 80 year old (1940), who is being seen today for an episodic care visit.  HPI information obtained from: facility chart records, facility staff and patient report.     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 She is currently on sertraline and risperidone.     Chelsey is initially reluctant to speak, but then was happy to discuss her cat she had. Appetite is fair. She says she sleeps fairly well with occasional awakenings through the night. She denies thoughts of harming herself. Nursing has not noted any issues with mood or behavior.      Past Medical and Surgical History reviewed in Epic today.    MEDICATIONS:    Current Outpatient Medications   Medication Sig Dispense Refill     Acetaminophen (TYLENOL PO) Take 1,000 mg by mouth every 8 hours as needed for mild pain        albuterol (PROAIR HFA/PROVENTIL HFA/VENTOLIN HFA) 108 (90 BASE) MCG/ACT Inhaler Inhale 2 puffs into the lungs every 4 hours as needed for shortness of breath / dyspnea or wheezing        nystatin (MYCOSTATIN) 253217 UNIT/GM external powder Apply topically 2 times daily as needed (Itching and moisture under breasts)       risperiDONE (RISPERDAL) 0.5 MG tablet Take 1 tablet (0.5 mg) by mouth At Bedtime 62 tablet 3     sertraline (ZOLOFT) 100 MG tablet Take 1 tablet (100 mg) by mouth At Bedtime 31 tablet 98     vitamin D3 (CHOLECALCIFEROL) 2000 units (50 mcg) tablet Take 1 tablet (2,000 Units) by mouth At Bedtime 30 tablet         REVIEW OF SYSTEMS:  10 point ROS of systems including Constitutional, Eyes, Respiratory, Cardiovascular, Gastroenterology, Genitourinary, Integumentary, Musculoskeletal, Psychiatric were all negative except for pertinent positives noted in my HPI.    Objective:  BP (!) 144/63   Pulse 61   Temp 96.4  F (35.8  C)   Resp 16   Ht 1.524 m (5')   Wt 69.4 kg (153 lb)   SpO2 97%   BMI 29.88 kg/m    Exam:  GENERAL APPEARANCE:  Alert, in no distress  PSYCH:  oriented X 3, insight and judgement impaired, memory impaired , affect abnormal flat    Labs:   Recent labs in EPIC reviewed by me today.     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 She has progressive dementia, which would contribute to her flat affect. Adding or increasing medications would likely have minimal affect on mood and would increase risk for side effects. PHQ9 has been stable for months.  Plan: Continue current POC with no changes at this time and adjustments as needed.        Electronically signed by:  ALCIRA Sweet [facility]  Phone: [phone]

## 2020-10-01 NOTE — NURSING NOTE
HPI:    Chelsey Casillas  is a 80 year old (1940), who is being seen today for an episodic care visit.       HPI information obtained from: facility chart records, facility staff, patient report and [facility] chart review.      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 Risperidone dose at 0.5 mg at HS for episodes of agitation, aggression and emotional distress. Previous BID dosing resulted in lethargy, dysphagia, and speech issues. She is alert and conversive. She is initially reluctant to speaking but is happy to discuss her cat named Mecca. Appetite is fair and she is eating well, weight recordings have fluctuated but maintain between 140s-160s. She sleeps fairly well with occasional awakenings through the night. She denies thoughts of harming herself or others. Nursing has no concerns or report of depressed mood, delusions, or behaviors.  9/8 PHQ9 = 10 BIMS = 9     Past Medical and Surgical History reviewed in Epic today.     MEDICATIONS:     Current Outpatient Prescriptions               Current Outpatient Medications   Medication Sig Dispense Refill     Acetaminophen (TYLENOL PO) Take 1,000 mg by mouth every 8 hours as needed for mild pain          albuterol (PROAIR HFA/PROVENTIL HFA/VENTOLIN HFA) 108 (90 BASE) MCG/ACT Inhaler Inhale 2 puffs into the lungs every 4 hours as needed for shortness of breath / dyspnea or wheezing          nystatin (MYCOSTATIN) 917962 UNIT/GM external powder Apply topically 2 times daily as needed (Itching and moisture under breasts)         risperiDONE (RISPERDAL) 0.5 MG tablet Take 1 tablet (0.5 mg) by mouth At Bedtime 62 tablet 3     sertraline (ZOLOFT) 100 MG tablet Take 1 tablet (100 mg) by mouth At Bedtime 31 tablet 98     vitamin D3 (CHOLECALCIFEROL) 2000 units (50 mcg) tablet Take 1 tablet (2,000 Units) by mouth At Bedtime 30 tablet                 REVIEW OF SYSTEMS:  10 point ROS of systems  including Constitutional, Eyes, Respiratory, Cardiovascular, Gastroenterology, Genitourinary, Integumentary, Musculoskeletal, Psychiatric were all negative except for pertinent positives noted in my HPI.     Objective:  BP (!) 66/41   Pulse 61   Temp 96.4  F (35.8  C)   Resp 16   Ht 1.524 m (5')   Wt 69.4 kg (153 lb)   SpO2 97%   BMI 29.88 kg/m    Exam:  GENERAL APPEARANCE:  Alert, in no distress  EYES:  EOM, conjunctivae, lids, pupils and irises normal  RESP:  respiratory effort and palpation of chest normal, lungs clear to auscultation   CV:  Palpation and auscultation of heart done , regular rate and rhythm, no murmur, rub, or gallop  ABDOMEN:  normal bowel sounds, soft, nontender, no hepatosplenomegaly or other masses  PSYCH:  oriented X 3, flat affect     Labs:   Recent labs in [facility] reviewed by me today.      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 PHQ9 and BIMS unchanged since March, behavior and agitation well-controlled.   Plan: Continue current POC with no changes at this time and adjustments as needed.            Electronically signed by:  CHARO Morillo, RN  [facility] NP Student

## 2020-10-07 ENCOUNTER — NURSING HOME VISIT (OUTPATIENT)
Dept: GERIATRICS | Facility: CLINIC | Age: 80
End: 2020-10-07
Payer: COMMERCIAL

## 2020-10-07 VITALS
WEIGHT: 153 LBS | HEART RATE: 69 BPM | SYSTOLIC BLOOD PRESSURE: 151 MMHG | TEMPERATURE: 96.9 F | RESPIRATION RATE: 16 BRPM | HEIGHT: 60 IN | DIASTOLIC BLOOD PRESSURE: 90 MMHG | OXYGEN SATURATION: 94 % | BODY MASS INDEX: 30.04 KG/M2

## 2020-10-07 DIAGNOSIS — R13.10 DYSPHAGIA, UNSPECIFIED TYPE: ICD-10-CM

## 2020-10-07 DIAGNOSIS — J44.9 CHRONIC OBSTRUCTIVE PULMONARY DISEASE, UNSPECIFIED COPD TYPE (H): Primary | ICD-10-CM

## 2020-10-07 DIAGNOSIS — F03.92 DEMENTIA WITH PSYCHOSIS (H): ICD-10-CM

## 2020-10-07 DIAGNOSIS — I10 ESSENTIAL HYPERTENSION: ICD-10-CM

## 2020-10-07 PROCEDURE — 99309 SBSQ NF CARE MODERATE MDM 30: CPT | Performed by: NURSE PRACTITIONER

## 2020-10-07 ASSESSMENT — MIFFLIN-ST. JEOR: SCORE: 1095.5

## 2020-10-07 NOTE — NURSING NOTE
HPI:    Chelsey Casillas  is 80 year old (1940), who is being seen today for a federally mandated E/M visit.  HPI information obtained from: facility chart records, facility staff and patient report.     Today's concerns are:  Chronic obstructive pulmonary disease, unspecified COPD type (H)  Oxygen saturation 94-97 on room air. No wheezing or shortness of breath. Asymptomatic COVID in May. Prevnar daily, no recent PRN albuterol use.    Dementia with psychosis (H)  BIMS = 9. She is more cheerful during our visit today, smiling and happy to discuss her pet cat. Staff report behaviors are stable.    Essential hypertension  110s-150s/50-80s. Antihypertensives successfully discontinued in May.    Dysphagia, unspecified type  Risks/benefits signed for regular consistency diet with thin liquids. Weights variable, appetite fair. RD following.         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  PAST SURGICAL HISTORY:   has no past surgical history on file.  FAMILY HISTORY: Family history is unknown by patient.  SOCIAL HISTORY:  reports that she has quit smoking. Her smoking use included cigarettes. She has never used smokeless tobacco. She reports that she does not drink alcohol or use drugs.     MEDICATIONS:  Current Outpatient Prescriptions          Current Outpatient Medications   Medication Sig Dispense Refill     Acetaminophen  (TYLENOL PO) Take 1,000 mg by mouth every 8 hours as needed for mild pain          albuterol (PROAIR HFA/PROVENTIL HFA/VENTOLIN HFA) 108 (90 BASE) MCG/ACT Inhaler Inhale 2 puffs into the lungs every 4 hours as needed for shortness of breath / dyspnea or wheezing          nystatin (MYCOSTATIN) 092666 UNIT/GM external powder Apply topically 2 times daily as needed (Itching and moisture under breasts)         risperiDONE (RISPERDAL) 0.5 MG tablet Take 1 tablet (0.5 mg) by mouth At Bedtime 62 tablet 3     sertraline (ZOLOFT) 100 MG tablet Take 1 tablet (100 mg) by mouth At Bedtime 31 tablet 98     vitamin D3 (CHOLECALCIFEROL) 2000 units (50 mcg) tablet Take 1 tablet (2,000 Units) by mouth At Bedtime 30 tablet             Case Management:  I have reviewed the care plan and MDS and do agree with the plan. Patient's desire to return to the community is present, but is not able due to care needs . Information reviewed:  Medications, vital signs, orders, and nursing notes.     ROS:  Limited secondary to cognitive impairment but today pt reports 10 point ROS of systems including Constitutional, Eyes, Respiratory, Cardiovascular, Gastroenterology, Genitourinary, Integumentary, Musculoskeletal, Psychiatric were all negative except for pertinent positives noted in my HPI.     Vitals:  BP (!) 151/90   Pulse 69   Temp 96.9  F (36.1  C)   Resp 16   Ht 1.524 m (5')   Wt 69.4 kg (153 lb)   SpO2 94%   BMI 29.88 kg/m    Body mass index is 29.88 kg/m .  Exam:  GENERAL APPEARANCE:  Alert, in no distress  ENT:  Mouth and posterior oropharynx normal, moist mucous membranes, normal hearing acuity  EYES:  EOM, conjunctivae, lids, pupils and irises normal  RESP:  respiratory effort and palpation of chest normal, lungs clear to auscultation , no respiratory distress  CV:  Palpation and auscultation of heart done , regular rate and rhythm, no murmur, rub, or gallop, no edema  ABDOMEN:  normal bowel sounds, soft, nontender, no  hepatosplenomegaly or other masses  PSYCH:  oriented to person/place, insight and judgement impaired, memory impaired , affect flat, mood normal     Lab/Diagnostic data:   Recent labs in [facility] reviewed by me today.      ASSESSMENT/PLAN  (J44.9) Chronic obstructive pulmonary disease, unspecified COPD type (H)  (primary encounter diagnosis)  Comment: Chronic, well-controlled.  Plan: Continue current POC with no changes at this time and adjustments as needed.       (F03.91) Dementia with psychosis (H)  Comment: Controlled with medication  Plan: Continue current POC with no changes at this time and adjustments as needed.       (I10) Essential hypertension  Comment: Controlled. To avoid risk of hypotension, falls, dizziness and tissue hypoperfusion, recommend  BP goal is < 150/90mmHg.  Plan: Continue without pharmacological intervention. Monitor BP. Adjust medication as clinically indicated.      (R13.10) Dysphagia, unspecified type  Comment: Chronic, eating liberalized diet per preference. Weight stable.  Plan: Continue current POC with no changes at this time and adjustments as needed.         Electronically signed by:  CHARO Morillo, RN  [facility] NP Student

## 2020-10-07 NOTE — LETTER
10/7/2020        RE: Chelsey Casillas  [facility] [place]  [address] [address]        [place] [facility]  Chief Complaint   Patient presents with     residential Regulatory     [place] Medical Record Number:  [id number]  Place of Service where encounter took place:  [facility] ([facility]) [[id number]]    HPI:    Chelsey Casillas  is 80 year old (1940), who is being seen today for a federally mandated E/M visit.  HPI information obtained from: facility chart records, facility staff and patient report. Today's concerns are:  Chronic obstructive pulmonary disease, unspecified COPD type (H)  SaO2 94-97% on room air. No wheeze, SOB. Tested positive for COVID in May, but never developed symptoms.    Dementia with psychosis (H)  BIMS = 9. Staff report behavioral issues are stable    Essential hypertension  -150s/50-80s. No longer on antihypertensive medications    Dysphagia, unspecified type  Risk/benefit signed for regular consistency diet with thin liquids. 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  PAST SURGICAL HISTORY:   has no past surgical history on file.  FAMILY HISTORY: Family history is unknown by patient.  SOCIAL HISTORY:  reports that she has quit smoking. Her smoking use included cigarettes. She has never used smokeless tobacco. She reports  that she does not drink alcohol or use drugs.    MEDICATIONS:  Current Outpatient Medications   Medication Sig Dispense Refill     Acetaminophen (TYLENOL PO) Take 1,000 mg by mouth every 8 hours as needed for mild pain        albuterol (PROAIR HFA/PROVENTIL HFA/VENTOLIN HFA) 108 (90 BASE) MCG/ACT Inhaler Inhale 2 puffs into the lungs every 4 hours as needed for shortness of breath / dyspnea or wheezing        nystatin (MYCOSTATIN) 144598 UNIT/GM external powder Apply topically 2 times daily as needed (Itching and moisture under breasts)       risperiDONE (RISPERDAL) 0.5 MG tablet Take 1 tablet (0.5 mg) by mouth At Bedtime 62 tablet 3     sertraline (ZOLOFT) 100 MG tablet Take 1 tablet (100 mg) by mouth At Bedtime 31 tablet 98     vitamin D3 (CHOLECALCIFEROL) 2000 units (50 mcg) tablet Take 1 tablet (2,000 Units) by mouth At Bedtime 30 tablet        Case Management:  I have reviewed the care plan and MDS and do agree with the plan. Patient's desire to return to the community is not present. Information reviewed:  Medications, vital signs, orders, and nursing notes.    ROS:  Limited secondary to cognitive impairment but today pt reports 10 point ROS of systems including Constitutional, Eyes, Respiratory, Cardiovascular, Gastroenterology, Genitourinary, Integumentary, Musculoskeletal, Psychiatric were all negative except for pertinent positives noted in my HPI.    Vitals:  BP (!) 151/90   Pulse 69   Temp 96.9  F (36.1  C)   Resp 16   Ht 1.524 m (5')   Wt 69.4 kg (153 lb)   SpO2 94%   BMI 29.88 kg/m    Body mass index is 29.88 kg/m .  Exam:  GENERAL APPEARANCE:  Alert, in no distress  ENT:  Mouth and posterior oropharynx normal, moist mucous membranes, normal hearing acuity  EYES:  EOM, conjunctivae, lids, pupils and irises normal  RESP:  respiratory effort and palpation of chest normal, lungs clear to auscultation , no respiratory distress  CV:  Palpation and auscultation of heart done , regular rate and rhythm,  no murmur, rub, or gallop, no edema  ABDOMEN:  normal bowel sounds, soft, nontender, no hepatosplenomegaly or other masses  SKIN:  Inspection of skin and subcutaneous tissue baseline, Palpation of skin and subcutaneous tissue baseline  PSYCH:  normal insight, judgement and memory, insight and judgement impaired, cheerful today, happy to discuss her cat she used to have    Lab/Diagnostic data:   Recent labs in EPIC reviewed by me today.     ASSESSMENT/PLAN  (J44.9) Chronic obstructive pulmonary disease, unspecified COPD type (H)  (primary encounter diagnosis)  Comment: Chronic condition being managed with medications and is currently asymptomatic.  Plan: Continue current POC with no changes at this time and adjustments as needed.    (F03.91) Dementia with psychosis (H)  Comment: Chronic, progressive. Needs 24 hour skilled nursing care. HPI/ROS unreliable with cognitive impairment. Behavioral issues controlled. Previous GDR of risperidone resulted in agitation, aggression. Expect further functional and cognitive decline. Expect weight loss.  Plan: Continue 24 hour care. Monitor weight. Monitor functional status. Monitor for behavioral disturbances.    (I10) Essential hypertension  Comment: BP goals are <150/90 mm Hg.This is higher than ACC and AHA recommendations due to goals of care, risk for hypotension, risk of dizziness and falls and risk of tissue/cerebral hypoperfusion. Patient is stable and continue without pharmacological invention with routine assessment.    (R13.10) Dysphagia, unspecified type  Comment: This had originally coincided with an increase in risperidone, so may have been related more to that than her dementia. If she were to become acutely ill with anything, she may have issues with this again.   Plan: Continue current POC with no changes at this time and adjustments as needed.        Electronically signed by:  ALCIRA Sweet [facility]  Phone:  [phone]

## 2020-10-07 NOTE — PROGRESS NOTES
[place]  Chief Complaint   Patient presents with     [facility] Regulatory     [place] Medical Record Number:  [id number]  Place of Service where encounter took place:  [facility] [place] ([facility]) [[id number]]    HPI:    Chelsey Casillas  is 80 year old (1940), who is being seen today for a federally mandated E/M visit.  HPI information obtained from: facility chart records, facility staff and patient report. Today's concerns are:  Chronic obstructive pulmonary disease, unspecified COPD type (H)  SaO2 94-97% on room air. No wheeze, SOB. Tested positive for COVID in May, but never developed symptoms.    Dementia with psychosis (H)  BIMS = 9. Staff report behavioral issues are stable    Essential hypertension  -150s/50-80s. No longer on antihypertensive medications    Dysphagia, unspecified type  Risk/benefit signed for regular consistency diet with thin liquids. 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  PAST SURGICAL HISTORY:   has no past surgical history on file.  FAMILY HISTORY: Family history is unknown by patient.  SOCIAL HISTORY:  reports that she has quit smoking. Her smoking use included cigarettes. She has never used smokeless tobacco. She reports that she does not drink alcohol or use drugs.    MEDICATIONS:  Current Outpatient Medications   Medication Sig  Dispense Refill     Acetaminophen (TYLENOL PO) Take 1,000 mg by mouth every 8 hours as needed for mild pain        albuterol (PROAIR HFA/PROVENTIL HFA/VENTOLIN HFA) 108 (90 BASE) MCG/ACT Inhaler Inhale 2 puffs into the lungs every 4 hours as needed for shortness of breath / dyspnea or wheezing        nystatin (MYCOSTATIN) 827618 UNIT/GM external powder Apply topically 2 times daily as needed (Itching and moisture under breasts)       risperiDONE (RISPERDAL) 0.5 MG tablet Take 1 tablet (0.5 mg) by mouth At Bedtime 62 tablet 3     sertraline (ZOLOFT) 100 MG tablet Take 1 tablet (100 mg) by mouth At Bedtime 31 tablet 98     vitamin D3 (CHOLECALCIFEROL) 2000 units (50 mcg) tablet Take 1 tablet (2,000 Units) by mouth At Bedtime 30 tablet        Case Management:  I have reviewed the care plan and MDS and do agree with the plan. Patient's desire to return to the community is not present. Information reviewed:  Medications, vital signs, orders, and nursing notes.    ROS:  Limited secondary to cognitive impairment but today pt reports 10 point ROS of systems including Constitutional, Eyes, Respiratory, Cardiovascular, Gastroenterology, Genitourinary, Integumentary, Musculoskeletal, Psychiatric were all negative except for pertinent positives noted in my HPI.    Vitals:  BP (!) 151/90   Pulse 69   Temp 96.9  F (36.1  C)   Resp 16   Ht 1.524 m (5')   Wt 69.4 kg (153 lb)   SpO2 94%   BMI 29.88 kg/m    Body mass index is 29.88 kg/m .  Exam:  GENERAL APPEARANCE:  Alert, in no distress  ENT:  Mouth and posterior oropharynx normal, moist mucous membranes, normal hearing acuity  EYES:  EOM, conjunctivae, lids, pupils and irises normal  RESP:  respiratory effort and palpation of chest normal, lungs clear to auscultation , no respiratory distress  CV:  Palpation and auscultation of heart done , regular rate and rhythm, no murmur, rub, or gallop, no edema  ABDOMEN:  normal bowel sounds, soft, nontender, no hepatosplenomegaly or  other masses  SKIN:  Inspection of skin and subcutaneous tissue baseline, Palpation of skin and subcutaneous tissue baseline  PSYCH:  normal insight, judgement and memory, insight and judgement impaired, cheerful today, happy to discuss her cat she used to have    Lab/Diagnostic data:   Recent labs in [facility] reviewed by me today.     ASSESSMENT/PLAN  (J44.9) Chronic obstructive pulmonary disease, unspecified COPD type (H)  (primary encounter diagnosis)  Comment: Chronic condition being managed with medications and is currently asymptomatic.  Plan: Continue current POC with no changes at this time and adjustments as needed.    (F03.91) Dementia with psychosis (H)  Comment: Chronic, progressive. Needs 24 hour skilled nursing care. HPI/ROS unreliable with cognitive impairment. Behavioral issues controlled. Previous GDR of risperidone resulted in agitation, aggression. Expect further functional and cognitive decline. Expect weight loss.  Plan: Continue 24 hour care. Monitor weight. Monitor functional status. Monitor for behavioral disturbances.    (I10) Essential hypertension  Comment: BP goals are <150/90 mm Hg.This is higher than ACC and AHA recommendations due to goals of care, risk for hypotension, risk of dizziness and falls and risk of tissue/cerebral hypoperfusion. Patient is stable and continue without pharmacological invention with routine assessment.    (R13.10) Dysphagia, unspecified type  Comment: This had originally coincided with an increase in risperidone, so may have been related more to that than her dementia. If she were to become acutely ill with anything, she may have issues with this again.   Plan: Continue current POC with no changes at this time and adjustments as needed.        Electronically signed by:  ALCIRA [facility]  Phone: [phone]

## 2020-12-04 ENCOUNTER — NURSING HOME VISIT (OUTPATIENT)
Dept: GERIATRICS | Facility: CLINIC | Age: 80
End: 2020-12-04
Payer: COMMERCIAL

## 2020-12-04 VITALS
HEART RATE: 81 BPM | BODY MASS INDEX: 31.92 KG/M2 | RESPIRATION RATE: 18 BRPM | SYSTOLIC BLOOD PRESSURE: 101 MMHG | OXYGEN SATURATION: 95 % | DIASTOLIC BLOOD PRESSURE: 60 MMHG | TEMPERATURE: 96.6 F | WEIGHT: 162.6 LBS | HEIGHT: 60 IN

## 2020-12-04 DIAGNOSIS — M15.9 GENERALIZED OSTEOARTHRITIS: Primary | ICD-10-CM

## 2020-12-04 DIAGNOSIS — I10 ESSENTIAL HYPERTENSION: ICD-10-CM

## 2020-12-04 DIAGNOSIS — Z87.898 HISTORY OF HALLUCINATIONS: ICD-10-CM

## 2020-12-04 DIAGNOSIS — F03.90 DEMENTIA WITHOUT BEHAVIORAL DISTURBANCE, UNSPECIFIED DEMENTIA TYPE: ICD-10-CM

## 2020-12-04 PROCEDURE — 99309 SBSQ NF CARE MODERATE MDM 30: CPT | Performed by: INTERNAL MEDICINE

## 2020-12-04 ASSESSMENT — MIFFLIN-ST. JEOR: SCORE: 1139.05

## 2020-12-04 NOTE — LETTER
"    12/4/2020        RE: Chelsey Casillas  [facility]  [address] [address]        [place] [facility]  PHYSICIAN NOTE    Chief Complaint   Patient presents with     [facility] Regulatory       HPI:    Chelsey Casillas is a 80 year old  (1940), who is being seen today for a federally mandated E/M visit at [facility] . Admitted to LTC in Sept 2018 from [facility]. 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 H/o COPD without O2. She has occasionally had behaviors and falls but in talking with nursing staff today, no current concerns in this regard. BPs have trended down over time and her anti-hypertensives have been able to be discontinued. Weight has been variable on recorded vital signs but now back to baseline. She did have COVID-19 infection in May but never was symptomatic.     Chelsey is seen sitting up in her wheelchair watching politics and comments its \"some BS!\". She is aware of recent presidential election and results though much to my surprise. She welcomes the visit/distraction today. Says she is breathing fine and mood is \"alright\". However, says she doesn't often sleep well because its \"noisy\" in hallways and she has \"pain all over\". Says from head to toe; hard for her to say if joints/muscles. No recent injury. Doesn't feel sickly/feverish. Says in the past \"Tylenol #3\" was helpful. Currently has PRN regular Tylenol and she is willing to first try having this scheduled. Of note, when I reviewed this request with staff, they reminded me that Tylenol used to be scheduled and she'd often refuse it so it was changed to a PRN order this past spring. I revisited this with Chelsey and again she wants it scheduled at this time. She appreciates ability to have PRN Nystatin for itchiness under breasts where " she has SKs.    ALLERGIES: Amlodipine    Past Medical History:   Diagnosis Date     Arthritis      Chronic low back pain 3/17/2015     COPD (chronic obstructive pulmonary disease) (H)      Dementia with psychosis (H) 11/1/2016     Fall 2/27/2013     Falling episodes 9/24/2016     Functional urinary incontinence 3/7/2013     GERD (gastroesophageal reflux disease) 4/3/2018     HTN (hypertension) 4/3/2018     Knee pain 3/7/2013     Major depression, single episode 4/16/2015     Pain in both knees 1/30/2014     Physical deconditioning 3/7/2013     Severe major depression with psychotic features (H) 7/12/2016     Skin tear 3/7/2013     Weakness 6/18/2015      CODE STATUS: DNR/I Comfort    MEDICATIONS: Reviewed and updated in [facility] according to facility MAR  Current Outpatient Medications   Medication Sig Dispense Refill     Acetaminophen (TYLENOL PO) Take 1,000 mg by mouth 2 times daily        albuterol (PROAIR HFA/PROVENTIL HFA/VENTOLIN HFA) 108 (90 BASE) MCG/ACT Inhaler Inhale 2 puffs into the lungs every 4 hours as needed for shortness of breath / dyspnea or wheezing        nystatin (MYCOSTATIN) 670720 UNIT/GM external powder Apply topically 2 times daily as needed (Itching and moisture under breasts)       risperiDONE (RISPERDAL) 0.5 MG tablet Take 1 tablet (0.5 mg) by mouth At Bedtime 62 tablet 3     sertraline (ZOLOFT) 100 MG tablet Take 1 tablet (100 mg) by mouth At Bedtime 31 tablet 98     vitamin D3 (CHOLECALCIFEROL) 2000 units (50 mcg) tablet Take 1 tablet (2,000 Units) by mouth At Bedtime 30 tablet        ROS:  Limited secondary to cognitive impairment but today pt reports as above in HPI    Exam:  /60   Pulse 81   Temp 96.6  F (35.9  C)   Resp 18   Ht 1.524 m (5')   Wt 73.8 kg (162 lb 9.6 oz)   SpO2 95%   BMI 31.76 kg/m    Alert, casually dressed, sitting up in wheelchair  No scleral icterus  Breathing non-labored, no cough  Mood euthymic, able to communicate needs  No edema    Lab/Diagnostic  Data:    +COVID-19 infection in May with negative screening tests since  No new labs    ASSESSMENT/PLAN:  Generalized osteoarthritis  Add back scheduled Tylenol BID at her request    Dementia without behavioral disturbance, unspecified dementia type (H)  History of hallucinations  Has had fluctuating doses of Risperdal to control troublesome hallucinations/delusions and also is on Sertraline  The past couple visits I've had with her she looks quite good, up and about in wheelchair and tolerating these medications well    Essential hypertension  Weight stabilized / back up  BP agents slowly have been able to be successfully tapered off over the past year with still at goal BP/HR        Electronically signed by:  Rina Gardner,           Sincerely,        Rina Gardner, DO

## 2020-12-05 NOTE — PROGRESS NOTES
"[place]  PHYSICIAN NOTE    Chief Complaint   Patient presents with     snf Regulatory       HPI:    Cehlsey Casillas is a 80 year old  (1940), who is being seen today for a federally mandated E/M visit at [facility] . Admitted to LTC in Sept 2018 from [facility]. 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 H/o COPD without O2. She has occasionally had behaviors and falls but in talking with nursing staff today, no current concerns in this regard. BPs have trended down over time and her anti-hypertensives have been able to be discontinued. Weight has been variable on recorded vital signs but now back to baseline. She did have COVID-19 infection in May but never was symptomatic.     Chelsey is seen sitting up in her wheelchair watching politics and comments its \"some BS!\". She is aware of recent presidential election and results though much to my surprise. She welcomes the visit/distraction today. Says she is breathing fine and mood is \"alright\". However, says she doesn't often sleep well because its \"noisy\" in hallways and she has \"pain all over\". Says from head to toe; hard for her to say if joints/muscles. No recent injury. Doesn't feel sickly/feverish. Says in the past \"Tylenol #3\" was helpful. Currently has PRN regular Tylenol and she is willing to first try having this scheduled. Of note, when I reviewed this request with staff, they reminded me that Tylenol used to be scheduled and she'd often refuse it so it was changed to a PRN order this past spring. I revisited this with Chelsey and again she wants it scheduled at this time. She appreciates ability to have PRN Nystatin for itchiness under breasts where she has SKs.    ALLERGIES: Amlodipine    Past Medical History:   Diagnosis Date     Arthritis      Chronic low " back pain 3/17/2015     COPD (chronic obstructive pulmonary disease) (H)      Dementia with psychosis (H) 11/1/2016     Fall 2/27/2013     Falling episodes 9/24/2016     Functional urinary incontinence 3/7/2013     GERD (gastroesophageal reflux disease) 4/3/2018     HTN (hypertension) 4/3/2018     Knee pain 3/7/2013     Major depression, single episode 4/16/2015     Pain in both knees 1/30/2014     Physical deconditioning 3/7/2013     Severe major depression with psychotic features (H) 7/12/2016     Skin tear 3/7/2013     Weakness 6/18/2015      CODE STATUS: DNR/I Comfort    MEDICATIONS: Reviewed and updated in Epic according to facility MAR  Current Outpatient Medications   Medication Sig Dispense Refill     Acetaminophen (TYLENOL PO) Take 1,000 mg by mouth 2 times daily        albuterol (PROAIR HFA/PROVENTIL HFA/VENTOLIN HFA) 108 (90 BASE) MCG/ACT Inhaler Inhale 2 puffs into the lungs every 4 hours as needed for shortness of breath / dyspnea or wheezing        nystatin (MYCOSTATIN) 529096 UNIT/GM external powder Apply topically 2 times daily as needed (Itching and moisture under breasts)       risperiDONE (RISPERDAL) 0.5 MG tablet Take 1 tablet (0.5 mg) by mouth At Bedtime 62 tablet 3     sertraline (ZOLOFT) 100 MG tablet Take 1 tablet (100 mg) by mouth At Bedtime 31 tablet 98     vitamin D3 (CHOLECALCIFEROL) 2000 units (50 mcg) tablet Take 1 tablet (2,000 Units) by mouth At Bedtime 30 tablet        ROS:  Limited secondary to cognitive impairment but today pt reports as above in HPI    Exam:  /60   Pulse 81   Temp 96.6  F (35.9  C)   Resp 18   Ht 1.524 m (5')   Wt 73.8 kg (162 lb 9.6 oz)   SpO2 95%   BMI 31.76 kg/m    Alert, casually dressed, sitting up in wheelchair  No scleral icterus  Breathing non-labored, no cough  Mood euthymic, able to communicate needs  No edema    Lab/Diagnostic Data:    +COVID-19 infection in May with negative screening tests since  No new  labs    ASSESSMENT/PLAN:  Generalized osteoarthritis  Add back scheduled Tylenol BID at her request    Dementia without behavioral disturbance, unspecified dementia type (H)  History of hallucinations  Has had fluctuating doses of Risperdal to control troublesome hallucinations/delusions and also is on Sertraline  The past couple visits I've had with her she looks quite good, up and about in wheelchair and tolerating these medications well    Essential hypertension  Weight stabilized / back up  BP agents slowly have been able to be successfully tapered off over the past year with still at goal BP/HR        Electronically signed by:  Rina Gardner DO

## 2021-01-06 ENCOUNTER — NURSING HOME VISIT (OUTPATIENT)
Dept: GERIATRICS | Facility: CLINIC | Age: 81
End: 2021-01-06
Payer: COMMERCIAL

## 2021-01-06 VITALS
BODY MASS INDEX: 31.9 KG/M2 | TEMPERATURE: 96.8 F | HEIGHT: 60 IN | HEART RATE: 82 BPM | WEIGHT: 162.5 LBS | SYSTOLIC BLOOD PRESSURE: 149 MMHG | OXYGEN SATURATION: 97 % | RESPIRATION RATE: 18 BRPM | DIASTOLIC BLOOD PRESSURE: 76 MMHG

## 2021-01-06 DIAGNOSIS — F32.3 SEVERE MAJOR DEPRESSION WITH PSYCHOTIC FEATURES (H): ICD-10-CM

## 2021-01-06 DIAGNOSIS — K21.9 GASTROESOPHAGEAL REFLUX DISEASE WITHOUT ESOPHAGITIS: ICD-10-CM

## 2021-01-06 DIAGNOSIS — I10 ESSENTIAL HYPERTENSION: ICD-10-CM

## 2021-01-06 DIAGNOSIS — F03.92 DEMENTIA WITH PSYCHOSIS (H): ICD-10-CM

## 2021-01-06 DIAGNOSIS — J44.9 CHRONIC OBSTRUCTIVE PULMONARY DISEASE, UNSPECIFIED COPD TYPE (H): Primary | ICD-10-CM

## 2021-01-06 PROCEDURE — 99318 PR ANNUAL NURSING FAC ASSESSMNT, STABLE: CPT | Performed by: NURSE PRACTITIONER

## 2021-01-06 ASSESSMENT — MIFFLIN-ST. JEOR: SCORE: 1133.6

## 2021-01-06 NOTE — LETTER
1/6/2021        RE: Chelsey Casillas  [facility]  [address] [address]        [place] [facility]  Chief Complaint   Patient presents with     Annual Comprehensive Nursing Home     [place] Medical Record Number:  [id number]  Place of Service where encounter took place:  [facility] [place] ([facility]) [[id number]]    HPI:    Chelsey Casillas  is a 81 year old  (1940), who is being seen today for an annual comprehensive visit. HPI information obtained from: facility chart records, facility staff and patient report.      Today's concerns are:  Chronic obstructive pulmonary disease, unspecified COPD type (H)  Patient reports chronic cough as she does at every visit, no worsening. No wheezing, SOB, fever, hypoxia    Gastroesophageal reflux disease without esophagitis  Patient c/o heartburn all day every day, especially after eating. 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 At the time risperidone was changed from BID to daily. For some time now, she has been back to how she was on admission. Alert, eating well, able to make needs known. Bedtime risperidone was reduced several months ago, but she developed agitation, yelling, swearing, refusing cares, so it was increased back to 0.5mg. Staff report no recent behavioral concerns. No recent falls.    Essential hypertension  No longer on antihypertensive medications. BP ranges from 100-150s/60-80s. Most readings <140/80        ALLERGIES: Amlodipine  PAST MEDICAL HISTORY:  has a past medical history of Arthritis, Chronic low back pain (3/17/2015), COPD (chronic obstructive pulmonary disease) (H), Dementia with psychosis (H) (11/1/2016), Fall (2/27/2013), Falling episodes (9/24/2016), Functional urinary incontinence (3/7/2013), GERD (gastroesophageal reflux disease) (4/3/2018), HTN  (hypertension) (4/3/2018), Knee pain (3/7/2013), Major depression, single episode (4/16/2015), Pain in both knees (1/30/2014), Physical deconditioning (3/7/2013), 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  PAST SURGICAL HISTORY:  has no past surgical history on file.  IMMUNIZATIONS:  Immunization History   Administered Date(s) Administered     FLU 6-35 months 09/10/2013     Influenza (High Dose) 3 valent vaccine 11/28/2017     Pneumo Conj 13-V (2010&after) 12/13/2018     Above immunizations pulled from [facility]. [facility] and facility records also reconciled. Outstanding information sent to  to update [facility].  Future immunizations needed:  PPSV23 and Provider working with patient, first contact, and facility to administer immunizations.    Current Outpatient Medications   Medication Sig Dispense Refill     Acetaminophen (TYLENOL PO) Take 1,000 mg by mouth 2 times daily        albuterol (PROAIR HFA/PROVENTIL HFA/VENTOLIN HFA) 108 (90 BASE) MCG/ACT Inhaler Inhale 2 puffs into the lungs every 4 hours as needed for shortness of breath / dyspnea or wheezing        nystatin (MYCOSTATIN) 137485 UNIT/GM external powder Apply topically 2 times daily as needed (Itching and moisture under breasts)       risperiDONE (RISPERDAL) 0.5 MG tablet Take 1 tablet (0.5 mg) by mouth At Bedtime 62 tablet 3     sertraline (ZOLOFT) 100 MG tablet Take 1 tablet (100 mg) by mouth At Bedtime 31 tablet 98     vitamin D3 (CHOLECALCIFEROL) 2000 units (50 mcg) tablet Take 1 tablet (2,000 Units) by mouth At Bedtime 30 tablet        Case Management:  I have reviewed the facility/SNF care plan/MDS, including the falls risk, nutrition and pain screening. I also reviewed the current immunizations, and preventive care. .Future cancer screening is not clinically indicated secondary to age/goals of care Patient's desire to return to the community is not present.  Current Level of Care is appropriate.    Advance Directive Discussion:    I reviewed the current advanced directives as reflected in EPIC, the POLST and the facility chart, and verified the congruency of orders. I contacted the first party and left a message regarding the plan of Care.  I did not due to cognitive impairment review the advance directives with the resident.     Team Discussion:  I communicated with the appropriate disciplines involved with the Plan of Care:   Nursing    Patient's goal is pain control and comfort.  Information reviewed:  Medications, vital signs, orders, and nursing notes.    ROS:  10 point ROS of systems including Constitutional, Eyes, Respiratory, Cardiovascular, Gastroenterology, Genitourinary, Integumentary, Musculoskeletal, Psychiatric were all negative except for pertinent positives noted in my HPI.    Vitals:  BP (!) 149/76   Pulse 82   Temp 96.8  F (36  C)   Resp 18   Ht 1.524 m (5')   Wt 73.7 kg (162 lb 8 oz)   SpO2 97%   BMI 31.74 kg/m   Body mass index is 31.74 kg/m .  Exam:  GENERAL APPEARANCE:  Alert, in no distress, cooperative  ENT:  Mouth and posterior oropharynx normal, moist mucous membranes, normal hearing acuity  EYES:  EOM, conjunctivae, lids, pupils and irises normal  RESP:  respiratory effort and palpation of chest normal, lungs clear to auscultation , no respiratory distress  CV:  Palpation and auscultation of heart done , regular rate and rhythm, no murmur, rub, or gallop, peripheral edema 1+ in ankles  ABDOMEN:  normal bowel sounds, soft, nontender, no hepatosplenomegaly or other masses  SKIN:  red, yeast rash under breasts. No open areas  NEURO:   Cranial nerves 2-12 are normal tested and grossly at patient's baseline  PSYCH:  insight and judgement impaired, memory impaired , affect and mood normal     Lab/Diagnostic data:   Recent labs in [facility] reviewed by me today.       ASSESSMENT/PLAN  (J44.9) Chronic obstructive pulmonary disease, unspecified COPD  type (H)  (primary encounter diagnosis)  Comment: Chronic condition being managed with medications and is currently asymptomatic.  Plan: Continue current POC with no changes at this time and adjustments as needed.    (K21.9) Gastroesophageal reflux disease without esophagitis  Comment: Patient with daily, significant symptoms. Will start PPI  Plan: Omeprazole 20mg daily. Monitor dyspepsia    (F03.91) Dementia with psychosis (H)  Comment: Chronic, progressive. Requires 24 hour skilled nursing care. Expect further functional and cognitive decline. Expect weight loss.  Plan: Continue 24 hour care. Monitor weight. Monitor functional status. Monitor for behavioral disturbances.    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 Benefits of current treatment outweigh risks. GDR not recommended as this could result in emotional harm.   Plan: Continue current POC with no changes at this time and adjustments as needed.    (I10) Essential hypertension  Comment: BP goals are <150/90 mm Hg.This is higher than ACC and AHA recommendations due to goals of care, risk for hypotension, risk of dizziness and falls and risk of tissue/cerebral hypoperfusion. Patient is stable and continue without pharmacological invention with routine assessment.      Orders written by provider at facility  Omeprazole 20mg daily  Nystatin powder under breasts    Electronically signed by:  ALCIRA [facility]  Phone: [phone]

## 2021-01-06 NOTE — PROGRESS NOTES
[place] [facility]  Chief Complaint   Patient presents with     Annual Comprehensive Nursing Home     [place] Medical Record Number:  [id number]  Place of Service where encounter took place:  [facility] ([facility]) [[id number]]    HPI:    Chelsey Casillas  is a 81 year old  (1940), who is being seen today for an annual comprehensive visit. HPI information obtained from: facility chart records, facility staff and patient report.      Today's concerns are:  Chronic obstructive pulmonary disease, unspecified COPD type (H)  Patient reports chronic cough as she does at every visit, no worsening. No wheezing, SOB, fever, hypoxia    Gastroesophageal reflux disease without esophagitis  Patient c/o heartburn all day every day, especially after eating. 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 At the time risperidone was changed from BID to daily. For some time now, she has been back to how she was on admission. Alert, eating well, able to make needs known. Bedtime risperidone was reduced several months ago, but she developed agitation, yelling, swearing, refusing cares, so it was increased back to 0.5mg. Staff report no recent behavioral concerns. No recent falls.    Essential hypertension  No longer on antihypertensive medications. BP ranges from 100-150s/60-80s. 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  PAST SURGICAL HISTORY:  has no past surgical history on file.  IMMUNIZATIONS:  Immunization History   Administered Date(s) Administered     FLU 6-35 months 09/10/2013     Influenza (High Dose) 3 valent vaccine 11/28/2017     Pneumo Conj 13-V (2010&after) 12/13/2018     Above immunizations pulled from [employer]. [facility] and facility records also reconciled. Outstanding information sent to  to update [employer].  Future immunizations needed:  PPSV23 and Provider working with patient, first contact, and facility to administer immunizations.    Current Outpatient Medications   Medication Sig Dispense Refill     Acetaminophen (TYLENOL PO) Take 1,000 mg by mouth 2 times daily        albuterol (PROAIR HFA/PROVENTIL HFA/VENTOLIN HFA) 108 (90 BASE) MCG/ACT Inhaler Inhale 2 puffs into the lungs every 4 hours as needed for shortness of breath / dyspnea or wheezing        nystatin (MYCOSTATIN) 987601 UNIT/GM external powder Apply topically 2 times daily as needed (Itching and moisture under breasts)       risperiDONE (RISPERDAL) 0.5 MG tablet Take 1 tablet (0.5 mg) by mouth At Bedtime 62 tablet 3     sertraline (ZOLOFT) 100 MG tablet Take 1 tablet (100 mg) by mouth At Bedtime 31 tablet 98     vitamin D3 (CHOLECALCIFEROL) 2000 units (50 mcg) tablet Take 1 tablet (2,000 Units) by mouth At Bedtime 30 tablet        Case Management:  I have reviewed the facility/SNF care plan/MDS, including the falls risk, nutrition and pain screening. I also reviewed the current immunizations, and preventive care. .Future cancer screening is not clinically indicated secondary to age/goals of care Patient's desire to return to the community is not present. Current Level of Care is appropriate.    Advance Directive Discussion:    I reviewed the current advanced  directives as reflected in EPIC, the POLST and the facility chart, and verified the congruency of orders. I contacted the first party and left a message regarding the plan of Care.  I did not due to cognitive impairment review the advance directives with the resident.     Team Discussion:  I communicated with the appropriate disciplines involved with the Plan of Care:   Nursing    Patient's goal is pain control and comfort.  Information reviewed:  Medications, vital signs, orders, and nursing notes.    ROS:  10 point ROS of systems including Constitutional, Eyes, Respiratory, Cardiovascular, Gastroenterology, Genitourinary, Integumentary, Musculoskeletal, Psychiatric were all negative except for pertinent positives noted in my HPI.    Vitals:  BP (!) 149/76   Pulse 82   Temp 96.8  F (36  C)   Resp 18   Ht 1.524 m (5')   Wt 73.7 kg (162 lb 8 oz)   SpO2 97%   BMI 31.74 kg/m   Body mass index is 31.74 kg/m .  Exam:  GENERAL APPEARANCE:  Alert, in no distress, cooperative  ENT:  Mouth and posterior oropharynx normal, moist mucous membranes, normal hearing acuity  EYES:  EOM, conjunctivae, lids, pupils and irises normal  RESP:  respiratory effort and palpation of chest normal, lungs clear to auscultation , no respiratory distress  CV:  Palpation and auscultation of heart done , regular rate and rhythm, no murmur, rub, or gallop, peripheral edema 1+ in ankles  ABDOMEN:  normal bowel sounds, soft, nontender, no hepatosplenomegaly or other masses  SKIN:  red, yeast rash under breasts. No open areas  NEURO:   Cranial nerves 2-12 are normal tested and grossly at patient's baseline  PSYCH:  insight and judgement impaired, memory impaired , affect and mood normal     Lab/Diagnostic data:   Recent labs in [facility] reviewed by me today.       ASSESSMENT/PLAN  (J44.9) Chronic obstructive pulmonary disease, unspecified COPD type (H)  (primary encounter diagnosis)  Comment: Chronic condition being managed with medications and is  currently asymptomatic.  Plan: Continue current POC with no changes at this time and adjustments as needed.    (K21.9) Gastroesophageal reflux disease without esophagitis  Comment: Patient with daily, significant symptoms. Will start PPI  Plan: Omeprazole 20mg daily. Monitor dyspepsia    (F03.91) Dementia with psychosis (H)  Comment: Chronic, progressive. Requires 24 hour skilled nursing care. Expect further functional and cognitive decline. Expect weight loss.  Plan: Continue 24 hour care. Monitor weight. Monitor functional status. Monitor for behavioral disturbances.    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 Benefits of current treatment outweigh risks. GDR not recommended as this could result in emotional harm.   Plan: Continue current POC with no changes at this time and adjustments as needed.    (I10) Essential hypertension  Comment: BP goals are <150/90 mm Hg.This is higher than ACC and AHA recommendations due to goals of care, risk for hypotension, risk of dizziness and falls and risk of tissue/cerebral hypoperfusion. Patient is stable and continue without pharmacological invention with routine assessment.      Orders written by provider at facility  Omeprazole 20mg daily  Nystatin powder under breasts    Electronically signed by:  ALCIRA [facility]  Phone: [phone]

## 2021-03-24 ENCOUNTER — NURSING HOME VISIT (OUTPATIENT)
Dept: GERIATRICS | Facility: CLINIC | Age: 81
End: 2021-03-24
Payer: COMMERCIAL

## 2021-03-24 VITALS
TEMPERATURE: 97 F | SYSTOLIC BLOOD PRESSURE: 96 MMHG | HEART RATE: 81 BPM | HEIGHT: 60 IN | WEIGHT: 160.4 LBS | BODY MASS INDEX: 31.49 KG/M2 | OXYGEN SATURATION: 96 % | RESPIRATION RATE: 16 BRPM | DIASTOLIC BLOOD PRESSURE: 65 MMHG

## 2021-03-24 DIAGNOSIS — I10 ESSENTIAL HYPERTENSION: ICD-10-CM

## 2021-03-24 DIAGNOSIS — J44.9 CHRONIC OBSTRUCTIVE PULMONARY DISEASE, UNSPECIFIED COPD TYPE (H): Primary | ICD-10-CM

## 2021-03-24 DIAGNOSIS — F03.92 DEMENTIA WITH PSYCHOSIS (H): ICD-10-CM

## 2021-03-24 DIAGNOSIS — K21.9 GASTROESOPHAGEAL REFLUX DISEASE WITHOUT ESOPHAGITIS: ICD-10-CM

## 2021-03-24 DIAGNOSIS — F32.3 SEVERE MAJOR DEPRESSION WITH PSYCHOTIC FEATURES (H): ICD-10-CM

## 2021-03-24 PROCEDURE — 99309 SBSQ NF CARE MODERATE MDM 30: CPT | Performed by: NURSE PRACTITIONER

## 2021-03-24 ASSESSMENT — MIFFLIN-ST. JEOR: SCORE: 1124.07

## 2021-03-24 NOTE — PROGRESS NOTES
"[place] [facility]  Chief Complaint   Patient presents with     senior care Regulatory     [place] Medical Record Number:  [id number]  Place of Service where encounter took place:  [facility] () [[id number]]    HPI:    Chelsey Casillas  is 81 year old (1940), who is being seen today for a federally mandated E/M visit.  HPI information obtained from: facility chart records, facility staff and patient report.     Today's concerns are:  Chronic obstructive pulmonary disease, unspecified COPD type (H)  Patient denies SOB, cough, wheezing. No hypoxia.    Gastroesophageal reflux disease without esophagitis  Omeprazole was started in January due to her report of almost constant dyspepsia. 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 Chelsey says her mood is \"so-so\"    Essential hypertension  No longer on BP meds. 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  PAST SURGICAL HISTORY:   has no past surgical history on file.  FAMILY HISTORY: Family history is unknown by patient.  SOCIAL HISTORY:  reports that she has quit smoking. Her smoking use included cigarettes. She has never used smokeless tobacco. She reports that " she does not drink alcohol or use drugs.    MEDICATIONS:  Current Outpatient Medications   Medication Sig Dispense Refill     Acetaminophen (TYLENOL PO) Take 1,000 mg by mouth 2 times daily        albuterol (PROAIR HFA/PROVENTIL HFA/VENTOLIN HFA) 108 (90 BASE) MCG/ACT Inhaler Inhale 2 puffs into the lungs every 4 hours as needed for shortness of breath / dyspnea or wheezing        nystatin (MYCOSTATIN) 953582 UNIT/GM external powder Apply topically 2 times daily as needed (Itching and moisture under breasts)       risperiDONE (RISPERDAL) 0.5 MG tablet Take 1 tablet (0.5 mg) by mouth At Bedtime 62 tablet 3     sertraline (ZOLOFT) 100 MG tablet Take 1 tablet (100 mg) by mouth At Bedtime 31 tablet 98     vitamin D3 (CHOLECALCIFEROL) 2000 units (50 mcg) tablet Take 1 tablet (2,000 Units) by mouth At Bedtime 30 tablet        Case Management:  I have reviewed the care plan and MDS and do agree with the plan. Patient's desire to return to the community is not present. Information reviewed:  Medications, vital signs, orders, and nursing notes.    ROS:  Limited secondary to cognitive impairment but today pt reports 10 point ROS of systems including Constitutional, Eyes, Respiratory, Cardiovascular, Gastroenterology, Genitourinary, Integumentary, Musculoskeletal, Psychiatric were all negative except for pertinent positives noted in my HPI.    Vitals:  BP 96/65   Pulse 81   Temp 97  F (36.1  C)   Resp 16   Ht 1.524 m (5')   Wt 72.8 kg (160 lb 6.4 oz)   SpO2 96%   BMI 31.33 kg/m    Body mass index is 31.33 kg/m .  Exam:  GENERAL APPEARANCE:  Alert, in no distress, cooperative  EYES:  EOM normal, conjunctiva and lids normal  RESP:  respiratory effort and palpation of chest normal, lungs clear to auscultation , no respiratory distress  CV:  Palpation and auscultation of heart done , regular rate and rhythm, no murmur, rub, or gallop, no edema  ABDOMEN:  normal bowel sounds, soft, nontender, no hepatosplenomegaly or other  masses  SKIN:  Inspection of skin and subcutaneous tissue baseline, Palpation of skin and subcutaneous tissue baseline  PSYCH:  insight and judgement impaired, memory impaired , affect and mood normal      Lab/Diagnostic data:   Recent labs in [facility] reviewed by me today.       ASSESSMENT/PLAN  (J44.9) Chronic obstructive pulmonary disease, unspecified COPD type (H)  (primary encounter diagnosis)  Comment: Chronic condition being managed with medications and is currently asymptomatic.  Plan: Continue current POC with no changes at this time and adjustments as needed.    (K21.9) Gastroesophageal reflux disease without esophagitis  Comment: Improved with PPI. Given severity of symptoms and goals of care being comfort focused, will likely not try to stop medication.  Plan: Continue current POC with no changes at this time and adjustments as needed.    (F03.91) Dementia with psychosis (H)  Comment: Chronic, progressive. Requires 24 hour skilled nursing care. Expect further functional and cognitive decline. Expect weight loss.  Plan: Continue 24 hour care. Monitor weight. Monitor functional status. Monitor for behavioral disturbances.    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 Benefits of current treatment outweigh risks. GDR not recommended as this could result in emotional harm.   Plan: Continue current POC with no changes at this time and adjustments as needed.    (I10) Essential hypertension  Comment: BP goals are <150/90 mm Hg.This is higher than ACC and AHA recommendations due to goals of care, risk for hypotension, risk of dizziness and falls and risk of tissue/cerebral hypoperfusion. Patient is stable and continue without pharmacological invention with routine assessment.        Electronically signed by:  ALCIRA [facility]  Phone: [phone]

## 2021-03-24 NOTE — NURSING NOTE
"[place] [facility]  Chief Complaint   Patient presents with     correction Regulatory     [place] Medical Record Number:  [id number]  Place of Service where encounter took place:  [facility] () [[id number]]    HPI:    Chelsey Casillas  is 81 year old (1940), who is being seen today for a federally mandated E/M visit.  HPI information obtained from: facility chart records, facility staff, patient report and [facility] chart review.     Today's concerns are:  Chronic obstructive pulmonary disease, unspecified COPD type (H)  When asked how her COPD and breathing has been, patient reports \"good\".  No hypoxia, fevers, SOB noted.    Gastroesophageal reflux disease without esophagitis  Started on omeprazole, patient reports her symptoms have improved with this.    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 Chart indicates previous history of patient getting agitated with staff, no recent notes to indicate this is a problem currently. Patient reports mood, \"It's about the same, just so-so\".      Essential hypertension  No longer on HTN meds.  Bps 145/62-96/57 this month.        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  PAST SURGICAL HISTORY:   has no past surgical " history on file.  FAMILY HISTORY: Family history is unknown by patient.  SOCIAL HISTORY:  reports that she has quit smoking. Her smoking use included cigarettes. She has never used smokeless tobacco. She reports that she does not drink alcohol or use drugs.    MEDICATIONS:  Current Outpatient Medications   Medication Sig Dispense Refill     Acetaminophen (TYLENOL PO) Take 1,000 mg by mouth 2 times daily        albuterol (PROAIR HFA/PROVENTIL HFA/VENTOLIN HFA) 108 (90 BASE) MCG/ACT Inhaler Inhale 2 puffs into the lungs every 4 hours as needed for shortness of breath / dyspnea or wheezing        nystatin (MYCOSTATIN) 721013 UNIT/GM external powder Apply topically 2 times daily as needed (Itching and moisture under breasts)       risperiDONE (RISPERDAL) 0.5 MG tablet Take 1 tablet (0.5 mg) by mouth At Bedtime 62 tablet 3     sertraline (ZOLOFT) 100 MG tablet Take 1 tablet (100 mg) by mouth At Bedtime 31 tablet 98     vitamin D3 (CHOLECALCIFEROL) 2000 units (50 mcg) tablet Take 1 tablet (2,000 Units) by mouth At Bedtime 30 tablet        Case Management:  I have reviewed the care plan and MDS and do agree with the plan. Patient's desire to return to the community is not present. Information reviewed:  Medications, vital signs, orders, and nursing notes.    ROS:  10 point ROS of systems including Constitutional, Eyes, Respiratory, Cardiovascular, Gastroenterology, Genitourinary, Integumentary, Musculoskeletal, Psychiatric were all negative except for pertinent positives noted in my HPI.    Vitals:  BP 96/65   Pulse 81   Temp 97  F (36.1  C)   Resp 16   Ht 1.524 m (5')   Wt 72.8 kg (160 lb 6.4 oz)   SpO2 96%   BMI 31.33 kg/m    Body mass index is 31.33 kg/m .  Exam:  GENERAL APPEARANCE:  Alert, in no distress, cooperative  EYES:  EOM, conjunctivae, lids, pupils and irises normal, EOM normal, conjunctiva and lids normal  RESP:  respiratory effort and palpation of chest normal, lungs clear to auscultation , no  respiratory distress  CV:  Palpation and auscultation of heart done , regular rate and rhythm, no murmur, rub, or gallop, no edema  ABDOMEN:  normal bowel sounds, soft, nontender, no hepatosplenomegaly or other masses  SKIN:  Inspection of skin and subcutaneous tissue baseline, continues to get nystatin under bilateral breasts  PSYCH:  insight and judgement impaired, memory impaired , affect and mood normal    Lab/Diagnostic data:   no new labs to review    ASSESSMENT/PLAN  (J44.9) Chronic obstructive pulmonary disease, unspecified COPD type (H)  (primary encounter diagnosis)  Comment: patient denies any symptoms.  No use of prn albuterol noted.  Plan: Continue current POC with no changes at this time and adjustments as needed.      (K21.9) Gastroesophageal reflux disease without esophagitis  Comment: PPI seems to be improving symptoms.  Will keep on PPI for now.  Plan: Continue current POC with no changes at this time and adjustments as needed.    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  Previous attempt at GDR caused increased hallucinations.  PHQ9 score has improved.  Weight is stable, behaviors appear stable.  Plan: Continue current POC with no changes at this time and adjustments as needed.    (I10) Essential hypertension  Comment: BP goals are <150/90 mm Hg.This is higher than ACC and AHA recommendations due to goals of care, risk for hypotension, risk of dizziness and falls and risk of tissue/cerebral hypoperfusion.  Blood pressures less than goal without medications.  Will continue to monitor.      Electronically signed by:  Ivette Pham RN, BSN, AGNP student

## 2021-03-24 NOTE — LETTER
"    3/24/2021        RE: Chelsey Casillas  [facility]  [address] [address]        [place] [facility]  Chief Complaint   Patient presents with     skilled nursing Regulatory     [place] Medical Record Number:  [id number]  Place of Service where encounter took place:  [facility] [place] () [[id number]]    HPI:    Chelsey Casillas  is 81 year old (1940), who is being seen today for a federally mandated E/M visit.  HPI information obtained from: facility chart records, facility staff and patient report.     Today's concerns are:  Chronic obstructive pulmonary disease, unspecified COPD type (H)  Patient denies SOB, cough, wheezing. No hypoxia.    Gastroesophageal reflux disease without esophagitis  Omeprazole was started in January due to her report of almost constant dyspepsia. 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 Chelsey says her mood is \"so-so\"    Essential hypertension  No longer on BP meds. 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  PAST SURGICAL HISTORY:   has no past surgical history on file.  FAMILY HISTORY: Family history is unknown by patient.  SOCIAL HISTORY:  reports that " she has quit smoking. Her smoking use included cigarettes. She has never used smokeless tobacco. She reports that she does not drink alcohol or use drugs.    MEDICATIONS:  Current Outpatient Medications   Medication Sig Dispense Refill     Acetaminophen (TYLENOL PO) Take 1,000 mg by mouth 2 times daily        albuterol (PROAIR HFA/PROVENTIL HFA/VENTOLIN HFA) 108 (90 BASE) MCG/ACT Inhaler Inhale 2 puffs into the lungs every 4 hours as needed for shortness of breath / dyspnea or wheezing        nystatin (MYCOSTATIN) 258214 UNIT/GM external powder Apply topically 2 times daily as needed (Itching and moisture under breasts)       risperiDONE (RISPERDAL) 0.5 MG tablet Take 1 tablet (0.5 mg) by mouth At Bedtime 62 tablet 3     sertraline (ZOLOFT) 100 MG tablet Take 1 tablet (100 mg) by mouth At Bedtime 31 tablet 98     vitamin D3 (CHOLECALCIFEROL) 2000 units (50 mcg) tablet Take 1 tablet (2,000 Units) by mouth At Bedtime 30 tablet        Case Management:  I have reviewed the care plan and MDS and do agree with the plan. Patient's desire to return to the community is not present. Information reviewed:  Medications, vital signs, orders, and nursing notes.    ROS:  Limited secondary to cognitive impairment but today pt reports 10 point ROS of systems including Constitutional, Eyes, Respiratory, Cardiovascular, Gastroenterology, Genitourinary, Integumentary, Musculoskeletal, Psychiatric were all negative except for pertinent positives noted in my HPI.    Vitals:  BP 96/65   Pulse 81   Temp 97  F (36.1  C)   Resp 16   Ht 1.524 m (5')   Wt 72.8 kg (160 lb 6.4 oz)   SpO2 96%   BMI 31.33 kg/m    Body mass index is 31.33 kg/m .  Exam:  GENERAL APPEARANCE:  Alert, in no distress, cooperative  EYES:  EOM normal, conjunctiva and lids normal  RESP:  respiratory effort and palpation of chest normal, lungs clear to auscultation , no respiratory distress  CV:  Palpation and auscultation of heart done , regular rate and rhythm, no  murmur, rub, or gallop, no edema  ABDOMEN:  normal bowel sounds, soft, nontender, no hepatosplenomegaly or other masses  SKIN:  Inspection of skin and subcutaneous tissue baseline, Palpation of skin and subcutaneous tissue baseline  PSYCH:  insight and judgement impaired, memory impaired , affect and mood normal      Lab/Diagnostic data:   Recent labs in [facility] reviewed by me today.       ASSESSMENT/PLAN  (J44.9) Chronic obstructive pulmonary disease, unspecified COPD type (H)  (primary encounter diagnosis)  Comment: Chronic condition being managed with medications and is currently asymptomatic.  Plan: Continue current POC with no changes at this time and adjustments as needed.    (K21.9) Gastroesophageal reflux disease without esophagitis  Comment: Improved with PPI. Given severity of symptoms and goals of care being comfort focused, will likely not try to stop medication.  Plan: Continue current POC with no changes at this time and adjustments as needed.    (F03.91) Dementia with psychosis (H)  Comment: Chronic, progressive. Requires 24 hour skilled nursing care. Expect further functional and cognitive decline. Expect weight loss.  Plan: Continue 24 hour care. Monitor weight. Monitor functional status. Monitor for behavioral disturbances.    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 Benefits of current treatment outweigh risks. GDR not recommended as this could result in emotional harm.   Plan: Continue current POC with no changes at this time and adjustments as needed.    (I10) Essential hypertension  Comment: BP goals are <150/90 mm Hg.This is higher than ACC and AHA recommendations due to goals of care, risk for hypotension, risk of dizziness and falls and risk of tissue/cerebral hypoperfusion. Patient is stable and continue without pharmacological invention with routine  assessment.        Electronically signed by:  ALCIRA Sweet [facility]  Phone: [phone]

## 2021-04-06 ENCOUNTER — NURSING HOME VISIT (OUTPATIENT)
Dept: GERIATRICS | Facility: CLINIC | Age: 81
End: 2021-04-06
Payer: COMMERCIAL

## 2021-04-06 VITALS
HEART RATE: 69 BPM | BODY MASS INDEX: 31.49 KG/M2 | RESPIRATION RATE: 18 BRPM | OXYGEN SATURATION: 94 % | HEIGHT: 60 IN | WEIGHT: 160.4 LBS | DIASTOLIC BLOOD PRESSURE: 83 MMHG | TEMPERATURE: 97.4 F | SYSTOLIC BLOOD PRESSURE: 132 MMHG

## 2021-04-06 DIAGNOSIS — J44.9 CHRONIC OBSTRUCTIVE PULMONARY DISEASE, UNSPECIFIED COPD TYPE (H): ICD-10-CM

## 2021-04-06 DIAGNOSIS — K21.9 GASTROESOPHAGEAL REFLUX DISEASE WITHOUT ESOPHAGITIS: Primary | ICD-10-CM

## 2021-04-06 DIAGNOSIS — F03.92 DEMENTIA WITH PSYCHOSIS (H): ICD-10-CM

## 2021-04-06 PROCEDURE — 99309 SBSQ NF CARE MODERATE MDM 30: CPT | Performed by: INTERNAL MEDICINE

## 2021-04-06 ASSESSMENT — MIFFLIN-ST. JEOR: SCORE: 1124.07

## 2021-04-06 NOTE — PROGRESS NOTES
"[place] [facility]  PHYSICIAN NOTE    Chief Complaint   Patient presents with     shelter Regulatory       HPI:    Chelsey Casillas is a 81 year old  (1940), who is being seen today for a federally mandated E/M visit at [facility] . Admitted to LTC in Sept 2018 from [facility]. 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 H/o COPD without O2. She has occasionally had behaviors and falls but no current concerns in this regard. BPs have trended down over time and her anti-hypertensives have been able to be discontinued. Weight has been variable on recorded vital signs but now back to baseline and stable in the 160s. She did have COVID-19 infection in May but never was symptomatic.     Chelsey is seen sitting up in her wheelchair watching TV and working on lunch which she doesn't like today. Says it \"doesn't taste good\". Is open to a snack and appreciated Ambrose eHnson that I gave her from snack cart since only ate about 25% of lunch. Denies abdominal pain or pain elsewhere. Had Omeprazole added in January for GERD and she says this has resolved. In talking with nursing staff, no concerns. She doesn't have a current rash but Chelsey still periodically likes the Nystatin powder placed on neck folds.    ALLERGIES: Amlodipine    Past Medical History:   Diagnosis Date     Arthritis      Chronic low back pain 3/17/2015     COPD (chronic obstructive pulmonary disease) (H)      Dementia with psychosis (H) 11/1/2016     Fall 2/27/2013     Falling episodes 9/24/2016     Functional urinary incontinence 3/7/2013     GERD (gastroesophageal reflux disease) 4/3/2018     HTN (hypertension) 4/3/2018     Knee pain 3/7/2013     Major depression, single episode 4/16/2015     Pain in both knees 1/30/2014     Physical deconditioning 3/7/2013     Severe major " depression with psychotic features (H) 7/12/2016     Skin tear 3/7/2013     Weakness 6/18/2015      CODE STATUS: DNR/I    MEDICATIONS: Reviewed and updated in [facility] according to facility MAR  Current Outpatient Medications   Medication Sig Dispense Refill     Acetaminophen (TYLENOL PO) Take 1,000 mg by mouth 2 times daily        albuterol (PROAIR HFA/PROVENTIL HFA/VENTOLIN HFA) 108 (90 BASE) MCG/ACT Inhaler Inhale 2 puffs into the lungs every 4 hours as needed for shortness of breath / dyspnea or wheezing        nystatin (MYCOSTATIN) 881526 UNIT/GM external powder Apply topically 2 times daily as needed (Itching and moisture under breasts)       omeprazole (PRILOSEC) 20 MG DR capsule Take 20 mg by mouth daily       risperiDONE (RISPERDAL) 0.5 MG tablet Take 1 tablet (0.5 mg) by mouth At Bedtime 62 tablet 3     sertraline (ZOLOFT) 100 MG tablet Take 1 tablet (100 mg) by mouth At Bedtime 31 tablet 98     vitamin D3 (CHOLECALCIFEROL) 2000 units (50 mcg) tablet Take 1 tablet (2,000 Units) by mouth At Bedtime 30 tablet        ROS:  Limited secondary to cognitive impairment but today pt reports as above in HPI    Exam:  /83   Pulse 69   Temp 97.4  F (36.3  C)   Resp 18   Ht 1.524 m (5')   Wt 72.8 kg (160 lb 6.4 oz)   SpO2 94%   BMI 31.33 kg/m    Alert, pleasant, NAD, casually dressed, sitting up in wheelchair watching TV and eating lunch  Only ate about 25% lunch and wanted a treat instead  Breathing non-labored, no cough  No edema  Mood euthymic, happy to have cat decor in her room  No rash under breasts today; +SKs on chest/abdomen    Lab/Diagnostic Data:    No new recent labs    ASSESSMENT/PLAN:  Gastroesophageal reflux disease without esophagitis  Omeprazole started in January has resolved GERD and will keep this in place for comfort  Weight stable though she is a picky eater; denies abdominal pain    Chronic obstructive pulmonary disease, unspecified COPD type (H)  Respiratory status stable on RA; no PRN  Albuterol use needed thus far this month    Dementia with psychosis (H)  Benefiting from support of LTC team  Has recently been stable on Sertraline and current 0.5 mg dose of Risperdal in evening    Electronically signed by:  Rina Gardner DO

## 2021-04-06 NOTE — LETTER
"    4/6/2021        RE: Chelsey Casillas  [facility]  [address] [address]        [place] [facility]  PHYSICIAN NOTE    Chief Complaint   Patient presents with     FPC Regulatory       HPI:    Chelsey Casillas is a 81 year old  (1940), who is being seen today for a federally mandated E/M visit at [facility] . Admitted to LTC in Sept 2018 from [facility] on [facility]. 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 H/o COPD without O2. She has occasionally had behaviors and falls but no current concerns in this regard. BPs have trended down over time and her anti-hypertensives have been able to be discontinued. Weight has been variable on recorded vital signs but now back to baseline and stable in the 160s. She did have COVID-19 infection in May but never was symptomatic.     Chelsey is seen sitting up in her wheelchair watching TV and working on lunch which she doesn't like today. Says it \"doesn't taste good\". Is open to a snack and appreciated Ambrose Natividad that I gave her from snack cart since only ate about 25% of lunch. Denies abdominal pain or pain elsewhere. Had Omeprazole added in January for GERD and she says this has resolved. In talking with nursing staff, no concerns. She doesn't have a current rash but Chelsey still periodically likes the Nystatin powder placed on neck folds.    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) 7/12/2016     Skin tear 3/7/2013     Weakness 6/18/2015      CODE STATUS: DNR/I    MEDICATIONS: Reviewed and updated in [facility] according to facility MAR  Current Outpatient Medications   Medication Sig Dispense Refill     Acetaminophen (TYLENOL PO) Take 1,000 mg by mouth 2 times daily        albuterol (PROAIR HFA/PROVENTIL HFA/VENTOLIN HFA) 108 (90 BASE) MCG/ACT Inhaler Inhale 2 puffs into the lungs every 4 hours as needed for shortness of breath / dyspnea or wheezing        nystatin (MYCOSTATIN) 830391 UNIT/GM external powder Apply topically 2 times daily as needed (Itching and moisture under breasts)       omeprazole (PRILOSEC) 20 MG DR capsule Take 20 mg by mouth daily       risperiDONE (RISPERDAL) 0.5 MG tablet Take 1 tablet (0.5 mg) by mouth At Bedtime 62 tablet 3     sertraline (ZOLOFT) 100 MG tablet Take 1 tablet (100 mg) by mouth At Bedtime 31 tablet 98     vitamin D3 (CHOLECALCIFEROL) 2000 units (50 mcg) tablet Take 1 tablet (2,000 Units) by mouth At Bedtime 30 tablet        ROS:  Limited secondary to cognitive impairment but today pt reports as above in HPI    Exam:  /83   Pulse 69   Temp 97.4  F (36.3  C)   Resp 18   Ht 1.524 m (5')   Wt 72.8 kg (160 lb 6.4 oz)   SpO2 94%   BMI 31.33 kg/m    Alert, pleasant, NAD, casually dressed, sitting up in wheelchair watching TV and eating lunch  Only ate about 25% lunch and wanted a treat instead  Breathing non-labored, no cough  No edema  Mood euthymic, happy to have cat decor in her room  No rash under breasts today; +SKs on chest/abdomen    Lab/Diagnostic Data:    No new recent labs    ASSESSMENT/PLAN:  Gastroesophageal reflux disease without esophagitis  Omeprazole started in January has resolved GERD and will keep this in place for comfort  Weight stable though she is a picky eater; denies abdominal  pain    Chronic obstructive pulmonary disease, unspecified COPD type (H)  Respiratory status stable on RA; no PRN Albuterol use needed thus far this month    Dementia with psychosis (H)  Benefiting from support of LTC team  Has recently been stable on Sertraline and current 0.5 mg dose of Risperdal in evening    Electronically signed by:  Rina Gardner DO          Sincerely,        Rina Gardner DO

## 2021-05-14 ENCOUNTER — NURSING HOME VISIT (OUTPATIENT)
Dept: GERIATRICS | Facility: CLINIC | Age: 81
End: 2021-05-14
Payer: COMMERCIAL

## 2021-05-14 VITALS
OXYGEN SATURATION: 96 % | RESPIRATION RATE: 18 BRPM | HEIGHT: 60 IN | BODY MASS INDEX: 33.28 KG/M2 | SYSTOLIC BLOOD PRESSURE: 110 MMHG | DIASTOLIC BLOOD PRESSURE: 71 MMHG | WEIGHT: 169.5 LBS | TEMPERATURE: 97.5 F | HEART RATE: 80 BPM

## 2021-05-14 DIAGNOSIS — K21.9 GASTROESOPHAGEAL REFLUX DISEASE WITHOUT ESOPHAGITIS: Primary | ICD-10-CM

## 2021-05-14 DIAGNOSIS — F32.3 SEVERE MAJOR DEPRESSION WITH PSYCHOTIC FEATURES (H): ICD-10-CM

## 2021-05-14 DIAGNOSIS — F03.92 DEMENTIA WITH PSYCHOSIS (H): ICD-10-CM

## 2021-05-14 DIAGNOSIS — I10 ESSENTIAL HYPERTENSION: ICD-10-CM

## 2021-05-14 PROCEDURE — 99309 SBSQ NF CARE MODERATE MDM 30: CPT | Performed by: NURSE PRACTITIONER

## 2021-05-14 ASSESSMENT — MIFFLIN-ST. JEOR: SCORE: 1165.35

## 2021-05-14 NOTE — PROGRESS NOTES
[place] [facility]  [place] Medical Record Number:  [id number]  Place of Service where encounter took place:  [facility] () [[id number]]  Chief Complaint   Patient presents with     RECHECK       HPI:    Chelsey Casillas  is a 81 year old (1940), who is being seen today for an episodic care visit.  HPI information obtained from: facility chart records, facility staff and patient report.     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 Omeprazole added in January due to daily c/o dyspepsia. She denies any dyspepsia today. Staff report that she is eating well. They have not noted any edema or SOB. No behavioral concerns per staff.   Wt Readings from Last 4 Encounters:   05/14/21 76.9 kg (169 lb 8 oz)   04/06/21 72.8 kg (160 lb 6.4 oz)   03/24/21 72.8 kg (160 lb 6.4 oz)   01/06/21 73.7 kg (162 lb 8 oz)       Essential hypertension  No longer on antihypertensive medications  -130s/70-80s  HR 70-80s      Past Medical and Surgical History reviewed in Epic today.    MEDICATIONS:    Current Outpatient Medications   Medication Sig Dispense Refill     Acetaminophen (TYLENOL PO) Take 1,000 mg by mouth 2 times daily        albuterol (PROAIR HFA/PROVENTIL HFA/VENTOLIN HFA) 108 (90 BASE) MCG/ACT Inhaler Inhale 2 puffs into the lungs every 4 hours as needed for shortness of breath / dyspnea or wheezing        nystatin (MYCOSTATIN) 899590 UNIT/GM external powder Apply topically 2 times daily as needed (Itching and moisture under breasts)       omeprazole (PRILOSEC) 20 MG DR capsule Take 20 mg by mouth daily       risperiDONE (RISPERDAL) 0.5 MG tablet Take 1 tablet (0.5 mg) by mouth At Bedtime 62 tablet 3     sertraline (ZOLOFT) 100 MG tablet Take 1 tablet (100 mg) by mouth At Bedtime 31 tablet 98     vitamin D3 (CHOLECALCIFEROL) 2000 units (50 mcg) tablet Take  1 tablet (2,000 Units) by mouth At Bedtime 30 tablet        REVIEW OF SYSTEMS:  Limited secondary to cognitive impairment but today pt reports 10 point ROS of systems including Constitutional, Eyes, Respiratory, Cardiovascular, Gastroenterology, Genitourinary, Integumentary, Musculoskeletal, Psychiatric were all negative except for pertinent positives noted in my HPI.    Objective:  /71   Pulse 80   Temp 97.5  F (36.4  C)   Resp 18   Ht 1.524 m (5')   Wt 76.9 kg (169 lb 8 oz)   SpO2 96%   BMI 33.10 kg/m    Exam:  GENERAL APPEARANCE:  Alert, in no distress, cooperative  EYES:  EOM normal, conjunctiva and lids normal  RESP:  respiratory effort and palpation of chest normal, lungs clear to auscultation , no respiratory distress  CV:  Palpation and auscultation of heart done , regular rate and rhythm, no murmur, rub, or gallop, no edema  ABDOMEN:  normal bowel sounds, soft, nontender, no hepatosplenomegaly or other masses  SKIN:  Inspection of skin and subcutaneous tissue baseline, Palpation of skin and subcutaneous tissue baseline  PSYCH:  insight and judgement impaired, memory impaired , affect and mood normal      Labs:   Recent labs in [facility] reviewed by me today.     ASSESSMENT/PLAN:  (K21.9) Gastroesophageal reflux disease without esophagitis  (primary encounter diagnosis)  Comment: Well controlled with PPI. Due to comfort care goals, will not attempt to wean this  Plan: Continue current POC with no changes at this time and adjustments as needed.    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 Patient appears to be thriving currently with no significant mood concerns. No reports of hallucinations. Again, due to comfort care, would not try to taper any medications.   Plan: Continue 24 hour care. Monitor weight. Monitor functional status. Monitor for behavioral disturbances.    (I10) Essential  hypertension  Comment: Remains well controlled off medication. BP goals are <150/90 mm Hg.This is higher than ACC and AHA recommendations due to goals of care, risk for hypotension, risk of dizziness and falls and risk of tissue/cerebral hypoperfusion. Patient is stable and continue without pharmacological invention with routine assessment.      Electronically signed by:  ALCIRA Sweet CNP   [facility]  Phone: [phone]

## 2021-06-07 ENCOUNTER — NURSING HOME VISIT (OUTPATIENT)
Dept: GERIATRICS | Facility: CLINIC | Age: 81
End: 2021-06-07
Payer: COMMERCIAL

## 2021-06-07 VITALS
SYSTOLIC BLOOD PRESSURE: 127 MMHG | BODY MASS INDEX: 33.41 KG/M2 | WEIGHT: 170.2 LBS | HEIGHT: 60 IN | RESPIRATION RATE: 16 BRPM | DIASTOLIC BLOOD PRESSURE: 78 MMHG | OXYGEN SATURATION: 96 % | TEMPERATURE: 98 F | HEART RATE: 82 BPM

## 2021-06-07 DIAGNOSIS — K21.9 GASTROESOPHAGEAL REFLUX DISEASE WITHOUT ESOPHAGITIS: Primary | ICD-10-CM

## 2021-06-07 DIAGNOSIS — I10 ESSENTIAL HYPERTENSION: ICD-10-CM

## 2021-06-07 DIAGNOSIS — F03.92 DEMENTIA WITH PSYCHOSIS (H): ICD-10-CM

## 2021-06-07 DIAGNOSIS — J44.9 CHRONIC OBSTRUCTIVE PULMONARY DISEASE, UNSPECIFIED COPD TYPE (H): ICD-10-CM

## 2021-06-07 DIAGNOSIS — F32.3 SEVERE MAJOR DEPRESSION WITH PSYCHOTIC FEATURES (H): ICD-10-CM

## 2021-06-07 PROCEDURE — 99309 SBSQ NF CARE MODERATE MDM 30: CPT | Performed by: NURSE PRACTITIONER

## 2021-06-07 ASSESSMENT — MIFFLIN-ST. JEOR: SCORE: 1168.52

## 2021-06-07 NOTE — PROGRESS NOTES
[facility]  Chief Complaint   Patient presents with     residential Regulatory     [facility] Record Number:  [id number]  Place of Service where encounter took place:  [facility] () [[id number]]    HPI:    Chelsey Casillas  is 81 year old (1940), who is being seen today for a federally mandated E/M visit.  HPI information obtained from: facility chart records, facility staff and patient report.     Today's concerns are:  Gastroesophageal reflux disease without esophagitis  Patient was having daily c/o dyspepsia before PPI was started. 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 On lower dose of risperidone she had significant delusions/paranoia. Most recent PHQ9 = 5, BIMS = 9. Staff report that she has not had any behavioral issues.   Pharmacy recommendation received for routine labs. Patient says no, she does not want any blood drawn.    Essential hypertension  -130s/70-80s    Chronic obstructive pulmonary disease, unspecified COPD type (H)  No SOB, hypoxia, cough      ALLERGIES:Amlodipine  PAST MEDICAL HISTORY:   has a past medical history of Arthritis, Chronic low back pain (3/17/2015), COPD (chronic obstructive pulmonary disease) (H), Dementia with psychosis (H) (11/1/2016), Fall (2/27/2013), Falling episodes (9/24/2016), Functional urinary incontinence (3/7/2013), GERD (gastroesophageal reflux disease) (4/3/2018), HTN (hypertension) (4/3/2018), Knee pain (3/7/2013), Major depression, single episode (4/16/2015), Pain in both knees (1/30/2014), Physical deconditioning (3/7/2013), 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  PAST SURGICAL HISTORY:   has no past surgical history on file.  FAMILY HISTORY: Family history is unknown by patient.  SOCIAL HISTORY:  reports that she has  quit smoking. Her smoking use included cigarettes. She has never used smokeless tobacco. She reports that she does not drink alcohol or use drugs.    MEDICATIONS:  Current Outpatient Medications   Medication Sig Dispense Refill     Acetaminophen (TYLENOL PO) Take 1,000 mg by mouth 2 times daily        albuterol (PROAIR HFA/PROVENTIL HFA/VENTOLIN HFA) 108 (90 BASE) MCG/ACT Inhaler Inhale 2 puffs into the lungs every 4 hours as needed for shortness of breath / dyspnea or wheezing        nystatin (MYCOSTATIN) 926060 UNIT/GM external powder Apply topically 2 times daily as needed (Itching and moisture under breasts)       omeprazole (PRILOSEC) 20 MG DR capsule Take 20 mg by mouth daily       risperiDONE (RISPERDAL) 0.5 MG tablet Take 1 tablet (0.5 mg) by mouth At Bedtime 62 tablet 3     sertraline (ZOLOFT) 100 MG tablet Take 1 tablet (100 mg) by mouth At Bedtime 31 tablet 98     vitamin D3 (CHOLECALCIFEROL) 2000 units (50 mcg) tablet Take 1 tablet (2,000 Units) by mouth At Bedtime 30 tablet        Case Management:  I have reviewed the care plan and MDS and do agree with the plan. Patient's desire to return to the community is not present. Information reviewed:  Medications, vital signs, orders, and nursing notes.    ROS:  Limited secondary to cognitive impairment but today pt reports 10 point ROS of systems including Constitutional, Eyes, Respiratory, Cardiovascular, Gastroenterology, Genitourinary, Integumentary, Musculoskeletal, Psychiatric were all negative except for pertinent positives noted in my HPI.    Vitals:  /78   Pulse 82   Temp 98  F (36.7  C)   Resp 16   Ht 1.524 m (5')   Wt 77.2 kg (170 lb 3.2 oz)   SpO2 96%   BMI 33.24 kg/m    Body mass index is 33.24 kg/m .  Exam:  GENERAL APPEARANCE:  Alert, in no distress, cooperative  EYES:  EOM normal, conjunctiva and lids normal  RESP:  respiratory effort and palpation of chest normal, lungs clear to auscultation , no respiratory distress  CV:   Palpation and auscultation of heart done , regular rate and rhythm, no murmur, rub, or gallop, no edema  ABDOMEN:  normal bowel sounds, soft, nontender, no hepatosplenomegaly or other masses  SKIN:  Inspection of skin and subcutaneous tissue baseline, Palpation of skin and subcutaneous tissue baseline  PSYCH:  insight and judgement impaired, memory impaired , affect and mood flat      Lab/Diagnostic data:   None due to refusal/comfort care    ASSESSMENT/PLAN  (K21.9) Gastroesophageal reflux disease without esophagitis  (primary encounter diagnosis)  Comment: Well controlled with PPI. Due to history and comfort care goals, would not recommend stopping this at any point  Plan: Continue current POC with no changes at this time and adjustments as needed.    (F03.91) Dementia with psychosis (H)  Comment: Chronic, progressive. Requires 24 hour skilled nursing care. Expect further functional and cognitive decline. Expect weight loss.  Plan: Continue 24 hour care. Monitor weight. Monitor functional status. Monitor for behavioral disturbances.    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    (I10) Essential hypertension  Comment: Chronic, controlled. To avoid risk of hypotension, falls, dizziness and tissue hypoperfusion, recommend  BP goal is < 150/90mmHg.  Plan: Continue current POC with no changes at this time and adjustments as needed.    (J44.9) Chronic obstructive pulmonary disease, unspecified COPD type (H)  Comment: Chronic condition being managed with medications and is currently asymptomatic.  Plan: Continue current POC with no changes at this time and adjustments as needed.      Electronically signed by:  ALCIRA Sweet CNP

## 2021-06-07 NOTE — LETTER
6/7/2021        RE: Chelsey Casillas  [facility]  [address] [address]        [facility]  Chief Complaint   Patient presents with     care home Regulatory     [facility] Record Number:  [id number]  Place of Service where encounter took place:  [facility] () [[id number]]    HPI:    Chelsey Casillas  is 81 year old (1940), who is being seen today for a federally mandated E/M visit.  HPI information obtained from: facility chart records, facility staff and patient report.     Today's concerns are:  Gastroesophageal reflux disease without esophagitis  Patient was having daily c/o dyspepsia before PPI was started. 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 On lower dose of risperidone she had significant delusions/paranoia. Most recent PHQ9 = 5, BIMS = 9. Staff report that she has not had any behavioral issues.   Pharmacy recommendation received for routine labs. Patient says no, she does not want any blood drawn.    Essential hypertension  -130s/70-80s    Chronic obstructive pulmonary disease, unspecified COPD type (H)  No SOB, hypoxia, cough      ALLERGIES:Amlodipine  PAST MEDICAL HISTORY:   has a past medical history of Arthritis, Chronic low back pain (3/17/2015), COPD (chronic obstructive pulmonary disease) (H), Dementia with psychosis (H) (11/1/2016), Fall (2/27/2013), Falling episodes (9/24/2016), Functional urinary incontinence (3/7/2013), GERD (gastroesophageal reflux disease) (4/3/2018), HTN (hypertension) (4/3/2018), Knee pain (3/7/2013), Major depression, single episode (4/16/2015), Pain in both knees (1/30/2014), Physical deconditioning (3/7/2013), 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  PAST SURGICAL HISTORY:   has no past surgical  history on file.  FAMILY HISTORY: Family history is unknown by patient.  SOCIAL HISTORY:  reports that she has quit smoking. Her smoking use included cigarettes. She has never used smokeless tobacco. She reports that she does not drink alcohol or use drugs.    MEDICATIONS:  Current Outpatient Medications   Medication Sig Dispense Refill     Acetaminophen (TYLENOL PO) Take 1,000 mg by mouth 2 times daily        albuterol (PROAIR HFA/PROVENTIL HFA/VENTOLIN HFA) 108 (90 BASE) MCG/ACT Inhaler Inhale 2 puffs into the lungs every 4 hours as needed for shortness of breath / dyspnea or wheezing        nystatin (MYCOSTATIN) 694539 UNIT/GM external powder Apply topically 2 times daily as needed (Itching and moisture under breasts)       omeprazole (PRILOSEC) 20 MG DR capsule Take 20 mg by mouth daily       risperiDONE (RISPERDAL) 0.5 MG tablet Take 1 tablet (0.5 mg) by mouth At Bedtime 62 tablet 3     sertraline (ZOLOFT) 100 MG tablet Take 1 tablet (100 mg) by mouth At Bedtime 31 tablet 98     vitamin D3 (CHOLECALCIFEROL) 2000 units (50 mcg) tablet Take 1 tablet (2,000 Units) by mouth At Bedtime 30 tablet        Case Management:  I have reviewed the care plan and MDS and do agree with the plan. Patient's desire to return to the community is not present. Information reviewed:  Medications, vital signs, orders, and nursing notes.    ROS:  Limited secondary to cognitive impairment but today pt reports 10 point ROS of systems including Constitutional, Eyes, Respiratory, Cardiovascular, Gastroenterology, Genitourinary, Integumentary, Musculoskeletal, Psychiatric were all negative except for pertinent positives noted in my HPI.    Vitals:  /78   Pulse 82   Temp 98  F (36.7  C)   Resp 16   Ht 1.524 m (5')   Wt 77.2 kg (170 lb 3.2 oz)   SpO2 96%   BMI 33.24 kg/m    Body mass index is 33.24 kg/m .  Exam:  GENERAL APPEARANCE:  Alert, in no distress, cooperative  EYES:  EOM normal, conjunctiva and lids normal  RESP:   respiratory effort and palpation of chest normal, lungs clear to auscultation , no respiratory distress  CV:  Palpation and auscultation of heart done , regular rate and rhythm, no murmur, rub, or gallop, no edema  ABDOMEN:  normal bowel sounds, soft, nontender, no hepatosplenomegaly or other masses  SKIN:  Inspection of skin and subcutaneous tissue baseline, Palpation of skin and subcutaneous tissue baseline  PSYCH:  insight and judgement impaired, memory impaired , affect and mood flat      Lab/Diagnostic data:   None due to refusal/comfort care    ASSESSMENT/PLAN  (K21.9) Gastroesophageal reflux disease without esophagitis  (primary encounter diagnosis)  Comment: Well controlled with PPI. Due to history and comfort care goals, would not recommend stopping this at any point  Plan: Continue current POC with no changes at this time and adjustments as needed.    (F03.91) Dementia with psychosis (H)  Comment: Chronic, progressive. Requires 24 hour skilled nursing care. Expect further functional and cognitive decline. Expect weight loss.  Plan: Continue 24 hour care. Monitor weight. Monitor functional status. Monitor for behavioral disturbances.    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    (I10) Essential hypertension  Comment: Chronic, controlled. To avoid risk of hypotension, falls, dizziness and tissue hypoperfusion, recommend  BP goal is < 150/90mmHg.  Plan: Continue current POC with no changes at this time and adjustments as needed.    (J44.9) Chronic obstructive pulmonary disease, unspecified COPD type (H)  Comment: Chronic condition being managed with medications and is currently asymptomatic.  Plan: Continue current POC with no changes at this time and adjustments as needed.      Electronically signed by:  ALCIRA Sweet CNP

## 2021-07-13 ENCOUNTER — LAB REQUISITION (OUTPATIENT)
Dept: LAB | Facility: CLINIC | Age: 81
End: 2021-07-13
Payer: COMMERCIAL

## 2021-07-13 DIAGNOSIS — Z29.9 ENCOUNTER FOR PROPHYLACTIC MEASURES, UNSPECIFIED: ICD-10-CM

## 2021-07-14 PROCEDURE — U0005 INFEC AGEN DETEC AMPLI PROBE: HCPCS | Mod: ORL | Performed by: NURSE PRACTITIONER

## 2021-07-15 LAB — SARS-COV-2 RNA RESP QL NAA+PROBE: NEGATIVE

## 2021-07-29 ENCOUNTER — LAB REQUISITION (OUTPATIENT)
Dept: LAB | Facility: CLINIC | Age: 81
End: 2021-07-29
Payer: COMMERCIAL

## 2021-07-29 DIAGNOSIS — Z29.9 ENCOUNTER FOR PROPHYLACTIC MEASURES, UNSPECIFIED: ICD-10-CM

## 2021-07-30 ENCOUNTER — LAB REQUISITION (OUTPATIENT)
Dept: LAB | Facility: CLINIC | Age: 81
End: 2021-07-30
Payer: COMMERCIAL

## 2021-07-30 DIAGNOSIS — Z29.9 ENCOUNTER FOR PROPHYLACTIC MEASURES, UNSPECIFIED: ICD-10-CM

## 2021-07-30 PROCEDURE — U0005 INFEC AGEN DETEC AMPLI PROBE: HCPCS | Mod: ORL | Performed by: NURSE PRACTITIONER

## 2021-07-31 LAB — SARS-COV-2 RNA RESP QL NAA+PROBE: NEGATIVE

## 2021-08-02 ENCOUNTER — LAB REQUISITION (OUTPATIENT)
Dept: LAB | Facility: CLINIC | Age: 81
End: 2021-08-02

## 2021-08-02 DIAGNOSIS — Z29.9 ENCOUNTER FOR PROPHYLACTIC MEASURES, UNSPECIFIED: ICD-10-CM

## 2021-08-02 PROCEDURE — U0005 INFEC AGEN DETEC AMPLI PROBE: HCPCS | Mod: ORL | Performed by: NURSE PRACTITIONER

## 2021-08-03 LAB — SARS-COV-2 RNA RESP QL NAA+PROBE: NEGATIVE

## 2021-08-04 ENCOUNTER — LAB REQUISITION (OUTPATIENT)
Dept: LAB | Facility: CLINIC | Age: 81
End: 2021-08-04

## 2021-08-04 DIAGNOSIS — Z29.9 ENCOUNTER FOR PROPHYLACTIC MEASURES, UNSPECIFIED: ICD-10-CM

## 2021-08-05 PROCEDURE — U0005 INFEC AGEN DETEC AMPLI PROBE: HCPCS | Mod: ORL | Performed by: NURSE PRACTITIONER

## 2021-08-06 LAB — SARS-COV-2 RNA RESP QL NAA+PROBE: NEGATIVE

## 2021-08-09 ENCOUNTER — LAB REQUISITION (OUTPATIENT)
Dept: LAB | Facility: CLINIC | Age: 81
End: 2021-08-09
Payer: COMMERCIAL

## 2021-08-09 DIAGNOSIS — Z29.9 ENCOUNTER FOR PROPHYLACTIC MEASURES, UNSPECIFIED: ICD-10-CM

## 2021-08-09 PROCEDURE — U0003 INFECTIOUS AGENT DETECTION BY NUCLEIC ACID (DNA OR RNA); SEVERE ACUTE RESPIRATORY SYNDROME CORONAVIRUS 2 (SARS-COV-2) (CORONAVIRUS DISEASE [COVID-19]), AMPLIFIED PROBE TECHNIQUE, MAKING USE OF HIGH THROUGHPUT TECHNOLOGIES AS DESCRIBED BY CMS-2020-01-R: HCPCS | Mod: ORL | Performed by: NURSE PRACTITIONER

## 2021-08-10 LAB — SARS-COV-2 RNA RESP QL NAA+PROBE: NEGATIVE

## 2021-08-13 ENCOUNTER — LAB REQUISITION (OUTPATIENT)
Dept: LAB | Facility: CLINIC | Age: 81
End: 2021-08-13
Payer: COMMERCIAL

## 2021-08-13 DIAGNOSIS — Z29.9 ENCOUNTER FOR PROPHYLACTIC MEASURES, UNSPECIFIED: ICD-10-CM

## 2021-08-13 PROCEDURE — U0003 INFECTIOUS AGENT DETECTION BY NUCLEIC ACID (DNA OR RNA); SEVERE ACUTE RESPIRATORY SYNDROME CORONAVIRUS 2 (SARS-COV-2) (CORONAVIRUS DISEASE [COVID-19]), AMPLIFIED PROBE TECHNIQUE, MAKING USE OF HIGH THROUGHPUT TECHNOLOGIES AS DESCRIBED BY CMS-2020-01-R: HCPCS | Mod: ORL | Performed by: NURSE PRACTITIONER

## 2021-08-15 LAB — SARS-COV-2 RNA RESP QL NAA+PROBE: NEGATIVE

## 2021-08-16 ENCOUNTER — LAB REQUISITION (OUTPATIENT)
Dept: LAB | Facility: CLINIC | Age: 81
End: 2021-08-16
Payer: COMMERCIAL

## 2021-08-16 DIAGNOSIS — Z29.9 ENCOUNTER FOR PROPHYLACTIC MEASURES, UNSPECIFIED: ICD-10-CM

## 2021-08-16 PROCEDURE — U0005 INFEC AGEN DETEC AMPLI PROBE: HCPCS | Mod: ORL | Performed by: NURSE PRACTITIONER

## 2021-08-17 LAB — SARS-COV-2 RNA RESP QL NAA+PROBE: NEGATIVE

## 2021-08-18 ENCOUNTER — LAB REQUISITION (OUTPATIENT)
Dept: LAB | Facility: CLINIC | Age: 81
End: 2021-08-18
Payer: COMMERCIAL

## 2021-08-18 DIAGNOSIS — Z29.9 ENCOUNTER FOR PROPHYLACTIC MEASURES, UNSPECIFIED: ICD-10-CM

## 2021-08-19 PROCEDURE — U0003 INFECTIOUS AGENT DETECTION BY NUCLEIC ACID (DNA OR RNA); SEVERE ACUTE RESPIRATORY SYNDROME CORONAVIRUS 2 (SARS-COV-2) (CORONAVIRUS DISEASE [COVID-19]), AMPLIFIED PROBE TECHNIQUE, MAKING USE OF HIGH THROUGHPUT TECHNOLOGIES AS DESCRIBED BY CMS-2020-01-R: HCPCS | Mod: ORL | Performed by: NURSE PRACTITIONER

## 2021-08-20 ENCOUNTER — NURSING HOME VISIT (OUTPATIENT)
Dept: GERIATRICS | Facility: CLINIC | Age: 81
End: 2021-08-20
Payer: COMMERCIAL

## 2021-08-20 VITALS
HEIGHT: 60 IN | SYSTOLIC BLOOD PRESSURE: 118 MMHG | OXYGEN SATURATION: 97 % | RESPIRATION RATE: 18 BRPM | TEMPERATURE: 97.8 F | BODY MASS INDEX: 33.77 KG/M2 | WEIGHT: 172 LBS | DIASTOLIC BLOOD PRESSURE: 56 MMHG | HEART RATE: 67 BPM

## 2021-08-20 DIAGNOSIS — J44.9 CHRONIC OBSTRUCTIVE PULMONARY DISEASE, UNSPECIFIED COPD TYPE (H): ICD-10-CM

## 2021-08-20 DIAGNOSIS — F03.92 DEMENTIA WITH PSYCHOSIS (H): Primary | ICD-10-CM

## 2021-08-20 DIAGNOSIS — F32.3 SEVERE MAJOR DEPRESSION WITH PSYCHOTIC FEATURES (H): ICD-10-CM

## 2021-08-20 LAB — SARS-COV-2 RNA RESP QL NAA+PROBE: NEGATIVE

## 2021-08-20 PROCEDURE — 99308 SBSQ NF CARE LOW MDM 20: CPT | Performed by: INTERNAL MEDICINE

## 2021-08-20 ASSESSMENT — MIFFLIN-ST. JEOR: SCORE: 1176.69

## 2021-08-22 RX ORDER — RISPERIDONE 1 MG/1
1 TABLET ORAL AT BEDTIME
COMMUNITY
End: 2022-11-21

## 2021-08-22 NOTE — PROGRESS NOTES
"[facility]  PHYSICIAN NOTE    Chief Complaint   Patient presents with     half-way Regulatory       HPI:    Chelsey Casillas is a 81 year old  (1940), who is being seen today for a federally mandated E/M visit at [facility] . Admitted to LTC in Sept 2018 from [facility]. 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 H/o COPD without O2. She has had intermittent behaviors and falls. Weight has been variable on recorded vital signs but now back to baseline and stable in the 160s. She did have COVID-19 infection in May 2020 but never was symptomatic.     Chelsey is seen sitting up in her wheelchair watching TV (though when asked what she is watching she says she \"doesn't know\") and just finished with breakfast and actually looks like she ate quite a bit. She has no complaints but does intermittently like Nystatin powder under her breasts and moist along neck line. She denies pain or anxiety concerns. I note that in house psychiatrist increased her evening Risperidone from 0.5 mg nightly to 1 mg nightly last month d/t reports of some behaviors. I spoke with RN today who thinks Chelsey is stable and no better or worse with this dose increase. She feels its situational r/t bathing and/or certain staff and this particular RN feels she gets along well with Chelsey. She doesn't feel changes are indicated today. I asked Chelsey if she thinks there is anything staff can do to help her feel more comfortable around bathing time and she says she doesn't know.    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) 7/12/2016     Skin tear 3/7/2013     Weakness 6/18/2015     CODE STATUS: DNR/I    MEDICATIONS: Reviewed and updated in [facility] according to facility MAR  Current Outpatient Medications   Medication Sig Dispense Refill     Acetaminophen (TYLENOL PO) Take 1,000 mg by mouth 2 times daily        albuterol (PROAIR HFA/PROVENTIL HFA/VENTOLIN HFA) 108 (90 BASE) MCG/ACT Inhaler Inhale 2 puffs into the lungs every 4 hours as needed for shortness of breath / dyspnea or wheezing        omeprazole (PRILOSEC) 20 MG DR capsule Take 20 mg by mouth daily       risperiDONE (RISPERDAL) 1 MG tablet Take 1 mg by mouth At Bedtime       sertraline (ZOLOFT) 100 MG tablet Take 1 tablet (100 mg) by mouth At Bedtime 31 tablet 98     vitamin D3 (CHOLECALCIFEROL) 2000 units (50 mcg) tablet Take 1 tablet (2,000 Units) by mouth At Bedtime 30 tablet        ROS:  Limited secondary to cognitive impairment but today pt reports as above in HPI    Exam:  /56   Pulse 67   Temp 97.8  F (36.6  C)   Resp 18   Ht 1.524 m (5')   Wt 78 kg (172 lb)   SpO2 97%   BMI 33.59 kg/m    Alert, casually dressed, sitting up in wheelchair in NAD  Breathing non-labored on RA, no cough  No edema  Mood at baseline for her, slightly flat, often provides minimal history, no tremor    Lab/Diagnostic Data:    Several recent negative COVID-19 screens; she has previously declined blood draws    ASSESSMENT/PLAN:  Dementia with psychosis (H)  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

## 2021-08-23 ENCOUNTER — LAB REQUISITION (OUTPATIENT)
Dept: LAB | Facility: CLINIC | Age: 81
End: 2021-08-23
Payer: COMMERCIAL

## 2021-08-23 DIAGNOSIS — Z29.9 ENCOUNTER FOR PROPHYLACTIC MEASURES, UNSPECIFIED: ICD-10-CM

## 2021-08-24 PROCEDURE — U0003 INFECTIOUS AGENT DETECTION BY NUCLEIC ACID (DNA OR RNA); SEVERE ACUTE RESPIRATORY SYNDROME CORONAVIRUS 2 (SARS-COV-2) (CORONAVIRUS DISEASE [COVID-19]), AMPLIFIED PROBE TECHNIQUE, MAKING USE OF HIGH THROUGHPUT TECHNOLOGIES AS DESCRIBED BY CMS-2020-01-R: HCPCS | Mod: ORL | Performed by: NURSE PRACTITIONER

## 2021-08-25 LAB — SARS-COV-2 RNA RESP QL NAA+PROBE: NEGATIVE

## 2021-08-26 ENCOUNTER — LAB REQUISITION (OUTPATIENT)
Dept: LAB | Facility: CLINIC | Age: 81
End: 2021-08-26
Payer: COMMERCIAL

## 2021-08-26 DIAGNOSIS — Z29.9 ENCOUNTER FOR PROPHYLACTIC MEASURES, UNSPECIFIED: ICD-10-CM

## 2021-08-30 PROCEDURE — U0003 INFECTIOUS AGENT DETECTION BY NUCLEIC ACID (DNA OR RNA); SEVERE ACUTE RESPIRATORY SYNDROME CORONAVIRUS 2 (SARS-COV-2) (CORONAVIRUS DISEASE [COVID-19]), AMPLIFIED PROBE TECHNIQUE, MAKING USE OF HIGH THROUGHPUT TECHNOLOGIES AS DESCRIBED BY CMS-2020-01-R: HCPCS | Mod: ORL | Performed by: NURSE PRACTITIONER

## 2021-08-31 LAB — SARS-COV-2 RNA RESP QL NAA+PROBE: NEGATIVE

## 2021-09-15 ENCOUNTER — LAB REQUISITION (OUTPATIENT)
Dept: LAB | Facility: CLINIC | Age: 81
End: 2021-09-15
Payer: COMMERCIAL

## 2021-09-15 DIAGNOSIS — Z29.9 ENCOUNTER FOR PROPHYLACTIC MEASURES, UNSPECIFIED: ICD-10-CM

## 2021-09-15 PROCEDURE — U0005 INFEC AGEN DETEC AMPLI PROBE: HCPCS | Mod: ORL | Performed by: NURSE PRACTITIONER

## 2021-09-16 LAB — SARS-COV-2 RNA RESP QL NAA+PROBE: NEGATIVE

## 2021-09-17 ENCOUNTER — DOCUMENTATION ONLY (OUTPATIENT)
Dept: OTHER | Facility: CLINIC | Age: 81
End: 2021-09-17

## 2021-09-20 ENCOUNTER — LAB REQUISITION (OUTPATIENT)
Dept: LAB | Facility: CLINIC | Age: 81
End: 2021-09-20
Payer: COMMERCIAL

## 2021-09-20 DIAGNOSIS — Z29.9 ENCOUNTER FOR PROPHYLACTIC MEASURES, UNSPECIFIED: ICD-10-CM

## 2021-09-21 PROCEDURE — U0003 INFECTIOUS AGENT DETECTION BY NUCLEIC ACID (DNA OR RNA); SEVERE ACUTE RESPIRATORY SYNDROME CORONAVIRUS 2 (SARS-COV-2) (CORONAVIRUS DISEASE [COVID-19]), AMPLIFIED PROBE TECHNIQUE, MAKING USE OF HIGH THROUGHPUT TECHNOLOGIES AS DESCRIBED BY CMS-2020-01-R: HCPCS | Mod: ORL | Performed by: NURSE PRACTITIONER

## 2021-09-24 LAB
SARS-COV-2 RNA RESP QL NAA+PROBE: NOT DETECTED
SPECIMEN SOURCE: NORMAL

## 2021-09-27 ENCOUNTER — LAB REQUISITION (OUTPATIENT)
Dept: LAB | Facility: CLINIC | Age: 81
End: 2021-09-27
Payer: COMMERCIAL

## 2021-09-27 DIAGNOSIS — Z29.9 ENCOUNTER FOR PROPHYLACTIC MEASURES, UNSPECIFIED: ICD-10-CM

## 2021-09-28 PROCEDURE — U0003 INFECTIOUS AGENT DETECTION BY NUCLEIC ACID (DNA OR RNA); SEVERE ACUTE RESPIRATORY SYNDROME CORONAVIRUS 2 (SARS-COV-2) (CORONAVIRUS DISEASE [COVID-19]), AMPLIFIED PROBE TECHNIQUE, MAKING USE OF HIGH THROUGHPUT TECHNOLOGIES AS DESCRIBED BY CMS-2020-01-R: HCPCS | Mod: ORL | Performed by: NURSE PRACTITIONER

## 2021-09-29 LAB — SARS-COV-2 RNA RESP QL NAA+PROBE: NEGATIVE

## 2021-10-06 ENCOUNTER — NURSING HOME VISIT (OUTPATIENT)
Dept: GERIATRICS | Facility: CLINIC | Age: 81
End: 2021-10-06
Payer: COMMERCIAL

## 2021-10-06 VITALS
TEMPERATURE: 97.7 F | BODY MASS INDEX: 32.1 KG/M2 | WEIGHT: 163.5 LBS | OXYGEN SATURATION: 97 % | HEIGHT: 60 IN | HEART RATE: 60 BPM | RESPIRATION RATE: 18 BRPM | DIASTOLIC BLOOD PRESSURE: 77 MMHG | SYSTOLIC BLOOD PRESSURE: 132 MMHG

## 2021-10-06 DIAGNOSIS — I10 ESSENTIAL HYPERTENSION: ICD-10-CM

## 2021-10-06 DIAGNOSIS — J44.9 CHRONIC OBSTRUCTIVE PULMONARY DISEASE, UNSPECIFIED COPD TYPE (H): ICD-10-CM

## 2021-10-06 DIAGNOSIS — F03.92 DEMENTIA WITH PSYCHOSIS (H): ICD-10-CM

## 2021-10-06 DIAGNOSIS — K21.9 GASTROESOPHAGEAL REFLUX DISEASE WITHOUT ESOPHAGITIS: Primary | ICD-10-CM

## 2021-10-06 PROCEDURE — 99309 SBSQ NF CARE MODERATE MDM 30: CPT | Performed by: NURSE PRACTITIONER

## 2021-10-06 ASSESSMENT — MIFFLIN-ST. JEOR: SCORE: 1138.13

## 2021-10-06 NOTE — LETTER
10/6/2021        RE: Chelsey Casillas  [facility]  [address] [address]        [facility]  Chief Complaint   Patient presents with     [facility] Medical Record Number:  [id number]  Place of Service where encounter took place:  [facility] () [[id number]]    HPI:    Chelsey Casillas  is 81 year old (1940), who is being seen today for a federally mandated E/M visit.     Today's concerns are:  Gastroesophageal reflux disease without esophagitis  Previously failed attempt to stop PPI. Patient currently denies dyspepsia, says her appetite is good    Dementia with psychosis (H)  Takes risperidone at bedtime. On lower dose of risperidone she had significant delusions/paranoia. No behavioral issues currently per staff. Last fall was early August. Needs assist with ADLs, nonambulatory    Essential hypertension  -140s/70s    Chronic obstructive pulmonary disease, unspecified COPD type (H)  No SOB, cough, wheeze, hypoxia      ALLERGIES:Amlodipine  PAST MEDICAL HISTORY:   Past Medical History:   Diagnosis Date     Arthritis      Chronic low back pain 3/17/2015     COPD (chronic obstructive pulmonary disease) (H)      Dementia with psychosis (H) 11/1/2016     Fall 2/27/2013     Falling episodes 9/24/2016     Functional urinary incontinence 3/7/2013     GERD (gastroesophageal reflux disease) 4/3/2018     HTN (hypertension) 4/3/2018     Knee pain 3/7/2013     Major depression, single episode 4/16/2015     Pain in both knees 1/30/2014     Physical deconditioning 3/7/2013     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  FAMILY HISTORY: Family history is unknown by patient.  SOCIAL HISTORY:  reports that she has quit smoking. Her smoking use included cigarettes. She has never used smokeless tobacco. She reports  that she does not drink alcohol and does not use drugs.    MEDICATIONS:  Current Outpatient Medications   Medication Sig Dispense Refill     Acetaminophen (TYLENOL PO) Take 1,000 mg by mouth 2 times daily        albuterol (PROAIR HFA/PROVENTIL HFA/VENTOLIN HFA) 108 (90 BASE) MCG/ACT Inhaler Inhale 2 puffs into the lungs every 4 hours as needed for shortness of breath / dyspnea or wheezing        omeprazole (PRILOSEC) 20 MG DR capsule Take 20 mg by mouth daily       risperiDONE (RISPERDAL) 1 MG tablet Take 1 mg by mouth At Bedtime       sertraline (ZOLOFT) 100 MG tablet Take 1 tablet (100 mg) by mouth At Bedtime 31 tablet 98     vitamin D3 (CHOLECALCIFEROL) 2000 units (50 mcg) tablet Take 1 tablet (2,000 Units) by mouth At Bedtime 30 tablet        Case Management:  I have reviewed the care plan and MDS and do agree with the plan. Patient's desire to return to the community is not present. Information reviewed:  Medications, vital signs, orders, and nursing notes.    ROS:  10 point ROS of systems including Constitutional, Eyes, Respiratory, Cardiovascular, Gastroenterology, Genitourinary, Integumentary, Musculoskeletal, Psychiatric were all negative except for pertinent positives noted in my HPI.    Vitals:  /77   Pulse 60   Temp 97.7  F (36.5  C)   Resp 18   Ht 1.524 m (5')   Wt 74.2 kg (163 lb 8 oz)   SpO2 97%   BMI 31.93 kg/m    Body mass index is 31.93 kg/m .  Exam:  GENERAL APPEARANCE:  Alert, in no distress  ENT:  Mouth and posterior oropharynx normal, moist mucous membranes  EYES:  EOM normal, conjunctiva and lids normal  RESP:  lungs clear to auscultation , no respiratory distress  CV:  Palpation and auscultation of heart done , regular rate and rhythm, no murmur, rub, or gallop  ABDOMEN:  normal bowel sounds, soft, nontender, no hepatosplenomegaly or other masses  SKIN:  Inspection of skin and subcutaneous tissue baseline, Palpation of skin and subcutaneous tissue baseline  PSYCH:  insight and  judgement impaired, memory impaired , affect and mood normal    Lab/Diagnostic data:   Patient refuses    ASSESSMENT/PLAN  (K21.9) Gastroesophageal reflux disease without esophagitis  (primary encounter diagnosis)  Comment: Chronic condition being managed with medications and is currently asymptomatic. Due to comfort care goals, would not stop PPI again  Plan: Continue current POC with no changes at this time and adjustments as needed.    (F03.91) Dementia with psychosis (H)  Comment: Chronic, progressive. Requires 24 hour skilled nursing care. Expect further functional and cognitive decline. Expect weight loss.  Plan: Continue 24 hour care. Monitor weight. Monitor functional status. Monitor for behavioral disturbances.    (I10) Essential hypertension  Comment: BP goals are ~130 -165/60 -90 mmHg.This is higher than ACC and AHA recommendations due to goals of care, risk for hypotension, risk of dizziness and falls and risk of tissue/cerebral hypoperfusion. Patient is stable and continue without pharmacological invention with routine assessment.    (J44.9) Chronic obstructive pulmonary disease, unspecified COPD type (H)  Comment: Chronic condition being managed with medications and is currently asymptomatic.  Plan: Continue current POC with no changes at this time and adjustments as needed.        Electronically signed by:  ALCIRA Sweet [facility]  Phone: [phone]

## 2021-10-06 NOTE — PROGRESS NOTES
[facility] GERIATRIC SERVICES  Chief Complaint   Patient presents with     care home Regulatory     [facility] Medical Record Number:  [id number]  Place of Service where encounter took place:  [facility] () [[id number]]    HPI:    Chelsey Casillas  is 81 year old (1940), who is being seen today for a federally mandated E/M visit.     Today's concerns are:  Gastroesophageal reflux disease without esophagitis  Previously failed attempt to stop PPI. Patient currently denies dyspepsia, says her appetite is good    Dementia with psychosis (H)  Takes risperidone at bedtime. On lower dose of risperidone she had significant delusions/paranoia. No behavioral issues currently per staff. Last fall was early August. Needs assist with ADLs, nonambulatory    Essential hypertension  -140s/70s    Chronic obstructive pulmonary disease, unspecified COPD type (H)  No SOB, cough, wheeze, hypoxia      ALLERGIES:Amlodipine  PAST MEDICAL HISTORY:   Past Medical History:   Diagnosis Date     Arthritis      Chronic low back pain 3/17/2015     COPD (chronic obstructive pulmonary disease) (H)      Dementia with psychosis (H) 11/1/2016     Fall 2/27/2013     Falling episodes 9/24/2016     Functional urinary incontinence 3/7/2013     GERD (gastroesophageal reflux disease) 4/3/2018     HTN (hypertension) 4/3/2018     Knee pain 3/7/2013     Major depression, single episode 4/16/2015     Pain in both knees 1/30/2014     Physical deconditioning 3/7/2013     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  FAMILY HISTORY: Family history is unknown by patient.  SOCIAL HISTORY:  reports that she has quit smoking. Her smoking use included cigarettes. She has never used smokeless tobacco. She reports that she does not drink alcohol and does not use drugs.    MEDICATIONS:  Current Outpatient Medications    Medication Sig Dispense Refill     Acetaminophen (TYLENOL PO) Take 1,000 mg by mouth 2 times daily        albuterol (PROAIR HFA/PROVENTIL HFA/VENTOLIN HFA) 108 (90 BASE) MCG/ACT Inhaler Inhale 2 puffs into the lungs every 4 hours as needed for shortness of breath / dyspnea or wheezing        omeprazole (PRILOSEC) 20 MG DR capsule Take 20 mg by mouth daily       risperiDONE (RISPERDAL) 1 MG tablet Take 1 mg by mouth At Bedtime       sertraline (ZOLOFT) 100 MG tablet Take 1 tablet (100 mg) by mouth At Bedtime 31 tablet 98     vitamin D3 (CHOLECALCIFEROL) 2000 units (50 mcg) tablet Take 1 tablet (2,000 Units) by mouth At Bedtime 30 tablet        Case Management:  I have reviewed the care plan and MDS and do agree with the plan. Patient's desire to return to the community is not present. Information reviewed:  Medications, vital signs, orders, and nursing notes.    ROS:  10 point ROS of systems including Constitutional, Eyes, Respiratory, Cardiovascular, Gastroenterology, Genitourinary, Integumentary, Musculoskeletal, Psychiatric were all negative except for pertinent positives noted in my HPI.    Vitals:  /77   Pulse 60   Temp 97.7  F (36.5  C)   Resp 18   Ht 1.524 m (5')   Wt 74.2 kg (163 lb 8 oz)   SpO2 97%   BMI 31.93 kg/m    Body mass index is 31.93 kg/m .  Exam:  GENERAL APPEARANCE:  Alert, in no distress  ENT:  Mouth and posterior oropharynx normal, moist mucous membranes  EYES:  EOM normal, conjunctiva and lids normal  RESP:  lungs clear to auscultation , no respiratory distress  CV:  Palpation and auscultation of heart done , regular rate and rhythm, no murmur, rub, or gallop  ABDOMEN:  normal bowel sounds, soft, nontender, no hepatosplenomegaly or other masses  SKIN:  Inspection of skin and subcutaneous tissue baseline, Palpation of skin and subcutaneous tissue baseline  PSYCH:  insight and judgement impaired, memory impaired , affect and mood normal    Lab/Diagnostic data:   Patient  refuses    ASSESSMENT/PLAN  (K21.9) Gastroesophageal reflux disease without esophagitis  (primary encounter diagnosis)  Comment: Chronic condition being managed with medications and is currently asymptomatic. Due to comfort care goals, would not stop PPI again  Plan: Continue current POC with no changes at this time and adjustments as needed.    (F03.91) Dementia with psychosis (H)  Comment: Chronic, progressive. Requires 24 hour skilled nursing care. Expect further functional and cognitive decline. Expect weight loss.  Plan: Continue 24 hour care. Monitor weight. Monitor functional status. Monitor for behavioral disturbances.    (I10) Essential hypertension  Comment: BP goals are ~130 -165/60 -90 mmHg.This is higher than ACC and AHA recommendations due to goals of care, risk for hypotension, risk of dizziness and falls and risk of tissue/cerebral hypoperfusion. Patient is stable and continue without pharmacological invention with routine assessment.    (J44.9) Chronic obstructive pulmonary disease, unspecified COPD type (H)  Comment: Chronic condition being managed with medications and is currently asymptomatic.  Plan: Continue current POC with no changes at this time and adjustments as needed.        Electronically signed by:  ALCIRA [facility] Geriatric Services  Phone: [phone]

## 2021-10-28 ENCOUNTER — LAB REQUISITION (OUTPATIENT)
Dept: LAB | Facility: CLINIC | Age: 81
End: 2021-10-28
Payer: COMMERCIAL

## 2021-10-28 DIAGNOSIS — Z29.9 ENCOUNTER FOR PROPHYLACTIC MEASURES, UNSPECIFIED: ICD-10-CM

## 2021-10-28 PROCEDURE — U0005 INFEC AGEN DETEC AMPLI PROBE: HCPCS | Mod: ORL | Performed by: NURSE PRACTITIONER

## 2021-10-29 LAB — SARS-COV-2 RNA RESP QL NAA+PROBE: NEGATIVE

## 2021-11-01 ENCOUNTER — LAB REQUISITION (OUTPATIENT)
Dept: LAB | Facility: CLINIC | Age: 81
End: 2021-11-01
Payer: COMMERCIAL

## 2021-11-01 DIAGNOSIS — Z29.9 ENCOUNTER FOR PROPHYLACTIC MEASURES, UNSPECIFIED: ICD-10-CM

## 2021-11-01 PROCEDURE — U0003 INFECTIOUS AGENT DETECTION BY NUCLEIC ACID (DNA OR RNA); SEVERE ACUTE RESPIRATORY SYNDROME CORONAVIRUS 2 (SARS-COV-2) (CORONAVIRUS DISEASE [COVID-19]), AMPLIFIED PROBE TECHNIQUE, MAKING USE OF HIGH THROUGHPUT TECHNOLOGIES AS DESCRIBED BY CMS-2020-01-R: HCPCS | Mod: ORL | Performed by: NURSE PRACTITIONER

## 2021-11-03 ENCOUNTER — LAB REQUISITION (OUTPATIENT)
Dept: LAB | Facility: CLINIC | Age: 81
End: 2021-11-03
Payer: COMMERCIAL

## 2021-11-03 DIAGNOSIS — Z29.9 ENCOUNTER FOR PROPHYLACTIC MEASURES, UNSPECIFIED: ICD-10-CM

## 2021-11-03 LAB — SARS-COV-2 RNA RESP QL NAA+PROBE: NOT DETECTED

## 2021-11-04 PROCEDURE — U0003 INFECTIOUS AGENT DETECTION BY NUCLEIC ACID (DNA OR RNA); SEVERE ACUTE RESPIRATORY SYNDROME CORONAVIRUS 2 (SARS-COV-2) (CORONAVIRUS DISEASE [COVID-19]), AMPLIFIED PROBE TECHNIQUE, MAKING USE OF HIGH THROUGHPUT TECHNOLOGIES AS DESCRIBED BY CMS-2020-01-R: HCPCS | Mod: ORL | Performed by: NURSE PRACTITIONER

## 2021-11-06 LAB — SARS-COV-2 RNA RESP QL NAA+PROBE: NOT DETECTED

## 2021-11-07 ENCOUNTER — LAB REQUISITION (OUTPATIENT)
Dept: LAB | Facility: CLINIC | Age: 81
End: 2021-11-07
Payer: COMMERCIAL

## 2021-11-07 DIAGNOSIS — Z29.9 ENCOUNTER FOR PROPHYLACTIC MEASURES, UNSPECIFIED: ICD-10-CM

## 2021-11-08 PROCEDURE — U0003 INFECTIOUS AGENT DETECTION BY NUCLEIC ACID (DNA OR RNA); SEVERE ACUTE RESPIRATORY SYNDROME CORONAVIRUS 2 (SARS-COV-2) (CORONAVIRUS DISEASE [COVID-19]), AMPLIFIED PROBE TECHNIQUE, MAKING USE OF HIGH THROUGHPUT TECHNOLOGIES AS DESCRIBED BY CMS-2020-01-R: HCPCS | Mod: ORL | Performed by: NURSE PRACTITIONER

## 2021-11-10 ENCOUNTER — LAB REQUISITION (OUTPATIENT)
Dept: LAB | Facility: CLINIC | Age: 81
End: 2021-11-10
Payer: COMMERCIAL

## 2021-11-10 DIAGNOSIS — Z29.9 ENCOUNTER FOR PROPHYLACTIC MEASURES, UNSPECIFIED: ICD-10-CM

## 2021-11-10 LAB — SARS-COV-2 RNA RESP QL NAA+PROBE: NOT DETECTED

## 2021-11-11 PROCEDURE — U0003 INFECTIOUS AGENT DETECTION BY NUCLEIC ACID (DNA OR RNA); SEVERE ACUTE RESPIRATORY SYNDROME CORONAVIRUS 2 (SARS-COV-2) (CORONAVIRUS DISEASE [COVID-19]), AMPLIFIED PROBE TECHNIQUE, MAKING USE OF HIGH THROUGHPUT TECHNOLOGIES AS DESCRIBED BY CMS-2020-01-R: HCPCS | Mod: ORL | Performed by: NURSE PRACTITIONER

## 2021-11-14 LAB — SARS-COV-2 RNA RESP QL NAA+PROBE: NOT DETECTED

## 2021-12-08 ENCOUNTER — NURSING HOME VISIT (OUTPATIENT)
Dept: GERIATRICS | Facility: CLINIC | Age: 81
End: 2021-12-08
Payer: COMMERCIAL

## 2021-12-08 VITALS
HEIGHT: 60 IN | RESPIRATION RATE: 18 BRPM | TEMPERATURE: 96.9 F | HEART RATE: 75 BPM | SYSTOLIC BLOOD PRESSURE: 146 MMHG | BODY MASS INDEX: 31.8 KG/M2 | OXYGEN SATURATION: 96 % | WEIGHT: 162 LBS | DIASTOLIC BLOOD PRESSURE: 72 MMHG

## 2021-12-08 DIAGNOSIS — I10 ESSENTIAL HYPERTENSION: ICD-10-CM

## 2021-12-08 DIAGNOSIS — F03.92 DEMENTIA WITH PSYCHOSIS (H): ICD-10-CM

## 2021-12-08 DIAGNOSIS — J44.9 CHRONIC OBSTRUCTIVE PULMONARY DISEASE, UNSPECIFIED COPD TYPE (H): ICD-10-CM

## 2021-12-08 DIAGNOSIS — K21.9 GASTROESOPHAGEAL REFLUX DISEASE WITHOUT ESOPHAGITIS: Primary | ICD-10-CM

## 2021-12-08 PROCEDURE — 99318 PR ANNUAL NURSING FAC ASSESSMNT, STABLE: CPT | Performed by: NURSE PRACTITIONER

## 2021-12-08 ASSESSMENT — MIFFLIN-ST. JEOR: SCORE: 1131.33

## 2021-12-08 NOTE — LETTER
12/8/2021        RE: Chelsey Casillas  [facility]  [address] [address]         HEALTH GERIATRIC SERVICES  Chief Complaint   Patient presents with     Annual Comprehensive Nursing Home     [place] Medical Record Number:  [id number]  Place of Service where encounter took place:  [facility] () [[id number]]    HPI:    Chelsey Casillas  is a 81 year old  (1940), who is being seen today for an annual comprehensive visit. HPI information obtained from: facility chart records, facility staff and patient report.     Gastroesophageal reflux disease without esophagitis  Patient denies indigestion or reflux, but says she feels a little nauseated today. She has not vomited. She cannot say if this has been happening regularly, but there is no mention of nausea in the nursing notes    Dementia with psychosis (H)  BIMS 10/15. Takes risperidone at bedtime. On lower dose of risperidone she had significant delusions/paranoia. No behavioral issues currently per staff. One recent fall in November, found next to her wheelchair. Needs assist with ADLs, nonambulatory    Essential hypertension  -140s/60-80s    Chronic obstructive pulmonary disease, unspecified COPD type (H)  No SOB, cough, wheeze, hypoxia      ALLERGIES: Amlodipine  PAST MEDICAL HISTORY:   Past Medical History:   Diagnosis Date     Arthritis      Chronic low back pain 3/17/2015     COPD (chronic obstructive pulmonary disease) (H)      Dementia with psychosis (H) 11/1/2016     Fall 2/27/2013     Falling episodes 9/24/2016     Functional urinary incontinence 3/7/2013     GERD (gastroesophageal reflux disease) 4/3/2018     HTN (hypertension) 4/3/2018     Knee pain 3/7/2013     Major depression, single episode 4/16/2015     Pain in both knees 1/30/2014     Physical deconditioning 3/7/2013     Severe major depression with psychotic features (H) 7/12/2016     Skin tear 3/7/2013     Weakness 6/18/2015      PAST SURGICAL  HISTORY:  has no past surgical history on file.  IMMUNIZATIONS:  Immunization History   Administered Date(s) Administered     COVID-19,PF,Moderna 12/28/2020, 01/25/2021, 11/18/2021     FLU 6-35 months 09/10/2013     Influenza (High Dose) 3 valent vaccine 11/28/2017     Influenza, Quad, High Dose, Pf, 65yr+ (Fluzone HD) 11/02/2021     Pneumo Conj 13-V (2010&after) 12/13/2018     Pneumococcal 23 valent 12/15/2019     Above immunizations pulled from [facility]. MIIC and facility records also reconciled. Outstanding information sent to  to update [facility].  Future immunizations are not needed at this point as all recommended immunizations are up to date.       Current Outpatient Medications:      Acetaminophen (TYLENOL PO), Take 1,000 mg by mouth 2 times daily , Disp: , Rfl:      albuterol (PROAIR HFA/PROVENTIL HFA/VENTOLIN HFA) 108 (90 BASE) MCG/ACT Inhaler, Inhale 2 puffs into the lungs every 4 hours as needed for shortness of breath / dyspnea or wheezing , Disp: , Rfl:      omeprazole (PRILOSEC) 20 MG DR capsule, Take 20 mg by mouth daily, Disp: , Rfl:      risperiDONE (RISPERDAL) 1 MG tablet, Take 1 mg by mouth At Bedtime, Disp: , Rfl:      sertraline (ZOLOFT) 100 MG tablet, Take 1 tablet (100 mg) by mouth At Bedtime, Disp: 31 tablet, Rfl: 98     vitamin D3 (CHOLECALCIFEROL) 2000 units (50 mcg) tablet, Take 1 tablet (2,000 Units) by mouth At Bedtime, Disp: 30 tablet, Rfl:      Case Management:  I have reviewed the facility/SNF care plan/MDS, including the falls risk, nutrition and pain screening. I also reviewed the current immunizations, and preventive care.. Future cancer screening is not clinically indicated secondary to age/goals of care Patient's desire to return to the community is not present. Current Level of Care is appropriate.    Advance Directive Discussion:    I reviewed the current advanced directives as reflected in EPIC, the POLST and the facility chart, and verified the  congruency of orders. I contacted the first party and left a message regarding the plan of Care.  I did not due to cognitive impairment review the advance directives with the resident. Patient's goal is pain control and comfort.    Team Discussion:  I communicated with the appropriate disciplines involved with the Plan of Care:   Nursing    Information reviewed:  Medications, vital signs, orders, and nursing notes.    ROS:  10 point ROS of systems including Constitutional, Eyes, Respiratory, Cardiovascular, Gastroenterology, Genitourinary, Integumentary, Musculoskeletal, Psychiatric were all negative except for pertinent positives noted in my HPI.    Vitals:  BP (!) 146/72   Pulse 75   Temp 96.9  F (36.1  C)   Resp 18   Ht 1.524 m (5')   Wt 73.5 kg (162 lb)   SpO2 96%   BMI 31.64 kg/m   Body mass index is 31.64 kg/m .  Exam:  GENERAL APPEARANCE:  Alert, in no distress  ENT:  Mouth and posterior oropharynx normal, moist mucous membranes, normal hearing acuity  EYES:  EOM, conjunctivae, lids, pupils and irises normal  RESP:  respiratory effort and palpation of chest normal, lungs clear to auscultation , no respiratory distress  CV:  Palpation and auscultation of heart done , regular rate and rhythm, no murmur, rub, or gallop, no edema  ABDOMEN:  normal bowel sounds, soft, nontender, no hepatosplenomegaly or other masses  SKIN:  Inspection of skin and subcutaneous tissue baseline, Palpation of skin and subcutaneous tissue baseline  NEURO:   Cranial nerves 2-12 are normal tested and grossly at patient's baseline  PSYCH:  insight and judgement impaired, memory impaired , affect abnormal flat     Lab/Diagnostic data:   Recent labs in [facility] reviewed by me today.     ASSESSMENT/PLAN  (K21.9) Gastroesophageal reflux disease without esophagitis  (primary encounter diagnosis)  Comment: Failed previous attempt to stop PPI. Her nausea today appears mild  Plan: Continue PPI. Monitor for dyspepsia. Adjust medication as  clinically indicated.    (F03.91) Dementia with psychosis (H)  Comment: Chronic, progressive. Requires 24 hour skilled nursing care. HPI/ROS unreliable with cognitive impairment. Expect further functional and cognitive decline. Expect weight loss.  Plan: Continue 24 hour care. Monitor weight. Monitor functional status. Monitor for behavioral disturbances.    (I10) Essential hypertension  Comment: Chronic, controlled  Plan: Continue current POC with no changes at this time and adjustments as needed.    (J44.9) Chronic obstructive pulmonary disease, unspecified COPD type (H)  Comment: Chronic condition being managed with medications and is currently asymptomatic.  Plan: Continue current POC with no changes at this time and adjustments as needed.      Electronically signed by:  ALCIRA Sweet [facility] Geriatric Services  Phone: [phone]

## 2021-12-08 NOTE — PROGRESS NOTES
[facility] GERIATRIC SERVICES  Chief Complaint   Patient presents with     Annual Comprehensive Nursing Home     [place] Medical Record Number:  [id number]  Place of Service where encounter took place:  [facility] () [[id number]]    HPI:    Chelsey Casillas  is a 81 year old  (1940), who is being seen today for an annual comprehensive visit. HPI information obtained from: facility chart records, facility staff and patient report.     Gastroesophageal reflux disease without esophagitis  Patient denies indigestion or reflux, but says she feels a little nauseated today. She has not vomited. She cannot say if this has been happening regularly, but there is no mention of nausea in the nursing notes    Dementia with psychosis (H)  BIMS 10/15. Takes risperidone at bedtime. On lower dose of risperidone she had significant delusions/paranoia. No behavioral issues currently per staff. One recent fall in November, found next to her wheelchair. Needs assist with ADLs, nonambulatory    Essential hypertension  -140s/60-80s    Chronic obstructive pulmonary disease, unspecified COPD type (H)  No SOB, cough, wheeze, hypoxia      ALLERGIES: Amlodipine  PAST MEDICAL HISTORY:   Past Medical History:   Diagnosis Date     Arthritis      Chronic low back pain 3/17/2015     COPD (chronic obstructive pulmonary disease) (H)      Dementia with psychosis (H) 11/1/2016     Fall 2/27/2013     Falling episodes 9/24/2016     Functional urinary incontinence 3/7/2013     GERD (gastroesophageal reflux disease) 4/3/2018     HTN (hypertension) 4/3/2018     Knee pain 3/7/2013     Major depression, single episode 4/16/2015     Pain in both knees 1/30/2014     Physical deconditioning 3/7/2013     Severe major depression with psychotic features (H) 7/12/2016     Skin tear 3/7/2013     Weakness 6/18/2015      PAST SURGICAL HISTORY:  has no past surgical history on file.  IMMUNIZATIONS:  Immunization History   Administered Date(s)  Administered     COVID-19,PF,Moderna 12/28/2020, 01/25/2021, 11/18/2021     FLU 6-35 months 09/10/2013     Influenza (High Dose) 3 valent vaccine 11/28/2017     Influenza, Quad, High Dose, Pf, 65yr+ (Fluzone HD) 11/02/2021     Pneumo Conj 13-V (2010&after) 12/13/2018     Pneumococcal 23 valent 12/15/2019     Above immunizations pulled from [facility]. MIIC and facility records also reconciled. Outstanding information sent to  to update [facility].  Future immunizations are not needed at this point as all recommended immunizations are up to date.       Current Outpatient Medications:      Acetaminophen (TYLENOL PO), Take 1,000 mg by mouth 2 times daily , Disp: , Rfl:      albuterol (PROAIR HFA/PROVENTIL HFA/VENTOLIN HFA) 108 (90 BASE) MCG/ACT Inhaler, Inhale 2 puffs into the lungs every 4 hours as needed for shortness of breath / dyspnea or wheezing , Disp: , Rfl:      omeprazole (PRILOSEC) 20 MG DR capsule, Take 20 mg by mouth daily, Disp: , Rfl:      risperiDONE (RISPERDAL) 1 MG tablet, Take 1 mg by mouth At Bedtime, Disp: , Rfl:      sertraline (ZOLOFT) 100 MG tablet, Take 1 tablet (100 mg) by mouth At Bedtime, Disp: 31 tablet, Rfl: 98     vitamin D3 (CHOLECALCIFEROL) 2000 units (50 mcg) tablet, Take 1 tablet (2,000 Units) by mouth At Bedtime, Disp: 30 tablet, Rfl:      Case Management:  I have reviewed the facility/SNF care plan/MDS, including the falls risk, nutrition and pain screening. I also reviewed the current immunizations, and preventive care.. Future cancer screening is not clinically indicated secondary to age/goals of care Patient's desire to return to the community is not present. Current Level of Care is appropriate.    Advance Directive Discussion:    I reviewed the current advanced directives as reflected in EPIC, the POLST and the facility chart, and verified the congruency of orders. I contacted the first party and left a message regarding the plan of Care.  I did not due to  cognitive impairment review the advance directives with the resident. Patient's goal is pain control and comfort.    Team Discussion:  I communicated with the appropriate disciplines involved with the Plan of Care:   Nursing    Information reviewed:  Medications, vital signs, orders, and nursing notes.    ROS:  10 point ROS of systems including Constitutional, Eyes, Respiratory, Cardiovascular, Gastroenterology, Genitourinary, Integumentary, Musculoskeletal, Psychiatric were all negative except for pertinent positives noted in my HPI.    Vitals:  BP (!) 146/72   Pulse 75   Temp 96.9  F (36.1  C)   Resp 18   Ht 1.524 m (5')   Wt 73.5 kg (162 lb)   SpO2 96%   BMI 31.64 kg/m   Body mass index is 31.64 kg/m .  Exam:  GENERAL APPEARANCE:  Alert, in no distress  ENT:  Mouth and posterior oropharynx normal, moist mucous membranes, normal hearing acuity  EYES:  EOM, conjunctivae, lids, pupils and irises normal  RESP:  respiratory effort and palpation of chest normal, lungs clear to auscultation , no respiratory distress  CV:  Palpation and auscultation of heart done , regular rate and rhythm, no murmur, rub, or gallop, no edema  ABDOMEN:  normal bowel sounds, soft, nontender, no hepatosplenomegaly or other masses  SKIN:  Inspection of skin and subcutaneous tissue baseline, Palpation of skin and subcutaneous tissue baseline  NEURO:   Cranial nerves 2-12 are normal tested and grossly at patient's baseline  PSYCH:  insight and judgement impaired, memory impaired , affect abnormal flat     Lab/Diagnostic data:   Recent labs in [facility] reviewed by me today.     ASSESSMENT/PLAN  (K21.9) Gastroesophageal reflux disease without esophagitis  (primary encounter diagnosis)  Comment: Failed previous attempt to stop PPI. Her nausea today appears mild  Plan: Continue PPI. Monitor for dyspepsia. Adjust medication as clinically indicated.    (F03.91) Dementia with psychosis (H)  Comment: Chronic, progressive. Requires 24 hour skilled  nursing care. HPI/ROS unreliable with cognitive impairment. Expect further functional and cognitive decline. Expect weight loss.  Plan: Continue 24 hour care. Monitor weight. Monitor functional status. Monitor for behavioral disturbances.    (I10) Essential hypertension  Comment: Chronic, controlled  Plan: Continue current POC with no changes at this time and adjustments as needed.    (J44.9) Chronic obstructive pulmonary disease, unspecified COPD type (H)  Comment: Chronic condition being managed with medications and is currently asymptomatic.  Plan: Continue current POC with no changes at this time and adjustments as needed.      Electronically signed by:  ALCIRA Sweet [facility] Geriatric Services  Phone: [phone]

## 2021-12-15 ENCOUNTER — LAB REQUISITION (OUTPATIENT)
Dept: LAB | Facility: CLINIC | Age: 81
End: 2021-12-15
Payer: COMMERCIAL

## 2021-12-15 DIAGNOSIS — Z29.9 ENCOUNTER FOR PROPHYLACTIC MEASURES, UNSPECIFIED: ICD-10-CM

## 2021-12-15 PROCEDURE — U0003 INFECTIOUS AGENT DETECTION BY NUCLEIC ACID (DNA OR RNA); SEVERE ACUTE RESPIRATORY SYNDROME CORONAVIRUS 2 (SARS-COV-2) (CORONAVIRUS DISEASE [COVID-19]), AMPLIFIED PROBE TECHNIQUE, MAKING USE OF HIGH THROUGHPUT TECHNOLOGIES AS DESCRIBED BY CMS-2020-01-R: HCPCS | Mod: ORL | Performed by: NURSE PRACTITIONER

## 2021-12-16 LAB — SARS-COV-2 RNA RESP QL NAA+PROBE: NEGATIVE

## 2021-12-20 ENCOUNTER — LAB REQUISITION (OUTPATIENT)
Dept: LAB | Facility: CLINIC | Age: 81
End: 2021-12-20
Payer: COMMERCIAL

## 2021-12-20 DIAGNOSIS — Z29.9 ENCOUNTER FOR PROPHYLACTIC MEASURES, UNSPECIFIED: ICD-10-CM

## 2021-12-21 ENCOUNTER — NURSING HOME VISIT (OUTPATIENT)
Dept: GERIATRICS | Facility: CLINIC | Age: 81
End: 2021-12-21
Payer: COMMERCIAL

## 2021-12-21 VITALS
HEIGHT: 60 IN | DIASTOLIC BLOOD PRESSURE: 82 MMHG | SYSTOLIC BLOOD PRESSURE: 138 MMHG | OXYGEN SATURATION: 96 % | WEIGHT: 158.8 LBS | RESPIRATION RATE: 16 BRPM | HEART RATE: 78 BPM | BODY MASS INDEX: 31.18 KG/M2 | TEMPERATURE: 97.6 F

## 2021-12-21 DIAGNOSIS — R68.89 FLUCTUATION OF WEIGHT: ICD-10-CM

## 2021-12-21 DIAGNOSIS — R29.6 RECURRENT FALLS: Primary | ICD-10-CM

## 2021-12-21 DIAGNOSIS — F03.92 DEMENTIA WITH PSYCHOSIS (H): ICD-10-CM

## 2021-12-21 DIAGNOSIS — R53.81 PHYSICAL DECONDITIONING: ICD-10-CM

## 2021-12-21 PROCEDURE — U0005 INFEC AGEN DETEC AMPLI PROBE: HCPCS | Mod: ORL | Performed by: NURSE PRACTITIONER

## 2021-12-21 PROCEDURE — 99309 SBSQ NF CARE MODERATE MDM 30: CPT | Performed by: INTERNAL MEDICINE

## 2021-12-21 ASSESSMENT — MIFFLIN-ST. JEOR: SCORE: 1116.81

## 2021-12-21 NOTE — LETTER
"    12/21/2021        RE: Chelsey Casillas  [facility]  [address] [address]        [place] GERIATRIC SERVICES  PHYSICIAN NOTE    Chief Complaint   Patient presents with     [facility] Regulatory       HPI:    Chelsey Casillas is a 81 year old  (1940), who is being seen today for a federally mandated E/M visit at [facility] .  Admitted to LTC in Sept 2018 from [facility] [address] [facility] [address] [facility]. 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 H/o COPD without O2. She has had intermittent behaviors and falls but overall been quite stable for some time.    Weight continues to fluctuate with ups and downs; in talking with Chelsey about this today she says the food is \"so-so\". Really likes chocolate pudding. Is up watching TV like I often see her but doesn't know what she is watching. Denies concerns for me to address today nor do staff.    ALLERGIES: Amlodipine    Past Medical History:   Diagnosis Date     Arthritis      Chronic low back pain 3/17/2015     COPD (chronic obstructive pulmonary disease) (H)      Dementia with psychosis (H) 11/1/2016     Fall 2/27/2013     Falling episodes 9/24/2016     Functional urinary incontinence 3/7/2013     GERD (gastroesophageal reflux disease) 4/3/2018     HTN (hypertension) 4/3/2018     Knee pain 3/7/2013     Major depression, single episode 4/16/2015     Pain in both knees 1/30/2014     Physical deconditioning 3/7/2013     Severe major depression with psychotic features (H) 7/12/2016     Skin tear 3/7/2013     Weakness 6/18/2015      CODE STATUS: DNR/I    MEDICATIONS: Reviewed and updated in [facility] according to facility MAR  Current Outpatient Medications   Medication Sig Dispense Refill     Acetaminophen (TYLENOL PO) Take 1,000 mg by mouth 2 times daily        albuterol (PROAIR HFA/PROVENTIL HFA/VENTOLIN HFA) " 108 (90 BASE) MCG/ACT Inhaler Inhale 2 puffs into the lungs every 4 hours as needed for shortness of breath / dyspnea or wheezing        omeprazole (PRILOSEC) 20 MG DR capsule Take 20 mg by mouth daily       risperiDONE (RISPERDAL) 1 MG tablet Take 1 mg by mouth At Bedtime       sertraline (ZOLOFT) 100 MG tablet Take 1 tablet (100 mg) by mouth At Bedtime 31 tablet 98     vitamin D3 (CHOLECALCIFEROL) 2000 units (50 mcg) tablet Take 1 tablet (2,000 Units) by mouth At Bedtime 30 tablet        ROS:  Limited secondary to cognitive impairment but today pt reports as above in HPI    Exam:  /82   Pulse 78   Temp 97.6  F (36.4  C)   Resp 16   Ht 1.524 m (5')   Wt 72 kg (158 lb 12.8 oz)   SpO2 96%   BMI 31.01 kg/m    Alert, pleasant, casually dressed, nicely groomed, sitting up in wheelchair watching TV  No scleral icterus  Moist oral mucosa  Breathing non-labored on RA, no cough  No edema  Mood euthymic, content    Lab/Diagnostic Data:    Several negative COVID-19 screens for facility protocol    ASSESSMENT/PLAN:  (R29.6) Recurrent falls  (primary encounter diagnosis)  (R53.81) Physical deconditioning  No recent injuries thankfully, non-ambulatory and may attempt self transfer  PCP gets updates on falls rather than I and I do see in her notes a fall in August as well as November  Continue staff fall risk reduction techniques    (R68.89) Fluctuation of weight  Long standing ups and downs of weight  Recently down from 160s to 150s# (but over the years I see wide range from as low as 130s to as high as 180s#)  Doses of medications in the past could contribute to weight fluctuations (ex: see below on Risperidone) as could her progressive chronic disease and dementia  Is on PPI which has proven beneficial based on symptom report and we're not checking routine labs d/t comfort goals of care  She doesn't tend to like the main meals but is offered snacks she likes including: Ice cream, pudding and has orders for an  afternoon sandwich daily at 3 pm    (F03.91) Dementia with psychosis (H)  I've not ever witnessed paranoia or behaviors but do know per reports they occur and her Risperidone doses have been adjusted up and down over time  Now seems content, not sedated, on current dose of Risperidone 1 mg at bedtime and Sertraline 100 mg daily  She has h/o troublesome/scary delusions we want to avoid  Followed as well by [facility] in house psychiatrist Dr. Hutchins last seen in November without adjustments deemed appropriate in her regimen at that time        Electronically signed by:  Rina Gardner,         Sincerely,        Rina Gardner, DO

## 2021-12-22 LAB — SARS-COV-2 RNA RESP QL NAA+PROBE: NEGATIVE

## 2021-12-30 ENCOUNTER — LAB REQUISITION (OUTPATIENT)
Dept: LAB | Facility: CLINIC | Age: 81
End: 2021-12-30
Payer: COMMERCIAL

## 2021-12-30 DIAGNOSIS — Z29.9 ENCOUNTER FOR PROPHYLACTIC MEASURES, UNSPECIFIED: ICD-10-CM

## 2021-12-30 PROCEDURE — U0005 INFEC AGEN DETEC AMPLI PROBE: HCPCS | Mod: ORL | Performed by: NURSE PRACTITIONER

## 2021-12-31 LAB — SARS-COV-2 RNA RESP QL NAA+PROBE: NEGATIVE

## 2022-01-04 ENCOUNTER — LAB REQUISITION (OUTPATIENT)
Dept: LAB | Facility: CLINIC | Age: 82
End: 2022-01-04
Payer: COMMERCIAL

## 2022-01-04 DIAGNOSIS — Z29.9 ENCOUNTER FOR PROPHYLACTIC MEASURES, UNSPECIFIED: ICD-10-CM

## 2022-01-04 PROCEDURE — U0003 INFECTIOUS AGENT DETECTION BY NUCLEIC ACID (DNA OR RNA); SEVERE ACUTE RESPIRATORY SYNDROME CORONAVIRUS 2 (SARS-COV-2) (CORONAVIRUS DISEASE [COVID-19]), AMPLIFIED PROBE TECHNIQUE, MAKING USE OF HIGH THROUGHPUT TECHNOLOGIES AS DESCRIBED BY CMS-2020-01-R: HCPCS | Mod: ORL | Performed by: NURSE PRACTITIONER

## 2022-01-05 LAB — SARS-COV-2 RNA RESP QL NAA+PROBE: NEGATIVE

## 2022-01-07 NOTE — PROGRESS NOTES
"[facility]  PHYSICIAN NOTE    Chief Complaint   Patient presents with     residential Regulatory       HPI:    Chelsey Casillas is a 81 year old  (1940), who is being seen today for a federally mandated E/M visit at [facility] .  Admitted to LTC in Sept 2018 from [facility]. 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 H/o COPD without O2. She has had intermittent behaviors and falls but overall been quite stable for some time.    Weight continues to fluctuate with ups and downs; in talking with Chelsey about this today she says the food is \"so-so\". Really likes chocolate pudding. Is up watching TV like I often see her but doesn't know what she is watching. Denies concerns for me to address today nor do staff.    ALLERGIES: Amlodipine    Past Medical History:   Diagnosis Date     Arthritis      Chronic low back pain 3/17/2015     COPD (chronic obstructive pulmonary disease) (H)      Dementia with psychosis (H) 11/1/2016     Fall 2/27/2013     Falling episodes 9/24/2016     Functional urinary incontinence 3/7/2013     GERD (gastroesophageal reflux disease) 4/3/2018     HTN (hypertension) 4/3/2018     Knee pain 3/7/2013     Major depression, single episode 4/16/2015     Pain in both knees 1/30/2014     Physical deconditioning 3/7/2013     Severe major depression with psychotic features (H) 7/12/2016     Skin tear 3/7/2013     Weakness 6/18/2015      CODE STATUS: DNR/I    MEDICATIONS: Reviewed and updated in [facility] according to facility MAR  Current Outpatient Medications   Medication Sig Dispense Refill     Acetaminophen (TYLENOL PO) Take 1,000 mg by mouth 2 times daily        albuterol (PROAIR HFA/PROVENTIL HFA/VENTOLIN HFA) 108 (90 BASE) MCG/ACT Inhaler Inhale 2 puffs into the lungs every 4 hours as needed for shortness of breath / " dyspnea or wheezing        omeprazole (PRILOSEC) 20 MG DR capsule Take 20 mg by mouth daily       risperiDONE (RISPERDAL) 1 MG tablet Take 1 mg by mouth At Bedtime       sertraline (ZOLOFT) 100 MG tablet Take 1 tablet (100 mg) by mouth At Bedtime 31 tablet 98     vitamin D3 (CHOLECALCIFEROL) 2000 units (50 mcg) tablet Take 1 tablet (2,000 Units) by mouth At Bedtime 30 tablet        ROS:  Limited secondary to cognitive impairment but today pt reports as above in HPI    Exam:  /82   Pulse 78   Temp 97.6  F (36.4  C)   Resp 16   Ht 1.524 m (5')   Wt 72 kg (158 lb 12.8 oz)   SpO2 96%   BMI 31.01 kg/m    Alert, pleasant, casually dressed, nicely groomed, sitting up in wheelchair watching TV  No scleral icterus  Moist oral mucosa  Breathing non-labored on RA, no cough  No edema  Mood euthymic, content    Lab/Diagnostic Data:    Several negative COVID-19 screens for facility protocol    ASSESSMENT/PLAN:  (R29.6) Recurrent falls  (primary encounter diagnosis)  (R53.81) Physical deconditioning  No recent injuries thankfully, non-ambulatory and may attempt self transfer  PCP gets updates on falls rather than I and I do see in her notes a fall in August as well as November  Continue staff fall risk reduction techniques    (R68.89) Fluctuation of weight  Long standing ups and downs of weight  Recently down from 160s to 150s# (but over the years I see wide range from as low as 130s to as high as 180s#)  Doses of medications in the past could contribute to weight fluctuations (ex: see below on Risperidone) as could her progressive chronic disease and dementia  Is on PPI which has proven beneficial based on symptom report and we're not checking routine labs d/t comfort goals of care  She doesn't tend to like the main meals but is offered snacks she likes including: Ice cream, pudding and has orders for an afternoon sandwich daily at 3 pm    (F03.91) Dementia with psychosis (H)  I've not ever witnessed paranoia or  behaviors but do know per reports they occur and her Risperidone doses have been adjusted up and down over time  Now seems content, not sedated, on current dose of Risperidone 1 mg at bedtime and Sertraline 100 mg daily  She has h/o troublesome/scary delusions we want to avoid  Followed as well by [facility] in house psychiatrist Dr. Hutchins last seen in November without adjustments deemed appropriate in her regimen at that time        Electronically signed by:  Rina Gardner, DO

## 2022-01-11 ENCOUNTER — LAB REQUISITION (OUTPATIENT)
Dept: LAB | Facility: CLINIC | Age: 82
End: 2022-01-11
Payer: COMMERCIAL

## 2022-01-11 DIAGNOSIS — Z29.9 ENCOUNTER FOR PROPHYLACTIC MEASURES, UNSPECIFIED: ICD-10-CM

## 2022-01-11 PROCEDURE — U0003 INFECTIOUS AGENT DETECTION BY NUCLEIC ACID (DNA OR RNA); SEVERE ACUTE RESPIRATORY SYNDROME CORONAVIRUS 2 (SARS-COV-2) (CORONAVIRUS DISEASE [COVID-19]), AMPLIFIED PROBE TECHNIQUE, MAKING USE OF HIGH THROUGHPUT TECHNOLOGIES AS DESCRIBED BY CMS-2020-01-R: HCPCS | Mod: ORL | Performed by: NURSE PRACTITIONER

## 2022-01-12 LAB — SARS-COV-2 RNA RESP QL NAA+PROBE: NEGATIVE

## 2022-01-17 ENCOUNTER — LAB REQUISITION (OUTPATIENT)
Dept: LAB | Facility: CLINIC | Age: 82
End: 2022-01-17
Payer: COMMERCIAL

## 2022-01-17 DIAGNOSIS — Z29.9 ENCOUNTER FOR PROPHYLACTIC MEASURES, UNSPECIFIED: ICD-10-CM

## 2022-01-18 PROCEDURE — U0005 INFEC AGEN DETEC AMPLI PROBE: HCPCS | Mod: ORL | Performed by: NURSE PRACTITIONER

## 2022-01-19 LAB — SARS-COV-2 RNA RESP QL NAA+PROBE: NEGATIVE

## 2022-01-24 ENCOUNTER — LAB REQUISITION (OUTPATIENT)
Dept: LAB | Facility: CLINIC | Age: 82
End: 2022-01-24
Payer: COMMERCIAL

## 2022-01-24 DIAGNOSIS — Z29.9 ENCOUNTER FOR PROPHYLACTIC MEASURES, UNSPECIFIED: ICD-10-CM

## 2022-01-25 PROCEDURE — U0005 INFEC AGEN DETEC AMPLI PROBE: HCPCS | Mod: ORL | Performed by: NURSE PRACTITIONER

## 2022-01-26 LAB — SARS-COV-2 RNA RESP QL NAA+PROBE: NEGATIVE

## 2022-01-31 ENCOUNTER — LAB REQUISITION (OUTPATIENT)
Dept: LAB | Facility: CLINIC | Age: 82
End: 2022-01-31
Payer: COMMERCIAL

## 2022-01-31 DIAGNOSIS — Z29.9 ENCOUNTER FOR PROPHYLACTIC MEASURES, UNSPECIFIED: ICD-10-CM

## 2022-02-01 LAB — SARS-COV-2 RNA RESP QL NAA+PROBE: NORMAL

## 2022-02-01 PROCEDURE — U0005 INFEC AGEN DETEC AMPLI PROBE: HCPCS | Mod: ORL | Performed by: NURSE PRACTITIONER

## 2022-02-02 LAB — SARS-COV-2 RNA RESP QL NAA+PROBE: NOT DETECTED

## 2022-02-07 ENCOUNTER — NURSING HOME VISIT (OUTPATIENT)
Dept: GERIATRICS | Facility: CLINIC | Age: 82
End: 2022-02-07
Payer: COMMERCIAL

## 2022-02-07 VITALS
BODY MASS INDEX: 31.41 KG/M2 | HEART RATE: 94 BPM | SYSTOLIC BLOOD PRESSURE: 139 MMHG | RESPIRATION RATE: 18 BRPM | OXYGEN SATURATION: 96 % | HEIGHT: 60 IN | WEIGHT: 160 LBS | DIASTOLIC BLOOD PRESSURE: 77 MMHG | TEMPERATURE: 97.2 F

## 2022-02-07 DIAGNOSIS — I10 ESSENTIAL HYPERTENSION: ICD-10-CM

## 2022-02-07 DIAGNOSIS — K21.9 GASTROESOPHAGEAL REFLUX DISEASE WITHOUT ESOPHAGITIS: ICD-10-CM

## 2022-02-07 DIAGNOSIS — J44.9 CHRONIC OBSTRUCTIVE PULMONARY DISEASE, UNSPECIFIED COPD TYPE (H): Primary | ICD-10-CM

## 2022-02-07 DIAGNOSIS — F03.92 DEMENTIA WITH PSYCHOSIS (H): ICD-10-CM

## 2022-02-07 PROCEDURE — 99309 SBSQ NF CARE MODERATE MDM 30: CPT | Performed by: NURSE PRACTITIONER

## 2022-02-07 ASSESSMENT — MIFFLIN-ST. JEOR: SCORE: 1117.26

## 2022-02-07 NOTE — NURSING NOTE
[facility]      Chief Complaint   Patient presents with     residential Regulatory      [place] Medical Record Number:  [id number]  Place of Service where encounter took place:  [facility] () [[id number]]     HPI:    Chelsey Casillas is 82 year old (1940) with a history of HTN, GERD, dementia with psychosis, recurrent falls, OA, and COPD, who is being seen today for a federally mandated E/M visit. She welcomes my visit today. Assessment is limited by patient's cognitive status.    Today's concerns are:  Dementia with psychosis (H)  (primary encounter diagnosis)  BIMS 10/15. History of paranoia and hallucinations. On Risperidone 1 mg at bedtime and Sertraline 100 mg at bedtime. She is alert, no concern of sedation per staff or patient report. Last seen by Dr. Hutchins, the in-house psychiatrist in November without any adjustments at the time. Medications have not been adjusted since July 2021. No behavioral disturbances or hallucinations noted per patient or staff.    Gastroesophageal reflux disease without esophagitis  Intermittent nausea with rare emesis per patient and staff report. On Omperazole 20 mg DR daily.    Essential hypertension  -130s/60-80s.    Chronic obstructive pulmonary disease, unspecified COPD type (H)  Denies cough, shortness of breath, wheezing. No hypoxia.    Recurrent falls  Most recent fall 11/21. She is non-ambulatory and attempts to self-transfer. Did not sustain any injuries.     ALLERGIES:Amlodipine    PAST MEDICAL HISTORY:   Past Medical History        Past Medical History:   Diagnosis Date     Arthritis       Chronic low back pain 3/17/2015     COPD (chronic obstructive pulmonary disease) (H)       Dementia with psychosis (H) 11/1/2016     Fall 2/27/2013     Falling episodes 9/24/2016     Functional urinary incontinence 3/7/2013     GERD (gastroesophageal reflux disease) 4/3/2018     HTN (hypertension) 4/3/2018     Knee pain 3/7/2013     Major  depression, single episode 4/16/2015     Pain in both knees 1/30/2014     Physical deconditioning 3/7/2013     Severe major depression with psychotic features (H) 7/12/2016     Skin tear 3/7/2013     Weakness 6/18/2015         PAST SURGICAL HISTORY:   has no past surgical history on file.  FAMILY HISTORY: Family history is unknown by patient.  SOCIAL HISTORY:  reports that she has quit smoking. Her smoking use included cigarettes. She has never used smokeless tobacco. She reports that she does not drink alcohol and does not use drugs.     MEDICATIONS:          Review of your medicines                Accurate as of February 7, 2022 11:58 AM. If you have any questions, ask your nurse or doctor.                      CONTINUE these medicines which have NOT CHANGED      Dose / Directions   albuterol 108 (90 Base) MCG/ACT inhaler  Commonly known as: PROAIR HFA/PROVENTIL HFA/VENTOLIN HFA       Dose: 2 puff  Inhale 2 puffs into the lungs every 4 hours as needed for shortness of breath / dyspnea or wheezing  Refills: 0      omeprazole 20 MG DR capsule  Commonly known as: priLOSEC       Dose: 20 mg  Take 20 mg by mouth daily  Refills: 0      risperiDONE 1 MG tablet  Commonly known as: risperDAL       Dose: 1 mg  Take 1 mg by mouth At Bedtime  Refills: 0      sertraline 100 MG tablet  Commonly known as: ZOLOFT  Used for: Recurrent major depressive disorder, in partial remission (H)       Dose: 100 mg  Take 1 tablet (100 mg) by mouth At Bedtime  Quantity: 31 tablet  Refills: 98      TYLENOL PO       Dose: 1,000 mg  Take 1,000 mg by mouth 2 times daily  Refills: 0      vitamin D3 50 mcg (2000 units) tablet  Commonly known as: CHOLECALCIFEROL  Used for: Vitamin D deficiency       Dose: 1 tablet  Take 1 tablet (2,000 Units) by mouth At Bedtime  Quantity: 30 tablet  Refills: 0          Case Management:  I have reviewed the care plan and MDS and do agree with the plan. Patient's desire to return to the community is present, but is  not able due to care needs . Information reviewed:  Medications, vital signs, orders, and nursing notes.     ROS:  Limited secondary to cognitive impairment but today pt reports 10 point ROS of systems including Constitutional, Eyes, Respiratory, Cardiovascular, Gastroenterology, Genitourinary, Integumentary, Musculoskeletal, Psychiatric were all negative except for pertinent positives noted in my HPI.     Vitals:  /77   Pulse 94   Temp 97.2  F (36.2  C)   Resp 18   Ht 1.524 m (5')   Wt 72.6 kg (160 lb)   SpO2 96%   BMI 31.25 kg/m    Body mass index is 31.25 kg/m .  Exam:  GENERAL APPEARANCE:  Alert, in no distress, cooperative  EYES:  EOM normal, conjunctiva and lids normal  RESP:  respiratory effort and palpation of chest normal, lungs clear to auscultation , no respiratory distress  CV:  Palpation and auscultation of heart done , regular rate and rhythm, no murmur, rub, or gallop, no edema  SKIN:  Inspection of skin and subcutaneous tissue baseline, Palpation of skin and subcutaneous tissue baseline  PSYCH:  insight and judgement impaired, memory impaired      Lab/Diagnostic data:   Recent labs in [facility] reviewed by me today.      ASSESSMENT/PLAN  (F03.91) Dementia with psychosis (H)  (primary encounter diagnosis)  Comment: Chronic, progressive. Requires 24 hr skilled nursing care. HPI/ROS unreliable with cognitive impairment. Functional and cognitive decline expected, as well as weight loss.  Plan: Continue 24 hr care. Monitor weight, functional status, and behavioral disturbances.    (K21.9) Gastroesophageal reflux disease without esophagitis  Comment: Continues to have nausea, rare emesis.  Plan: Continue PPI. Monitor for dyspepsia. Adjust medication as clinically indicated.    (I10) Essential hypertension  Comment: Chronic, controlled. Not on any antihypertensives at this time.  Plan: Continue current POC with no changes at this time and adjustments as needed.    (J44.9) Chronic obstructive  pulmonary disease, unspecified COPD type (H)  Comment: Chronic, controlled with current medications. Currently asymptomatic.  Plan: Continue with current POC with no changes at this time and adjustments as needed.    (R29.6) Recurrent falls  Comment: Most recent fall in November. Fortunately, did not sustain any injuries.  Plan: Continue diligent fall precautions.    Electronically signed by:  Reji Carmona DNP Student  February 7, 2022 3:56 PM

## 2022-02-07 NOTE — PROGRESS NOTES
[facility]  Chief Complaint   Patient presents with     group home Regulatory     [place] Medical Record Number:  [id number]  Place of Service where encounter took place:  [facility] () [[id number]]    HPI:    Chelsey Casillas  is 82 year old (1940), who is being seen today for a federally mandated E/M visit.     Today's concerns are:  Chronic obstructive pulmonary disease, unspecified COPD type (H)  No cough, SOB, wheezing, hypoxia    Dementia with psychosis (H)  BIMS 10/15. She takes risperidone for history of paranoia and hallucinations. Previous GDR attempt failed, resulted in recurrent hallucinations and distress. No current behavioral issues per staff. She is followed by in house psych.    Essential hypertension  -130s/60-80s    Gastroesophageal reflux disease without esophagitis  Patient reports intermittent nausea, certainly not on a daily basis. PPI was started some time ago for daily complaints of dyspepsia.       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  FAMILY HISTORY: Family history is unknown by patient.  SOCIAL HISTORY:  reports that she has quit smoking. Her smoking use included cigarettes. She has never used smokeless tobacco. She reports that she does not drink alcohol and does not  use drugs.    MEDICATIONS:  Current Outpatient Medications   Medication Sig Dispense Refill     Acetaminophen (TYLENOL PO) Take 1,000 mg by mouth 2 times daily        albuterol (PROAIR HFA/PROVENTIL HFA/VENTOLIN HFA) 108 (90 BASE) MCG/ACT Inhaler Inhale 2 puffs into the lungs every 4 hours as needed for shortness of breath / dyspnea or wheezing        omeprazole (PRILOSEC) 20 MG DR capsule Take 20 mg by mouth daily       risperiDONE (RISPERDAL) 1 MG tablet Take 1 mg by mouth At Bedtime       sertraline (ZOLOFT) 100 MG tablet Take 1 tablet (100 mg) by mouth At Bedtime 31 tablet 98     vitamin D3 (CHOLECALCIFEROL) 2000 units (50 mcg) tablet Take 1 tablet (2,000 Units) by mouth At Bedtime 30 tablet        Case Management:  I have reviewed the care plan and MDS and do agree with the plan. Patient's desire to return to the community is present, but is not able due to care needs . Information reviewed:  Medications, vital signs, orders, and nursing notes.    ROS:  Limited secondary to cognitive impairment but today pt reports 10 point ROS of systems including Constitutional, Eyes, Respiratory, Cardiovascular, Gastroenterology, Genitourinary, Integumentary, Musculoskeletal, Psychiatric were all negative except for pertinent positives noted in my HPI.    Vitals:  /77   Pulse 94   Temp 97.2  F (36.2  C)   Resp 18   Ht 1.524 m (5')   Wt 72.6 kg (160 lb)   SpO2 96%   BMI 31.25 kg/m    Body mass index is 31.25 kg/m .  Exam:  GENERAL APPEARANCE:  Alert, in no distress, cooperative  ENT:  Mouth and posterior oropharynx normal, moist mucous membranes  EYES:  EOM normal, conjunctiva and lids normal  RESP:  respiratory effort and palpation of chest normal, lungs clear to auscultation , no respiratory distress  CV:  Palpation and auscultation of heart done , regular rate and rhythm, no murmur, rub, or gallop, no edema  ABDOMEN:  bowel sounds normal, soft, non-tender  SKIN:  Inspection of skin and subcutaneous tissue  baseline, Palpation of skin and subcutaneous tissue baseline  PSYCH:  insight and judgement impaired, memory impaired , affect abnormal flat    Lab/Diagnostic data:   Recent labs in [facility] reviewed by me today.       ASSESSMENT/PLAN  (J44.9) Chronic obstructive pulmonary disease, unspecified COPD type (H)  (primary encounter diagnosis)  Comment: Chronic condition being managed with medications and is currently asymptomatic.  Plan: Continue current POC with no changes at this time and adjustments as needed.    (F03.91) Dementia with psychosis (H)  Comment: Chronic, progressive. Requires 24 hour skilled nursing care. Expect further functional and cognitive decline. Expect weight loss. Psychotic symptoms controlled on risperidone at lowest effective dose with no side effects noted. Benefits of current treatment outweigh risks. GDR not recommended as this could result in emotional harm.   Plan: Continue 24 hour care. Monitor weight. Monitor functional status. Monitor for behavioral disturbances.    (I10) Essential hypertension  Comment: Chronic, controlled. To avoid risk of hypotension, falls, dizziness and tissue hypoperfusion, recommend  BP goal is < 150/90mmHg.  Plan: Continue current POC with no changes at this time and adjustments as needed.    (K21.9) Gastroesophageal reflux disease without esophagitis  Comment: Nausea appears to be intermittent and mild. If this were to become a daily complaints, would consider increasing PPI to BID.   Plan: Monitor dyspepsia      Electronically signed by:  ALCIRA Sweet [facility]  Phone: [phone]

## 2022-02-07 NOTE — LETTER
2/7/2022        RE: Chelsey Casillas  [facility]  [address] [address]        [facility] GERIATRICS  Chief Complaint   Patient presents with     jail Regulatory     [place] Medical Record Number:  [id number]  Place of Service where encounter took place:  [facility] () [[id number]]    HPI:    Chelsey Casillas  is 82 year old (1940), who is being seen today for a federally mandated E/M visit.     Today's concerns are:  Chronic obstructive pulmonary disease, unspecified COPD type (H)  No cough, SOB, wheezing, hypoxia    Dementia with psychosis (H)  BIMS 10/15. She takes risperidone for history of paranoia and hallucinations. Previous GDR attempt failed, resulted in recurrent hallucinations and distress. No current behavioral issues per staff. She is followed by in house psych.    Essential hypertension  -130s/60-80s    Gastroesophageal reflux disease without esophagitis  Patient reports intermittent nausea, certainly not on a daily basis. PPI was started some time ago for daily complaints of dyspepsia.       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  FAMILY HISTORY: Family history is unknown by patient.  SOCIAL HISTORY:  reports that she has quit smoking. Her smoking use  included cigarettes. She has never used smokeless tobacco. She reports that she does not drink alcohol and does not use drugs.    MEDICATIONS:  Current Outpatient Medications   Medication Sig Dispense Refill     Acetaminophen (TYLENOL PO) Take 1,000 mg by mouth 2 times daily        albuterol (PROAIR HFA/PROVENTIL HFA/VENTOLIN HFA) 108 (90 BASE) MCG/ACT Inhaler Inhale 2 puffs into the lungs every 4 hours as needed for shortness of breath / dyspnea or wheezing        omeprazole (PRILOSEC) 20 MG DR capsule Take 20 mg by mouth daily       risperiDONE (RISPERDAL) 1 MG tablet Take 1 mg by mouth At Bedtime       sertraline (ZOLOFT) 100 MG tablet Take 1 tablet (100 mg) by mouth At Bedtime 31 tablet 98     vitamin D3 (CHOLECALCIFEROL) 2000 units (50 mcg) tablet Take 1 tablet (2,000 Units) by mouth At Bedtime 30 tablet        Case Management:  I have reviewed the care plan and MDS and do agree with the plan. Patient's desire to return to the community is present, but is not able due to care needs . Information reviewed:  Medications, vital signs, orders, and nursing notes.    ROS:  Limited secondary to cognitive impairment but today pt reports 10 point ROS of systems including Constitutional, Eyes, Respiratory, Cardiovascular, Gastroenterology, Genitourinary, Integumentary, Musculoskeletal, Psychiatric were all negative except for pertinent positives noted in my HPI.    Vitals:  /77   Pulse 94   Temp 97.2  F (36.2  C)   Resp 18   Ht 1.524 m (5')   Wt 72.6 kg (160 lb)   SpO2 96%   BMI 31.25 kg/m    Body mass index is 31.25 kg/m .  Exam:  GENERAL APPEARANCE:  Alert, in no distress, cooperative  ENT:  Mouth and posterior oropharynx normal, moist mucous membranes  EYES:  EOM normal, conjunctiva and lids normal  RESP:  respiratory effort and palpation of chest normal, lungs clear to auscultation , no respiratory distress  CV:  Palpation and auscultation of heart done , regular rate and rhythm, no murmur, rub, or  gallop, no edema  ABDOMEN:  bowel sounds normal, soft, non-tender  SKIN:  Inspection of skin and subcutaneous tissue baseline, Palpation of skin and subcutaneous tissue baseline  PSYCH:  insight and judgement impaired, memory impaired , affect abnormal flat    Lab/Diagnostic data:   Recent labs in [facility] reviewed by me today.       ASSESSMENT/PLAN  (J44.9) Chronic obstructive pulmonary disease, unspecified COPD type (H)  (primary encounter diagnosis)  Comment: Chronic condition being managed with medications and is currently asymptomatic.  Plan: Continue current POC with no changes at this time and adjustments as needed.    (F03.91) Dementia with psychosis (H)  Comment: Chronic, progressive. Requires 24 hour skilled nursing care. Expect further functional and cognitive decline. Expect weight loss. Psychotic symptoms controlled on risperidone at lowest effective dose with no side effects noted. Benefits of current treatment outweigh risks. GDR not recommended as this could result in emotional harm.   Plan: Continue 24 hour care. Monitor weight. Monitor functional status. Monitor for behavioral disturbances.    (I10) Essential hypertension  Comment: Chronic, controlled. To avoid risk of hypotension, falls, dizziness and tissue hypoperfusion, recommend  BP goal is < 150/90mmHg.  Plan: Continue current POC with no changes at this time and adjustments as needed.    (K21.9) Gastroesophageal reflux disease without esophagitis  Comment: Nausea appears to be intermittent and mild. If this were to become a daily complaints, would consider increasing PPI to BID.   Plan: Monitor dyspepsia      Electronically signed by:  ALCIRA Sweet [facility]  Phone: [phone]

## 2022-02-17 PROBLEM — Z76.89 HEALTH CARE HOME: Status: RESOLVED | Noted: 2018-08-15 | Resolved: 2018-10-03

## 2022-04-05 ENCOUNTER — NURSING HOME VISIT (OUTPATIENT)
Dept: GERIATRICS | Facility: CLINIC | Age: 82
End: 2022-04-05
Payer: COMMERCIAL

## 2022-04-05 VITALS
BODY MASS INDEX: 31.41 KG/M2 | HEART RATE: 78 BPM | HEIGHT: 60 IN | WEIGHT: 160 LBS | RESPIRATION RATE: 18 BRPM | DIASTOLIC BLOOD PRESSURE: 78 MMHG | OXYGEN SATURATION: 96 % | SYSTOLIC BLOOD PRESSURE: 135 MMHG | TEMPERATURE: 97.2 F

## 2022-04-05 DIAGNOSIS — F03.92 DEMENTIA WITH PSYCHOSIS (H): ICD-10-CM

## 2022-04-05 DIAGNOSIS — F33.9 RECURRENT MAJOR DEPRESSIVE DISORDER, REMISSION STATUS UNSPECIFIED (H): Primary | ICD-10-CM

## 2022-04-05 DIAGNOSIS — J44.9 CHRONIC OBSTRUCTIVE PULMONARY DISEASE, UNSPECIFIED COPD TYPE (H): ICD-10-CM

## 2022-04-05 PROCEDURE — 99309 SBSQ NF CARE MODERATE MDM 30: CPT | Performed by: INTERNAL MEDICINE

## 2022-04-05 NOTE — PROGRESS NOTES
[facility]  PHYSICIAN NOTE    Chief Complaint   Patient presents with     [facility] Regulatory       HPI:    Chelsey Casillas is a 82 year old  (1940), who is being seen today for a federally mandated E/M visit at [facility]. Admitted to LTC in Sept 2018 from [facility]. 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 H/o COPD without O2. She has had intermittent behaviors and falls but overall been quite stable for some time.    Chelsey's weight and vitals currently remain stable; note past h/o some weight fluctuations. PPI has seemed to help some past reports of GI upset. She also is a picky eater though does get supplements and snacks as desired. Has an active order for a sandwich at 3 PM every afternoon. Today I see her in her room sitting up by TV watching a food show but not really engaged in it. Does have remnants of her afternoon sandwich and actually ate about 3/4 of the sandwich. Allows me to do exam. Denies questions or concerns for me medically regarding her care.    ALLERGIES: Amlodipine    Past Medical History:   Diagnosis Date     Arthritis      Chronic low back pain 3/17/2015     COPD (chronic obstructive pulmonary disease) (H)      Dementia with psychosis (H) 11/1/2016     Fall 2/27/2013     Falling episodes 9/24/2016     Functional urinary incontinence 3/7/2013     GERD (gastroesophageal reflux disease) 4/3/2018     HTN (hypertension) 4/3/2018     Knee pain 3/7/2013     Major depression, single episode 4/16/2015     Pain in both knees 1/30/2014     Physical deconditioning 3/7/2013     Severe major depression with psychotic features (H) 7/12/2016     Skin tear 3/7/2013     Weakness 6/18/2015      CODE STATUS: DNR/I    MEDICATIONS: Reviewed and updated in [facility] according to facility MAR  Current Outpatient Medications    Medication Sig Dispense Refill     Acetaminophen (TYLENOL PO) Take 1,000 mg by mouth 2 times daily       albuterol (PROAIR HFA/PROVENTIL HFA/VENTOLIN HFA) 108 (90 BASE) MCG/ACT Inhaler Inhale 2 puffs into the lungs every 4 hours as needed for shortness of breath / dyspnea or wheezing        omeprazole (PRILOSEC) 20 MG DR capsule Take 20 mg by mouth daily       risperiDONE (RISPERDAL) 1 MG tablet Take 1 mg by mouth At Bedtime       sertraline (ZOLOFT) 100 MG tablet Take 1 tablet (100 mg) by mouth At Bedtime 31 tablet 98     vitamin D3 (CHOLECALCIFEROL) 2000 units (50 mcg) tablet Take 1 tablet (2,000 Units) by mouth At Bedtime 30 tablet        ROS:  Limited secondary to cognitive impairment but today pt reports as above in HPI    Exam:  /78   Pulse 78   Temp 97.2  F (36.2  C)   Resp 18   Ht 1.524 m (5')   Wt 72.6 kg (160 lb)   SpO2 96%   BMI 31.25 kg/m    Alert, pleasant, NAD, casually dressed sitting up in WC without odor  Vague historian but seems calm, content, not sedated  No tremor, normal speech  Breathing non-labored on RA  No edema    Lab/Diagnostic Data:    Several negative COVID-19 screening tests per facility protocol; no recent routine phlebotomy/imaging d/t overall comfort based approach to care    ASSESSMENT/PLAN:  (F33.9) Recurrent major depressive disorder, remission status unspecified (H)  (primary encounter diagnosis)  (F03.91) Dementia with psychosis (H)  Stable now as of late on combination Sertraline and Risperidone  Has had dose adjustments up and down of Risperidone in the past  Is followed by on site ACP psychiatrist Dr. Hutchins last seen in March noting stability on this regimen and I agree for now to continue as is given stability, comfort based approach to care and concern of decompensation should medications be further adjusted though always keep potential for GDR in mind  She is never lethargic/sedated when I see her and is almost always up in WC in her room  Weight and vitals  currently have been stable for her    (J44.9) Chronic obstructive pulmonary disease, unspecified COPD type (H)  Stable for quite some time without exacerbation; has PRN Albuterol available but no need for controller Rx therapy at this time given long time clinical stability      Electronically signed by:  Rina Gardner DO

## 2022-04-05 NOTE — LETTER
4/5/2022        RE: Chelsey Casillas  [facility] [place]  [address] [address]        [facility]  PHYSICIAN NOTE    Chief Complaint   Patient presents with     [facility]idential Regulatory       HPI:    Chelsey Casillas is a 82 year old  (1940), who is being seen today for a federally mandated E/M visit at [facility]. Admitted to LT in Sept 2018 from [facility] on [facility]. 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 H/o COPD without O2. She has had intermittent behaviors and falls but overall been quite stable for some time.    Chelsey's weight and vitals currently remain stable; note past h/o some weight fluctuations. PPI has seemed to help some past reports of GI upset. She also is a picky eater though does get supplements and snacks as desired. Has an active order for a sandwich at 3 PM every afternoon. Today I see her in her room sitting up by TV watching a food show but not really engaged in it. Does have remnants of her afternoon sandwich and actually ate about 3/4 of the sandwich. Allows me to do exam. Denies questions or concerns for me medically regarding her care.    ALLERGIES: Amlodipine    Past Medical History:   Diagnosis Date     Arthritis      Chronic low back pain 3/17/2015     COPD (chronic obstructive pulmonary disease) (H)      Dementia with psychosis (H) 11/1/2016     Fall 2/27/2013     Falling episodes 9/24/2016     Functional urinary incontinence 3/7/2013     GERD (gastroesophageal reflux disease) 4/3/2018     HTN (hypertension) 4/3/2018     Knee pain 3/7/2013     Major depression, single episode 4/16/2015     Pain in both knees 1/30/2014     Physical deconditioning 3/7/2013     Severe major depression with psychotic features (H) 7/12/2016     Skin tear 3/7/2013     Weakness 6/18/2015      CODE STATUS:  DNR/I    MEDICATIONS: Reviewed and updated in Epic according to facility MAR  Current Outpatient Medications   Medication Sig Dispense Refill     Acetaminophen (TYLENOL PO) Take 1,000 mg by mouth 2 times daily       albuterol (PROAIR HFA/PROVENTIL HFA/VENTOLIN HFA) 108 (90 BASE) MCG/ACT Inhaler Inhale 2 puffs into the lungs every 4 hours as needed for shortness of breath / dyspnea or wheezing        omeprazole (PRILOSEC) 20 MG DR capsule Take 20 mg by mouth daily       risperiDONE (RISPERDAL) 1 MG tablet Take 1 mg by mouth At Bedtime       sertraline (ZOLOFT) 100 MG tablet Take 1 tablet (100 mg) by mouth At Bedtime 31 tablet 98     vitamin D3 (CHOLECALCIFEROL) 2000 units (50 mcg) tablet Take 1 tablet (2,000 Units) by mouth At Bedtime 30 tablet        ROS:  Limited secondary to cognitive impairment but today pt reports as above in HPI    Exam:  /78   Pulse 78   Temp 97.2  F (36.2  C)   Resp 18   Ht 1.524 m (5')   Wt 72.6 kg (160 lb)   SpO2 96%   BMI 31.25 kg/m    Alert, pleasant, NAD, casually dressed sitting up in WC without odor  Vague historian but seems calm, content, not sedated  No tremor, normal speech  Breathing non-labored on RA  No edema    Lab/Diagnostic Data:    Several negative COVID-19 screening tests per facility protocol; no recent routine phlebotomy/imaging d/t overall comfort based approach to care    ASSESSMENT/PLAN:  (F33.9) Recurrent major depressive disorder, remission status unspecified (H)  (primary encounter diagnosis)  (F03.91) Dementia with psychosis (H)  Stable now as of late on combination Sertraline and Risperidone  Has had dose adjustments up and down of Risperidone in the past  Is followed by on site ACP psychiatrist Dr. Hutchins last seen in March noting stability on this regimen and I agree for now to continue as is given stability, comfort based approach to care and concern of decompensation should medications be further adjusted though always keep potential for GDR in  mind  She is never lethargic/sedated when I see her and is almost always up in WC in her room  Weight and vitals currently have been stable for her    (J44.9) Chronic obstructive pulmonary disease, unspecified COPD type (H)  Stable for quite some time without exacerbation; has PRN Albuterol available but no need for controller Rx therapy at this time given long time clinical stability      Electronically signed by:  Rina Gardner, DO        Sincerely,        Rina Gardner, DO

## 2022-04-21 ENCOUNTER — LAB REQUISITION (OUTPATIENT)
Dept: LAB | Facility: CLINIC | Age: 82
End: 2022-04-21
Payer: COMMERCIAL

## 2022-04-21 DIAGNOSIS — Z29.9 ENCOUNTER FOR PROPHYLACTIC MEASURES, UNSPECIFIED: ICD-10-CM

## 2022-04-21 PROCEDURE — U0005 INFEC AGEN DETEC AMPLI PROBE: HCPCS | Mod: ORL | Performed by: NURSE PRACTITIONER

## 2022-04-22 LAB — SARS-COV-2 RNA RESP QL NAA+PROBE: NEGATIVE

## 2022-04-25 ENCOUNTER — LAB REQUISITION (OUTPATIENT)
Dept: LAB | Facility: CLINIC | Age: 82
End: 2022-04-25
Payer: COMMERCIAL

## 2022-04-25 DIAGNOSIS — Z29.9 ENCOUNTER FOR PROPHYLACTIC MEASURES, UNSPECIFIED: ICD-10-CM

## 2022-04-25 PROCEDURE — U0003 INFECTIOUS AGENT DETECTION BY NUCLEIC ACID (DNA OR RNA); SEVERE ACUTE RESPIRATORY SYNDROME CORONAVIRUS 2 (SARS-COV-2) (CORONAVIRUS DISEASE [COVID-19]), AMPLIFIED PROBE TECHNIQUE, MAKING USE OF HIGH THROUGHPUT TECHNOLOGIES AS DESCRIBED BY CMS-2020-01-R: HCPCS | Mod: ORL | Performed by: NURSE PRACTITIONER

## 2022-04-26 LAB — SARS-COV-2 RNA RESP QL NAA+PROBE: NEGATIVE

## 2022-04-27 ENCOUNTER — LAB REQUISITION (OUTPATIENT)
Dept: LAB | Facility: CLINIC | Age: 82
End: 2022-04-27
Payer: COMMERCIAL

## 2022-04-27 ENCOUNTER — PATIENT OUTREACH (OUTPATIENT)
Dept: GERIATRIC MEDICINE | Facility: CLINIC | Age: 82
End: 2022-04-27
Payer: COMMERCIAL

## 2022-04-27 DIAGNOSIS — Z29.9 ENCOUNTER FOR PROPHYLACTIC MEASURES, UNSPECIFIED: ICD-10-CM

## 2022-04-27 NOTE — TELEPHONE ENCOUNTER
[facility] Care Coordination Contact    Internal CC change effective 05/01/2022.  Mailed member CC Change letter.  Additional tasks to be completed by CMS include: update database & Epic and create member files on Tro[employer].    Jenn Villaseñor  Care Management Specialist  [facility]  [phone]

## 2022-04-27 NOTE — LETTER
April 27, 2022    Important [employer] Information    CHELSEY GRAHAM  [facility]  [address] [address]    Your New Care Coordinator  Dear Chelsey,  My name is Madelyn Davis RN and I am your new Care Coordinator. You may reach me by calling [phone]. I will be in touch with you shortly to address any questions you may have.   I have also been in contact with Rebekah Rodas NP, your previous care coordinator, to ensure a smooth transition.  Questions?  Call me at [phone] Monday-Friday between 8am and 5pm. TTY: 711. I look forward to working with you as a [employer]  member.  Sincerely,    [email or web address]  Phone: [phone]      [employer]    cc: member records                                                                                                                        CB5 (Os) (5-2020)    Civil Rights Notice  Discrimination is against the law. [employer] does not discriminate on the basis of any of the following:    Race    Color    National Origin    Creed    Tenriism    Age    Public Assistance Status    Receipt of Health Care Services    Disability (including physical or mental impairment)    Sex (including sex stereotypes and gender identity)    Marital Status    Political Beliefs    Medical Condition    Genetic Information    Sexual Orientation    Claims Experience    Medical History    Health Status    Auxiliary Aids and Services:  [employer] provides auxiliary aids and services, like qualified interpreters or information in accessible formats, free of charge and in a timely manner, to ensure an equal opportunity to participate in our health care programs. Contact [employer] at [employer]/contact medicaid or call [phone] (toll free); TTY:711 or at [employer]/contactmedicaid.    Language Assistance Services:  [employer] provides translated documents and spoken language interpreting, free of charge and  in a timely manner, when language assistance services are necessary to ensure limited English speakers have meaningful access to our information and services. Contact [employer] at [phone] (toll free); TTY: 711 or [email or web address]/contactmedicaid.     Civil Rights Complaints  You have the right to file a discrimination complaint if you believe you were treated in a discriminatory way by [employer]. You may contact any of the following four agencies directly to file a discrimination complaint.        U.S. Department of Health and Human Services  Office for Civil Rights (OCR)  You have the right to file a complaint with the OCR, a federal agency, if you believe you have been discriminated against because of any of the following:    Race    Disability    Color    Sex    National Origin    Age    Hoahaoism (in some cases)    Contact the OCR directly to file a complaint:         Director         U.S. Department of Health and Human Services  Office for Civil Rights         [address] [place] [address]         Customer Response Center: Toll-free: [phone]          TDD: [phone]         Email: [email or web address].gov    [place] Department of Human Rights (MDHR)  In [place], you have the right to file a complaint with the MDHR if you believe you have been discriminated against because of any of the following:      Race    Color    National Origin    Hoahaoism    Creed    Sex    Sexual Orientation    Marital Status    Public Assistance Status    Disability    Contact the MDHR directly to file a complaint:         [place] Department of Human Rights         [address] [place] [address]         [phone] (voice)          [phone] (toll free)         714 or [phone] ([place] Relay)         [phone] (Fax)         [email or web address] (Email)     [place] Department of Human Services (DHS)  You have the right to file  a complaint with [facility] if you believe you have been discriminated against in our health care programs because of any of the following:    Race    Color    National Origin    Creed    Taoist    Age    Public Assistance Status    Receipt of Health Care Services    Disability (including physical or mental impairment)    Sex (including sex stereotypes and gender identity)    Marital Status    Political Beliefs    Medical Condition    Genetic Information    Sexual Orientation    Claims Experience    Medical History    Health Status    Complaints must be in writing and filed within 180 days of the date you discovered the alleged discrimination. The complaint must contain your name and address and describe the discrimination you are complaining about. After we get your complaint, we will review it and notify you in writing about whether we have authority to investigate. If we do, we will investigate the complaint.      [facility] will notify you in writing of the investigation s outcome. You have a right to appeal the outcome if you disagree with the decision. To appeal, you must send a written request to have [facility] review the investigation outcome. Be brief and state why you disagree with the decision. Include additional information you think is important.      If you file a complaint in this way, the people who work for the agency named in the complaint cannot retaliate against you. This means they cannot punish you in any way for filing a complaint. Filing a complaint in this way does not stop you from seeking out other legal or administration actions.     Contact [facility] directly to file a discrimination complaint:        Civil Rights Coordinator        [place] Department of Human Services        Equal Opportunity and Access Division        [address] [address]        [place] [address] [phone] (voice) or use your preferred relay service     [employer] Complaint Notice   You have the right to file a complaint with [employer]  if you believe you have been discriminated against because of any of the following:       Medical condition    Health status    Receipt of health care services    Claims experience    Medical history    Genetic information    Disability (including mental or physical impairment)    Marital status    Age    Sex (including sex stereotypes and gender identity)    Sexual orientation    National origin    Race    Color    Episcopal    Creed    Public assistance status    Political beliefs    You can file a complaint and ask for help in filing a complaint in person or by mail, phone, fax, or email at:     [employer] Civil Rights Coordinator  [employer]  [address], [place] [address] [phone] (voice and fax) or TTY:747  Email: [email or web address]    American Indians can begin or continue to use [facility] and [facility] ([facility]) clinics. We will not require prior approval or impose any conditions for you to get services at these clinics. For elders age 65 years and older this includes Elderly Waiver (EW) services accessed through the [place]. If a doctor or other provider in a [place] or [facility] clinic refers you to a provider in our network, we will not require you to see your primary care provider prior to the referral.

## 2022-04-28 PROCEDURE — U0005 INFEC AGEN DETEC AMPLI PROBE: HCPCS | Mod: ORL | Performed by: NURSE PRACTITIONER

## 2022-04-29 LAB — SARS-COV-2 RNA RESP QL NAA+PROBE: NEGATIVE

## 2022-05-03 ENCOUNTER — LAB REQUISITION (OUTPATIENT)
Dept: LAB | Facility: CLINIC | Age: 82
End: 2022-05-03
Payer: COMMERCIAL

## 2022-05-03 DIAGNOSIS — Z29.9 ENCOUNTER FOR PROPHYLACTIC MEASURES, UNSPECIFIED: ICD-10-CM

## 2022-05-03 PROCEDURE — U0005 INFEC AGEN DETEC AMPLI PROBE: HCPCS | Mod: ORL | Performed by: NURSE PRACTITIONER

## 2022-05-04 LAB — SARS-COV-2 RNA RESP QL NAA+PROBE: NEGATIVE

## 2022-05-05 ENCOUNTER — LAB REQUISITION (OUTPATIENT)
Dept: LAB | Facility: CLINIC | Age: 82
End: 2022-05-05
Payer: COMMERCIAL

## 2022-05-05 DIAGNOSIS — Z29.9 ENCOUNTER FOR PROPHYLACTIC MEASURES, UNSPECIFIED: ICD-10-CM

## 2022-05-06 PROCEDURE — U0005 INFEC AGEN DETEC AMPLI PROBE: HCPCS | Mod: ORL | Performed by: NURSE PRACTITIONER

## 2022-05-07 LAB — SARS-COV-2 RNA RESP QL NAA+PROBE: NEGATIVE

## 2022-05-09 ENCOUNTER — LAB REQUISITION (OUTPATIENT)
Dept: LAB | Facility: CLINIC | Age: 82
End: 2022-05-09
Payer: COMMERCIAL

## 2022-05-09 DIAGNOSIS — Z29.9 ENCOUNTER FOR PROPHYLACTIC MEASURES, UNSPECIFIED: ICD-10-CM

## 2022-05-09 PROCEDURE — U0005 INFEC AGEN DETEC AMPLI PROBE: HCPCS | Mod: ORL | Performed by: NURSE PRACTITIONER

## 2022-05-10 LAB — SARS-COV-2 RNA RESP QL NAA+PROBE: NEGATIVE

## 2022-05-11 ENCOUNTER — LAB REQUISITION (OUTPATIENT)
Dept: LAB | Facility: CLINIC | Age: 82
End: 2022-05-11
Payer: COMMERCIAL

## 2022-05-11 DIAGNOSIS — Z29.9 ENCOUNTER FOR PROPHYLACTIC MEASURES, UNSPECIFIED: ICD-10-CM

## 2022-05-12 PROCEDURE — U0005 INFEC AGEN DETEC AMPLI PROBE: HCPCS | Mod: ORL | Performed by: NURSE PRACTITIONER

## 2022-05-13 LAB — SARS-COV-2 RNA RESP QL NAA+PROBE: NEGATIVE

## 2022-05-16 ENCOUNTER — LAB REQUISITION (OUTPATIENT)
Dept: LAB | Facility: CLINIC | Age: 82
End: 2022-05-16
Payer: COMMERCIAL

## 2022-05-16 DIAGNOSIS — Z29.9 ENCOUNTER FOR PROPHYLACTIC MEASURES, UNSPECIFIED: ICD-10-CM

## 2022-05-16 PROCEDURE — U0005 INFEC AGEN DETEC AMPLI PROBE: HCPCS | Mod: ORL | Performed by: NURSE PRACTITIONER

## 2022-05-17 LAB — SARS-COV-2 RNA RESP QL NAA+PROBE: NEGATIVE

## 2022-05-18 ENCOUNTER — LAB REQUISITION (OUTPATIENT)
Dept: LAB | Facility: CLINIC | Age: 82
End: 2022-05-18
Payer: COMMERCIAL

## 2022-05-18 DIAGNOSIS — Z29.9 ENCOUNTER FOR PROPHYLACTIC MEASURES, UNSPECIFIED: ICD-10-CM

## 2022-05-19 PROCEDURE — U0003 INFECTIOUS AGENT DETECTION BY NUCLEIC ACID (DNA OR RNA); SEVERE ACUTE RESPIRATORY SYNDROME CORONAVIRUS 2 (SARS-COV-2) (CORONAVIRUS DISEASE [COVID-19]), AMPLIFIED PROBE TECHNIQUE, MAKING USE OF HIGH THROUGHPUT TECHNOLOGIES AS DESCRIBED BY CMS-2020-01-R: HCPCS | Mod: ORL | Performed by: NURSE PRACTITIONER

## 2022-05-20 LAB — SARS-COV-2 RNA RESP QL NAA+PROBE: NEGATIVE

## 2022-05-23 ENCOUNTER — LAB REQUISITION (OUTPATIENT)
Dept: LAB | Facility: CLINIC | Age: 82
End: 2022-05-23
Payer: COMMERCIAL

## 2022-05-23 DIAGNOSIS — Z29.9 ENCOUNTER FOR PROPHYLACTIC MEASURES, UNSPECIFIED: ICD-10-CM

## 2022-05-23 PROCEDURE — U0005 INFEC AGEN DETEC AMPLI PROBE: HCPCS | Mod: ORL | Performed by: NURSE PRACTITIONER

## 2022-05-24 LAB — SARS-COV-2 RNA RESP QL NAA+PROBE: NEGATIVE

## 2022-05-25 ENCOUNTER — LAB REQUISITION (OUTPATIENT)
Dept: LAB | Facility: CLINIC | Age: 82
End: 2022-05-25
Payer: COMMERCIAL

## 2022-05-25 DIAGNOSIS — Z29.9 ENCOUNTER FOR PROPHYLACTIC MEASURES, UNSPECIFIED: ICD-10-CM

## 2022-05-26 PROCEDURE — U0003 INFECTIOUS AGENT DETECTION BY NUCLEIC ACID (DNA OR RNA); SEVERE ACUTE RESPIRATORY SYNDROME CORONAVIRUS 2 (SARS-COV-2) (CORONAVIRUS DISEASE [COVID-19]), AMPLIFIED PROBE TECHNIQUE, MAKING USE OF HIGH THROUGHPUT TECHNOLOGIES AS DESCRIBED BY CMS-2020-01-R: HCPCS | Mod: ORL | Performed by: NURSE PRACTITIONER

## 2022-05-27 LAB — SARS-COV-2 RNA RESP QL NAA+PROBE: NEGATIVE

## 2022-05-31 ENCOUNTER — LAB REQUISITION (OUTPATIENT)
Dept: LAB | Facility: CLINIC | Age: 82
End: 2022-05-31
Payer: COMMERCIAL

## 2022-05-31 DIAGNOSIS — Z29.9 ENCOUNTER FOR PROPHYLACTIC MEASURES, UNSPECIFIED: ICD-10-CM

## 2022-05-31 PROCEDURE — U0005 INFEC AGEN DETEC AMPLI PROBE: HCPCS | Mod: ORL | Performed by: NURSE PRACTITIONER

## 2022-06-01 LAB — SARS-COV-2 RNA RESP QL NAA+PROBE: NEGATIVE

## 2022-06-02 ENCOUNTER — LAB REQUISITION (OUTPATIENT)
Dept: LAB | Facility: CLINIC | Age: 82
End: 2022-06-02
Payer: COMMERCIAL

## 2022-06-02 DIAGNOSIS — Z29.9 ENCOUNTER FOR PROPHYLACTIC MEASURES, UNSPECIFIED: ICD-10-CM

## 2022-06-03 PROCEDURE — U0005 INFEC AGEN DETEC AMPLI PROBE: HCPCS | Mod: ORL | Performed by: NURSE PRACTITIONER

## 2022-06-04 LAB — SARS-COV-2 RNA RESP QL NAA+PROBE: NEGATIVE

## 2022-06-06 ENCOUNTER — LAB REQUISITION (OUTPATIENT)
Dept: LAB | Facility: CLINIC | Age: 82
End: 2022-06-06
Payer: COMMERCIAL

## 2022-06-06 DIAGNOSIS — Z29.9 ENCOUNTER FOR PROPHYLACTIC MEASURES, UNSPECIFIED: ICD-10-CM

## 2022-06-06 PROCEDURE — U0005 INFEC AGEN DETEC AMPLI PROBE: HCPCS | Mod: ORL | Performed by: NURSE PRACTITIONER

## 2022-06-07 LAB — SARS-COV-2 RNA RESP QL NAA+PROBE: NEGATIVE

## 2022-06-08 ENCOUNTER — LAB REQUISITION (OUTPATIENT)
Dept: LAB | Facility: CLINIC | Age: 82
End: 2022-06-08
Payer: COMMERCIAL

## 2022-06-08 DIAGNOSIS — Z29.9 ENCOUNTER FOR PROPHYLACTIC MEASURES, UNSPECIFIED: ICD-10-CM

## 2022-06-09 PROCEDURE — U0005 INFEC AGEN DETEC AMPLI PROBE: HCPCS | Mod: ORL | Performed by: NURSE PRACTITIONER

## 2022-06-10 ENCOUNTER — NURSING HOME VISIT (OUTPATIENT)
Dept: GERIATRICS | Facility: CLINIC | Age: 82
End: 2022-06-10
Payer: COMMERCIAL

## 2022-06-10 VITALS
SYSTOLIC BLOOD PRESSURE: 132 MMHG | BODY MASS INDEX: 32 KG/M2 | HEART RATE: 80 BPM | TEMPERATURE: 98.4 F | DIASTOLIC BLOOD PRESSURE: 74 MMHG | RESPIRATION RATE: 18 BRPM | WEIGHT: 163 LBS | HEIGHT: 60 IN | OXYGEN SATURATION: 97 %

## 2022-06-10 DIAGNOSIS — J44.9 CHRONIC OBSTRUCTIVE PULMONARY DISEASE, UNSPECIFIED COPD TYPE (H): Primary | ICD-10-CM

## 2022-06-10 DIAGNOSIS — I10 ESSENTIAL HYPERTENSION: ICD-10-CM

## 2022-06-10 DIAGNOSIS — K21.9 GASTROESOPHAGEAL REFLUX DISEASE WITHOUT ESOPHAGITIS: ICD-10-CM

## 2022-06-10 DIAGNOSIS — F03.92 DEMENTIA WITH PSYCHOSIS (H): ICD-10-CM

## 2022-06-10 LAB — SARS-COV-2 RNA RESP QL NAA+PROBE: NEGATIVE

## 2022-06-10 PROCEDURE — 99309 SBSQ NF CARE MODERATE MDM 30: CPT | Performed by: NURSE PRACTITIONER

## 2022-06-10 NOTE — PROGRESS NOTES
[facility] GERIATRICS  Chief Complaint   Patient presents with     care home Regulatory     [place] Medical Record Number:  [id number]  Place of Service where encounter took place:  [facility] () [[id number]]    HPI:    Chelsey Casillas  is 82 year old (1940), who is being seen today for a federally mandated E/M visit.     Today's concerns are:  Patient is sleeping in bed, arouses to voice, but is not interested in visit. She says she is fine and closes her eyes again.    Chronic obstructive pulmonary disease, unspecified COPD type (H)  No SOB, cough, hypoxia, or wheezing noted by staff    Dementia with psychosis (H)  She takes risperidone for history of paranoia and hallucinations. Previous GDR attempt failed, resulted in recurrent hallucinations and distress. No current behavioral issues per staff. She is followed by [facility]    Essential hypertension  -130s/70s    Gastroesophageal reflux disease without esophagitis  Patient does not answer questions today. Previous GDR attempt of PPI failed, resulted in daily dyspepsia.       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  FAMILY HISTORY: Family history is unknown by patient.  SOCIAL HISTORY:  reports that she has quit smoking. Her smoking use included  cigarettes. She has never used smokeless tobacco. She reports that she does not drink alcohol and does not use drugs.    MEDICATIONS:  Current Outpatient Medications   Medication Sig Dispense Refill     Acetaminophen (TYLENOL PO) Take 1,000 mg by mouth 2 times daily       albuterol (PROAIR HFA/PROVENTIL HFA/VENTOLIN HFA) 108 (90 BASE) MCG/ACT Inhaler Inhale 2 puffs into the lungs every 4 hours as needed for shortness of breath / dyspnea or wheezing        omeprazole (PRILOSEC) 20 MG DR capsule Take 20 mg by mouth daily       risperiDONE (RISPERDAL) 1 MG tablet Take 1 mg by mouth At Bedtime       sertraline (ZOLOFT) 100 MG tablet Take 1 tablet (100 mg) by mouth At Bedtime 31 tablet 98     vitamin D3 (CHOLECALCIFEROL) 2000 units (50 mcg) tablet Take 1 tablet (2,000 Units) by mouth At Bedtime 30 tablet      Case Management:  I have reviewed the care plan and MDS and do agree with the plan. Patient's desire to return to the community is not assessible due to cognitive impairment. Information reviewed:  Medications, vital signs, orders, and nursing notes.    ROS:  Limited secondary to cognitive impairment/cooperation but today pt reports she feels fine    Vitals:  /74   Pulse 80   Temp 98.4  F (36.9  C)   Resp 18   Ht 1.524 m (5')   Wt 73.9 kg (163 lb)   SpO2 97%   BMI 31.83 kg/m    Body mass index is 31.83 kg/m .  Exam:  GENERAL APPEARANCE:  Sleeping, arouses easily, but dozes off again  EYES:  Conjunctiva and lids normal  RESP:  respiratory effort and palpation of chest normal, lungs clear to auscultation , no respiratory distress  CV:  Palpation and auscultation of heart done , regular rate and rhythm, no murmur, rub, or gallop, no edema  ABDOMEN:  bowel sounds normal, soft, non-tender  SKIN:  Inspection of skin and subcutaneous tissue baseline, Palpation of skin and subcutaneous tissue baseline  NEURO:   no tremor or EPSE noted  PSYCH:  limited verbal response, affect flat    Lab/Diagnostic data:    Recent labs in [facility] reviewed by me today.       ASSESSMENT/PLAN  (J44.9) Chronic obstructive pulmonary disease, unspecified COPD type (H)  (primary encounter diagnosis)  Comment: Chronic condition being managed with medications and is currently asymptomatic.  Plan: Continue current POC with no changes at this time and adjustments as needed.    (F03.91) Dementia with psychosis (H)  Comment: Chronic, progressive. Requires 24 hour skilled nursing care. HPI/ROS difficult to obtain with cognitive impairment. Expect further functional and cognitive decline. Expect weight loss. Today's fatigue is not typical, would not recommend decrease of risperidone at this time, but will continue to assess  Plan: Continue 24 hour care. Monitor weight. Monitor functional status. Monitor for behavioral disturbances.    (I10) Essential hypertension  Comment: BP goals are <150/90 mm Hg.This is higher than ACC and AHA recommendations due to goals of care, risk for hypotension, risk of dizziness and falls and risk of tissue/cerebral hypoperfusion. Patient is stable and continue without pharmacological invention with routine assessment.    (K21.9) Gastroesophageal reflux disease without esophagitis  Comment: Unable to assess with patient today, but given history and goals of care, benefit of PPI outweighs risk for side effects.  Plan: Continue current POC with no changes at this time and adjustments as needed.        Electronically signed by:  ALCIRA Sweet [facility]  Phone: [phone]

## 2022-06-10 NOTE — LETTER
6/10/2022        RE: Chelsey Casillas  [facility]  [address] [address]        [facility] GERIATRICS  Chief Complaint   Patient presents with     correction Regulatory     [place] Medical Record Number:  [id number]  Place of Service where encounter took place:  [facility] () [[id number]]    HPI:    Chelsey Casillas  is 82 year old (1940), who is being seen today for a federally mandated E/M visit.     Today's concerns are:  Patient is sleeping in bed, arouses to voice, but is not interested in visit. She says she is fine and closes her eyes again.    Chronic obstructive pulmonary disease, unspecified COPD type (H)  No SOB, cough, hypoxia, or wheezing noted by staff    Dementia with psychosis (H)  She takes risperidone for history of paranoia and hallucinations. Previous GDR attempt failed, resulted in recurrent hallucinations and distress. No current behavioral issues per staff. She is followed by [facility]    Essential hypertension  -130s/70s    Gastroesophageal reflux disease without esophagitis  Patient does not answer questions today. Previous GDR attempt of PPI failed, resulted in daily dyspepsia.       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  FAMILY HISTORY: Family  history is unknown by patient.  SOCIAL HISTORY:  reports that she has quit smoking. Her smoking use included cigarettes. She has never used smokeless tobacco. She reports that she does not drink alcohol and does not use drugs.    MEDICATIONS:  Current Outpatient Medications   Medication Sig Dispense Refill     Acetaminophen (TYLENOL PO) Take 1,000 mg by mouth 2 times daily       albuterol (PROAIR HFA/PROVENTIL HFA/VENTOLIN HFA) 108 (90 BASE) MCG/ACT Inhaler Inhale 2 puffs into the lungs every 4 hours as needed for shortness of breath / dyspnea or wheezing        omeprazole (PRILOSEC) 20 MG DR capsule Take 20 mg by mouth daily       risperiDONE (RISPERDAL) 1 MG tablet Take 1 mg by mouth At Bedtime       sertraline (ZOLOFT) 100 MG tablet Take 1 tablet (100 mg) by mouth At Bedtime 31 tablet 98     vitamin D3 (CHOLECALCIFEROL) 2000 units (50 mcg) tablet Take 1 tablet (2,000 Units) by mouth At Bedtime 30 tablet      Case Management:  I have reviewed the care plan and MDS and do agree with the plan. Patient's desire to return to the community is not assessible due to cognitive impairment. Information reviewed:  Medications, vital signs, orders, and nursing notes.    ROS:  Limited secondary to cognitive impairment/cooperation but today pt reports she feels fine    Vitals:  /74   Pulse 80   Temp 98.4  F (36.9  C)   Resp 18   Ht 1.524 m (5')   Wt 73.9 kg (163 lb)   SpO2 97%   BMI 31.83 kg/m    Body mass index is 31.83 kg/m .  Exam:  GENERAL APPEARANCE:  Sleeping, arouses easily, but dozes off again  EYES:  Conjunctiva and lids normal  RESP:  respiratory effort and palpation of chest normal, lungs clear to auscultation , no respiratory distress  CV:  Palpation and auscultation of heart done , regular rate and rhythm, no murmur, rub, or gallop, no edema  ABDOMEN:  bowel sounds normal, soft, non-tender  SKIN:  Inspection of skin and subcutaneous tissue baseline, Palpation of skin and subcutaneous tissue  baseline  NEURO:   no tremor or EPSE noted  PSYCH:  limited verbal response, affect flat    Lab/Diagnostic data:   Recent labs in EPIC reviewed by me today.       ASSESSMENT/PLAN  (J44.9) Chronic obstructive pulmonary disease, unspecified COPD type (H)  (primary encounter diagnosis)  Comment: Chronic condition being managed with medications and is currently asymptomatic.  Plan: Continue current POC with no changes at this time and adjustments as needed.    (F03.91) Dementia with psychosis (H)  Comment: Chronic, progressive. Requires 24 hour skilled nursing care. HPI/ROS difficult to obtain with cognitive impairment. Expect further functional and cognitive decline. Expect weight loss. Today's fatigue is not typical, would not recommend decrease of risperidone at this time, but will continue to assess  Plan: Continue 24 hour care. Monitor weight. Monitor functional status. Monitor for behavioral disturbances.    (I10) Essential hypertension  Comment: BP goals are <150/90 mm Hg.This is higher than ACC and AHA recommendations due to goals of care, risk for hypotension, risk of dizziness and falls and risk of tissue/cerebral hypoperfusion. Patient is stable and continue without pharmacological invention with routine assessment.    (K21.9) Gastroesophageal reflux disease without esophagitis  Comment: Unable to assess with patient today, but given history and goals of care, benefit of PPI outweighs risk for side effects.  Plan: Continue current POC with no changes at this time and adjustments as needed.        Electronically signed by:  ALCIRA Sweet [facility]  Phone: [phone]

## 2022-06-13 ENCOUNTER — LAB REQUISITION (OUTPATIENT)
Dept: LAB | Facility: CLINIC | Age: 82
End: 2022-06-13
Payer: COMMERCIAL

## 2022-06-13 DIAGNOSIS — Z29.9 ENCOUNTER FOR PROPHYLACTIC MEASURES, UNSPECIFIED: ICD-10-CM

## 2022-06-13 PROCEDURE — U0003 INFECTIOUS AGENT DETECTION BY NUCLEIC ACID (DNA OR RNA); SEVERE ACUTE RESPIRATORY SYNDROME CORONAVIRUS 2 (SARS-COV-2) (CORONAVIRUS DISEASE [COVID-19]), AMPLIFIED PROBE TECHNIQUE, MAKING USE OF HIGH THROUGHPUT TECHNOLOGIES AS DESCRIBED BY CMS-2020-01-R: HCPCS | Mod: ORL | Performed by: NURSE PRACTITIONER

## 2022-06-14 LAB — SARS-COV-2 RNA RESP QL NAA+PROBE: NEGATIVE

## 2022-06-22 ENCOUNTER — PATIENT OUTREACH (OUTPATIENT)
Dept: GERIATRIC MEDICINE | Facility: CLINIC | Age: 82
End: 2022-06-22

## 2022-06-22 NOTE — PROGRESS NOTES
[facility] Care Coordination Contact    [facility] Six-Month Assessment    6 month assessment completed on 6-22-22 with Rebekah Rodas NP.    ER visits: No  Hospitalizations: No  TCU stays: No  Significant health status changes: behaviors very from day to day. She is followed by house psychiatry for psychosis and behavior outburst.  Has thrown things at staff. No significant changes  Falls/Injuries: No  ADL/IADL changes: No    Reviewed Institutional Assessment and updated as needed.     Will see member in 6 months for an annual health risk assessment.   Encouraged member to call CC with any questions or concerns in the meantime.     Madelyn Davis RN  [facility]  [phone]

## 2022-08-22 ENCOUNTER — PATIENT OUTREACH (OUTPATIENT)
Dept: GERIATRIC MEDICINE | Facility: CLINIC | Age: 82
End: 2022-08-22

## 2022-08-22 NOTE — PROGRESS NOTES
No outreach completed.  CC updated program tasks and targets for [facility] launch.    Madelyn Davis RN  [facility]  [phone]

## 2022-08-23 ENCOUNTER — NURSING HOME VISIT (OUTPATIENT)
Dept: GERIATRICS | Facility: CLINIC | Age: 82
End: 2022-08-23
Payer: COMMERCIAL

## 2022-08-23 VITALS
OXYGEN SATURATION: 97 % | WEIGHT: 164 LBS | HEART RATE: 82 BPM | TEMPERATURE: 97.8 F | BODY MASS INDEX: 32.2 KG/M2 | SYSTOLIC BLOOD PRESSURE: 132 MMHG | HEIGHT: 60 IN | DIASTOLIC BLOOD PRESSURE: 82 MMHG | RESPIRATION RATE: 18 BRPM

## 2022-08-23 DIAGNOSIS — F03.92 DEMENTIA WITH PSYCHOSIS (H): Primary | ICD-10-CM

## 2022-08-23 DIAGNOSIS — R63.0 POOR APPETITE: ICD-10-CM

## 2022-08-23 PROCEDURE — 99309 SBSQ NF CARE MODERATE MDM 30: CPT | Performed by: INTERNAL MEDICINE

## 2022-08-23 NOTE — LETTER
"    8/23/2022        RE: Chelsey Casillas  [facility]  [address] [address]        [place] GERIATRIC SERVICES  PHYSICIAN NOTE    Chief Complaint   Patient presents with     [facility] Regulatory       HPI:    Chelsey Casillas is a 82 year old  (1940), who is being seen today for a federally mandated E/M visit at [facility]. Admitted to LT in Sept 2018 from [facility] on [facility]. 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 H/o COPD without O2. She has had intermittent behaviors and falls but overall been quite stable for some time. Also followed by in house ACP psychiatry team. Has remained on stable dose of Sertraline as well.    On review of the chart her weight is overall stable though has chronically had some subtle fluctuations. She has snacks offered throughout the day as has voiced past dislike of facility food at times. Has an order for a sandwich daily at 3 PM which she has in front of her today and says she is \"sick\" of the sandwich offering daily and says \"I'm going to go feed it to my cat\" which is an electronic cat. But otherwise she is up in her WC, cheerful, watching TV. Denies pain, shortness of breath or care needs when I ask. I offer her a different sandwich or snack and she politely declines and wishes me to go have a good day.    ALLERGIES: Amlodipine    Past Medical History:   Diagnosis Date     Arthritis      Chronic low back pain 3/17/2015     COPD (chronic obstructive pulmonary disease) (H)      Dementia with psychosis (H) 11/1/2016     Fall 2/27/2013     Falling episodes 9/24/2016     Functional urinary incontinence 3/7/2013     GERD (gastroesophageal reflux disease) 4/3/2018     HTN (hypertension) 4/3/2018     Knee pain 3/7/2013     Major depression, single episode 4/16/2015     Pain in both knees 1/30/2014 "     Physical deconditioning 3/7/2013     Severe major depression with psychotic features (H) 7/12/2016     Skin tear 3/7/2013     Weakness 6/18/2015     CODE STATUS: DNR/I    MEDICATIONS: Reviewed and updated in Epic according to facility MAR   Current Outpatient Medications   Medication Sig Dispense Refill     Acetaminophen (TYLENOL PO) Take 1,000 mg by mouth 2 times daily       albuterol (PROAIR HFA/PROVENTIL HFA/VENTOLIN HFA) 108 (90 BASE) MCG/ACT Inhaler Inhale 2 puffs into the lungs every 4 hours as needed for shortness of breath / dyspnea or wheezing        omeprazole (PRILOSEC) 20 MG DR capsule Take 20 mg by mouth daily       risperiDONE (RISPERDAL) 1 MG tablet Take 1 mg by mouth At Bedtime       sertraline (ZOLOFT) 100 MG tablet Take 1 tablet (100 mg) by mouth At Bedtime 31 tablet 98     vitamin D3 (CHOLECALCIFEROL) 2000 units (50 mcg) tablet Take 1 tablet (2,000 Units) by mouth At Bedtime 30 tablet        ROS:  4 point ROS including Respiratory, CV, GI and , other than that noted in the HPI,  is negative    Exam:  /82   Pulse 82   Temp 97.8  F (36.6  C)   Resp 18   Ht 1.524 m (5')   Wt 74.4 kg (164 lb)   SpO2 97%   BMI 32.03 kg/m    Alert, casually dressed, no odor, sitting up in WC  Breathing non-labored, no cough  No edema  No tremor, normal speech, speaks in simple statements  Mood slightly grumpy but also polite/calm    Lab/Diagnostic Data:    Several negative COVID-19 screening tests per facility protocol; no recent routine phlebotomy/imaging d/t overall comfort based approach to care    ASSESSMENT/PLAN:  (F03.91) Dementia with psychosis (H)  (primary encounter diagnosis)  (R63.0) Poor appetite  On stable regimen, failed past GDR especially of Risperidone, followed as well by in house psych  Poor appetite likely somewhat r/t advancing age and dementia though eats certain snacks and weight remains stable in 150-160 range over the past year  No routine labs/imaging d/t comfort based  approach to care and recent stability  PPI has seemed to help calm past periodic reports of GI distress  No changes made today  Appreciate continued facility support and care of Chelsey      Electronically signed by:  Rina Gardner DO          Sincerely,        Rina Gardner DO

## 2022-09-07 NOTE — PROGRESS NOTES
"[place] GERIATRIC SERVICES  PHYSICIAN NOTE    Chief Complaint   Patient presents with     [facility] Regulatory       HPI:    Chelsey Casillas is a 82 year old  (1940), who is being seen today for a federally mandated E/M visit at [facility]. Admitted to LTC in Sept 2018 from [facility]. 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 H/o COPD without O2. She has had intermittent behaviors and falls but overall been quite stable for some time. Also followed by in house ACP psychiatry team. Has remained on stable dose of Sertraline as well.    On review of the chart her weight is overall stable though has chronically had some subtle fluctuations. She has snacks offered throughout the day as has voiced past dislike of facility food at times. Has an order for a sandwich daily at 3 PM which she has in front of her today and says she is \"sick\" of the sandwich offering daily and says \"I'm going to go feed it to my cat\" which is an electronic cat. But otherwise she is up in her WC, cheerful, watching TV. Denies pain, shortness of breath or care needs when I ask. I offer her a different sandwich or snack and she politely declines and wishes me to go have a good day.    ALLERGIES: Amlodipine    Past Medical History:   Diagnosis Date     Arthritis      Chronic low back pain 3/17/2015     COPD (chronic obstructive pulmonary disease) (H)      Dementia with psychosis (H) 11/1/2016     Fall 2/27/2013     Falling episodes 9/24/2016     Functional urinary incontinence 3/7/2013     GERD (gastroesophageal reflux disease) 4/3/2018     HTN (hypertension) 4/3/2018     Knee pain 3/7/2013     Major depression, single episode 4/16/2015     Pain in both knees 1/30/2014     Physical deconditioning 3/7/2013     Severe major depression with psychotic features (H) 7/12/2016     Skin " tear 3/7/2013     Weakness 6/18/2015     CODE STATUS: DNR/I    MEDICATIONS: Reviewed and updated in Epic according to facility MAR   Current Outpatient Medications   Medication Sig Dispense Refill     Acetaminophen (TYLENOL PO) Take 1,000 mg by mouth 2 times daily       albuterol (PROAIR HFA/PROVENTIL HFA/VENTOLIN HFA) 108 (90 BASE) MCG/ACT Inhaler Inhale 2 puffs into the lungs every 4 hours as needed for shortness of breath / dyspnea or wheezing        omeprazole (PRILOSEC) 20 MG DR capsule Take 20 mg by mouth daily       risperiDONE (RISPERDAL) 1 MG tablet Take 1 mg by mouth At Bedtime       sertraline (ZOLOFT) 100 MG tablet Take 1 tablet (100 mg) by mouth At Bedtime 31 tablet 98     vitamin D3 (CHOLECALCIFEROL) 2000 units (50 mcg) tablet Take 1 tablet (2,000 Units) by mouth At Bedtime 30 tablet        ROS:  4 point ROS including Respiratory, CV, GI and , other than that noted in the HPI,  is negative    Exam:  /82   Pulse 82   Temp 97.8  F (36.6  C)   Resp 18   Ht 1.524 m (5')   Wt 74.4 kg (164 lb)   SpO2 97%   BMI 32.03 kg/m    Alert, casually dressed, no odor, sitting up in WC  Breathing non-labored, no cough  No edema  No tremor, normal speech, speaks in simple statements  Mood slightly grumpy but also polite/calm    Lab/Diagnostic Data:    Several negative COVID-19 screening tests per facility protocol; no recent routine phlebotomy/imaging d/t overall comfort based approach to care    ASSESSMENT/PLAN:  (F03.91) Dementia with psychosis (H)  (primary encounter diagnosis)  (R63.0) Poor appetite  On stable regimen, failed past GDR especially of Risperidone, followed as well by in house psych  Poor appetite likely somewhat r/t advancing age and dementia though eats certain snacks and weight remains stable in 150-160 range over the past year  No routine labs/imaging d/t comfort based approach to care and recent stability  PPI has seemed to help calm past periodic reports of GI distress  No changes  made today  Appreciate continued facility support and care of Chelsey      Electronically signed by:  Rina Gardner, DO

## 2022-10-04 ENCOUNTER — NURSING HOME VISIT (OUTPATIENT)
Dept: GERIATRICS | Facility: CLINIC | Age: 82
End: 2022-10-04
Payer: COMMERCIAL

## 2022-10-04 VITALS — OXYGEN SATURATION: 96 % | TEMPERATURE: 98.1 F

## 2022-10-04 DIAGNOSIS — J44.9 CHRONIC OBSTRUCTIVE PULMONARY DISEASE, UNSPECIFIED COPD TYPE (H): Primary | ICD-10-CM

## 2022-10-04 DIAGNOSIS — R45.4 IRRITABILITY: ICD-10-CM

## 2022-10-04 DIAGNOSIS — F32.3 CURRENT SEVERE EPISODE OF MAJOR DEPRESSIVE DISORDER WITH PSYCHOTIC FEATURES, UNSPECIFIED WHETHER RECURRENT (H): ICD-10-CM

## 2022-10-04 DIAGNOSIS — F03.92 DEMENTIA WITH PSYCHOSIS (H): ICD-10-CM

## 2022-10-04 DIAGNOSIS — M54.50 CHRONIC LOW BACK PAIN WITHOUT SCIATICA, UNSPECIFIED BACK PAIN LATERALITY: ICD-10-CM

## 2022-10-04 DIAGNOSIS — K21.00 GASTROESOPHAGEAL REFLUX DISEASE WITH ESOPHAGITIS WITHOUT HEMORRHAGE: ICD-10-CM

## 2022-10-04 DIAGNOSIS — G89.29 CHRONIC LOW BACK PAIN WITHOUT SCIATICA, UNSPECIFIED BACK PAIN LATERALITY: ICD-10-CM

## 2022-10-04 PROCEDURE — 99310 SBSQ NF CARE HIGH MDM 45: CPT | Performed by: NURSE PRACTITIONER

## 2022-10-04 NOTE — LETTER
10/4/2022        RE: Chelsey Casillas  [facility]  [address] [address]        [facility] GERIATRICS  Chief Complaint   Patient presents with     [facility]PC Regulatory     [place] Medical Record Number:  [id number]  Place of Service where encounter took place:  [facility] () [[id number]]    HPI:    Chelsey Casillas  is 82 year old (1940), who is being seen today for a federally mandated E/M visit.  Her primary contact is Tasha Garcia, her friend.    Her past medical history includes    COPD    Dementia with psychosis    Major depression with psychosis    History of falls    Urinary incontinence    GERD    Chronic lower back pain    Today patient was seen in her room.  She states she has chronic back pain but denies any problems.  She would like someone to look at her wheelchair as one of the brakes does not work.  Otherwise she denies shortness of breath, chest pain, dizziness, lightheadedness, episodes of depression or anxiety.   Nursing has no concerns. BIMS= 7/15 (score 0 to 7 suggest severe impairment) PHQ9= 6.   Patient needs assistance with ADLs, uses a wheelchair.    Her appetite is good and consumes a regular diet with juice supplement and ice cream twice daily.  Per nursing, skin is intact. Code status is DNR/DNI.   Psychiatry recommended no gradual dose reduction due to possible decompensation.    In reviewing point click care, weight has been stable at 164 pounds, blood pressure 100 -154 /60-85 mmHg, heart rate 60-80* bpm, oxygen saturation 94-98% on room air, no pain is recorded        ALLERGIES:Amlodipine  PAST MEDICAL HISTORY:   Past Medical History:   Diagnosis Date     Arthritis      Chronic low back pain 3/17/2015     COPD (chronic obstructive pulmonary disease) (H)      Dementia with psychosis (H) 11/1/2016     Fall 2/27/2013     Falling episodes 9/24/2016     Functional urinary incontinence 3/7/2013     GERD (gastroesophageal reflux disease)  4/3/2018     HTN (hypertension) 4/3/2018     Knee pain 3/7/2013     Major depression, single episode 4/16/2015     Pain in both knees 1/30/2014     Physical deconditioning 3/7/2013     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  FAMILY HISTORY: Family history is unknown by patient.  SOCIAL HISTORY:  reports that she has quit smoking. Her smoking use included cigarettes. She has never used smokeless tobacco. She reports that she does not drink alcohol and does not use drugs.    MEDICATIONS:    Post Medication Reconciliation Status: Medication reconciliation previously completed during another office visit           Review of your medicines          Accurate as of October 4, 2022 11:59 PM. If you have any questions, ask your nurse or doctor.            CONTINUE these medicines which have NOT CHANGED      Dose / Directions   albuterol 108 (90 Base) MCG/ACT inhaler  Commonly known as: PROAIR HFA/PROVENTIL HFA/VENTOLIN HFA      Dose: 2 puff  Inhale 2 puffs into the lungs every 4 hours as needed for shortness of breath / dyspnea or wheezing  Refills: 0     omeprazole 20 MG DR capsule  Commonly known as: priLOSEC      Dose: 20 mg  Take 20 mg by mouth daily  Refills: 0     risperiDONE 1 MG tablet  Commonly known as: risperDAL      Dose: 1 mg  Take 1 mg by mouth At Bedtime  Refills: 0     sertraline 100 MG tablet  Commonly known as: ZOLOFT  Used for: Recurrent major depressive disorder, in partial remission (H)      Dose: 100 mg  Take 1 tablet (100 mg) by mouth At Bedtime  Quantity: 31 tablet  Refills: 98     TYLENOL PO      Dose: 1,000 mg  Take 1,000 mg by mouth 2 times daily  Refills: 0     vitamin D3 50 mcg (2000 units) tablet  Commonly known as: CHOLECALCIFEROL  Used for: Vitamin D deficiency      Dose: 1 tablet  Take 1 tablet (2,000 Units) by mouth At Bedtime  Quantity: 30 tablet  Refills:  0             Case Management:  I have reviewed the care plan and MDS and do agree with the plan. Patient's desire to return to the community is present, but is not able due to care needs . Information reviewed:  Medications, vital signs, orders, and nursing notes.    ROS:  Review of Systems   Constitutional: Negative.   HENT: Negative.    Eyes: Negative.    Cardiovascular: Negative.    Respiratory: Negative.    Endocrine: Negative.    Hematologic/Lymphatic: Negative.    Skin: Negative.    Musculoskeletal: Positive for back pain.   Gastrointestinal: Negative.  Negative for heartburn.   Genitourinary: Negative.    Neurological: Negative.    Psychiatric/Behavioral: Negative.  Negative for depression, suicidal ideas and thoughts of violence. The patient is not nervous/anxious.    Allergic/Immunologic: Negative.        Vitals:  Temp 98.1  F (36.7  C)   SpO2 96%   There is no height or weight on file to calculate BMI.  Exam:  Physical Exam  Vitals and nursing note reviewed.   Constitutional:       General: She is not in acute distress.     Appearance: She is obese. She is not ill-appearing.   HENT:      Head: Normocephalic.      Comments: Small tattoo below left eye  Cardiovascular:      Rate and Rhythm: Normal rate and regular rhythm.      Heart sounds: Normal heart sounds.   Pulmonary:      Effort: Pulmonary effort is normal.      Breath sounds: Normal breath sounds.   Abdominal:      General: Bowel sounds are normal.      Palpations: Abdomen is soft.   Musculoskeletal:         General: No swelling.      Cervical back: Normal range of motion.   Skin:     General: Skin is warm and dry.   Neurological:      Mental Status: She is alert.      Comments: Orientated x2   Psychiatric:         Mood and Affect: Mood normal.         Behavior: Behavior normal.         Lab/Diagnostic data:     Most Recent 3 CBC's:Recent Labs   Lab Test 01/06/20  0000 06/20/19  0000 02/19/19  0000   WBC 6.5 5.6 12.4*   HGB 9.6* 11.4* 12.8   MCV 85  89 90    167 201     Most Recent 3 BMP's:Recent Labs   Lab Test 01/06/20  0000 06/20/19  0000 02/19/19  0000    140 139   POTASSIUM 4.0 4.0 4.3   CHLORIDE 105 106 104   CO2 26 27 28   BUN 13 10 22   CR 0.52 0.48* 0.66   ANIONGAP 6 7 7   MILA 8.9 8.8 9.6   GLC 88 100* 110*       ASSESSMENT/PLAN  (J44.9) Chronic obstructive pulmonary disease, unspecified COPD type (H)  (primary encounter diagnosis)  Comment: Chronic, stable, patient does not have any complaints of shortness of breath and has albuterol as needed  Plan: Continue with plan of care no changes at this time, adjustment as needed      (F03.92) Dementia with psychosis  (R45.4) irritability  (F32.3) Current severe episode of major depressive disorder with psychotic features, unspecified whether recurrent (H)  Comment: Chronic, stable while taking risperidone 1 mg at bedtime and Zoloft 100 mg daily.  Patient's last BIMS was 7/15 which indications severe impairment and her PHQ-9 was 6/9 which indicates some depression.  During interview patient denied feeling depressed or anxious or suicidal ideation.   On exam, did not witness signs and symptoms of tardive dyskinesia (including  uncontrolled tongue movements, uncontrolled finger tapping, constant blinking, unintentional arm and leg movements, involuntary facial grimacing).   This condition is Chronic, progressive requiring 24-hour skilled nursing care.   No behavioral issues or emotional distress with changes.  Expect further functional and cognitive decline.      Pharmacy has recommended no gradual dose reduction due to possible decompensation  Plan: Continue with plan of care no changes at this time, adjustment as needed      (K21.00) Gastroesophageal reflux disease with esophagitis without hemorrhage  Comment: Continues to take omeprazole.  In the past she has failed dose reduction.  Plan:Continue with plan of care no changes at this time, adjustment as needed      (M54.50) chronic lower back  pain without sciatica   comment: Patient currently receives acetaminophen 1000 mg twice daily with some relief of her back discomfort  Plan:   Continue with plan of care no changes at this time, adjustment as needed        Orders:    No new orders      Electronically signed by:    ALCIRA Sepulveda CNP                 Sincerely,        ALCIRA Sepulveda CNP

## 2022-10-04 NOTE — PROGRESS NOTES
[facility] GERIATRICS  Chief Complaint   Patient presents with     shelter Regulatory     [facility] Medical Record Number:  [id number]  Place of Service where encounter took place:  [facility] [place] () [[id number]]    HPI:    Chelsey Casillas  is 82 year old (1940), who is being seen today for a federally mandated E/M visit.  Her primary contact is Tasha Garcia, her friend.    Her past medical history includes    COPD    Dementia with psychosis    Major depression with psychosis    History of falls    Urinary incontinence    GERD    Chronic lower back pain    Today patient was seen in her room.  She states she has chronic back pain but denies any problems.  She would like someone to look at her wheelchair as one of the brakes does not work.  Otherwise she denies shortness of breath, chest pain, dizziness, lightheadedness, episodes of depression or anxiety.   Nursing has no concerns. BIMS= 7/15 (score 0 to 7 suggest severe impairment) PHQ9= 6.   Patient needs assistance with ADLs, uses a wheelchair.    Her appetite is good and consumes a regular diet with juice supplement and ice cream twice daily.  Per nursing, skin is intact. Code status is DNR/DNI.   Psychiatry recommended no gradual dose reduction due to possible decompensation.    In reviewing point click care, weight has been stable at 164 pounds, blood pressure 100 -154 /60-85 mmHg, heart rate 60-80* bpm, oxygen saturation 94-98% on room air, no pain is recorded        ALLERGIES:Amlodipine  PAST MEDICAL HISTORY:   Past Medical History:   Diagnosis Date     Arthritis      Chronic low back pain 3/17/2015     COPD (chronic obstructive pulmonary disease) (H)      Dementia with psychosis (H) 11/1/2016     Fall 2/27/2013     Falling episodes 9/24/2016     Functional urinary incontinence 3/7/2013     GERD (gastroesophageal reflux disease) 4/3/2018     HTN (hypertension) 4/3/2018     Knee pain 3/7/2013     Major depression, single episode 4/16/2015      Pain in both knees 1/30/2014     Physical deconditioning 3/7/2013     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  FAMILY HISTORY: Family history is unknown by patient.  SOCIAL HISTORY:  reports that she has quit smoking. Her smoking use included cigarettes. She has never used smokeless tobacco. She reports that she does not drink alcohol and does not use drugs.    MEDICATIONS:    Post Medication Reconciliation Status: Medication reconciliation previously completed during another office visit           Review of your medicines          Accurate as of October 4, 2022 11:59 PM. If you have any questions, ask your nurse or doctor.            CONTINUE these medicines which have NOT CHANGED      Dose / Directions   albuterol 108 (90 Base) MCG/ACT inhaler  Commonly known as: PROAIR HFA/PROVENTIL HFA/VENTOLIN HFA      Dose: 2 puff  Inhale 2 puffs into the lungs every 4 hours as needed for shortness of breath / dyspnea or wheezing  Refills: 0     omeprazole 20 MG DR capsule  Commonly known as: priLOSEC      Dose: 20 mg  Take 20 mg by mouth daily  Refills: 0     risperiDONE 1 MG tablet  Commonly known as: risperDAL      Dose: 1 mg  Take 1 mg by mouth At Bedtime  Refills: 0     sertraline 100 MG tablet  Commonly known as: ZOLOFT  Used for: Recurrent major depressive disorder, in partial remission (H)      Dose: 100 mg  Take 1 tablet (100 mg) by mouth At Bedtime  Quantity: 31 tablet  Refills: 98     TYLENOL PO      Dose: 1,000 mg  Take 1,000 mg by mouth 2 times daily  Refills: 0     vitamin D3 50 mcg (2000 units) tablet  Commonly known as: CHOLECALCIFEROL  Used for: Vitamin D deficiency      Dose: 1 tablet  Take 1 tablet (2,000 Units) by mouth At Bedtime  Quantity: 30 tablet  Refills: 0             Case Management:  I have reviewed the care plan and MDS and do agree with the plan. Patient's desire  to return to the community is present, but is not able due to care needs . Information reviewed:  Medications, vital signs, orders, and nursing notes.    ROS:  Review of Systems   Constitutional: Negative.   HENT: Negative.    Eyes: Negative.    Cardiovascular: Negative.    Respiratory: Negative.    Endocrine: Negative.    Hematologic/Lymphatic: Negative.    Skin: Negative.    Musculoskeletal: Positive for back pain.   Gastrointestinal: Negative.  Negative for heartburn.   Genitourinary: Negative.    Neurological: Negative.    Psychiatric/Behavioral: Negative.  Negative for depression, suicidal ideas and thoughts of violence. The patient is not nervous/anxious.    Allergic/Immunologic: Negative.        Vitals:  Temp 98.1  F (36.7  C)   SpO2 96%   There is no height or weight on file to calculate BMI.  Exam:  Physical Exam  Vitals and nursing note reviewed.   Constitutional:       General: She is not in acute distress.     Appearance: She is obese. She is not ill-appearing.   HENT:      Head: Normocephalic.      Comments: Small tattoo below left eye  Cardiovascular:      Rate and Rhythm: Normal rate and regular rhythm.      Heart sounds: Normal heart sounds.   Pulmonary:      Effort: Pulmonary effort is normal.      Breath sounds: Normal breath sounds.   Abdominal:      General: Bowel sounds are normal.      Palpations: Abdomen is soft.   Musculoskeletal:         General: No swelling.      Cervical back: Normal range of motion.   Skin:     General: Skin is warm and dry.   Neurological:      Mental Status: She is alert.      Comments: Orientated x2   Psychiatric:         Mood and Affect: Mood normal.         Behavior: Behavior normal.         Lab/Diagnostic data:     Most Recent 3 CBC's:Recent Labs   Lab Test 01/06/20  0000 06/20/19  0000 02/19/19  0000   WBC 6.5 5.6 12.4*   HGB 9.6* 11.4* 12.8   MCV 85 89 90    167 201     Most Recent 3 BMP's:Recent Labs   Lab Test 01/06/20  0000 06/20/19  0000 02/19/19  0000     140 139   POTASSIUM 4.0 4.0 4.3   CHLORIDE 105 106 104   CO2 26 27 28   BUN 13 10 22   CR 0.52 0.48* 0.66   ANIONGAP 6 7 7   MILA 8.9 8.8 9.6   GLC 88 100* 110*       ASSESSMENT/PLAN  (J44.9) Chronic obstructive pulmonary disease, unspecified COPD type (H)  (primary encounter diagnosis)  Comment: Chronic, stable, patient does not have any complaints of shortness of breath and has albuterol as needed  Plan: Continue with plan of care no changes at this time, adjustment as needed      (F03.92) Dementia with psychosis  (R45.4) irritability  (F32.3) Current severe episode of major depressive disorder with psychotic features, unspecified whether recurrent (H)  Comment: Chronic, stable while taking risperidone 1 mg at bedtime and Zoloft 100 mg daily.  Patient's last BIMS was 7/15 which indications severe impairment and her PHQ-9 was 6/9 which indicates some depression.  During interview patient denied feeling depressed or anxious or suicidal ideation.   On exam, did not witness signs and symptoms of tardive dyskinesia (including  uncontrolled tongue movements, uncontrolled finger tapping, constant blinking, unintentional arm and leg movements, involuntary facial grimacing).   This condition is Chronic, progressive requiring 24-hour skilled nursing care.   No behavioral issues or emotional distress with changes.  Expect further functional and cognitive decline.      Pharmacy has recommended no gradual dose reduction due to possible decompensation  Plan: Continue with plan of care no changes at this time, adjustment as needed      (K21.00) Gastroesophageal reflux disease with esophagitis without hemorrhage  Comment: Continues to take omeprazole.  In the past she has failed dose reduction.  Plan:Continue with plan of care no changes at this time, adjustment as needed      (M54.50) chronic lower back pain without sciatica   comment: Patient currently receives acetaminophen 1000 mg twice daily with some relief of her  back discomfort  Plan:   Continue with plan of care no changes at this time, adjustment as needed        Orders:    No new orders      Electronically signed by:    ALCIRA Sepulveda CNP

## 2022-10-05 ASSESSMENT — ENCOUNTER SYMPTOMS
CARDIOVASCULAR NEGATIVE: 1
ALLERGIC/IMMUNOLOGIC NEGATIVE: 1
DEPRESSION: 0
NEUROLOGICAL NEGATIVE: 1
NERVOUS/ANXIOUS: 0
RESPIRATORY NEGATIVE: 1
ENDOCRINE NEGATIVE: 1
BACK PAIN: 1
EYES NEGATIVE: 1
HEARTBURN: 0
THOUGHT CONTENT - THOUGHTS OF VIOLENCE: 0
GASTROINTESTINAL NEGATIVE: 1
HEMATOLOGIC/LYMPHATIC NEGATIVE: 1
CONSTITUTIONAL NEGATIVE: 1
PSYCHIATRIC NEGATIVE: 1

## 2022-11-07 ENCOUNTER — PATIENT OUTREACH (OUTPATIENT)
Dept: GERIATRIC MEDICINE | Facility: CLINIC | Age: 82
End: 2022-11-07

## 2022-11-07 NOTE — PROGRESS NOTES
Encounter opened due to Regulatory [employer] Update to open FVP Program.    Ashley Woo  Care Management Specialist Manager  [facility]  [phone]

## 2022-11-07 NOTE — PROGRESS NOTES
Encounter opened due to Regulatory [employer] Update to close FVP Program.    Ashley Woo  Care Management Specialist Manager  [facility]  [phone]

## 2022-11-14 ENCOUNTER — TELEPHONE (OUTPATIENT)
Dept: GERIATRICS | Facility: CLINIC | Age: 82
End: 2022-11-14

## 2022-11-14 DIAGNOSIS — R05.1 ACUTE COUGH: Primary | ICD-10-CM

## 2022-11-14 RX ORDER — GUAIFENESIN/DEXTROMETHORPHAN 100-10MG/5
10 SYRUP ORAL EVERY 4 HOURS PRN
Start: 2022-11-14 | End: 2023-12-03 | Stop reason: ALTCHOICE

## 2022-11-15 ENCOUNTER — PATIENT OUTREACH (OUTPATIENT)
Dept: GERIATRIC MEDICINE | Facility: CLINIC | Age: 82
End: 2022-11-15

## 2022-11-15 ASSESSMENT — PATIENT HEALTH QUESTIONNAIRE - PHQ9: SUM OF ALL RESPONSES TO PHQ QUESTIONS 1-9: 1

## 2022-11-15 ASSESSMENT — ACTIVITIES OF DAILY LIVING (ADL)
DEPENDENT_IADLS:: CLEANING;COOKING;LAUNDRY;SHOPPING;MEAL PREPARATION;MEDICATION MANAGEMENT;MONEY MANAGEMENT;TRANSPORTATION;INCONTINENCE

## 2022-11-15 NOTE — PROGRESS NOTES
"[facility] Care Coordination Contact    [facility] Institutional Assessment     Institutional Assessment for Health Risk Assessment with Chelsey Casillas completed on November 15, 2022 at [facility]    Type of residence:: Nursing home  Current living arrangement:: I live in a nursing home     Assessment completed with:: Patient, Care Team Member      Mental/Behavioral Health   Depression Screening:      PHQ-9 Total Score: 1    Mental health DX:: Yes   Mental health DX how managed:: In Home Counseling, Medication member has been known to take a swipe at staff in the past.  She becomes grouchy quickly and refuses to cooperate.  Has no family or visitors and does not socialize with other residents. Staff describe her as \"brutally honest\". No longer uses her walker but only the wheelchair.  Reports that she quit smoking about a year ago and still misses it sometimes.      Falls Assessment:   Fallen 2 or more times in the past year?: No   Any fall with injury in the past year?: No    ADL/IADL Dependencies:   Dependent ADLs:: Bathing, Dressing, Eating, Grooming, Incontinence, Wheelchair-with assist  Dependent IADLs:: Cleaning, Cooking, Laundry, Shopping, Meal Preparation, Medication Management, Money Management, Transportation, Incontinence      Care Plan & Recommendations: Member denied pain today but has diagnosis of chronic pain. Denied that staff help with ADLs but staff report that they assist with most ADLs. Has several missing teeth but refuses to see a dentist.   Discussed options/opportunities for transitions.    See Institutional Care Plan for detailed assessment information.    Obtained a copy of the facility care plan and MDS from facility electronic records. Requested of correction social worker to put this care coordinator on care conference attendee list.    Placed the Health Plan facility face sheet in the member's facility chart.    Follow-Up Plan: Member informed of future " contact, plan to f/u with member with a 6 month assessment, attend 1 care conference annually, and will follow any hospitalizations or transitions. Care Coordinator contact information shared with member/family and facility, and encouraged to call this care coordinator with any questions or concerns at any time.     [place] care continuum providers: Please see Snapshot and Care Management Flowsheets for Specific details of care plan.    Madelyn Davis RN  [facility]  [phone]    This CC note routed to PCP.

## 2022-11-16 ENCOUNTER — LAB REQUISITION (OUTPATIENT)
Dept: LAB | Facility: CLINIC | Age: 82
End: 2022-11-16
Payer: COMMERCIAL

## 2022-11-16 DIAGNOSIS — Z29.9 ENCOUNTER FOR PROPHYLACTIC MEASURES, UNSPECIFIED: ICD-10-CM

## 2022-11-16 DIAGNOSIS — E11.9 TYPE 2 DIABETES MELLITUS WITHOUT COMPLICATIONS (H): ICD-10-CM

## 2022-11-17 ENCOUNTER — LAB REQUISITION (OUTPATIENT)
Dept: LAB | Facility: CLINIC | Age: 82
End: 2022-11-17
Payer: COMMERCIAL

## 2022-11-17 DIAGNOSIS — Z29.9 ENCOUNTER FOR PROPHYLACTIC MEASURES, UNSPECIFIED: ICD-10-CM

## 2022-11-17 LAB
ALBUMIN SERPL-MCNC: 3.4 G/DL (ref 3.4–5)
ALP SERPL-CCNC: 71 U/L (ref 40–150)
ALT SERPL W P-5'-P-CCNC: 16 U/L (ref 0–50)
ANION GAP SERPL CALCULATED.3IONS-SCNC: 7 MMOL/L (ref 3–14)
AST SERPL W P-5'-P-CCNC: 10 U/L (ref 0–45)
BILIRUB DIRECT SERPL-MCNC: <0.1 MG/DL (ref 0–0.2)
BILIRUB SERPL-MCNC: 0.2 MG/DL (ref 0.2–1.3)
BUN SERPL-MCNC: 13 MG/DL (ref 7–30)
CALCIUM SERPL-MCNC: 9.3 MG/DL (ref 8.5–10.1)
CHLORIDE BLD-SCNC: 101 MMOL/L (ref 94–109)
CHOLEST SERPL-MCNC: 214 MG/DL
CO2 SERPL-SCNC: 26 MMOL/L (ref 20–32)
CREAT SERPL-MCNC: 0.55 MG/DL (ref 0.52–1.04)
ERYTHROCYTE [DISTWIDTH] IN BLOOD BY AUTOMATED COUNT: 19.9 % (ref 10–15)
FASTING STATUS PATIENT QL REPORTED: ABNORMAL
GFR SERPL CREATININE-BSD FRML MDRD: >90 ML/MIN/1.73M2
GLUCOSE BLD-MCNC: 176 MG/DL (ref 70–99)
HBA1C MFR BLD: 6.2 % (ref 0–5.6)
HCT VFR BLD AUTO: 25.2 % (ref 35–47)
HDLC SERPL-MCNC: 37 MG/DL
HGB BLD-MCNC: 6.6 G/DL (ref 11.7–15.7)
LDLC SERPL CALC-MCNC: 121 MG/DL
MAGNESIUM SERPL-MCNC: 2.3 MG/DL (ref 1.6–2.3)
MCH RBC QN AUTO: 16.8 PG (ref 26.5–33)
MCHC RBC AUTO-ENTMCNC: 26.2 G/DL (ref 31.5–36.5)
MCV RBC AUTO: 64 FL (ref 78–100)
NONHDLC SERPL-MCNC: 177 MG/DL
PLATELET # BLD AUTO: 254 10E3/UL (ref 150–450)
POTASSIUM BLD-SCNC: 3.9 MMOL/L (ref 3.4–5.3)
PROT SERPL-MCNC: 7.8 G/DL (ref 6.8–8.8)
RBC # BLD AUTO: 3.93 10E6/UL (ref 3.8–5.2)
SODIUM SERPL-SCNC: 134 MMOL/L (ref 133–144)
TRIGL SERPL-MCNC: 279 MG/DL
TSH SERPL DL<=0.005 MIU/L-ACNC: 0.1 MU/L (ref 0.4–4)
VIT B12 SERPL-MCNC: 1046 PG/ML (ref 232–1245)
WBC # BLD AUTO: 5.1 10E3/UL (ref 4–11)

## 2022-11-17 PROCEDURE — 83036 HEMOGLOBIN GLYCOSYLATED A1C: CPT | Mod: ORL | Performed by: NURSE PRACTITIONER

## 2022-11-17 PROCEDURE — 82248 BILIRUBIN DIRECT: CPT | Mod: ORL | Performed by: NURSE PRACTITIONER

## 2022-11-17 PROCEDURE — 83735 ASSAY OF MAGNESIUM: CPT | Mod: ORL | Performed by: NURSE PRACTITIONER

## 2022-11-17 PROCEDURE — 85027 COMPLETE CBC AUTOMATED: CPT | Mod: ORL | Performed by: NURSE PRACTITIONER

## 2022-11-17 PROCEDURE — 82607 VITAMIN B-12: CPT | Mod: ORL | Performed by: NURSE PRACTITIONER

## 2022-11-17 PROCEDURE — 36415 COLL VENOUS BLD VENIPUNCTURE: CPT | Mod: ORL | Performed by: NURSE PRACTITIONER

## 2022-11-17 PROCEDURE — 80061 LIPID PANEL: CPT | Mod: ORL | Performed by: NURSE PRACTITIONER

## 2022-11-17 PROCEDURE — 80053 COMPREHEN METABOLIC PANEL: CPT | Mod: ORL | Performed by: NURSE PRACTITIONER

## 2022-11-17 PROCEDURE — 84443 ASSAY THYROID STIM HORMONE: CPT | Mod: ORL | Performed by: NURSE PRACTITIONER

## 2022-11-18 ENCOUNTER — TELEPHONE (OUTPATIENT)
Dept: GERIATRICS | Facility: CLINIC | Age: 82
End: 2022-11-18

## 2022-11-18 NOTE — TELEPHONE ENCOUNTER
Called herb Garcia to notify her that Chelsey Casillas's hgb was critically low at 6.6.   Tasha doesn't want Chelsey to have heroic measures.   She does not want invasive testing.   Tasha's address was also updated.     ALCIRA Sepulveda CNP on 11/18/2022 at 8:43 AM

## 2022-11-20 ENCOUNTER — LAB REQUISITION (OUTPATIENT)
Dept: LAB | Facility: CLINIC | Age: 82
End: 2022-11-20
Payer: COMMERCIAL

## 2022-11-20 DIAGNOSIS — D64.9 ANEMIA, UNSPECIFIED: ICD-10-CM

## 2022-11-21 ENCOUNTER — LAB REQUISITION (OUTPATIENT)
Dept: LAB | Facility: CLINIC | Age: 82
End: 2022-11-21
Payer: COMMERCIAL

## 2022-11-21 ENCOUNTER — NURSING HOME VISIT (OUTPATIENT)
Dept: GERIATRICS | Facility: CLINIC | Age: 82
End: 2022-11-21
Payer: COMMERCIAL

## 2022-11-21 VITALS
HEART RATE: 77 BPM | TEMPERATURE: 98.8 F | WEIGHT: 164 LBS | OXYGEN SATURATION: 93 % | BODY MASS INDEX: 32.03 KG/M2 | RESPIRATION RATE: 16 BRPM | DIASTOLIC BLOOD PRESSURE: 86 MMHG | SYSTOLIC BLOOD PRESSURE: 144 MMHG

## 2022-11-21 DIAGNOSIS — D50.9 IRON DEFICIENCY ANEMIA, UNSPECIFIED IRON DEFICIENCY ANEMIA TYPE: Primary | ICD-10-CM

## 2022-11-21 DIAGNOSIS — Z29.9 ENCOUNTER FOR PROPHYLACTIC MEASURES, UNSPECIFIED: ICD-10-CM

## 2022-11-21 DIAGNOSIS — E05.90 HYPERTHYROIDISM: ICD-10-CM

## 2022-11-21 DIAGNOSIS — F03.92 DEMENTIA WITH PSYCHOSIS (H): ICD-10-CM

## 2022-11-21 LAB
ERYTHROCYTE [DISTWIDTH] IN BLOOD BY AUTOMATED COUNT: 19.7 % (ref 10–15)
HCT VFR BLD AUTO: 27 % (ref 35–47)
HGB BLD-MCNC: 7.1 G/DL (ref 11.7–15.7)
MCH RBC QN AUTO: 16.9 PG (ref 26.5–33)
MCHC RBC AUTO-ENTMCNC: 26.3 G/DL (ref 31.5–36.5)
MCV RBC AUTO: 64 FL (ref 78–100)
PLATELET # BLD AUTO: 247 10E3/UL (ref 150–450)
RBC # BLD AUTO: 4.21 10E6/UL (ref 3.8–5.2)
WBC # BLD AUTO: 5.4 10E3/UL (ref 4–11)

## 2022-11-21 PROCEDURE — 99309 SBSQ NF CARE MODERATE MDM 30: CPT | Performed by: NURSE PRACTITIONER

## 2022-11-21 PROCEDURE — 85027 COMPLETE CBC AUTOMATED: CPT | Mod: ORL | Performed by: NURSE PRACTITIONER

## 2022-11-21 PROCEDURE — 36415 COLL VENOUS BLD VENIPUNCTURE: CPT | Mod: ORL | Performed by: NURSE PRACTITIONER

## 2022-11-21 RX ORDER — FERROUS SULFATE 325(65) MG
325 TABLET ORAL
Start: 2022-11-21 | End: 2023-12-03

## 2022-11-21 RX ORDER — METHIMAZOLE 5 MG/1
5 TABLET ORAL 3 TIMES DAILY
Status: CANCELLED
Start: 2022-11-21

## 2022-11-21 RX ORDER — RISPERIDONE 1 MG/1
0.5 TABLET ORAL AT BEDTIME
Start: 2022-11-21 | End: 2023-04-05

## 2022-11-21 NOTE — PROGRESS NOTES
Code Status:  DNR/DNI comfort care  Visit Type: Anemia     Facility:   [facility] () [[id number]]         History of Present Illness: Chelsey Casillas is a 82 year old female     Her past medical history includes    COPD    Dementia with psychosis    Major depression with psychosis    History of falls    Urinary incontinence    GERD    Chronic lower back pain    Anemia    hyperthyroidism    On 11/14/2022, pt was found to have critical hemoglobin.      On 11/18, herb Garcia was notified that Juliet's hgb was critically low at 6.6.   Tasha doesn't want Chelsey to have heroic measures.   She does not want invasive testing.      Today patient was seen in her room.  She denies shortness of breath, dyspnea, lightheadedness, melena, sensitivity to temperatures, anxiety or depression.   She only eats 2 meals a day and usually skips breakfast.  Consumes a diet regular diet with ice cream and juice supplement.  Per nursing, skin is intact. Code status is comfort care.   Psychiatry decreased her risperidone.    In reviewing point click care, her VS are stable.            Current Outpatient Medications   Medication Sig Dispense Refill     Acetaminophen (TYLENOL PO) Take 1,000 mg by mouth 2 times daily       albuterol (PROAIR HFA/PROVENTIL HFA/VENTOLIN HFA) 108 (90 BASE) MCG/ACT Inhaler Inhale 2 puffs into the lungs every 4 hours as needed for shortness of breath / dyspnea or wheezing        guaiFENesin-dextromethorphan (ROBITUSSIN DM) 100-10 MG/5ML syrup Take 10 mLs by mouth every 4 hours as needed for cough       omeprazole (PRILOSEC) 20 MG DR capsule Take 20 mg by mouth daily       sertraline (ZOLOFT) 100 MG tablet Take 1 tablet (100 mg) by mouth At Bedtime 31 tablet 98     vitamin D3 (CHOLECALCIFEROL) 2000 units (50 mcg) tablet Take 1 tablet (2,000 Units) by mouth At Bedtime 30 tablet      risperiDONE (RISPERDAL) 1 MG tablet Take 1 mg by mouth At Bedtime (Patient not taking: Reported on 11/21/2022)         Review  of Systems   All other systems reviewed and are negative.         Physical Exam  Vitals and nursing note reviewed.   Constitutional:       General: She is not in acute distress.     Appearance: She is obese. She is not ill-appearing.   Cardiovascular:      Rate and Rhythm: Normal rate and regular rhythm.      Pulses: Normal pulses.      Heart sounds: Normal heart sounds.   Pulmonary:      Breath sounds: Normal breath sounds.   Abdominal:      General: Bowel sounds are normal.      Palpations: Abdomen is soft.   Skin:     Coloration: Skin is pale.   Neurological:      Mental Status: She is alert.   Psychiatric:         Attention and Perception: Attention normal.         Mood and Affect: Mood normal. Affect is flat.         Behavior: Behavior normal.         Labs:       Most Recent 3 CBC's:Recent Labs   Lab Test 11/17/22 0600 01/06/20  0000 06/20/19  0000   WBC 5.1 6.5 5.6   HGB 6.6* 9.6* 11.4*   MCV 64* 85 89    209 167     Most Recent 3 BMP's:  Recent Labs   Lab Test 11/17/22 0600 01/06/20  0000 06/20/19  0000    137 140   POTASSIUM 3.9 4.0 4.0   CHLORIDE 101 105 106   CO2 26 26 27   BUN 13 13 10   CR 0.55 0.52 0.48*   ANIONGAP 7 6 7   MILA 9.3 8.9 8.8   * 88 100*     Most Recent 2 LFT's:  Recent Labs   Lab Test 11/17/22 0600 01/06/20  0000   AST 10 18   ALT 16 36   ALKPHOS 71 220*   BILITOTAL 0.2 0.4     Most Recent Cholesterol Panel:  Recent Labs   Lab Test 11/17/22 0600   CHOL 214*   *   HDL 37*   TRIG 279*     Most Recent TSH and T 4:  Recent Labs   Lab Test 11/17/22 0600   TSH 0.10*     Most Recent Hemoglobin AC:  Recent Labs   Lab Test 11/17/22 0600   A1C 6.2*       Assessment/Plan:  (D 50.9) Iron deficiency anemia, unspecified iron deficiency anemia type  (primary encounter diagnosis)  Comment: acute, new finding of critically low hemoglobin.  Pt denies any symptoms.   Plan:     Redraw CBC with iron studies    Increase omeprazole (PRILOSEC) 20 MG DR capsule BID,    add ferrous  sulfate (FEROSUL) 325 (65 Fe) MG tablet    Continue to make pt comfortable         (03.92) Dementia with psychosis  Comment: Chronic, progressive.    Patient requiring 24-hour skilled nursing care.   No behavioral issues or emotional distress noted.  Psychiatry decreased risperidone  Expect further functional and cognitive decline.  Expect weight loss.     Plan:     Per psychiatry decrease risperiDONE (RISPERDAL) to 0.5 mg daily         (E05.90) Hyperthyroidism  Comment: Acute new finding.   PT's TSH is 0.1.      Plan: Will discuss with Dr. Gardner starting Methimazole 5 my TID for 4 weeks then recheck TSH with reflex to free T4 and T3 plus CMP due to the hepatic damage that can occur.        Orders    Redraw CBC with iron studies now    Increase omeprazole (PRILOSEC) 20 MG DR capsule BID,    add ferrous sulfate (FEROSUL) 325 (65 Fe) MG tablet    Will discuss with Dr. Gardner starting Methimazole 5 my TID for 4 weeks then recheck TSH with reflex to free T4 and T3 plus CMP due to the hepatic damage that can occur.      Redraw CBC in 1 month      Electronically signed by:ALCIRA Sepulveda CNP

## 2022-11-22 LAB
ERYTHROCYTE [DISTWIDTH] IN BLOOD BY AUTOMATED COUNT: 19.3 % (ref 10–15)
FOLATE SERPL-MCNC: 4.8 NG/ML (ref 4.6–34.8)
HCT VFR BLD AUTO: 22.5 % (ref 35–47)
HGB BLD-MCNC: 5.9 G/DL (ref 11.7–15.7)
IRON SERPL-MCNC: 12 UG/DL (ref 35–180)
MCH RBC QN AUTO: 16.8 PG (ref 26.5–33)
MCHC RBC AUTO-ENTMCNC: 26.2 G/DL (ref 31.5–36.5)
MCV RBC AUTO: 64 FL (ref 78–100)
PLATELET # BLD AUTO: 226 10E3/UL (ref 150–450)
RBC # BLD AUTO: 3.51 10E6/UL (ref 3.8–5.2)
VIT B12 SERPL-MCNC: 834 PG/ML (ref 232–1245)
WBC # BLD AUTO: 5.3 10E3/UL (ref 4–11)

## 2022-11-22 PROCEDURE — 82746 ASSAY OF FOLIC ACID SERUM: CPT | Mod: ORL | Performed by: NURSE PRACTITIONER

## 2022-11-22 PROCEDURE — 82607 VITAMIN B-12: CPT | Mod: ORL | Performed by: NURSE PRACTITIONER

## 2022-11-22 PROCEDURE — 83540 ASSAY OF IRON: CPT | Mod: ORL | Performed by: NURSE PRACTITIONER

## 2022-11-22 PROCEDURE — 85027 COMPLETE CBC AUTOMATED: CPT | Mod: ORL | Performed by: NURSE PRACTITIONER

## 2022-11-22 PROCEDURE — P9604 ONE-WAY ALLOW PRORATED TRIP: HCPCS | Mod: ORL | Performed by: NURSE PRACTITIONER

## 2022-11-22 PROCEDURE — 36415 COLL VENOUS BLD VENIPUNCTURE: CPT | Mod: ORL | Performed by: NURSE PRACTITIONER

## 2022-11-28 ENCOUNTER — NURSING HOME VISIT (OUTPATIENT)
Dept: GERIATRICS | Facility: CLINIC | Age: 82
End: 2022-11-28
Payer: COMMERCIAL

## 2022-11-28 VITALS
TEMPERATURE: 97.6 F | OXYGEN SATURATION: 93 % | HEIGHT: 60 IN | HEART RATE: 76 BPM | RESPIRATION RATE: 18 BRPM | WEIGHT: 165.8 LBS | SYSTOLIC BLOOD PRESSURE: 128 MMHG | BODY MASS INDEX: 32.55 KG/M2 | DIASTOLIC BLOOD PRESSURE: 72 MMHG

## 2022-11-28 DIAGNOSIS — D50.9 IRON DEFICIENCY ANEMIA, UNSPECIFIED IRON DEFICIENCY ANEMIA TYPE: Primary | ICD-10-CM

## 2022-11-28 PROCEDURE — 99309 SBSQ NF CARE MODERATE MDM 30: CPT | Performed by: NURSE PRACTITIONER

## 2022-11-28 NOTE — PROGRESS NOTES
[facility] GERIATRICS    Chief Complaint   Patient presents with     Nursing Home Acute     anemia     HPI:  Chelsey Casillas is a 82 year old  (1940), who is being seen today for an episodic care visit at: [facility] () [[id number]].     Her past medical history includes    COPD    Dementia with psychosis    Major depression with psychosis    History of falls    Urinary incontinence    GERD    Chronic lower back pain    Anemia    hyperthyroidism     On 11/14/2022, pt was found to have critical hemoglobin.       On 11/18, herb Garcia was notified that Juliet's hgb was critically low at 6.6.   Tasha doesn't want Chelsey to have heroic measures.   She does not want invasive testing.      Today patient was seen in her room.  She denies shortness of breath, dyspnea, lightheadedness, melena, sensitivity to temperatures, anxiety or depression.    She is not having constipation from the iron.  As noted her risperidone was decreased and no behavior changes noted.      Today patient was seen in her room and denied patient denied chest pain, shortness of breath, dizziness, lightheadedness, and a poor appetite.   VS stable.            Allergies, and PMH/PSH reviewed in [employer] today.  REVIEW OF SYSTEMS:  10 point ROS of systems including Constitutional, Eyes, Respiratory, Cardiovascular, Gastroenterology, Genitourinary, Integumentary, Musculoskeletal, Psychiatric were all negative except for pertinent positives noted in my HPI.    Objective:   /72   Pulse 76   Temp 97.6  F (36.4  C)   Resp 18   Ht 1.524 m (5')   Wt 75.2 kg (165 lb 12.8 oz)   SpO2 93%   BMI 32.38 kg/m    GENERAL APPEARANCE:  Alert, in no distress  RESP:  no respiratory distress  CV:  no edema  PSYCH:  affect and mood normal      Most Recent 3 CBC's:Recent Labs   Lab Test 11/22/22  0745 11/21/22  1223 11/17/22  0600   WBC 5.3 5.4 5.1   HGB 5.9* 7.1* 6.6*   MCV 64* 64* 64*    247 254     Most Recent 3 BMP's:  Recent Labs    Lab Test 11/17/22  0600 01/06/20  0000 06/20/19  0000    137 140   POTASSIUM 3.9 4.0 4.0   CHLORIDE 101 105 106   CO2 26 26 27   BUN 13 13 10   CR 0.55 0.52 0.48*   ANIONGAP 7 6 7   MILA 9.3 8.9 8.8   * 88 100*     Most Recent 2 LFT's:  Recent Labs   Lab Test 11/17/22  0600 01/06/20  0000   AST 10 18   ALT 16 36   ALKPHOS 71 220*   BILITOTAL 0.2 0.4     Most Recent 3 INR's:No lab results found.  Most Recent Cholesterol Panel:  Recent Labs   Lab Test 11/17/22 0600   CHOL 214*   *   HDL 37*   TRIG 279*     Most Recent TSH and T4:  Recent Labs   Lab Test 11/17/22 0600   TSH 0.10*     Most Recent Hemoglobin A1c:  Recent Labs   Lab Test 11/17/22 0600   A1C 6.2*         Assessment/Plan:  (D 50.9) Iron deficiency anemia, unspecified iron deficiency anemia type  (primary encounter diagnosis)  Comment: acute, relatively new finding of critically low hemoglobin.  Started on iron supplements and omeprazole was increased to BID, POA aware.  Pt denies any symptoms.   Plan:     Redraw CBC with iron studies (ferritin, iron, transferrin, total iron binding capacity) in 1 month    Continue to make pt comfortable          (03.92) Dementia with psychosis  Comment: Chronic, progressive.    Patient requiring 24-hour skilled nursing care.   No behavioral issues or emotional distress noted.  Psychiatry decreased risperidone (11/2022) and no behavior problems noted.  Expect further functional and cognitive decline.  Expect weight loss.     Plan: Continue with plan of care no changes at this time, adjustment as needed          (E05.90) Hyperthyroidism  Comment: Acute new finding.   PT's TSH is 0.1.      Plan: Will redraw when redraw hgb in 1 month          Orders    Redraw CBC with iron studies and TSH with reflex to free T4     MED REC completed    Electronically signed by:     ALCIRA Sepulveda CNP on 11/28/2022 at 2:29 PM

## 2022-12-02 ENCOUNTER — NURSING HOME VISIT (OUTPATIENT)
Dept: GERIATRICS | Facility: CLINIC | Age: 82
End: 2022-12-02
Payer: COMMERCIAL

## 2022-12-02 VITALS
WEIGHT: 165.8 LBS | SYSTOLIC BLOOD PRESSURE: 128 MMHG | OXYGEN SATURATION: 93 % | TEMPERATURE: 97.6 F | DIASTOLIC BLOOD PRESSURE: 72 MMHG | BODY MASS INDEX: 32.55 KG/M2 | HEART RATE: 76 BPM | HEIGHT: 60 IN | RESPIRATION RATE: 18 BRPM

## 2022-12-02 DIAGNOSIS — K21.9 GASTROESOPHAGEAL REFLUX DISEASE, UNSPECIFIED WHETHER ESOPHAGITIS PRESENT: ICD-10-CM

## 2022-12-02 DIAGNOSIS — F33.9 RECURRENT MAJOR DEPRESSIVE DISORDER, REMISSION STATUS UNSPECIFIED (H): ICD-10-CM

## 2022-12-02 DIAGNOSIS — R79.89 LOW SERUM THYROID STIMULATING HORMONE (TSH): ICD-10-CM

## 2022-12-02 DIAGNOSIS — J44.9 CHRONIC OBSTRUCTIVE PULMONARY DISEASE, UNSPECIFIED COPD TYPE (H): ICD-10-CM

## 2022-12-02 DIAGNOSIS — D50.9 IRON DEFICIENCY ANEMIA, UNSPECIFIED IRON DEFICIENCY ANEMIA TYPE: Primary | ICD-10-CM

## 2022-12-02 DIAGNOSIS — F03.B3 MODERATE DEMENTIA WITH MOOD DISTURBANCE, UNSPECIFIED DEMENTIA TYPE (H): ICD-10-CM

## 2022-12-02 PROCEDURE — 99309 SBSQ NF CARE MODERATE MDM 30: CPT | Performed by: INTERNAL MEDICINE

## 2022-12-02 NOTE — LETTER
12/2/2022        RE: Chelsey Casillas  [facility]  [address] [address]        [place] [facility]  PHYSICIAN NOTE    Chief Complaint   Patient presents with     shelter Regulatory       HPI:    Chelsey Casillas is a 82 year old  (1940), who is being seen today for a federally mandated E/M visit at [facility]. Admitted to LT in Sept 2018 with h/o dementia with past scary delusions treated with Risperidone, depression on Sertraline, COPD without O2 use and recent acute anemia without signs/sx of active bleeding. Goals of care are comfort based.    Chelsey is seen sitting up in her WC and actually ate well as evidenced by what was left on her lunch tray. Weight has been stable. She denies any obvious known signs/sx of blood loss. She denies pain or dyspnea too. She had no concerns today when I met with her and seemed calm/content in her room. Nursing staff have no acute concerns either. At times she refuses baths/cares. In Nov 2022, BIMS 9/15 and PHQ-9 = 5.    ALLERGIES: Amlodipine    Past Medical History:   Diagnosis Date     Arthritis      Chronic low back pain 3/17/2015     COPD (chronic obstructive pulmonary disease) (H)      Dementia with psychosis 11/1/2016     Fall 2/27/2013     Falling episodes 9/24/2016     Functional urinary incontinence 3/7/2013     GERD (gastroesophageal reflux disease) 4/3/2018     HTN (hypertension) 4/3/2018     Knee pain 3/7/2013     Major depression, single episode 4/16/2015     Pain in both knees 1/30/2014     Physical deconditioning 3/7/2013     Severe major depression with psychotic features (H) 7/12/2016     Skin tear 3/7/2013     Weakness 6/18/2015      CODE STATUS: DNR/I    MEDICATIONS: Reviewed and updated in [facility] according to facility MAR   Current Outpatient Medications   Medication Sig Dispense Refill     Acetaminophen (TYLENOL PO) Take 1,000 mg by mouth 2 times daily       albuterol (PROAIR HFA/PROVENTIL  HFA/VENTOLIN HFA) 108 (90 BASE) MCG/ACT Inhaler Inhale 2 puffs into the lungs every 4 hours as needed for shortness of breath / dyspnea or wheezing        ferrous sulfate (FEROSUL) 325 (65 Fe) MG tablet Take 1 tablet (325 mg) by mouth daily (with breakfast)       guaiFENesin-dextromethorphan (ROBITUSSIN DM) 100-10 MG/5ML syrup Take 10 mLs by mouth every 4 hours as needed for cough       omeprazole (PRILOSEC) 20 MG DR capsule Take 1 capsule (20 mg) by mouth 2 times daily       risperiDONE (RISPERDAL) 1 MG tablet Take 0.5 tablets (0.5 mg) by mouth At Bedtime       sertraline (ZOLOFT) 100 MG tablet Take 1 tablet (100 mg) by mouth At Bedtime 31 tablet 98     vitamin D3 (CHOLECALCIFEROL) 2000 units (50 mcg) tablet Take 1 tablet (2,000 Units) by mouth At Bedtime 30 tablet        ROS:  Limited secondary to cognitive impairment but today pt reports as above in HPI    Exam:  /72   Pulse 76   Temp 97.6  F (36.4  C)   Resp 18   Ht 1.524 m (5')   Wt 75.2 kg (165 lb 12.8 oz)   SpO2 93%   BMI 32.38 kg/m    Alert, sitting up in WC in NAD, casually dressed, no odor  Not overtly pale  Breathing non-labored, no cough  Seems calm/content in her room watching TV    Lab/Diagnostic Data:    Nov 2022: CMP within normal limits, A1c 6.2%, TSH low at 0.1, B12 within normal limits 834, Iron low at 12, Folate within normal limits 4.8, WBC and platelet within normal limits, Hgb with variability over the course of a week 6.6 --> 7.1 --> 5.9 with low MCV 64    Note, previously Hgb low at 9.6 in Jan 2020    ASSESSMENT/PLAN:  (D50.9) Iron deficiency anemia, unspecified iron deficiency anemia type  (primary encounter diagnosis)  (K21.9) Gastroesophageal reflux disease, unspecified whether esophagitis present  Due to comfort based goals of care and limiting phlebotomy during the pandemic, her mild anemia in early 2020 was not followed  Now incidentally picked up a profound worsening Hgb into the 5-7 range last month with low MCV and iron  indicative of an iron deficiency anemia  WBC and platelets within normal limits as well as folate and B12  Is now on oral iron supplement  No witnessed obvious signs/sx of blood loss but suspect she's losing microscopic blood possibly from GI tract  Does have h/o GERD and historically c/o some symptoms in this regard which was improved after PPI started  Due to comfort based goals of care verified with her primary contact and friend, Tasha, via conversation PCP had with them in November, not pursuing invasive work up and I agree with that recommendation  Is planning to have f/u labs in the new year    (R79.89) Low serum thyroid stimulating hormone (TSH)  Low TSH noted incidentally with recent labs; no treatment indicated but following with routine next labs including a Free T4 would be helpful and if is significantly abnormal, consider options for treatment if in line with goals of care    (J44.9) Chronic obstructive pulmonary disease, unspecified COPD type (H)  Despite low Hgb, maintaining O2 sats and breathing comfortably    (F03.B3) Moderate dementia with mood disturbance, unspecified dementia type  (F33.9) Recurrent major depressive disorder, remission status unspecified (H)  In Nov 2022, BIMS 9/15 and PHQ-9 = 5  Remains supported by LTC staff, at times refuses baths/cares  Pleasant and content today during my visit  Remains on Sertraline and Risperidone (various dose titrations over time have been tried - goal to keep h/o bothersome scary delusions at bay)        Electronically signed by:  Rina Gardner DO        Sincerely,        Rina Gardner DO

## 2022-12-02 NOTE — Clinical Note
Juan Luis Tomas, she looked stable. Do you want to please order her follow up CBC, iron studies, TSH and Free T4 for sometime in January at your discretion please? Thanks!

## 2022-12-13 NOTE — LETTER
11/28/2022        RE: Chelsey Casillas  [facility]  [address] [address]        [facility]    Chief Complaint   Patient presents with     Nursing Home Acute     anemia     HPI:  Chelsey Casillas is a 82 year old  (1940), who is being seen today for an episodic care visit at: [facility] () [[id number]].     Her past medical history includes    COPD    Dementia with psychosis    Major depression with psychosis    History of falls    Urinary incontinence    GERD    Chronic lower back pain    Anemia    hyperthyroidism     On 11/14/2022, pt was found to have critical hemoglobin.       On 11/18, herb Garcia was notified that Juliet's hgb was critically low at 6.6.   Tasha doesn't want Chelsey to have heroic measures.   She does not want invasive testing.      Today patient was seen in her room.  She denies shortness of breath, dyspnea, lightheadedness, melena, sensitivity to temperatures, anxiety or depression.    She is not having constipation from the iron.  As noted her risperidone was decreased and no behavior changes noted.      Today patient was seen in her room and denied patient denied chest pain, shortness of breath, dizziness, lightheadedness, and a poor appetite.   VS stable.            Allergies, and PMH/PSH reviewed in EPIC today.  REVIEW OF SYSTEMS:  10 point ROS of systems including Constitutional, Eyes, Respiratory, Cardiovascular, Gastroenterology, Genitourinary, Integumentary, Musculoskeletal, Psychiatric were all negative except for pertinent positives noted in my HPI.    Objective:   /72   Pulse 76   Temp 97.6  F (36.4  C)   Resp 18   Ht 1.524 m (5')   Wt 75.2 kg (165 lb 12.8 oz)   SpO2 93%   BMI 32.38 kg/m    GENERAL APPEARANCE:  Alert, in no distress  RESP:  no respiratory distress  CV:  no edema  PSYCH:  affect and mood normal      Most Recent 3 CBC's:Recent Labs   Lab Test 11/22/22  0745 11/21/22  1223 11/17/22  0600    WBC 5.3 5.4 5.1   HGB 5.9* 7.1* 6.6*   MCV 64* 64* 64*    247 254     Most Recent 3 BMP's:  Recent Labs   Lab Test 11/17/22  0600 01/06/20  0000 06/20/19  0000    137 140   POTASSIUM 3.9 4.0 4.0   CHLORIDE 101 105 106   CO2 26 26 27   BUN 13 13 10   CR 0.55 0.52 0.48*   ANIONGAP 7 6 7   MILA 9.3 8.9 8.8   * 88 100*     Most Recent 2 LFT's:  Recent Labs   Lab Test 11/17/22 0600 01/06/20  0000   AST 10 18   ALT 16 36   ALKPHOS 71 220*   BILITOTAL 0.2 0.4     Most Recent 3 INR's:No lab results found.  Most Recent Cholesterol Panel:  Recent Labs   Lab Test 11/17/22 0600   CHOL 214*   *   HDL 37*   TRIG 279*     Most Recent TSH and T4:  Recent Labs   Lab Test 11/17/22 0600   TSH 0.10*     Most Recent Hemoglobin A1c:  Recent Labs   Lab Test 11/17/22 0600   A1C 6.2*         Assessment/Plan:  (D 50.9) Iron deficiency anemia, unspecified iron deficiency anemia type  (primary encounter diagnosis)  Comment: acute, relatively new finding of critically low hemoglobin.  Started on iron supplements and omeprazole was increased to BID, POA aware.  Pt denies any symptoms.   Plan:     Redraw CBC with iron studies (ferritin, iron, transferrin, total iron binding capacity) in 1 month    Continue to make pt comfortable          (03.92) Dementia with psychosis  Comment: Chronic, progressive.    Patient requiring 24-hour skilled nursing care.   No behavioral issues or emotional distress noted.  Psychiatry decreased risperidone (11/2022) and no behavior problems noted.  Expect further functional and cognitive decline.  Expect weight loss.     Plan: Continue with plan of care no changes at this time, adjustment as needed          (E05.90) Hyperthyroidism  Comment: Acute new finding.   PT's TSH is 0.1.      Plan: Will redraw when redraw hgb in 1 month          Orders    Redraw CBC with iron studies and TSH with reflex to free T4     MED REC completed    Electronically signed by:     Genoveva Michaels, APRN CNP  on 11/28/2022 at 2:29 PM          Sincerely,        ALCIRA Sepulveda CNP     Propranolol Pregnancy And Lactation Text: This medication is Pregnancy Category C and it isn't known if it is safe during pregnancy. It is excreted in breast milk.

## 2022-12-28 ENCOUNTER — LAB REQUISITION (OUTPATIENT)
Dept: LAB | Facility: CLINIC | Age: 82
End: 2022-12-28
Payer: COMMERCIAL

## 2022-12-28 DIAGNOSIS — Z29.9 ENCOUNTER FOR PROPHYLACTIC MEASURES, UNSPECIFIED: ICD-10-CM

## 2022-12-28 LAB
FLUAV RNA SPEC QL NAA+PROBE: NEGATIVE
FLUBV RNA RESP QL NAA+PROBE: NEGATIVE
RSV RNA SPEC NAA+PROBE: NEGATIVE
SARS-COV-2 RNA RESP QL NAA+PROBE: NEGATIVE

## 2022-12-28 PROCEDURE — 87637 SARSCOV2&INF A&B&RSV AMP PRB: CPT | Mod: ORL | Performed by: NURSE PRACTITIONER

## 2023-01-03 ENCOUNTER — LAB REQUISITION (OUTPATIENT)
Dept: LAB | Facility: CLINIC | Age: 83
End: 2023-01-03
Payer: COMMERCIAL

## 2023-01-03 DIAGNOSIS — Z29.9 ENCOUNTER FOR PROPHYLACTIC MEASURES, UNSPECIFIED: ICD-10-CM

## 2023-01-04 LAB
ANION GAP SERPL CALCULATED.3IONS-SCNC: 7 MMOL/L (ref 3–14)
BUN SERPL-MCNC: 13 MG/DL (ref 7–30)
CALCIUM SERPL-MCNC: 9.3 MG/DL (ref 8.5–10.1)
CHLORIDE BLD-SCNC: 102 MMOL/L (ref 94–109)
CO2 SERPL-SCNC: 29 MMOL/L (ref 20–32)
CREAT SERPL-MCNC: 0.5 MG/DL (ref 0.52–1.04)
GFR SERPL CREATININE-BSD FRML MDRD: >90 ML/MIN/1.73M2
GLUCOSE BLD-MCNC: 74 MG/DL (ref 70–99)
POTASSIUM BLD-SCNC: 4 MMOL/L (ref 3.4–5.3)
SODIUM SERPL-SCNC: 138 MMOL/L (ref 133–144)

## 2023-01-04 PROCEDURE — 80048 BASIC METABOLIC PNL TOTAL CA: CPT | Mod: ORL | Performed by: INTERNAL MEDICINE

## 2023-01-04 PROCEDURE — 36415 COLL VENOUS BLD VENIPUNCTURE: CPT | Mod: ORL | Performed by: INTERNAL MEDICINE

## 2023-01-04 PROCEDURE — P9604 ONE-WAY ALLOW PRORATED TRIP: HCPCS | Mod: ORL | Performed by: INTERNAL MEDICINE

## 2023-01-09 NOTE — PROGRESS NOTES
[place] [facility]  PHYSICIAN NOTE    Chief Complaint   Patient presents with     residential Regulatory       HPI:    Chelsey Casillas is a 82 year old  (1940), who is being seen today for a federally mandated E/M visit at [facility]. Admitted to LTC in Sept 2018 with h/o dementia with past scary delusions treated with Risperidone, depression on Sertraline, COPD without O2 use and recent acute anemia without signs/sx of active bleeding. Goals of care are comfort based.    Chelsey is seen sitting up in her WC and actually ate well as evidenced by what was left on her lunch tray. Weight has been stable. She denies any obvious known signs/sx of blood loss. She denies pain or dyspnea too. She had no concerns today when I met with her and seemed calm/content in her room. Nursing staff have no acute concerns either. At times she refuses baths/cares. In Nov 2022, BIMS 9/15 and PHQ-9 = 5.    ALLERGIES: Amlodipine    Past Medical History:   Diagnosis Date     Arthritis      Chronic low back pain 3/17/2015     COPD (chronic obstructive pulmonary disease) (H)      Dementia with psychosis 11/1/2016     Fall 2/27/2013     Falling episodes 9/24/2016     Functional urinary incontinence 3/7/2013     GERD (gastroesophageal reflux disease) 4/3/2018     HTN (hypertension) 4/3/2018     Knee pain 3/7/2013     Major depression, single episode 4/16/2015     Pain in both knees 1/30/2014     Physical deconditioning 3/7/2013     Severe major depression with psychotic features (H) 7/12/2016     Skin tear 3/7/2013     Weakness 6/18/2015      CODE STATUS: DNR/I    MEDICATIONS: Reviewed and updated in [facility] according to facility MAR   Current Outpatient Medications   Medication Sig Dispense Refill     Acetaminophen (TYLENOL PO) Take 1,000 mg by mouth 2 times daily       albuterol (PROAIR HFA/PROVENTIL HFA/VENTOLIN HFA) 108 (90 BASE) MCG/ACT Inhaler Inhale 2 puffs into the lungs every 4 hours as needed for shortness of  breath / dyspnea or wheezing        ferrous sulfate (FEROSUL) 325 (65 Fe) MG tablet Take 1 tablet (325 mg) by mouth daily (with breakfast)       guaiFENesin-dextromethorphan (ROBITUSSIN DM) 100-10 MG/5ML syrup Take 10 mLs by mouth every 4 hours as needed for cough       omeprazole (PRILOSEC) 20 MG DR capsule Take 1 capsule (20 mg) by mouth 2 times daily       risperiDONE (RISPERDAL) 1 MG tablet Take 0.5 tablets (0.5 mg) by mouth At Bedtime       sertraline (ZOLOFT) 100 MG tablet Take 1 tablet (100 mg) by mouth At Bedtime 31 tablet 98     vitamin D3 (CHOLECALCIFEROL) 2000 units (50 mcg) tablet Take 1 tablet (2,000 Units) by mouth At Bedtime 30 tablet        ROS:  Limited secondary to cognitive impairment but today pt reports as above in HPI    Exam:  /72   Pulse 76   Temp 97.6  F (36.4  C)   Resp 18   Ht 1.524 m (5')   Wt 75.2 kg (165 lb 12.8 oz)   SpO2 93%   BMI 32.38 kg/m    Alert, sitting up in WC in NAD, casually dressed, no odor  Not overtly pale  Breathing non-labored, no cough  Seems calm/content in her room watching TV    Lab/Diagnostic Data:    Nov 2022: CMP within normal limits, A1c 6.2%, TSH low at 0.1, B12 within normal limits 834, Iron low at 12, Folate within normal limits 4.8, WBC and platelet within normal limits, Hgb with variability over the course of a week 6.6 --> 7.1 --> 5.9 with low MCV 64    Note, previously Hgb low at 9.6 in Jan 2020    ASSESSMENT/PLAN:  (D50.9) Iron deficiency anemia, unspecified iron deficiency anemia type  (primary encounter diagnosis)  (K21.9) Gastroesophageal reflux disease, unspecified whether esophagitis present  Due to comfort based goals of care and limiting phlebotomy during the pandemic, her mild anemia in early 2020 was not followed  Now incidentally picked up a profound worsening Hgb into the 5-7 range last month with low MCV and iron indicative of an iron deficiency anemia  WBC and platelets within normal limits as well as folate and B12  Is now on  oral iron supplement  No witnessed obvious signs/sx of blood loss but suspect she's losing microscopic blood possibly from GI tract  Does have h/o GERD and historically c/o some symptoms in this regard which was improved after PPI started  Due to comfort based goals of care verified with her primary contact and friend, Tasha, via conversation PCP had with them in November, not pursuing invasive work up and I agree with that recommendation  Is planning to have f/u labs in the new year    (R79.89) Low serum thyroid stimulating hormone (TSH)  Low TSH noted incidentally with recent labs; no treatment indicated but following with routine next labs including a Free T4 would be helpful and if is significantly abnormal, consider options for treatment if in line with goals of care    (J44.9) Chronic obstructive pulmonary disease, unspecified COPD type (H)  Despite low Hgb, maintaining O2 sats and breathing comfortably    (F03.B3) Moderate dementia with mood disturbance, unspecified dementia type  (F33.9) Recurrent major depressive disorder, remission status unspecified (H)  In Nov 2022, BIMS 9/15 and PHQ-9 = 5  Remains supported by LTC staff, at times refuses baths/cares  Pleasant and content today during my visit  Remains on Sertraline and Risperidone (various dose titrations over time have been tried - goal to keep h/o bothersome scary delusions at bay)        Electronically signed by:  Rina Gardner DO

## 2023-01-23 ENCOUNTER — TELEPHONE (OUTPATIENT)
Dept: GERIATRICS | Facility: CLINIC | Age: 83
End: 2023-01-23

## 2023-01-24 NOTE — TELEPHONE ENCOUNTER
PA Initiation    Medication: OMEPRAZOLE 20MG BID  Insurance Company: [employer] - Phone [phone] Fax [phone]  Pharmacy Filling the Rx: [facility] - [address]  Filling Pharmacy Phone: [phone]  Filling Pharmacy Fax: [phone]  Start Date: 1/24/2023

## 2023-01-24 NOTE — TELEPHONE ENCOUNTER
PRIOR AUTHORIZATION DENIED    Medication: OMEPRAZOLE 20MG BID    Denial Date: 1/24/2023    Denial Rationale:           Appeal Information: If provider would like to appeal this decision we will need a detailed letter of medical necessity to start the process. Then re-route this request back to the PA pool.

## 2023-02-15 ENCOUNTER — NURSING HOME VISIT (OUTPATIENT)
Dept: GERIATRICS | Facility: CLINIC | Age: 83
End: 2023-02-15
Payer: COMMERCIAL

## 2023-02-15 VITALS
OXYGEN SATURATION: 97 % | TEMPERATURE: 98.1 F | RESPIRATION RATE: 18 BRPM | HEIGHT: 60 IN | SYSTOLIC BLOOD PRESSURE: 140 MMHG | BODY MASS INDEX: 32.35 KG/M2 | DIASTOLIC BLOOD PRESSURE: 82 MMHG | WEIGHT: 164.8 LBS | HEART RATE: 80 BPM

## 2023-02-15 DIAGNOSIS — I10 ESSENTIAL HYPERTENSION: ICD-10-CM

## 2023-02-15 DIAGNOSIS — J44.9 CHRONIC OBSTRUCTIVE PULMONARY DISEASE, UNSPECIFIED COPD TYPE (H): Primary | ICD-10-CM

## 2023-02-15 DIAGNOSIS — F03.90 DEMENTIA WITHOUT BEHAVIORAL DISTURBANCE (H): ICD-10-CM

## 2023-02-15 DIAGNOSIS — G89.29 CHRONIC LOW BACK PAIN WITHOUT SCIATICA, UNSPECIFIED BACK PAIN LATERALITY: ICD-10-CM

## 2023-02-15 DIAGNOSIS — F03.92 DEMENTIA WITH PSYCHOSIS (H): ICD-10-CM

## 2023-02-15 DIAGNOSIS — F33.9 RECURRENT MAJOR DEPRESSIVE DISORDER, REMISSION STATUS UNSPECIFIED (H): ICD-10-CM

## 2023-02-15 DIAGNOSIS — D50.9 IRON DEFICIENCY ANEMIA, UNSPECIFIED IRON DEFICIENCY ANEMIA TYPE: ICD-10-CM

## 2023-02-15 DIAGNOSIS — M54.50 CHRONIC LOW BACK PAIN WITHOUT SCIATICA, UNSPECIFIED BACK PAIN LATERALITY: ICD-10-CM

## 2023-02-15 DIAGNOSIS — K21.9 GASTROESOPHAGEAL REFLUX DISEASE, UNSPECIFIED WHETHER ESOPHAGITIS PRESENT: ICD-10-CM

## 2023-02-15 PROCEDURE — 99309 SBSQ NF CARE MODERATE MDM 30: CPT | Performed by: NURSE PRACTITIONER

## 2023-02-15 NOTE — PROGRESS NOTES
[facility] GERIATRICS  Chief Complaint   Patient presents with     Annual Comprehensive Nursing Home     [place] [facility] Medical Record Number:  [id number]  Place of Service where encounter took place:  [facility] () [[id number]]    HPI:    Chelsey Casillas  is a 83 year old  (1940), who is being seen today for an annual comprehensive visit. HPI information obtained from: facility chart records, facility staff, patient report, [facility] chart review and Care Everywhere [facility] chart review.     Her past medical history includes    COPD    Dementia with psychosis    Major depression with psychosis    History of falls    Urinary incontinence    GERD    Chronic lower back pain    Anemia    hyperthyroidism     On 11/14/2022, pt was found to have critical hemoglobin.       On 11/18, herb Garcia was notified that Juliet's hgb was critically low at 6.6.   Tasha doesn't want Chelsey to have heroic measures.   She does not want invasive testing.     Today patient was seen in her room and denied any  chest pain, shortness of breath, dizziness, lightheadedness, and a poor appetite.   Nursing has no concerns. BIMS=9/15 (score 8 to 12 suggests moderately impaired) PHQ9=5/27.   Patient needs extensive assistance with ADLs, uses a wheelchair.    Her appetite is Fair and consumes a regular diet with daily ice cream and juice supplement.   Per nursing, skin is intact. Code status is DNR/DNI with comfort focus.   Psychiatry decreased her risperidone in 11/2022 and pharmacy recommended labs in November 2022.    In reviewing point click care, VS are stable.   .           ALLERGIES: Amlodipine  PAST MEDICAL HISTORY:   Past Medical History:   Diagnosis Date     Arthritis      Chronic low back pain 3/17/2015     COPD (chronic obstructive pulmonary disease) (H)      Dementia with psychosis 11/1/2016     Fall 2/27/2013     Falling episodes 9/24/2016     Functional urinary incontinence 3/7/2013     GERD (gastroesophageal  reflux disease) 4/3/2018     HTN (hypertension) 4/3/2018     Knee pain 3/7/2013     Major depression, single episode 4/16/2015     Pain in both knees 1/30/2014     Physical deconditioning 3/7/2013     Severe major depression with psychotic features (H) 7/12/2016     Skin tear 3/7/2013     Weakness 6/18/2015      PAST SURGICAL HISTORY:  has no past surgical history on file.  IMMUNIZATIONS:  Immunization History   Administered Date(s) Administered     COVID-19 Vaccine 18+ (Moderna) 12/28/2020, 01/25/2021, 11/18/2021     COVID-19 Vaccine Bivalent Booster 12+ (Pfizer) 10/07/2022     FLU 6-35 months 09/10/2013     Influenza (High Dose) 3 valent vaccine 11/28/2017     Influenza Vaccine 65+ (Fluzone HD) 11/02/2021     Pneumo Conj 13-V (2010&after) 12/13/2018     Pneumococcal 23 valent 12/15/2019     Tdap (Adacel,Boostrix) 04/06/2022     Above immunizations pulled from [facility]. MIIC and facility records also reconciled. Outstanding information sent to  to update [facility] .  Future immunizations needed:  yearly influenza per facility protocol      Current Outpatient Medications:      Acetaminophen (TYLENOL PO), Take 1,000 mg by mouth 2 times daily, Disp: , Rfl:      albuterol (PROAIR HFA/PROVENTIL HFA/VENTOLIN HFA) 108 (90 BASE) MCG/ACT Inhaler, Inhale 2 puffs into the lungs every 4 hours as needed for shortness of breath / dyspnea or wheezing , Disp: , Rfl:      ferrous sulfate (FEROSUL) 325 (65 Fe) MG tablet, Take 1 tablet (325 mg) by mouth daily (with breakfast), Disp: , Rfl:      guaiFENesin-dextromethorphan (ROBITUSSIN DM) 100-10 MG/5ML syrup, Take 10 mLs by mouth every 4 hours as needed for cough, Disp: , Rfl:      omeprazole (PRILOSEC) 20 MG DR capsule, Take 1 capsule (20 mg) by mouth 2 times daily, Disp: , Rfl:      risperiDONE (RISPERDAL) 1 MG tablet, Take 0.5 tablets (0.5 mg) by mouth At Bedtime, Disp: , Rfl:      sertraline (ZOLOFT) 100 MG tablet, Take 1 tablet (100 mg) by mouth At  Bedtime, Disp: 31 tablet, Rfl: 98     vitamin D3 (CHOLECALCIFEROL) 2000 units (50 mcg) tablet, Take 1 tablet (2,000 Units) by mouth At Bedtime, Disp: 30 tablet, Rfl:      MED REC REQUIRED  Post Medication Reconciliation Status:  Discharge medications reconciled, continue medications without change        Case Management:  I have reviewed the facility/SNF care plan/MDS, including the falls risk, nutrition and pain screening. I also reviewed the current immunizations, and preventive care.. Future cancer screening is not clinically indicated secondary to age/goals of care Patient's desire to return to the community is not present. Current Level of Care is appropriate.    Advance Directive Discussion:    I reviewed the current advanced directives as reflected in EPIC, the POLST and the facility chart, and verified the congruency of orders.   I did review the advance directives with the resident. Patient's goal is pain control and comfort.    Team Discussion:  I communicated with the appropriate disciplines involved with the Plan of Care:   Nursing    Information reviewed:  Medications, vital signs, orders, and nursing notes.    ROS:  4 point ROS including Respiratory, CV, GI and , other than that noted in the HPI,  is negative    Vitals:  There were no vitals taken for this visit. There is no height or weight on file to calculate BMI.  Exam:  GENERAL APPEARANCE:  Alert, in no distress  CV:  regular rate and rhythm, no murmur, rub, or gallop  CHEST (BREASTS):  no inspection completed  PSYCH:  memory impaired      Lab/Diagnostic data:     Most Recent 3 CBC's:Recent Labs   Lab Test 11/22/22  0745 11/21/22  1223 11/17/22  0600   WBC 5.3 5.4 5.1   HGB 5.9* 7.1* 6.6*   MCV 64* 64* 64*    247 254     Most Recent 3 BMP's:Recent Labs   Lab Test 01/04/23  1108 11/17/22  0600 01/06/20  0000    134 137   POTASSIUM 4.0 3.9 4.0   CHLORIDE 102 101 105   CO2 29 26 26   BUN 13 13 13   CR 0.50* 0.55 0.52   ANIONGAP 7 7 6    MILA 9.3 9.3 8.9   GLC 74 176* 88     Most Recent 2 LFT's:Recent Labs   Lab Test 11/17/22  0600 01/06/20  0000   AST 10 18   ALT 16 36   ALKPHOS 71 220*   BILITOTAL 0.2 0.4     Most Recent Cholesterol Panel:Recent Labs   Lab Test 11/17/22  0600   CHOL 214*   *   HDL 37*   TRIG 279*     Most Recent TSH and T4:Recent Labs   Lab Test 11/17/22  0600   TSH 0.10*     Most Recent Hemoglobin A1c:Recent Labs   Lab Test 11/17/22  0600   A1C 6.2*       ASSESSMENT/PLAN  (F33.9) Recurrent major depressive disorder, remission status unspecified (H)    Comment: Chronic, stable.  PHQ9=9/27 while in sertraline.    Plan: continue to keep active with activities.     (J44.9) Chronic obstructive pulmonary disease, unspecified COPD type (H)  Comment: Chronic stable.  Takes no inhalers  Plan: monitor for shortness of breath, increase dyspnea with exertion    (K21.9) Gastroesophageal reflux disease, unspecified whether esophagitis present  Comment: chronic. Stable.  While taking omeprazole  Plan: Continue with plan of care no changes at this time, adjustment as needed      (D50.9) Iron deficiency anemia, unspecified iron deficiency anemia type  Comment: In November 2022, pt's hgb was noted to be less than 7 which is critically low.  Due to comfort focus POLST it was decided not to redraw the hgb as blood transfusion would not be completed.   Will treat with omeprazole and iron supplement  Plan: Continue with plan of care no changes at this time, adjustment as needed      (F03.92) Dementia with psychosis  Comment: Chronic, progressive.    Patient requiring 24-hour skilled nursing care.   No behavioral issues or emotional distress noted.  Psychiatry decreased risperidone (11/2022) and no behavior problems noted.  Expect further functional and cognitive decline.  Expect weight loss.  Plan: Chronic, progressive.    Patient requiring 24-hour skilled nursing care.   No behavioral issues or emotional distress noted.  Psychiatry decreased  risperidone (11/2022) and no behavior problems noted.  Expect further functional and cognitive decline.  Expect weight loss.    (M54.50,  G89.29) Chronic low back pain without sciatica, unspecified back pain laterality  Comment: Chronic, stable while taking tylenol.  Uses a wheelchair.  Needs assistance with transfers  Plan: Continue with plan of care no changes at this time, adjustment as needed      (I10) Essential hypertension  Comment: chronic, stable.   Plan: Continue with plan of care no changes at this time, adjustment as needed    (E05.90) Hyperthyroidism  Comment:  Few finding in 11/2022   PT's TSH is 0.1.      Plan: Will redraw when redraw with reflex to free T4     Orders:  Consider Redraw TSH with reflex to free T4      Electronically signed by:  ALCIRA Sepulveda CNP

## 2023-02-15 NOTE — LETTER
2/15/2023        RE: Chelsey Casillas  [facility]  [address] [address]        [facility] GERIATRICS  Chief Complaint   Patient presents with     Annual Comprehensive Nursing Home     [facility] Medical Record Number:  [id number]  Place of Service where encounter took place:  [facility] () [[id number]]    HPI:    Chelsey Casillas  is a 83 year old  (1940), who is being seen today for an annual comprehensive visit. HPI information obtained from: facility chart records, facility staff, patient report, [facility] chart review and Care Everywhere [facility] chart review.     Her past medical history includes    COPD    Dementia with psychosis    Major depression with psychosis    History of falls    Urinary incontinence    GERD    Chronic lower back pain    Anemia    hyperthyroidism     On 11/14/2022, pt was found to have critical hemoglobin.       On 11/18, herb Garcia was notified that Juliet's hgb was critically low at 6.6.   Tasha doesn't want Chelsey to have heroic measures.   She does not want invasive testing.     Today patient was seen in her room and denied any  chest pain, shortness of breath, dizziness, lightheadedness, and a poor appetite.   Nursing has no concerns. BIMS=9/15 (score 8 to 12 suggests moderately impaired) PHQ9=5/27.   Patient needs extensive assistance with ADLs, uses a wheelchair.    Her appetite is Fair and consumes a regular diet with daily ice cream and juice supplement.   Per nursing, skin is intact. Code status is DNR/DNI with comfort focus.   Psychiatry decreased her risperidone in 11/2022 and pharmacy recommended labs in November 2022.    In reviewing point click care, VS are stable.   .           ALLERGIES: Amlodipine  PAST MEDICAL HISTORY:   Past Medical History:   Diagnosis Date     Arthritis      Chronic low back pain 3/17/2015     COPD (chronic obstructive pulmonary disease) (H)      Dementia with psychosis 11/1/2016     Fall  2/27/2013     Falling episodes 9/24/2016     Functional urinary incontinence 3/7/2013     GERD (gastroesophageal reflux disease) 4/3/2018     HTN (hypertension) 4/3/2018     Knee pain 3/7/2013     Major depression, single episode 4/16/2015     Pain in both knees 1/30/2014     Physical deconditioning 3/7/2013     Severe major depression with psychotic features (H) 7/12/2016     Skin tear 3/7/2013     Weakness 6/18/2015      PAST SURGICAL HISTORY:  has no past surgical history on file.  IMMUNIZATIONS:  Immunization History   Administered Date(s) Administered     COVID-19 Vaccine 18+ (Moderna) 12/28/2020, 01/25/2021, 11/18/2021     COVID-19 Vaccine Bivalent Booster 12+ (Pfizer) 10/07/2022     FLU 6-35 months 09/10/2013     Influenza (High Dose) 3 valent vaccine 11/28/2017     Influenza Vaccine 65+ (Fluzone HD) 11/02/2021     Pneumo Conj 13-V (2010&after) 12/13/2018     Pneumococcal 23 valent 12/15/2019     Tdap (Adacel,Boostrix) 04/06/2022     Above immunizations pulled from [facility]. MIIC and facility records also reconciled. Outstanding information sent to  to update [facility] [employer] .  Future immunizations needed:  yearly influenza per facility protocol      Current Outpatient Medications:      Acetaminophen (TYLENOL PO), Take 1,000 mg by mouth 2 times daily, Disp: , Rfl:      albuterol (PROAIR HFA/PROVENTIL HFA/VENTOLIN HFA) 108 (90 BASE) MCG/ACT Inhaler, Inhale 2 puffs into the lungs every 4 hours as needed for shortness of breath / dyspnea or wheezing , Disp: , Rfl:      ferrous sulfate (FEROSUL) 325 (65 Fe) MG tablet, Take 1 tablet (325 mg) by mouth daily (with breakfast), Disp: , Rfl:      guaiFENesin-dextromethorphan (ROBITUSSIN DM) 100-10 MG/5ML syrup, Take 10 mLs by mouth every 4 hours as needed for cough, Disp: , Rfl:      omeprazole (PRILOSEC) 20 MG DR capsule, Take 1 capsule (20 mg) by mouth 2 times daily, Disp: , Rfl:      risperiDONE (RISPERDAL) 1 MG tablet, Take 0.5 tablets (0.5 mg)  by mouth At Bedtime, Disp: , Rfl:      sertraline (ZOLOFT) 100 MG tablet, Take 1 tablet (100 mg) by mouth At Bedtime, Disp: 31 tablet, Rfl: 98     vitamin D3 (CHOLECALCIFEROL) 2000 units (50 mcg) tablet, Take 1 tablet (2,000 Units) by mouth At Bedtime, Disp: 30 tablet, Rfl:      MED REC REQUIRED  Post Medication Reconciliation Status:  Discharge medications reconciled, continue medications without change        Case Management:  I have reviewed the facility/SNF care plan/MDS, including the falls risk, nutrition and pain screening. I also reviewed the current immunizations, and preventive care.. Future cancer screening is not clinically indicated secondary to age/goals of care Patient's desire to return to the community is not present. Current Level of Care is appropriate.    Advance Directive Discussion:    I reviewed the current advanced directives as reflected in EPIC, the POLST and the facility chart, and verified the congruency of orders.   I did review the advance directives with the resident. Patient's goal is pain control and comfort.    Team Discussion:  I communicated with the appropriate disciplines involved with the Plan of Care:   Nursing    Information reviewed:  Medications, vital signs, orders, and nursing notes.    ROS:  4 point ROS including Respiratory, CV, GI and , other than that noted in the HPI,  is negative    Vitals:  There were no vitals taken for this visit. There is no height or weight on file to calculate BMI.  Exam:  GENERAL APPEARANCE:  Alert, in no distress  CV:  regular rate and rhythm, no murmur, rub, or gallop  CHEST (BREASTS):  no inspection completed  PSYCH:  memory impaired      Lab/Diagnostic data:     Most Recent 3 CBC's:Recent Labs   Lab Test 11/22/22  0745 11/21/22  1223 11/17/22  0600   WBC 5.3 5.4 5.1   HGB 5.9* 7.1* 6.6*   MCV 64* 64* 64*    247 254     Most Recent 3 BMP's:Recent Labs   Lab Test 01/04/23  1108 11/17/22  0600 01/06/20  0000    134 137    POTASSIUM 4.0 3.9 4.0   CHLORIDE 102 101 105   CO2 29 26 26   BUN 13 13 13   CR 0.50* 0.55 0.52   ANIONGAP 7 7 6   MILA 9.3 9.3 8.9   GLC 74 176* 88     Most Recent 2 LFT's:Recent Labs   Lab Test 11/17/22  0600 01/06/20  0000   AST 10 18   ALT 16 36   ALKPHOS 71 220*   BILITOTAL 0.2 0.4     Most Recent Cholesterol Panel:Recent Labs   Lab Test 11/17/22  0600   CHOL 214*   *   HDL 37*   TRIG 279*     Most Recent TSH and T4:Recent Labs   Lab Test 11/17/22  0600   TSH 0.10*     Most Recent Hemoglobin A1c:Recent Labs   Lab Test 11/17/22  0600   A1C 6.2*       ASSESSMENT/PLAN  (F33.9) Recurrent major depressive disorder, remission status unspecified (H)    Comment: Chronic, stable.  PHQ9=9/27 while in sertraline.    Plan: continue to keep active with activities.     (J44.9) Chronic obstructive pulmonary disease, unspecified COPD type (H)  Comment: Chronic stable.  Takes no inhalers  Plan: monitor for shortness of breath, increase dyspnea with exertion    (K21.9) Gastroesophageal reflux disease, unspecified whether esophagitis present  Comment: chronic. Stable.  While taking omeprazole  Plan: Continue with plan of care no changes at this time, adjustment as needed      (D50.9) Iron deficiency anemia, unspecified iron deficiency anemia type  Comment: In November 2022, pt's hgb was noted to be less than 7 which is critically low.  Due to comfort focus POLST it was decided not to redraw the hgb as blood transfusion would not be completed.   Will treat with omeprazole and iron supplement  Plan: Continue with plan of care no changes at this time, adjustment as needed      (F03.92) Dementia with psychosis  Comment: Chronic, progressive.    Patient requiring 24-hour skilled nursing care.   No behavioral issues or emotional distress noted.  Psychiatry decreased risperidone (11/2022) and no behavior problems noted.  Expect further functional and cognitive decline.  Expect weight loss.  Plan: Chronic, progressive.    Patient  requiring 24-hour skilled nursing care.   No behavioral issues or emotional distress noted.  Psychiatry decreased risperidone (11/2022) and no behavior problems noted.  Expect further functional and cognitive decline.  Expect weight loss.    (M54.50,  G89.29) Chronic low back pain without sciatica, unspecified back pain laterality  Comment: Chronic, stable while taking tylenol.  Uses a wheelchair.  Needs assistance with transfers  Plan: Continue with plan of care no changes at this time, adjustment as needed      (I10) Essential hypertension  Comment: chronic, stable.   Plan: Continue with plan of care no changes at this time, adjustment as needed    (E05.90) Hyperthyroidism  Comment:  Few finding in 11/2022   PT's TSH is 0.1.      Plan: Will redraw when redraw with reflex to free T4     Orders:  Consider Redraw TSH with reflex to free T4      Electronically signed by:  ALCIRA Sepulveda CNP           Sincerely,        ALCIRA Sepulveda CNP

## 2023-03-07 ENCOUNTER — PATIENT OUTREACH (OUTPATIENT)
Dept: GERIATRIC MEDICINE | Facility: CLINIC | Age: 83
End: 2023-03-07
Payer: COMMERCIAL

## 2023-03-07 NOTE — PROGRESS NOTES
[facility] Care Coordination Contact  CC attended care conference for member on 3-7-23 at [facility].   Present at care conference member, this care coordinator, significant other (Pat), NH  (Emily), NH RN (Kamryn) and NH Therapy (Chrissy).  OT Report:   Cognitive testing results:   PT Report:    Nursing Report: member is stable. Sleeps late daily and can be resistive to cares in the morning.   Dietician Report: weight is stable  Social Work Report: Are approaching the need for member to move when the facility closes and where that should be. Partner Tasha would like to look into moving to [place] to be closer to her.    Madelyn Davis RN  [facility]  [phone]

## 2023-04-04 ENCOUNTER — NURSING HOME VISIT (OUTPATIENT)
Dept: GERIATRICS | Facility: CLINIC | Age: 83
End: 2023-04-04
Payer: COMMERCIAL

## 2023-04-04 ENCOUNTER — DOCUMENTATION ONLY (OUTPATIENT)
Dept: OTHER | Facility: CLINIC | Age: 83
End: 2023-04-04

## 2023-04-04 VITALS
DIASTOLIC BLOOD PRESSURE: 78 MMHG | TEMPERATURE: 97 F | HEIGHT: 60 IN | HEART RATE: 76 BPM | BODY MASS INDEX: 32.39 KG/M2 | RESPIRATION RATE: 20 BRPM | SYSTOLIC BLOOD PRESSURE: 136 MMHG | WEIGHT: 165 LBS | OXYGEN SATURATION: 98 %

## 2023-04-04 DIAGNOSIS — F03.90 DEMENTIA WITHOUT BEHAVIORAL DISTURBANCE (H): ICD-10-CM

## 2023-04-04 DIAGNOSIS — R29.898 LEFT ARM WEAKNESS: ICD-10-CM

## 2023-04-04 DIAGNOSIS — D50.9 IRON DEFICIENCY ANEMIA, UNSPECIFIED IRON DEFICIENCY ANEMIA TYPE: ICD-10-CM

## 2023-04-04 DIAGNOSIS — Z86.73 HISTORY OF CVA (CEREBROVASCULAR ACCIDENT): Primary | ICD-10-CM

## 2023-04-04 DIAGNOSIS — R79.89 LOW TSH LEVEL: ICD-10-CM

## 2023-04-04 PROCEDURE — 99310 SBSQ NF CARE HIGH MDM 45: CPT | Performed by: INTERNAL MEDICINE

## 2023-04-04 NOTE — LETTER
"    4/4/2023        RE: Chelsey Casillas  [facility]  [address] [facility] [address]        [facility]  PHYSICIAN NOTE    Chief Complaint   Patient presents with     [facility] Regulatory       HPI:    Chelsey Casillas is a 83 year old  (1940), who is being seen today for a federally mandated E/M visit at [facility]. Admitted to LT in Sept 2018 with h/o dementia with past scary delusions treated with Risperidone, depression on Sertraline, h/o right-sided MCA stroke and COPD without O2 use. Recent acute anemia without signs/sx of active bleeding noted incidentally on labs in Nov 2022. Goals of care are comfort based and iron started but not pursued any invasive measures per her primary contact.    In March 2023, Dr. Hutchins of [facility] was able to finish her wean of Risperidone and discontinue it completely. Of note, needed higher doses a few years ago but has subsequently tolerated a slow taper. Per nursing staff, she has \"mellowed out\" and they have no concerns about her mood or behavior today. Her weight is stable. I see Chelsey sitting up in her wheelchair in her room watching TV which is often where I find her. She is eating a sandwich which she has every afternoon as a snack as she enjoys this. She has lack of insight into her care needs / situation. I see her leaning slightly to the left and wearing a L hand splint which I hadn't previously realized she had. She didn't know anything about this when I pointed it out to her. Did allow exam; denied pain nor had insight into her lean/weakness. Her speech was normal and she was chewing/swallowing well. Chelsey said she had no concerns. I then spoke with RN again about this and she said that Chelsey's had the splint for awhile but used to refuse to wear it. Now that she has \"mellowed out\" she allows the splint again. I went back through records and found a Jan 2022 occupational therapy note mentioning a L " "hand contracture of the 4-5th digits and L shoulder \"contracture\" as well. She was thus fitted with the L hand splint at that time to \"reduce risk of further joint contracture and maintain skin integrity\". Going back further in records not previously noted in depth was a Sept 2018 distal left humerus fracture from a fall shortly after moving to [facility] and even further back in records per [facility] there was a CT head in 2016 noting a chronic R MCA infarct at that time (see below). I updated her history list accordingly.        Past Medical History:   Diagnosis Date     Arthritis      Chronic low back pain 03/17/2015     Closed fracture of left distal humerus 2018     COPD (chronic obstructive pulmonary disease) (H)      Dementia with psychosis (H) 11/01/2016     Fall 02/27/2013     Falling episodes 09/24/2016     Functional urinary incontinence 03/07/2013     GERD (gastroesophageal reflux disease) 04/03/2018     History of CVA (cerebrovascular accident)     R MCA     HTN (hypertension) 04/03/2018     Knee pain 03/07/2013     Major depression, single episode 04/16/2015     Pain in both knees 01/30/2014     Physical deconditioning 03/07/2013     Severe major depression with psychotic features (H) 07/12/2016     Skin tear 03/07/2013     Weakness 06/18/2015        CODE STATUS: DNR/I    ALLERGIES: Amlodipine    MEDICATIONS: Reviewed and updated in Epic according to facility MAR  Current Outpatient Medications   Medication Sig Dispense Refill     Acetaminophen (TYLENOL PO) Take 1,000 mg by mouth 2 times daily       albuterol (PROAIR HFA/PROVENTIL HFA/VENTOLIN HFA) 108 (90 BASE) MCG/ACT Inhaler Inhale 2 puffs into the lungs every 4 hours as needed for shortness of breath / dyspnea or wheezing        ferrous sulfate (FEROSUL) 325 (65 Fe) MG tablet Take 1 tablet (325 mg) by mouth daily (with breakfast)       guaiFENesin-dextromethorphan (ROBITUSSIN DM) 100-10 MG/5ML syrup Take 10 mLs by mouth every 4 hours as " needed for cough       omeprazole 20 MG tablet Take 20 mg by mouth daily       sertraline (ZOLOFT) 100 MG tablet Take 1 tablet (100 mg) by mouth At Bedtime 31 tablet 98     vitamin D3 (CHOLECALCIFEROL) 2000 units (50 mcg) tablet Take 1 tablet (2,000 Units) by mouth At Bedtime 30 tablet        ROS:  Limited secondary to cognitive impairment but today pt reports as above in HPI    Exam:  /78   Pulse 76   Temp 97  F (36.1  C)   Resp 20   Ht 1.524 m (5')   Wt 74.8 kg (165 lb)   SpO2 98%   BMI 32.22 kg/m    Alert, sitting up in wheelchair casually dressed eating a sandwich  Breathing non-labored  Chewing/swallowing well  Normal speech, no facial droop  Heart tones regular  No edema  L sided weakness of LUE with left palmar splint in place and slight lean to L side which she seems unaware of    Lab/Diagnostic Data:    Nov 2022: CMP within normal limits, A1c 6.2%, TSH low at 0.1, B12 within normal limits 834, Iron low at 12, Folate within normal limits 4.8, WBC and platelet within normal limits, Hgb with variability over the course of a week 6.6 --> 7.1 --> 5.9 with low MCV 64     Note, previously Hgb low at 9.6 in Jan 2020    CT Head Sept 2016 per [facility]:  Impression:   1. No acute intracranial pathology.   2. Chronic sequelae of right MCA infarct and bilateral cerebral small vessel ischemic disease.       ASSESSMENT/PLAN:  History of CVA (cerebrovascular accident)  Left arm weakness  See above, suspect LUE weakness is old but just hadn't been obvious at times nor well documented previously given her R MCA infarct was already chronic in nature according to outside [facility] CT scan from 2016  Then she had the distal left humerus fracture in 2018 treated non-operatively as well  She had above mentioned occupational therapy eval and fitted for L hand splint in early 2022 but hadn't been wearing it d/t behaviors (now she is more accepting of it)  Goals of care are purely comfort based, and she has no  "discomfort or insight into the LUE weakness today  Continue to monitor and support; adjust care plan as needed for comfort    Dementia without behavioral disturbance (H)  Progressive decline over time, now \"mellowing out\" per staff and has tolerated GDR of Risperidone as directed by [facility] Dr. Hutchins with complete discontinuation of Risperidone as of last month  Remains on Sertraline for now  Jan 2019 CPT 3.9  Jan 2022 CPT 4.0    Iron deficiency anemia, unspecified iron deficiency anemia type  PPI reduced from BID to once daily recently  Remains on iron  No invasive procedures or transfusion desired based on goals of care  May consider rechecking CBC to know if needs on-going iron or not if she will tolerate this phlebotomy     Low serum thyroid stimulating hormone (TSH)  Low TSH noted incidentally with Nov 2022 labs; no treatment indicated but following with routine next labs including a Free T4 would be helpful if doing other labs and if is significantly abnormal, consider options for treatment if in line with goals of care    Electronically signed by:  Rina Gardner, DO          Sincerely,        Rina Gardner, DO      "

## 2023-04-04 NOTE — Clinical Note
"So I totally didn't know until now that she had LUE weakness and a L hand splint with past R MCA stroke years ago - did some digging through old notes to find this (read at your leisure if interested - lol) :). Off of Risperidone now per Dr. Hutchins in March and staff feel she is \"mellowed out\". Wonder pros/cons of following up on CBC and TSH, Free T4? Defer to you."

## 2023-04-04 NOTE — PROGRESS NOTES
"[facility]  PHYSICIAN NOTE    Chief Complaint   Patient presents with     [facility] Regulatory       HPI:    Chelsey Casillas is a 83 year old  (1940), who is being seen today for a federally mandated E/M visit at [facility]. Admitted to LTC in Sept 2018 with h/o dementia with past scary delusions treated with Risperidone, depression on Sertraline, h/o right-sided MCA stroke and COPD without O2 use. Recent acute anemia without signs/sx of active bleeding noted incidentally on labs in Nov 2022. Goals of care are comfort based and iron started but not pursued any invasive measures per her primary contact.    In March 2023, Dr. Hutchins of [facility] was able to finish her wean of Risperidone and discontinue it completely. Of note, needed higher doses a few years ago but has subsequently tolerated a slow taper. Per nursing staff, she has \"mellowed out\" and they have no concerns about her mood or behavior today. Her weight is stable. I see Chelsey sitting up in her wheelchair in her room watching TV which is often where I find her. She is eating a sandwich which she has every afternoon as a snack as she enjoys this. She has lack of insight into her care needs / situation. I see her leaning slightly to the left and wearing a L hand splint which I hadn't previously realized she had. She didn't know anything about this when I pointed it out to her. Did allow exam; denied pain nor had insight into her lean/weakness. Her speech was normal and she was chewing/swallowing well. Chelsey said she had no concerns. I then spoke with RN again about this and she said that Chelsey's had the splint for awhile but used to refuse to wear it. Now that she has \"mellowed out\" she allows the splint again. I went back through records and found a Jan 2022 occupational therapy note mentioning a L hand contracture of the 4-5th digits and L shoulder \"contracture\" as well. She was thus fitted with the L hand " "splint at that time to \"reduce risk of further joint contracture and maintain skin integrity\". Going back further in records not previously noted in depth was a Sept 2018 distal left humerus fracture from a fall shortly after moving to [facility] and even further back in records per [facility] [place] was a CT head in 2016 noting a chronic R MCA infarct at that time (see below). I updated her history list accordingly.        Past Medical History:   Diagnosis Date     Arthritis      Chronic low back pain 03/17/2015     Closed fracture of left distal humerus 2018     COPD (chronic obstructive pulmonary disease) (H)      Dementia with psychosis (H) 11/01/2016     Fall 02/27/2013     Falling episodes 09/24/2016     Functional urinary incontinence 03/07/2013     GERD (gastroesophageal reflux disease) 04/03/2018     History of CVA (cerebrovascular accident)     R MCA     HTN (hypertension) 04/03/2018     Knee pain 03/07/2013     Major depression, single episode 04/16/2015     Pain in both knees 01/30/2014     Physical deconditioning 03/07/2013     Severe major depression with psychotic features (H) 07/12/2016     Skin tear 03/07/2013     Weakness 06/18/2015        CODE STATUS: DNR/I    ALLERGIES: Amlodipine    MEDICATIONS: Reviewed and updated in Epic according to facility MAR  Current Outpatient Medications   Medication Sig Dispense Refill     Acetaminophen (TYLENOL PO) Take 1,000 mg by mouth 2 times daily       albuterol (PROAIR HFA/PROVENTIL HFA/VENTOLIN HFA) 108 (90 BASE) MCG/ACT Inhaler Inhale 2 puffs into the lungs every 4 hours as needed for shortness of breath / dyspnea or wheezing        ferrous sulfate (FEROSUL) 325 (65 Fe) MG tablet Take 1 tablet (325 mg) by mouth daily (with breakfast)       guaiFENesin-dextromethorphan (ROBITUSSIN DM) 100-10 MG/5ML syrup Take 10 mLs by mouth every 4 hours as needed for cough       omeprazole 20 MG tablet Take 20 mg by mouth daily       sertraline (ZOLOFT) 100 MG tablet " Take 1 tablet (100 mg) by mouth At Bedtime 31 tablet 98     vitamin D3 (CHOLECALCIFEROL) 2000 units (50 mcg) tablet Take 1 tablet (2,000 Units) by mouth At Bedtime 30 tablet        ROS:  Limited secondary to cognitive impairment but today pt reports as above in HPI    Exam:  /78   Pulse 76   Temp 97  F (36.1  C)   Resp 20   Ht 1.524 m (5')   Wt 74.8 kg (165 lb)   SpO2 98%   BMI 32.22 kg/m    Alert, sitting up in wheelchair casually dressed eating a sandwich  Breathing non-labored  Chewing/swallowing well  Normal speech, no facial droop  Heart tones regular  No edema  L sided weakness of LUE with left palmar splint in place and slight lean to L side which she seems unaware of    Lab/Diagnostic Data:    Nov 2022: CMP within normal limits, A1c 6.2%, TSH low at 0.1, B12 within normal limits 834, Iron low at 12, Folate within normal limits 4.8, WBC and platelet within normal limits, Hgb with variability over the course of a week 6.6 --> 7.1 --> 5.9 with low MCV 64     Note, previously Hgb low at 9.6 in Jan 2020    CT Head Sept 2016 per [facility]:  Impression:   1. No acute intracranial pathology.   2. Chronic sequelae of right MCA infarct and bilateral cerebral small vessel ischemic disease.       ASSESSMENT/PLAN:  History of CVA (cerebrovascular accident)  Left arm weakness  See above, suspect LUE weakness is old but just hadn't been obvious at times nor well documented previously given her R MCA infarct was already chronic in nature according to outside [facility] [place] from 2016  Then she had the distal left humerus fracture in 2018 treated non-operatively as well  She had above mentioned occupational therapy eval and fitted for L hand splint in early 2022 but hadn't been wearing it d/t behaviors (now she is more accepting of it)  Goals of care are purely comfort based, and she has no discomfort or insight into the LUE weakness today  Continue to monitor and support; adjust care plan as needed for  "comfort    Dementia without behavioral disturbance (H)  Progressive decline over time, now \"mellowing out\" per staff and has tolerated GDR of Risperidone as directed by [facility] psychiatrist Dr. Hutchins with complete discontinuation of Risperidone as of last month  Remains on Sertraline for now  Jan 2019 CPT 3.9  Jan 2022 CPT 4.0    Iron deficiency anemia, unspecified iron deficiency anemia type  PPI reduced from BID to once daily recently  Remains on iron  No invasive procedures or transfusion desired based on goals of care  May consider rechecking CBC to know if needs on-going iron or not if she will tolerate this phlebotomy     Low serum thyroid stimulating hormone (TSH)  Low TSH noted incidentally with Nov 2022 labs; no treatment indicated but following with routine next labs including a Free T4 would be helpful if doing other labs and if is significantly abnormal, consider options for treatment if in line with goals of care    Electronically signed by:  Rina Gardner,     "

## 2023-04-05 PROBLEM — Z86.73 HISTORY OF CVA (CEREBROVASCULAR ACCIDENT): Status: ACTIVE | Noted: 2023-04-05

## 2023-04-05 RX ORDER — NICOTINE POLACRILEX 4 MG/1
20 GUM, CHEWING ORAL DAILY
COMMUNITY

## 2023-04-07 PROBLEM — S42.402A CLOSED FRACTURE OF LEFT DISTAL HUMERUS: Status: ACTIVE | Noted: 2018-01-01

## 2023-05-03 ENCOUNTER — DISCHARGE SUMMARY NURSING HOME (OUTPATIENT)
Dept: GERIATRICS | Facility: CLINIC | Age: 83
End: 2023-05-03
Payer: COMMERCIAL

## 2023-05-03 DIAGNOSIS — J44.9 CHRONIC OBSTRUCTIVE PULMONARY DISEASE, UNSPECIFIED COPD TYPE (H): ICD-10-CM

## 2023-05-03 DIAGNOSIS — F33.9 RECURRENT MAJOR DEPRESSIVE DISORDER, REMISSION STATUS UNSPECIFIED (H): ICD-10-CM

## 2023-05-03 DIAGNOSIS — F03.90 DEMENTIA WITHOUT BEHAVIORAL DISTURBANCE (H): ICD-10-CM

## 2023-05-03 DIAGNOSIS — G89.29 CHRONIC LOW BACK PAIN WITHOUT SCIATICA, UNSPECIFIED BACK PAIN LATERALITY: ICD-10-CM

## 2023-05-03 DIAGNOSIS — E05.90 HYPERTHYROIDISM: Primary | ICD-10-CM

## 2023-05-03 DIAGNOSIS — D50.9 IRON DEFICIENCY ANEMIA, UNSPECIFIED IRON DEFICIENCY ANEMIA TYPE: ICD-10-CM

## 2023-05-03 DIAGNOSIS — I10 ESSENTIAL HYPERTENSION: ICD-10-CM

## 2023-05-03 DIAGNOSIS — M54.50 CHRONIC LOW BACK PAIN WITHOUT SCIATICA, UNSPECIFIED BACK PAIN LATERALITY: ICD-10-CM

## 2023-05-03 DIAGNOSIS — K21.9 GASTROESOPHAGEAL REFLUX DISEASE, UNSPECIFIED WHETHER ESOPHAGITIS PRESENT: ICD-10-CM

## 2023-05-03 PROCEDURE — 99316 NF DSCHRG MGMT 30 MIN+: CPT | Performed by: NURSE PRACTITIONER

## 2023-05-03 NOTE — PROGRESS NOTES
[facility] GERIATRICS DISCHARGE SUMMARY  PATIENT'S NAME: Chelsey Casillas  YOB: 1940  MEDICAL RECORD NUMBER:  [id number]  Place of Service where encounter took place:  [facility] () [[id number]]    PRIMARY CARE PROVIDER AND CLINIC RESPONSIBLE AFTER TRANSFER:   ALCIRA Sepulveda CNP, [address] [facility] [address]    [facility] Provider     Transferring providers: Genoveva ELI CNP; Rina Gardner MD  Recent Hospitalization/ED:  Emergency Room  [facility] [place]24/[place] to 9/27/2016.  Date of SNF Admission: September 18, 2018  Date of SNF (anticipated) Discharge: May 08, 2023  Discharged to: [facility]   Cognitive Scores: BIMS: 9/15  Physical Function: wheelchair  DME: Wheelchair    CODE STATUS/ADVANCE DIRECTIVES DISCUSSION:  DNR/DNI   ALLERGIES: Amlodipine    NURSING FACILITY COURSE   Medication Changes/Rationale:   Summary of nursing facility stay:     (F33.9) Recurrent major depressive disorder, remission status unspecified (H)    Comment: Chronic, stable.  PHQ9=4/27 while in sertraline.      (J44.9) Chronic obstructive pulmonary disease, unspecified COPD type (H)  Comment: Chronic stable.  Takes no inhalers       (K21.9) Gastroesophageal reflux disease, unspecified whether esophagitis present  Comment: chronic. Stable.  While taking omeprazole      (D50.9) Iron deficiency anemia, unspecified iron deficiency anemia type  Comment: In November 2022, pt's hgb was noted to be less than 7 which is critically low.  Due to comfort focus POLST it was decided not to redraw the hgb as blood transfusion would not be completed.           (F03.92) Dementia with psychosis  Comment: Chronic, progressive. Pt BIMS=10/15 indicating moderate impairment.   Patient requiring 24-hour skilled nursing care.   No behavioral issues or emotional distress noted.  Psychiatry decreased risperidone (11/2022) and no behavior problems noted.    (M54.50,  G89.29) Chronic low back pain without  sciatica, unspecified back pain laterality  Comment: Chronic, stable while taking tylenol.  Uses a wheelchair.  Needs assistance with transfers     (I10) Essential hypertension  Comment: chronic, stable.       (E05.90) Hyperthyroidism  Comment:  Few finding in 11/2022   PT's TSH is 0.1.        Discharge Medications:  MED REC REQUIRED  Post Medication Reconciliation Status:  Discharge medications reconciled, continue medications without change         Current Outpatient Medications   Medication Sig Dispense Refill     Acetaminophen (TYLENOL PO) Take 1,000 mg by mouth 2 times daily       albuterol (PROAIR HFA/PROVENTIL HFA/VENTOLIN HFA) 108 (90 BASE) MCG/ACT Inhaler Inhale 2 puffs into the lungs every 4 hours as needed for shortness of breath / dyspnea or wheezing        ferrous sulfate (FEROSUL) 325 (65 Fe) MG tablet Take 1 tablet (325 mg) by mouth daily (with breakfast)       guaiFENesin-dextromethorphan (ROBITUSSIN DM) 100-10 MG/5ML syrup Take 10 mLs by mouth every 4 hours as needed for cough       omeprazole 20 MG tablet Take 20 mg by mouth daily       sertraline (ZOLOFT) 100 MG tablet Take 1 tablet (100 mg) by mouth At Bedtime 31 tablet 98     vitamin D3 (CHOLECALCIFEROL) 2000 units (50 mcg) tablet Take 1 tablet (2,000 Units) by mouth At Bedtime 30 tablet           Controlled medications:   not applicable/none     Past Medical History:   Past Medical History:   Diagnosis Date     Arthritis      Chronic low back pain 03/17/2015     Closed fracture of left distal humerus 2018     COPD (chronic obstructive pulmonary disease) (H)      Dementia with psychosis (H) 11/01/2016     Fall 02/27/2013     Falling episodes 09/24/2016     Functional urinary incontinence 03/07/2013     GERD (gastroesophageal reflux disease) 04/03/2018     History of CVA (cerebrovascular accident)     R MCA     HTN (hypertension) 04/03/2018     Knee pain 03/07/2013     Major depression, single episode 04/16/2015     Pain in both knees 01/30/2014      Physical deconditioning 03/07/2013     Severe major depression with psychotic features (H) 07/12/2016     Skin tear 03/07/2013     Weakness 06/18/2015     Physical Exam:   Vitals: /78   Pulse 81   Temp 98.1  F (36.7  C)   Resp 18   Ht 1.524 m (5')   Wt 75.3 kg (166 lb)   SpO2 96%   BMI 32.42 kg/m    BMI: Body mass index is 50.25 kg/m .  GENERAL APPEARANCE:  Alert, in no distress, multiple tatoos  RESP:  lungs clear to auscultation , no respiratory distress  CV:  Palpation and auscultation of heart done , rate-normal  PSYCH:  memory impaired , affect and mood normal     SNF labs:   Most Recent 3 CBC's:  Recent Labs   Lab Test 11/22/22  0745 11/21/22  1223 11/17/22  0600   WBC 5.3 5.4 5.1   HGB 5.9* 7.1* 6.6*   MCV 64* 64* 64*    247 254     Most Recent 3 BMP's:  Recent Labs   Lab Test 01/04/23  1108 11/17/22  0600 01/06/20  0000    134 137   POTASSIUM 4.0 3.9 4.0   CHLORIDE 102 101 105   CO2 29 26 26   BUN 13 13 13   CR 0.50* 0.55 0.52   ANIONGAP 7 7 6   MILA 9.3 9.3 8.9   GLC 74 176* 88     Most Recent TSH and T4:  Recent Labs   Lab Test 11/17/22  0600   TSH 0.10*     Most Recent Hemoglobin A1c:  Recent Labs   Lab Test 11/17/22  0600   A1C 6.2*       DISCHARGE PLAN:    Follow up labs: could consider CBC, BMP.  Recommend TSH with Free T4     Medical Follow Up:      Follow up with primary care provider in 1-2 weeks    [facility] scheduled appointments: none    Discharge Services: No therapy or home care recommended.     Discharge Instructions Verbalized to Patient at Discharge:     None    TOTAL DISCHARGE TIME:   Greater than 30 minutes  Electronically signed by:      ALCIRA Sepulveda CNP

## 2023-05-03 NOTE — LETTER
5/3/2023        RE: Chelsey Casillas  [facility]  [address] [address]        [facility] GERIATRICS DISCHARGE SUMMARY  PATIENT'S NAME: Chelsey Casillas  YOB: 1940  MEDICAL RECORD NUMBER:  [id number]  Place of Service where encounter took place:  [facility] [place] () [[id number]]    PRIMARY CARE PROVIDER AND CLINIC RESPONSIBLE AFTER TRANSFER:   ALCIRA Sepulveda CNP, [address] [address]    [facility] Provider     Transferring providers: Genoveva ELI CNP; Rina Gardner MD  Recent Hospitalization/ED:  Emergency Room  [facility] stay 9/24/2016 to 9/27/2016.  Date of SNF Admission: September 18, 2018  Date of SNF (anticipated) Discharge: May 08, 2023  Discharged to: [facility]   Cognitive Scores: BIMS: 9/15  Physical Function: wheelchair  DME: Wheelchair    CODE STATUS/ADVANCE DIRECTIVES DISCUSSION:  DNR/DNI   ALLERGIES: Amlodipine    NURSING FACILITY COURSE   Medication Changes/Rationale:   Summary of nursing facility stay:     (F33.9) Recurrent major depressive disorder, remission status unspecified (H)    Comment: Chronic, stable.  PHQ9=4/27 while in sertraline.      (J44.9) Chronic obstructive pulmonary disease, unspecified COPD type (H)  Comment: Chronic stable.  Takes no inhalers       (K21.9) Gastroesophageal reflux disease, unspecified whether esophagitis present  Comment: chronic. Stable.  While taking omeprazole      (D50.9) Iron deficiency anemia, unspecified iron deficiency anemia type  Comment: In November 2022, pt's hgb was noted to be less than 7 which is critically low.  Due to comfort focus POLST it was decided not to redraw the hgb as blood transfusion would not be completed.           (F03.92) Dementia with psychosis  Comment: Chronic, progressive. Pt BIMS=10/15 indicating moderate impairment.   Patient requiring 24-hour skilled nursing care.   No behavioral issues or emotional distress noted.  Psychiatry  decreased risperidone (11/2022) and no behavior problems noted.    (M54.50,  G89.29) Chronic low back pain without sciatica, unspecified back pain laterality  Comment: Chronic, stable while taking tylenol.  Uses a wheelchair.  Needs assistance with transfers     (I10) Essential hypertension  Comment: chronic, stable.       (E05.90) Hyperthyroidism  Comment:  Few finding in 11/2022   PT's TSH is 0.1.        Discharge Medications:  MED REC REQUIRED  Post Medication Reconciliation Status:  Discharge medications reconciled, continue medications without change         Current Outpatient Medications   Medication Sig Dispense Refill     Acetaminophen (TYLENOL PO) Take 1,000 mg by mouth 2 times daily       albuterol (PROAIR HFA/PROVENTIL HFA/VENTOLIN HFA) 108 (90 BASE) MCG/ACT Inhaler Inhale 2 puffs into the lungs every 4 hours as needed for shortness of breath / dyspnea or wheezing        ferrous sulfate (FEROSUL) 325 (65 Fe) MG tablet Take 1 tablet (325 mg) by mouth daily (with breakfast)       guaiFENesin-dextromethorphan (ROBITUSSIN DM) 100-10 MG/5ML syrup Take 10 mLs by mouth every 4 hours as needed for cough       omeprazole 20 MG tablet Take 20 mg by mouth daily       sertraline (ZOLOFT) 100 MG tablet Take 1 tablet (100 mg) by mouth At Bedtime 31 tablet 98     vitamin D3 (CHOLECALCIFEROL) 2000 units (50 mcg) tablet Take 1 tablet (2,000 Units) by mouth At Bedtime 30 tablet           Controlled medications:   not applicable/none     Past Medical History:   Past Medical History:   Diagnosis Date     Arthritis      Chronic low back pain 03/17/2015     Closed fracture of left distal humerus 2018     COPD (chronic obstructive pulmonary disease) (H)      Dementia with psychosis (H) 11/01/2016     Fall 02/27/2013     Falling episodes 09/24/2016     Functional urinary incontinence 03/07/2013     GERD (gastroesophageal reflux disease) 04/03/2018     History of CVA (cerebrovascular accident)     R MCA     HTN (hypertension)  04/03/2018     Knee pain 03/07/2013     Major depression, single episode 04/16/2015     Pain in both knees 01/30/2014     Physical deconditioning 03/07/2013     Severe major depression with psychotic features (H) 07/12/2016     Skin tear 03/07/2013     Weakness 06/18/2015     Physical Exam:   Vitals: /78   Pulse 81   Temp 98.1  F (36.7  C)   Resp 18   Ht 1.524 m (5')   Wt 75.3 kg (166 lb)   SpO2 96%   BMI 32.42 kg/m    BMI: Body mass index is 50.25 kg/m .  GENERAL APPEARANCE:  Alert, in no distress, multiple tatoos  RESP:  lungs clear to auscultation , no respiratory distress  CV:  Palpation and auscultation of heart done , rate-normal  PSYCH:  memory impaired , affect and mood normal     SNF labs:   Most Recent 3 CBC's:  Recent Labs   Lab Test 11/22/22  0745 11/21/22  1223 11/17/22  0600   WBC 5.3 5.4 5.1   HGB 5.9* 7.1* 6.6*   MCV 64* 64* 64*    247 254     Most Recent 3 BMP's:  Recent Labs   Lab Test 01/04/23  1108 11/17/22  0600 01/06/20  0000    134 137   POTASSIUM 4.0 3.9 4.0   CHLORIDE 102 101 105   CO2 29 26 26   BUN 13 13 13   CR 0.50* 0.55 0.52   ANIONGAP 7 7 6   MILA 9.3 9.3 8.9   GLC 74 176* 88     Most Recent TSH and T4:  Recent Labs   Lab Test 11/17/22  0600   TSH 0.10*     Most Recent Hemoglobin A1c:  Recent Labs   Lab Test 11/17/22  0600   A1C 6.2*       DISCHARGE PLAN:    Follow up labs: could consider CBC, BMP.  Recommend TSH with Free T4     Medical Follow Up:      Follow up with primary care provider in 1-2 weeks    [facility] scheduled appointments: none    Discharge Services: No therapy or home care recommended.     Discharge Instructions Verbalized to Patient at Discharge:     None    TOTAL DISCHARGE TIME:   Greater than 30 minutes  Electronically signed by:      ALCIRA Sepulveda CNP                   Sincerely,        ALCIRA Sepulveda CNP

## 2023-05-06 VITALS
HEART RATE: 81 BPM | OXYGEN SATURATION: 96 % | TEMPERATURE: 98.1 F | DIASTOLIC BLOOD PRESSURE: 78 MMHG | RESPIRATION RATE: 18 BRPM | BODY MASS INDEX: 32.59 KG/M2 | WEIGHT: 166 LBS | HEIGHT: 60 IN | SYSTOLIC BLOOD PRESSURE: 132 MMHG

## 2023-05-06 PROBLEM — E66.01 MORBID OBESITY (H): Status: ACTIVE | Noted: 2023-05-06

## 2023-05-06 PROBLEM — E66.01 MORBID OBESITY (H): Status: RESOLVED | Noted: 2023-05-06 | Resolved: 2023-05-06

## 2023-05-08 ENCOUNTER — LAB REQUISITION (OUTPATIENT)
Dept: LAB | Facility: CLINIC | Age: 83
End: 2023-05-08
Payer: COMMERCIAL

## 2023-05-08 DIAGNOSIS — Z11.1 ENCOUNTER FOR SCREENING FOR RESPIRATORY TUBERCULOSIS: ICD-10-CM

## 2023-05-08 DIAGNOSIS — U07.1 COVID-19: ICD-10-CM

## 2023-05-08 PROCEDURE — U0003 INFECTIOUS AGENT DETECTION BY NUCLEIC ACID (DNA OR RNA); SEVERE ACUTE RESPIRATORY SYNDROME CORONAVIRUS 2 (SARS-COV-2) (CORONAVIRUS DISEASE [COVID-19]), AMPLIFIED PROBE TECHNIQUE, MAKING USE OF HIGH THROUGHPUT TECHNOLOGIES AS DESCRIBED BY CMS-2020-01-R: HCPCS | Mod: ORL

## 2023-05-09 LAB — SARS-COV-2 RNA RESP QL NAA+PROBE: NEGATIVE

## 2023-05-09 PROCEDURE — 36415 COLL VENOUS BLD VENIPUNCTURE: CPT | Mod: ORL

## 2023-05-09 PROCEDURE — 86481 TB AG RESPONSE T-CELL SUSP: CPT | Mod: ORL

## 2023-05-10 ENCOUNTER — LAB REQUISITION (OUTPATIENT)
Dept: LAB | Facility: CLINIC | Age: 83
End: 2023-05-10
Payer: COMMERCIAL

## 2023-05-10 DIAGNOSIS — U07.1 COVID-19: ICD-10-CM

## 2023-05-10 LAB
QUANTIFERON MITOGEN: 0 IU/ML
QUANTIFERON NIL TUBE: 0 IU/ML
QUANTIFERON TB1 TUBE: 0 IU/ML
QUANTIFERON TB2 TUBE: 0

## 2023-05-10 PROCEDURE — U0003 INFECTIOUS AGENT DETECTION BY NUCLEIC ACID (DNA OR RNA); SEVERE ACUTE RESPIRATORY SYNDROME CORONAVIRUS 2 (SARS-COV-2) (CORONAVIRUS DISEASE [COVID-19]), AMPLIFIED PROBE TECHNIQUE, MAKING USE OF HIGH THROUGHPUT TECHNOLOGIES AS DESCRIBED BY CMS-2020-01-R: HCPCS | Mod: ORL

## 2023-05-11 LAB
GAMMA INTERFERON BACKGROUND BLD IA-ACNC: 0 IU/ML
M TB IFN-G BLD-IMP: ABNORMAL
M TB IFN-G CD4+ BCKGRND COR BLD-ACNC: 0 IU/ML
MITOGEN IGNF BCKGRD COR BLD-ACNC: 0 IU/ML
MITOGEN IGNF BCKGRD COR BLD-ACNC: 0 IU/ML
SARS-COV-2 RNA RESP QL NAA+PROBE: NEGATIVE

## 2023-05-12 ENCOUNTER — NURSING HOME VISIT (OUTPATIENT)
Dept: GERIATRICS | Facility: CLINIC | Age: 83
End: 2023-05-12
Payer: COMMERCIAL

## 2023-05-12 VITALS
OXYGEN SATURATION: 94 % | SYSTOLIC BLOOD PRESSURE: 130 MMHG | HEIGHT: 60 IN | DIASTOLIC BLOOD PRESSURE: 72 MMHG | HEART RATE: 62 BPM | WEIGHT: 152 LBS | TEMPERATURE: 98 F | RESPIRATION RATE: 17 BRPM | BODY MASS INDEX: 29.84 KG/M2

## 2023-05-12 DIAGNOSIS — I10 ESSENTIAL HYPERTENSION: ICD-10-CM

## 2023-05-12 DIAGNOSIS — F33.9 RECURRENT MAJOR DEPRESSIVE DISORDER, REMISSION STATUS UNSPECIFIED (H): ICD-10-CM

## 2023-05-12 DIAGNOSIS — R29.6 RECURRENT FALLS: ICD-10-CM

## 2023-05-12 DIAGNOSIS — K21.9 GASTROESOPHAGEAL REFLUX DISEASE, UNSPECIFIED WHETHER ESOPHAGITIS PRESENT: ICD-10-CM

## 2023-05-12 DIAGNOSIS — F03.90 DEMENTIA WITHOUT BEHAVIORAL DISTURBANCE (H): ICD-10-CM

## 2023-05-12 DIAGNOSIS — J44.9 CHRONIC OBSTRUCTIVE PULMONARY DISEASE, UNSPECIFIED COPD TYPE (H): ICD-10-CM

## 2023-05-12 DIAGNOSIS — M54.50 CHRONIC LOW BACK PAIN WITHOUT SCIATICA, UNSPECIFIED BACK PAIN LATERALITY: ICD-10-CM

## 2023-05-12 DIAGNOSIS — E05.90 HYPERTHYROIDISM: ICD-10-CM

## 2023-05-12 DIAGNOSIS — D50.9 IRON DEFICIENCY ANEMIA, UNSPECIFIED IRON DEFICIENCY ANEMIA TYPE: ICD-10-CM

## 2023-05-12 DIAGNOSIS — G89.29 CHRONIC LOW BACK PAIN WITHOUT SCIATICA, UNSPECIFIED BACK PAIN LATERALITY: ICD-10-CM

## 2023-05-12 DIAGNOSIS — Z86.73 HISTORY OF CVA (CEREBROVASCULAR ACCIDENT): Primary | ICD-10-CM

## 2023-05-12 DIAGNOSIS — R29.898 LEFT ARM WEAKNESS: ICD-10-CM

## 2023-05-12 PROCEDURE — 99309 SBSQ NF CARE MODERATE MDM 30: CPT

## 2023-05-12 NOTE — PROGRESS NOTES
"[facility] GERIATRICS    PRIMARY CARE PROVIDER AND CLINIC:  ALCIRA Morillo CNP, [address] / [place] [address]  Chief Complaint   Patient presents with     [facility] Medical Record Number:  [id number]  Place of Service where encounter took place:  [facility]  () [[id number]]    Chelsey Casillas  is a 83 year old  (1940), admitted to the above facility from [facility] ().     By chart review, patient was last hospitalized at [facility] September 2016. She previously resided at [facility] and admitted to LT September 2018. She had a subsequent fall with left humerus fracture that month, workup including CT head noted a chronic right MCA infarct at that time. 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 She has followed with [facility] Dr Hutchins and successfully off of Risperidone since March 2023. She also has a history of COPD without oxygen use. Anemia noted on routine lab work November 2022, no evidence of active bleeding and she was started on iron. No additional workup per goals of care for comfort. Now transferring to the above facility for permanent placement.    HPI:    Seen today in her room in LTC resting abed. She is watching baseball but notes \"that's just what was on.\" She does not offer any history and struggles to answer some questions. She is planning to stay in facility long term, does not have any insight as to why she was transferred. She notes occasional aches and pains throughout her body. She denies CP, SOB, changes in b/b habits, NVD, fever/chills, dizziness or light headedness.    CODE STATUS/ADVANCE DIRECTIVES DISCUSSION:  DNR/DNI  DNR / DNI  ALLERGIES:   Allergies   Allergen Reactions     Amlodipine Other (See Comments)      PAST MEDICAL HISTORY:   Past Medical History:   Diagnosis Date     Arthritis      Chronic low back pain 03/17/2015     Closed fracture of " left distal humerus 2018     COPD (chronic obstructive pulmonary disease) (H)      Dementia with psychosis (H) 11/01/2016     Fall 02/27/2013     Falling episodes 09/24/2016     Functional urinary incontinence 03/07/2013     GERD (gastroesophageal reflux disease) 04/03/2018     History of CVA (cerebrovascular accident)     R MCA     HTN (hypertension) 04/03/2018     Knee pain 03/07/2013     Major depression, single episode 04/16/2015     Pain in both knees 01/30/2014     Physical deconditioning 03/07/2013     Severe major depression with psychotic features (H) 07/12/2016     Skin tear 03/07/2013     Weakness 06/18/2015      PAST SURGICAL HISTORY:   has no past surgical history on file.  FAMILY HISTORY: Family history is unknown by patient.  SOCIAL HISTORY:   reports that she has quit smoking. Her smoking use included cigarettes. She has never used smokeless tobacco. She reports that she does not drink alcohol and does not use drugs.  Patient's living condition: lives in a skilled nursing facility    Post Discharge Medication Reconciliation Status:   MED REC REQUIRED  Post Medication Reconciliation Status:  Discharge medications reconciled, continue medications without change         Current Outpatient Medications   Medication Sig     Acetaminophen (TYLENOL PO) Take 1,000 mg by mouth 2 times daily     albuterol (PROAIR HFA/PROVENTIL HFA/VENTOLIN HFA) 108 (90 BASE) MCG/ACT Inhaler Inhale 2 puffs into the lungs every 4 hours as needed for shortness of breath / dyspnea or wheezing      ferrous sulfate (FEROSUL) 325 (65 Fe) MG tablet Take 1 tablet (325 mg) by mouth daily (with breakfast)     guaiFENesin-dextromethorphan (ROBITUSSIN DM) 100-10 MG/5ML syrup Take 10 mLs by mouth every 4 hours as needed for cough     omeprazole 20 MG tablet Take 20 mg by mouth daily     sertraline (ZOLOFT) 100 MG tablet Take 1 tablet (100 mg) by mouth At Bedtime     vitamin D3 (CHOLECALCIFEROL) 2000 units (50 mcg) tablet Take 1 tablet (2,000  Units) by mouth At Bedtime     No current facility-administered medications for this visit.       ROS:  Limited secondary to cognitive impairment but today pt reports the above    Vitals:  /72   Pulse 62   Temp 98  F (36.7  C)   Resp 17   Ht 1.524 m (5')   Wt 68.9 kg (152 lb)   SpO2 94%   BMI 29.69 kg/m    Exam:  GENERAL APPEARANCE:  Alert, in no distress  RESP:  respiratory effort and palpation of chest normal, lungs clear to auscultation , no respiratory distress  CV:  Palpation and auscultation of heart done , regular rate and rhythm, + murmur, rub, or gallop, no edema  ABDOMEN:  normal bowel sounds, soft, nontender, no hepatosplenomegaly or other masses  M/S:   Gait and station abnormal transfers with assist  SKIN:  Inspection of skin and subcutaneous tissue baseline, Palpation of skin and subcutaneous tissue baseline  NEURO:   Cranial nerves 2-12 are normal tested and grossly at patient's baseline, left sided weakness in LUE, leans left in bed, speech normal  PSYCH:  insight and judgement impaired, memory impaired     Lab/Diagnostic data:  Labs done in SNF are in [place] [employer]. Please refer to them using [employer]/Care Everywhere. and Recent labs in EPIC reviewed by me today.     ASSESSMENT/PLAN:    (Z86.73) History of CVA (cerebrovascular accident)  (primary encounter diagnosis)  (R29.898) Left arm weakness  Comment: Chronic, with history of R MCA infarct dating prior to September 2016. Has resided in LTC since 2018.   Plan: SNF for assist with ADLs, medication management, meals, activities    (F03.90) Dementia without behavioral disturbance (H)  Comment: chronic, progressive decline with history of significant behavioral disturbance, now off of risperidone. Has followed with [facility] psychiatry.   -CPT 4.0 Jan 2022  -BIMS 9/15  -comfort focused goals per previous documentation, DNR per POLST with selective measures  Plan: monitor cognition, SNF for support as above    (F33.9) Recurrent major depressive  disorder, remission status unspecified (H)  Comment: chronic, by history  Plan: PTA sertraline, Mood/behavior/drug SE monitoring and nonpharm interventions per nursing.     (J44.9) Chronic obstructive pulmonary disease, unspecified COPD type (H)  Comment: chronic, stable  Plan: monitor respiratory status    (K21.9) Gastroesophageal reflux disease, unspecified whether esophagitis present  Comment: chronic  Plan: PTA omeprazole    (D50.9) Iron deficiency anemia, unspecified iron deficiency anemia type  Comment: chronic, with hgb drop in November 2022, no additional workup completed per goals of care. No overt signs of bleeding.   Plan: continue iron daily, PPI. Monitor hgb. CBC next week    (M54.50,  G89.29) Chronic low back pain without sciatica, unspecified back pain laterality  Comment: chronic, notes occasional aches and pains  Plan: continue tylenol, pain monitoring and non pharm nterventions per nursing    (I10) Essential hypertension  Comment: chronic, reasonably well controlled off of antihypertensives. Avoid hypotension due to patient age, frailty, and risk for complications.    BP Readings from Last 3 Encounters:   05/12/23 130/72   05/03/23 132/78   04/04/23 136/78   Plan: monitor BP, check BMP next week    (E05.90) Hyperthyroidism  Comment: chronic, find on routine labs in November. Patient is asymptomatic and no other workup at that time per goals of care   Plan: consider repeat TSH, free T4 if symptomatic, can discuss further with [facility] at follow-up     (R29.6) Recurrent falls  Comment: by history  Plan: fall precautions, D3 for bone health. SNF for assist with transfers        Electronically signed by:  ALCIRA Morillo CNP

## 2023-05-14 ENCOUNTER — LAB REQUISITION (OUTPATIENT)
Dept: LAB | Facility: CLINIC | Age: 83
End: 2023-05-14
Payer: COMMERCIAL

## 2023-05-14 DIAGNOSIS — D64.9 ANEMIA, UNSPECIFIED: ICD-10-CM

## 2023-05-14 DIAGNOSIS — I10 ESSENTIAL (PRIMARY) HYPERTENSION: ICD-10-CM

## 2023-05-15 ENCOUNTER — DOCUMENTATION ONLY (OUTPATIENT)
Dept: OTHER | Facility: CLINIC | Age: 83
End: 2023-05-15
Payer: COMMERCIAL

## 2023-05-15 ENCOUNTER — LAB REQUISITION (OUTPATIENT)
Dept: LAB | Facility: CLINIC | Age: 83
End: 2023-05-15
Payer: COMMERCIAL

## 2023-05-15 DIAGNOSIS — U07.1 COVID-19: ICD-10-CM

## 2023-05-15 LAB
ANION GAP SERPL CALCULATED.3IONS-SCNC: 13 MMOL/L (ref 7–15)
BUN SERPL-MCNC: 11.8 MG/DL (ref 8–23)
CALCIUM SERPL-MCNC: 8.9 MG/DL (ref 8.8–10.2)
CHLORIDE SERPL-SCNC: 102 MMOL/L (ref 98–107)
CREAT SERPL-MCNC: 0.46 MG/DL (ref 0.51–0.95)
DEPRECATED HCO3 PLAS-SCNC: 23 MMOL/L (ref 22–29)
ERYTHROCYTE [DISTWIDTH] IN BLOOD BY AUTOMATED COUNT: 18.6 % (ref 10–15)
GFR SERPL CREATININE-BSD FRML MDRD: >90 ML/MIN/1.73M2
GLUCOSE SERPL-MCNC: 64 MG/DL (ref 70–99)
HCT VFR BLD AUTO: 40.7 % (ref 35–47)
HGB BLD-MCNC: 12.4 G/DL (ref 11.7–15.7)
MCH RBC QN AUTO: 27 PG (ref 26.5–33)
MCHC RBC AUTO-ENTMCNC: 30.5 G/DL (ref 31.5–36.5)
MCV RBC AUTO: 89 FL (ref 78–100)
PLATELET # BLD AUTO: 112 10E3/UL (ref 150–450)
POTASSIUM SERPL-SCNC: 4.8 MMOL/L (ref 3.4–5.3)
RBC # BLD AUTO: 4.59 10E6/UL (ref 3.8–5.2)
SODIUM SERPL-SCNC: 138 MMOL/L (ref 136–145)
WBC # BLD AUTO: 5.9 10E3/UL (ref 4–11)

## 2023-05-15 PROCEDURE — 85027 COMPLETE CBC AUTOMATED: CPT | Mod: ORL

## 2023-05-15 PROCEDURE — 87635 SARS-COV-2 COVID-19 AMP PRB: CPT | Mod: ORL

## 2023-05-15 PROCEDURE — 80048 BASIC METABOLIC PNL TOTAL CA: CPT | Mod: ORL

## 2023-05-15 PROCEDURE — 36415 COLL VENOUS BLD VENIPUNCTURE: CPT | Mod: ORL

## 2023-05-16 LAB — SARS-COV-2 RNA RESP QL NAA+PROBE: NEGATIVE

## 2023-05-19 ENCOUNTER — PATIENT OUTREACH (OUTPATIENT)
Dept: GERIATRIC MEDICINE | Facility: CLINIC | Age: 83
End: 2023-05-19
Payer: COMMERCIAL

## 2023-05-19 NOTE — PROGRESS NOTES
[facility] Care Coordination Contact    Internal CC change effective 6/1/2023.  Mailed member CC Change letter.  Additional tasks to be completed by CMS include: update database & Epic.    Alyssa Flores  Case Management Specialist  [facility]  [phone]

## 2023-05-19 NOTE — LETTER
May 19, 2023    Important [employer] Information    CHELSEY GRAHAM  [facility]  [address] [address]    Your New Care Coordinator  Dear Chelsey,  My name is Yeny Chaudhry RN, PHN  and I am your new Care Coordinator. You may reach me by calling [phone]. I will be in touch with you shortly to address any questions you may have.   I have also been in contact with Madelyn Davis RN, your previous care coordinator, to ensure a smooth transition.  Questions?  Call me at [phone] Monday-Friday between 8am and 5pm. TTY: [phone]. I look forward to working with you as a [employer]  member.  Sincerely,    Yeny Chaudhry RN, PHN  [phone]  [email or web address]    cc: member records

## 2023-06-01 ENCOUNTER — PATIENT OUTREACH (OUTPATIENT)
Dept: GERIATRIC MEDICINE | Facility: CLINIC | Age: 83
End: 2023-06-01
Payer: COMMERCIAL

## 2023-06-12 ENCOUNTER — NURSING HOME VISIT (OUTPATIENT)
Dept: GERIATRICS | Facility: CLINIC | Age: 83
End: 2023-06-12
Payer: COMMERCIAL

## 2023-06-12 VITALS
BODY MASS INDEX: 29.92 KG/M2 | RESPIRATION RATE: 18 BRPM | SYSTOLIC BLOOD PRESSURE: 150 MMHG | HEART RATE: 69 BPM | TEMPERATURE: 97.5 F | WEIGHT: 152.4 LBS | HEIGHT: 60 IN | DIASTOLIC BLOOD PRESSURE: 70 MMHG | OXYGEN SATURATION: 93 %

## 2023-06-12 DIAGNOSIS — F01.C18 SEVERE VASCULAR DEMENTIA WITH OTHER BEHAVIORAL DISTURBANCE (H): ICD-10-CM

## 2023-06-12 DIAGNOSIS — J44.9 CHRONIC OBSTRUCTIVE PULMONARY DISEASE, UNSPECIFIED COPD TYPE (H): ICD-10-CM

## 2023-06-12 DIAGNOSIS — I67.9 CEREBRAL VASCULAR DISEASE: ICD-10-CM

## 2023-06-12 DIAGNOSIS — Z86.73 HISTORY OF CVA (CEREBROVASCULAR ACCIDENT): ICD-10-CM

## 2023-06-12 DIAGNOSIS — R29.898 LEFT ARM WEAKNESS: ICD-10-CM

## 2023-06-12 DIAGNOSIS — F33.9 RECURRENT MAJOR DEPRESSIVE DISORDER, REMISSION STATUS UNSPECIFIED (H): Primary | ICD-10-CM

## 2023-06-12 DIAGNOSIS — D64.9 ANEMIA, UNSPECIFIED TYPE: ICD-10-CM

## 2023-06-12 PROCEDURE — 99305 1ST NF CARE MODERATE MDM 35: CPT | Performed by: INTERNAL MEDICINE

## 2023-06-26 NOTE — PROGRESS NOTES
"Chelsey Casillas is a 83 year old female seen June 12, 2023 at [facility] where she has resided for one month (admit 5/2023) seen for initial visit.    Pt is seen on the unit up to WC.  Does not offer much history, states she is \"fine.\"   Nursing staff notes pt has generally stayed in her bed since admission.  Has had some behaviors such as striking out, on occasion   Generally likes to be left alone        By chart review, pt admitted to LTC in 2018 after she had had multiple hospitalization for LE weakness and falls.  Noted to have sequelae of right MCA infarct on head imaging   After admission she had another fall in which suffered a distal left humerus fracture.     Biggest concern has been depression with psychosis, distressing hallucinations and behavioral symptoms.   She was followed by Dr Hutchins, over time.   Improved and eventually able to be weaned off risperidone in March 2023    She had anemia of unknown etiology in Nov 2022, now resolved   No workup undertaken given comfort focus.       Past Medical History:   Diagnosis Date     Arthritis      Chronic low back pain 03/17/2015     Closed fracture of left distal humerus 2018     COPD (chronic obstructive pulmonary disease) (H)      Dementia with psychosis (H) 11/01/2016     Fall 02/27/2013     Falling episodes 09/24/2016     Functional urinary incontinence 03/07/2013     GERD (gastroesophageal reflux disease) 04/03/2018     History of CVA (cerebrovascular accident)     R MCA     HTN (hypertension) 04/03/2018     Knee pain 03/07/2013     Major depression, single episode 04/16/2015     Pain in both knees 01/30/2014     Physical deconditioning 03/07/2013     Severe major depression with psychotic features (H) 07/12/2016     Skin tear 03/07/2013     Weakness 06/18/2015     Family History   Family history unknown: Yes     Social History     Tobacco Use     Smoking status: Former     Types: Cigarettes     Smokeless tobacco: Never   Substance Use Topics "     Alcohol use: No      SH: Single no family.   A friend Tasha Garcia is first contact  Lived in [facility] from September 2018 to May 2023   Prior to that she lived in the [facility] apartment, then at [facility] [address]     ROS: limited by pt's dementia  BIMS 9/15   PHQ9 5/27    Wt Readings from Last 5 Encounters:   06/12/23 69.1 kg (152 lb 6.4 oz)   05/12/23 68.9 kg (152 lb)   05/03/23 75.3 kg (166 lb)   04/04/23 74.8 kg (165 lb)   02/15/23 74.8 kg (164 lb 12.8 oz)     GENERAL APPEARANCE: alert and no distress  BP (!) 150/70   Pulse 69   Temp 97.5  F (36.4  C)   Resp 18   Ht 1.524 m (5')   Wt 69.1 kg (152 lb 6.4 oz)   SpO2 93%   BMI 29.76 kg/m     HEENT: normocephalic, no lesion or abnormalities  NECK: no adenopathy, no asymmetry, masses, or scars and thyroid normal to palpation  RESP: lungs clear to auscultation - no rales, rhonchi or wheezes  CV: regular rate and rhythm, normal S1 S2  ABDOMEN:  soft, nontender, no HSM or masses and bowel sounds normal  MS: extremities normal- no gross deformities noted, no evidence of inflammation in joints, FROM in all extremities.  SKIN: no suspicious lesions or rashes  NEURO: WC bound, LUE weakness, limited history   PSYCH: affect flat  LYMPHATICS: No cervical,  or supraclavicular nodes    Lab Results   Component Value Date     05/15/2023    POTASSIUM 4.8 05/15/2023    CHLORIDE 102 05/15/2023    CO2 23 05/15/2023    ANIONGAP 13 05/15/2023    GLC 64 (L) 05/15/2023    BUN 11.8 05/15/2023    CR 0.46 (L) 05/15/2023    GFRESTIMATED >90 05/15/2023    MILA 8.9 05/15/2023     Lab Results   Component Value Date    ALBUMIN 3.4 11/17/2022     Lab Results   Component Value Date    WBC 5.9 05/15/2023      HGB 12.4 05/15/2023      MCV 89 05/15/2023       05/15/2023     TSH   Date Value Ref Range Status   11/17/2022 0.10 (L) 0.40 - 4.00 mU/L Final      Lab Results   Component Value Date    A1C 6.2 11/17/2022     Head CT 2016      1. No acute  intracranial pathology.   2. Chronic sequelae of right MCA infarct and bilateral cerebral small vessel ischemic disease.        IMP/PLAN:   (F33.9) Recurrent major depressive disorder, remission status unspecified (H)    Comment: longstanding   Plan: sertraline 100 mg/day   [facility] to continue to follow       (J44.9) Chronic obstructive pulmonary disease, unspecified COPD type (H)  Comment: smoker until this past year   Plan: albuterol MDI and guaifenesin PRN     (I67.9) Cerebral vascular disease  (Z86.73) History of CVA (cerebrovascular accident)  (R29.898) Left arm weakness  Comment: right MCA stroke years ago    Plan: LTC for assist with med admin, meals, activity and all cares     (F01.C18) Severe vascular dementia with other behavioral disturbance (H)  Comment: can strike out or be resistant to cares  Weaned off risperidone in March   Plan: redirect and reapproach while she transitions to a new facility     (D64.9) Anemia, unspecified type  Comment: latha 5.9 in Nov 2022   No workup per goals of care but hgb increased to 12.4 last month with PPI and Fe  Plan: omeprazole 20 mg/day, would treat indefinitely   With normalization of hgb, could decrease Fe to MWF and follow      Inga Salmeron MD

## 2023-07-07 ENCOUNTER — NURSING HOME VISIT (OUTPATIENT)
Dept: GERIATRICS | Facility: CLINIC | Age: 83
End: 2023-07-07
Payer: COMMERCIAL

## 2023-07-07 ENCOUNTER — LAB REQUISITION (OUTPATIENT)
Dept: LAB | Facility: CLINIC | Age: 83
End: 2023-07-07
Payer: COMMERCIAL

## 2023-07-07 VITALS
RESPIRATION RATE: 18 BRPM | HEIGHT: 60 IN | BODY MASS INDEX: 31.96 KG/M2 | SYSTOLIC BLOOD PRESSURE: 113 MMHG | TEMPERATURE: 98 F | OXYGEN SATURATION: 94 % | WEIGHT: 162.8 LBS | HEART RATE: 85 BPM | DIASTOLIC BLOOD PRESSURE: 74 MMHG

## 2023-07-07 DIAGNOSIS — J44.9 CHRONIC OBSTRUCTIVE PULMONARY DISEASE, UNSPECIFIED COPD TYPE (H): ICD-10-CM

## 2023-07-07 DIAGNOSIS — R29.6 RECURRENT FALLS: ICD-10-CM

## 2023-07-07 DIAGNOSIS — K21.9 GASTROESOPHAGEAL REFLUX DISEASE, UNSPECIFIED WHETHER ESOPHAGITIS PRESENT: ICD-10-CM

## 2023-07-07 DIAGNOSIS — G89.29 CHRONIC LOW BACK PAIN WITHOUT SCIATICA, UNSPECIFIED BACK PAIN LATERALITY: ICD-10-CM

## 2023-07-07 DIAGNOSIS — E05.90 HYPERTHYROIDISM: ICD-10-CM

## 2023-07-07 DIAGNOSIS — R29.898 LEFT ARM WEAKNESS: ICD-10-CM

## 2023-07-07 DIAGNOSIS — M54.50 CHRONIC LOW BACK PAIN WITHOUT SCIATICA, UNSPECIFIED BACK PAIN LATERALITY: ICD-10-CM

## 2023-07-07 DIAGNOSIS — I10 ESSENTIAL HYPERTENSION: ICD-10-CM

## 2023-07-07 DIAGNOSIS — I10 ESSENTIAL (PRIMARY) HYPERTENSION: ICD-10-CM

## 2023-07-07 DIAGNOSIS — F01.C18 SEVERE VASCULAR DEMENTIA WITH OTHER BEHAVIORAL DISTURBANCE (H): ICD-10-CM

## 2023-07-07 DIAGNOSIS — F33.9 RECURRENT MAJOR DEPRESSIVE DISORDER, REMISSION STATUS UNSPECIFIED (H): ICD-10-CM

## 2023-07-07 DIAGNOSIS — Z86.73 HISTORY OF CVA (CEREBROVASCULAR ACCIDENT): Primary | ICD-10-CM

## 2023-07-07 PROCEDURE — 99309 SBSQ NF CARE MODERATE MDM 30: CPT

## 2023-07-07 RX ORDER — RISPERIDONE 1 MG/1
1 TABLET ORAL AT BEDTIME
COMMUNITY
End: 2024-07-01

## 2023-07-07 NOTE — PATIENT INSTRUCTIONS
Yamileth Casillas  1940     Decrease iron to MWF   Recheck CBC, BMP 8/8/23 dx: iron deficiency, HTN      ALCIRA Morillo CNP on 7/7/2023 at 3:57 PM

## 2023-07-07 NOTE — PROGRESS NOTES
[facility] GERIATRICS  Chief Complaint   Patient presents with     longterm Regulatory     [place] Medical Record Number:  [id number]  Place of Service where encounter took place:  [facility]  () [[id number]]    HPI:    Chelsey Casillas  is 83 year old (1940), who is being seen today for a federally mandated E/M visit.     By chart review, [place] [facility] [place] She previously resided at [facility] and admitted to LT September 2018. She had a subsequent fall with left humerus fracture that month, workup including CT head noted a chronic right MCA infarct at that time. 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 She has followed with [facility] Dr Hutchins and successfully off of Risperidone since March 2023. She also has a history of COPD without oxygen use. Anemia noted on routine lab work November 2022, no evidence of active bleeding and she was started on iron. No additional workup per goals of care for comfort. Now transferring to the above facility for permanent placement.    Today's concerns are:  Seen today for routine follow-up, up on the unit in LTC. She reports she is feeling well today, enjoying a snack of a cookie and coffee. Has been more social and out on the unit. She was resumed on olanzapine at bedtime for combativeness toward staff and psychiatry re-consulted. She is denying CP, SOB, changes in b/b habits, fever/chills.    ALLERGIES:Amlodipine  PAST MEDICAL HISTORY:   Past Medical History:   Diagnosis Date     Arthritis      Chronic low back pain 03/17/2015     Closed fracture of left distal humerus 2018     COPD (chronic obstructive pulmonary disease) (H)      Dementia with psychosis (H) 11/01/2016     Fall 02/27/2013     Falling episodes 09/24/2016     Functional urinary incontinence 03/07/2013     GERD (gastroesophageal reflux disease) 04/03/2018     History of CVA  (cerebrovascular accident)     R MCA     HTN (hypertension) 04/03/2018     Knee pain 03/07/2013     Major depression, single episode 04/16/2015     Pain in both knees 01/30/2014     Physical deconditioning 03/07/2013     Severe major depression with psychotic features (H) 07/12/2016     Skin tear 03/07/2013     Weakness 06/18/2015     PAST SURGICAL HISTORY:   has no past surgical history on file.  FAMILY HISTORY: Family history is unknown by patient.  SOCIAL HISTORY:  reports that she has quit smoking. Her smoking use included cigarettes. She has never used smokeless tobacco. She reports that she does not drink alcohol and does not use drugs.    MEDICATIONS:  MED REC REQUIRED  Post Medication Reconciliation Status:  Patient was not discharged from an inpatient facility or TCU           Review of your medicines          Accurate as of July 7, 2023  4:03 PM. If you have any questions, ask your nurse or doctor.            CONTINUE these medicines which have NOT CHANGED      Dose / Directions   albuterol 108 (90 Base) MCG/ACT inhaler  Commonly known as: PROAIR HFA/PROVENTIL HFA/VENTOLIN HFA      Dose: 2 puff  Inhale 2 puffs into the lungs every 4 hours as needed for shortness of breath / dyspnea or wheezing  Refills: 0     ferrous sulfate 325 (65 Fe) MG tablet  Commonly known as: FEROSUL  Used for: Iron deficiency anemia, unspecified iron deficiency anemia type      Dose: 325 mg  Take 1 tablet (325 mg) by mouth daily (with breakfast)  Refills: 0     guaiFENesin-dextromethorphan 100-10 MG/5ML syrup  Commonly known as: ROBITUSSIN DM  Used for: Acute cough      Dose: 10 mL  Take 10 mLs by mouth every 4 hours as needed for cough  Refills: 0     omeprazole 20 MG tablet      Dose: 20 mg  Take 20 mg by mouth daily  Refills: 0     risperiDONE 1 MG tablet  Commonly known as: risperDAL      Dose: 1 mg  Take 1 mg by mouth At Bedtime  Refills: 0     sertraline 100 MG tablet  Commonly known as: ZOLOFT  Used for: Recurrent major  depressive disorder, in partial remission (H)      Dose: 100 mg  Take 1 tablet (100 mg) by mouth At Bedtime  Quantity: 31 tablet  Refills: 98     TYLENOL PO      Dose: 1,000 mg  Take 1,000 mg by mouth 2 times daily  Refills: 0     vitamin D3 50 mcg (2000 units) tablet  Commonly known as: CHOLECALCIFEROL  Used for: Vitamin D deficiency      Dose: 1 tablet  Take 1 tablet (2,000 Units) by mouth At Bedtime  Quantity: 30 tablet  Refills: 0             Case Management:  I have reviewed the care plan and MDS and do agree with the plan. Patient's desire to return to the community is present, but is not able due to care needs . Information reviewed:  Medications, vital signs, orders, and nursing notes.    ROS:  Limited secondary to cognitive impairment but today pt reports the above and 10 point ROS of systems including Constitutional, Eyes, Respiratory, Cardiovascular, Gastroenterology, Genitourinary, Integumentary, Musculoskeletal, Psychiatric were all negative except for pertinent positives noted in my HPI.    Vitals:  /74   Pulse 85   Temp 98  F (36.7  C)   Resp 18   Ht 1.524 m (5')   Wt 73.8 kg (162 lb 12.8 oz)   SpO2 94%   BMI 31.79 kg/m    Body mass index is 31.79 kg/m .  Exam:  GENERAL APPEARANCE:  Alert, in no distress  RESP:  respiratory effort and palpation of chest normal, lungs clear to auscultation , no respiratory distress  CV:  Palpation and auscultation of heart done , regular rate and rhythm, no murmur, rub, or gallop, no edema  ABDOMEN:  normal bowel sounds, soft, nontender, no hepatosplenomegaly or other masses  M/S:   Gait and station abnormal transfers with asist, wheelchair for mobility  SKIN:  no concerning lesions or rash  NEURO:   LUE weakness, limited history  PSYCH:  memory impaired , affect and mood normal    Lab/Diagnostic data:   Labs done in SNF are in [facility] EPIC. Please refer to them using [employer]/Care Everywhere. and Recent labs in EPIC reviewed by me today.      ASSESSMENT/PLAN     (Z86.73) History of CVA (cerebrovascular accident)  (primary encounter diagnosis)  (R29.828) Left arm weakness  Comment: Chronic, with history of R MCA infarct dating prior to September 2016. Has resided in LTC since 2018.   Plan: SNF for assist with ADLs, medication management, meals, activities     (F03.90) Dementia without behavioral disturbance (H)  Comment: chronic, progressive decline with history of significant behavioral disturbance. Has followed with [facility] psychiatry and re consulted. Resumed risperidone due to aggression towards staff  -CPT 4.0 Jan 2022  -BIMS 9/15  -management reviewed with nursing facility staff, MD today  Plan: monitor cognition, SNF for support as above, risperidone 1 mg at bedtime, follow-up with psychiatry per recommendations      (F33.9) Recurrent major depressive disorder, remission status unspecified (H)  Comment: chronic, by history. Psychiatry consulted as above. More social and up on the unit now that she is settled  Plan: PTA sertraline, Mood/behavior/drug SE monitoring and nonpharm interventions per nursing.      (J44.9) Chronic obstructive pulmonary disease, unspecified COPD type (H)  Comment: chronic, stable  Plan: monitor respiratory status     (K21.9) Gastroesophageal reflux disease, unspecified whether esophagitis present  Comment: chronic  Plan: PTA omeprazole     (D50.9) Iron deficiency anemia, unspecified iron deficiency anemia type - resolved  Comment: with hgb drop in November 2022, no additional workup completed per goals of care. No overt signs of bleeding.   Hemoglobin   Date Value Ref Range Status   05/15/2023 12.4 11.7 - 15.7 g/dL Final   11/22/2022 5.9 (LL) 11.7 - 15.7 g/dL Final   01/06/2020 9.6 (A) 11.7 - 15.7 g/dL Final   06/20/2019 11.4 (A) 11.7 - 15.7 g/dL Final   Plan: decrease iron to MWF PPI. Monitor hgb. Recheck CBC in August     (M54.50,  G89.29) Chronic low back pain without sciatica, unspecified back pain laterality  Comment:  chronic, notes occasional aches and pains  Plan: continue tylenol, pain monitoring and non pharm nterventions per nursing     (I10) Essential hypertension  Comment: chronic, reasonably well controlled off of antihypertensives. Avoid hypotension due to patient age, frailty, and risk for complications.   BP Readings from Last 3 Encounters:   07/07/23 113/74   06/12/23 (!) 150/70   05/12/23 130/72   Plan: monitor BP, BMP     (E05.90) Hyperthyroidism  Comment: chronic, find on routine labs in November. Patient is asymptomatic and no other workup at that time per goals of care   Plan: consider repeat TSH, free T4 if symptomatic     (R29.6) Recurrent falls  Comment: by history  Plan: fall precautions, D3 for bone health. SNF for assist with transfers    Electronically signed by:  ALCIRA Morillo CNP

## 2023-08-07 ENCOUNTER — NURSING HOME VISIT (OUTPATIENT)
Dept: GERIATRICS | Facility: CLINIC | Age: 83
End: 2023-08-07
Payer: COMMERCIAL

## 2023-08-07 VITALS
RESPIRATION RATE: 18 BRPM | HEIGHT: 60 IN | TEMPERATURE: 97.1 F | DIASTOLIC BLOOD PRESSURE: 83 MMHG | OXYGEN SATURATION: 95 % | SYSTOLIC BLOOD PRESSURE: 144 MMHG | BODY MASS INDEX: 32.51 KG/M2 | WEIGHT: 165.6 LBS | HEART RATE: 71 BPM

## 2023-08-07 DIAGNOSIS — Z86.73 HISTORY OF CVA (CEREBROVASCULAR ACCIDENT): ICD-10-CM

## 2023-08-07 DIAGNOSIS — J44.9 CHRONIC OBSTRUCTIVE PULMONARY DISEASE, UNSPECIFIED COPD TYPE (H): ICD-10-CM

## 2023-08-07 DIAGNOSIS — D64.9 ANEMIA, UNSPECIFIED TYPE: ICD-10-CM

## 2023-08-07 DIAGNOSIS — R29.898 LEFT ARM WEAKNESS: Primary | ICD-10-CM

## 2023-08-07 DIAGNOSIS — F01.C18 SEVERE VASCULAR DEMENTIA WITH OTHER BEHAVIORAL DISTURBANCE (H): ICD-10-CM

## 2023-08-07 DIAGNOSIS — F33.9 RECURRENT MAJOR DEPRESSIVE DISORDER, REMISSION STATUS UNSPECIFIED (H): ICD-10-CM

## 2023-08-07 PROCEDURE — 99309 SBSQ NF CARE MODERATE MDM 30: CPT | Performed by: INTERNAL MEDICINE

## 2023-08-08 ENCOUNTER — LAB REQUISITION (OUTPATIENT)
Dept: LAB | Facility: CLINIC | Age: 83
End: 2023-08-08
Payer: COMMERCIAL

## 2023-08-08 DIAGNOSIS — E61.1 IRON DEFICIENCY: ICD-10-CM

## 2023-08-09 LAB
ANION GAP SERPL CALCULATED.3IONS-SCNC: 11 MMOL/L (ref 7–15)
BASOPHILS # BLD AUTO: 0 10E3/UL (ref 0–0.2)
BASOPHILS NFR BLD AUTO: 1 %
BUN SERPL-MCNC: 15.4 MG/DL (ref 8–23)
CALCIUM SERPL-MCNC: 9.3 MG/DL (ref 8.8–10.2)
CHLORIDE SERPL-SCNC: 101 MMOL/L (ref 98–107)
CREAT SERPL-MCNC: 0.6 MG/DL (ref 0.51–0.95)
DEPRECATED HCO3 PLAS-SCNC: 27 MMOL/L (ref 22–29)
EOSINOPHIL # BLD AUTO: 0.3 10E3/UL (ref 0–0.7)
EOSINOPHIL NFR BLD AUTO: 4 %
ERYTHROCYTE [DISTWIDTH] IN BLOOD BY AUTOMATED COUNT: 14 % (ref 10–15)
GFR SERPL CREATININE-BSD FRML MDRD: 90 ML/MIN/1.73M2
GLUCOSE SERPL-MCNC: 86 MG/DL (ref 70–99)
HCT VFR BLD AUTO: 39.5 % (ref 35–47)
HGB BLD-MCNC: 12.5 G/DL (ref 11.7–15.7)
IMM GRANULOCYTES # BLD: 0 10E3/UL
IMM GRANULOCYTES NFR BLD: 0 %
LYMPHOCYTES # BLD AUTO: 1.7 10E3/UL (ref 0.8–5.3)
LYMPHOCYTES NFR BLD AUTO: 29 %
MCH RBC QN AUTO: 28.7 PG (ref 26.5–33)
MCHC RBC AUTO-ENTMCNC: 31.6 G/DL (ref 31.5–36.5)
MCV RBC AUTO: 91 FL (ref 78–100)
MONOCYTES # BLD AUTO: 0.5 10E3/UL (ref 0–1.3)
MONOCYTES NFR BLD AUTO: 9 %
NEUTROPHILS # BLD AUTO: 3.3 10E3/UL (ref 1.6–8.3)
NEUTROPHILS NFR BLD AUTO: 57 %
NRBC # BLD AUTO: 0 10E3/UL
NRBC BLD AUTO-RTO: 0 /100
PLATELET # BLD AUTO: 161 10E3/UL (ref 150–450)
POTASSIUM SERPL-SCNC: 3.9 MMOL/L (ref 3.4–5.3)
RBC # BLD AUTO: 4.35 10E6/UL (ref 3.8–5.2)
SODIUM SERPL-SCNC: 139 MMOL/L (ref 136–145)
WBC # BLD AUTO: 5.9 10E3/UL (ref 4–11)

## 2023-08-09 PROCEDURE — P9603 ONE-WAY ALLOW PRORATED MILES: HCPCS | Mod: ORL | Performed by: INTERNAL MEDICINE

## 2023-08-09 PROCEDURE — 85025 COMPLETE CBC W/AUTO DIFF WBC: CPT | Mod: ORL | Performed by: INTERNAL MEDICINE

## 2023-08-09 PROCEDURE — 80048 BASIC METABOLIC PNL TOTAL CA: CPT | Mod: ORL | Performed by: INTERNAL MEDICINE

## 2023-08-09 PROCEDURE — 36415 COLL VENOUS BLD VENIPUNCTURE: CPT | Mod: ORL | Performed by: INTERNAL MEDICINE

## 2023-08-20 NOTE — PROGRESS NOTES
Chelsey Casillas is a 83 year old female seen August 7, 2023 at [facility] where she has resided for 3 months (admit 5/2023) seen for regulatory visit and to follow up progressive dementia with behavioral symptoms   Pt is seen on the unit up to WC.   Has been more active than she was initially, out to activities.   Today waiting for lunch to be served, states she's ready to eat.   Denies any current pain or other symptoms     Nursing staff has report pt can be combative with cares, such that risperidone was restarted   She is followed by ACP Dr Hutchins        By chart review, pt admitted to LT in 2018 after she had had multiple hospitalization for LE weakness and falls.  Noted to have sequelae of right MCA infarct on head imaging   After admission she had another fall in which suffered a distal left humerus fracture.     Biggest concern has been depression with psychosis, distressing hallucinations and behavioral symptoms.   She was followed by Dr Hutchins, over time.   Improved and eventually able to be weaned off risperidone in March 2023      She had anemia of unknown etiology in Nov 2022, now resolved   No workup undertaken given comfort focus.       Past Medical History:   Diagnosis Date    Arthritis     Chronic low back pain 03/17/2015    Closed fracture of left distal humerus 2018    COPD (chronic obstructive pulmonary disease) (H)     Dementia with psychosis (H) 11/01/2016    Fall 02/27/2013    Falling episodes 09/24/2016    Functional urinary incontinence 03/07/2013    GERD (gastroesophageal reflux disease) 04/03/2018    History of CVA (cerebrovascular accident)     R MCA    HTN (hypertension) 04/03/2018    Knee pain 03/07/2013    Major depression, single episode 04/16/2015    Pain in both knees 01/30/2014    Physical deconditioning 03/07/2013    Severe major depression with psychotic features (H) 07/12/2016    Skin tear 03/07/2013    Weakness 06/18/2015     SH: Single no family.   A friend Tasha Garcia  is first contact  Lived in [facility] from September 2018 to May 2023   Prior to that she lived in the [facility] apartment, then at [facility] on [facility] [facility]   Past smoker     ROS: limited by pt's dementia  BIMS 9/15   PHQ9 5/27    Wt Readings from Last 5 Encounters:   08/07/23 75.1 kg (165 lb 9.6 oz)   07/07/23 73.8 kg (162 lb 12.8 oz)   06/12/23 69.1 kg (152 lb 6.4 oz)   05/12/23 68.9 kg (152 lb)   05/03/23 75.3 kg (166 lb)     EXAM:  NAD  BP (!) 144/83   Pulse 71   Temp 97.1  F (36.2  C)   Resp 18   Ht 1.524 m (5')   Wt 75.1 kg (165 lb 9.6 oz)   SpO2 95%   BMI 32.34 kg/m     +kyphosis  Neck supple without adenopathy  Lungs decreased BS, no rales or wheeze  Heart RRR s1s2 A  Abd soft, NT, no distention or guarding, +BS  Ext without edema   Neuro: limited history, WC bound  Psych: affect okay, pleasant     Labs reviewed     Head CT 2016      1. No acute intracranial pathology.   2. Chronic sequelae of right MCA infarct and bilateral cerebral small vessel ischemic disease.        IMP/PLAN:   (F33.9) Recurrent major depressive disorder, remission status unspecified (H)    Comment: longstanding   Plan: sertraline 100 mg/day   [facility] to continue to follow       (J44.9) Chronic obstructive pulmonary disease, unspecified COPD type (H)  Comment: smoker until this past year   Plan: albuterol MDI and guaifenesin PRN     (I67.9) Cerebral vascular disease  (Z86.73) History of CVA (cerebrovascular accident)  (R29.898) Left arm weakness  Comment: right MCA stroke years ago, has resided in LTC since 2018     Plan: LTC for assist with med admin, meals, activity and all cares     (F01.C18) Severe vascular dementia with other behavioral disturbance (H)  Comment: can strike out or be resistant to cares  Plan: Risperidone restarted 1 mg /HS to allow for safe cares       (D64.9) Anemia, unspecified type  Comment: latha 5.9 in Nov 2022   No workup per goals of care but hgb increased to 12.4 with PPI and  Fe  Plan: omeprazole 20 mg/day, would treat indefinitely    Fe 325mg MWF and follow      Inga Salmeron MD

## 2023-08-28 ENCOUNTER — NURSING HOME VISIT (OUTPATIENT)
Dept: GERIATRICS | Facility: CLINIC | Age: 83
End: 2023-08-28
Payer: COMMERCIAL

## 2023-08-28 VITALS
HEIGHT: 60 IN | BODY MASS INDEX: 32.2 KG/M2 | OXYGEN SATURATION: 93 % | DIASTOLIC BLOOD PRESSURE: 67 MMHG | TEMPERATURE: 97 F | SYSTOLIC BLOOD PRESSURE: 132 MMHG | WEIGHT: 164 LBS | HEART RATE: 81 BPM | RESPIRATION RATE: 18 BRPM

## 2023-08-28 DIAGNOSIS — M25.552 HIP PAIN, LEFT: Primary | ICD-10-CM

## 2023-08-28 DIAGNOSIS — F01.C18 SEVERE VASCULAR DEMENTIA WITH OTHER BEHAVIORAL DISTURBANCE (H): ICD-10-CM

## 2023-08-28 PROCEDURE — 99309 SBSQ NF CARE MODERATE MDM 30: CPT
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
## 2023-09-01 ENCOUNTER — NURSING HOME VISIT (OUTPATIENT)
Dept: GERIATRICS | Facility: CLINIC | Age: 83
End: 2023-09-01
Payer: COMMERCIAL

## 2023-09-01 VITALS
HEART RATE: 91 BPM | SYSTOLIC BLOOD PRESSURE: 143 MMHG | DIASTOLIC BLOOD PRESSURE: 91 MMHG | RESPIRATION RATE: 18 BRPM | HEIGHT: 60 IN | BODY MASS INDEX: 33.18 KG/M2 | TEMPERATURE: 98.2 F | OXYGEN SATURATION: 94 % | WEIGHT: 169 LBS

## 2023-09-01 DIAGNOSIS — M25.552 HIP PAIN, LEFT: Primary | ICD-10-CM

## 2023-09-01 DIAGNOSIS — F01.C18 SEVERE VASCULAR DEMENTIA WITH OTHER BEHAVIORAL DISTURBANCE (H): ICD-10-CM

## 2023-09-01 DIAGNOSIS — M15.0 PRIMARY OSTEOARTHRITIS INVOLVING MULTIPLE JOINTS: ICD-10-CM

## 2023-09-01 PROCEDURE — 99309 SBSQ NF CARE MODERATE MDM 30: CPT

## 2023-09-01 RX ORDER — LIDOCAINE 4 G/G
1 PATCH TOPICAL EVERY 24 HOURS
COMMUNITY

## 2023-09-01 NOTE — PROGRESS NOTES
[facility] GERIATRICS    Chief Complaint   Patient presents with    Nursing Home Acute     HPI:  Chelsey Casillas is a 83 year old  (1940), who is being seen today for an episodic care visit at: [facility]  () [[id number]]. Today's concern is: Seen today for follow-up on left hip pain, behaviors.  Nursing reported left hip pain and aggression towards staff earlier this week following a transfer.  X-ray of left hip was negative for acute findings.  She was started on Voltaren.  Also seen by [facility] earlier this week.  Seen today up on the unit in long-term care in her wheelchair.  She is denying pain at rest.  She does note some ongoing discomfort with transfers, unable to elaborate as to severity or quality of pain.  She is denying new weakness or paresthesias, fever/chills, wounds or rash.    Allergies, and PMH/PSH reviewed in EPIC today.  REVIEW OF SYSTEMS:  Limited secondary to cognitive impairment but today pt reports the above    Objective:   BP (!) 143/91   Pulse 91   Temp 98.2  F (36.8  C)   Resp 18   Ht 1.524 m (5')   Wt 76.7 kg (169 lb)   SpO2 94%   BMI 33.01 kg/m    GENERAL APPEARANCE:  Alert, in no distress  M/S:   Gait and station abnormal transfers with assist, no gross deformity, moves all extremities freely, comfortable appearing in her wheelchair  PSYCH:  memory impaired , affect and mood normal, calm, no overt evidence of psychosis    Labs done in SNF are in [facility]. Please refer to them using [employer]/Care Everywhere. and Recent labs in [facility] reviewed by me today.     Assessment/Plan:  (M25.552) Hip pain, left  (primary encounter diagnosis)  (M15.9) Primary osteoarthritis involving multiple joints  Comment: Acute on chronic hip pain, x-ray shows moderate OA, no acute findings, destructive process or evidence of fracture.  Will add lidocaine and increase Tylenol to 3 times daily for ongoing discomfort.  - Management discussed with nursing facility staff today  Plan:  Continue Voltaren twice daily and twice daily as needed, start lidocaine 5% patch to left hip daily, increase Tylenol to 1000 mg 3 times daily, continue ice, heat, nonpharmacological interventions.  Consult Ortho/PT as needed.    (F01.C18) Severe vascular dementia with other behavioral disturbance (H)  Comment: Chronic, with acute behavioral exacerbation, likely secondary to pain.  On risperidone since May when she has been on historically for psychosis with disturbing delusions.  Psychiatry considering increasing Risperdal but sounds as if that was on hold, seen by [facility] yesterday.  Plan: Pain management as above, continue risperidone, sertraline.  Follow-up with [facility], psychiatry per recommendations, will defer med changes to psychiatry.  Continue nonpharmacological interventions, activities, redirection per nursing      Electronically signed by: ALCIRA Morillo CNP

## 2023-09-27 ENCOUNTER — TELEPHONE (OUTPATIENT)
Dept: GERIATRICS | Facility: CLINIC | Age: 83
End: 2023-09-27
Payer: COMMERCIAL

## 2023-09-27 NOTE — TELEPHONE ENCOUNTER
"[facility] Triage Nurse Telephone Encounter    Provider: ALCIRA Morillo CNP  Facility: [facility]  Facility Type:  LTC    Caller: Duncan  Call Back Number: [phone]    Allergies:    Allergies   Allergen Reactions    Amlodipine Other (See Comments)        Reason for call: Nurse is calling to report that patient had a \"choking\" episode while eating popcorn today.  Patient was noted to be coughing on the popcorn.  Patient did not require the heimlich maneuver.  VS are stable and lung sounds are clear.      Verbal Order/Direction given by Provider: Continue to monitor and update with changes.      Provider giving Order:  ALCIRA Morillo CNP    Verbal Order given to: Duncan Iraheta RN      "

## 2023-10-04 ENCOUNTER — PATIENT OUTREACH (OUTPATIENT)
Dept: GERIATRIC MEDICINE | Facility: CLINIC | Age: 83
End: 2023-10-04
Payer: COMMERCIAL

## 2023-10-04 NOTE — PROGRESS NOTES
[facility] Care Coordination Communication    Email sent to [facility]  at [facility] to  schedule annual assessment. For member. Assessment  is scheduled for 10/09/23.. CC will complete chart review and reach out to family or primary contact as part of the assessment.     Yeny Chaudhry RN, PHN  Care Coordinator [facility]  [phone]

## 2023-10-09 ENCOUNTER — LAB REQUISITION (OUTPATIENT)
Dept: LAB | Facility: CLINIC | Age: 83
End: 2023-10-09
Payer: COMMERCIAL

## 2023-10-09 ENCOUNTER — PATIENT OUTREACH (OUTPATIENT)
Dept: GERIATRIC MEDICINE | Facility: CLINIC | Age: 83
End: 2023-10-09

## 2023-10-09 DIAGNOSIS — U07.1 COVID-19: ICD-10-CM

## 2023-10-09 PROCEDURE — 87635 SARS-COV-2 COVID-19 AMP PRB: CPT | Mod: ORL

## 2023-10-09 NOTE — Clinical Note
Savita I saw Chelsey on 10/09/23. No concerns. Most of my assessment information was obtained from staff due to cognition. Have placed call to her friend Tasha Garcia. I will update you if Pat has any concerns. Let me know if there is anything I can assist you with   Yeny Chaudhry, RN, PHN Intuitional Care Coordinator [facility] [phone] Fax [phone]

## 2023-10-10 LAB — SARS-COV-2 RNA RESP QL NAA+PROBE: NEGATIVE

## 2023-10-12 ENCOUNTER — LAB REQUISITION (OUTPATIENT)
Dept: LAB | Facility: CLINIC | Age: 83
End: 2023-10-12
Payer: COMMERCIAL

## 2023-10-12 DIAGNOSIS — U07.1 COVID-19: ICD-10-CM

## 2023-10-12 PROCEDURE — 87635 SARS-COV-2 COVID-19 AMP PRB: CPT | Mod: ORL

## 2023-10-13 LAB — SARS-COV-2 RNA RESP QL NAA+PROBE: NEGATIVE

## 2023-10-16 ENCOUNTER — LAB REQUISITION (OUTPATIENT)
Dept: LAB | Facility: CLINIC | Age: 83
End: 2023-10-16
Payer: COMMERCIAL

## 2023-10-16 DIAGNOSIS — U07.1 COVID-19: ICD-10-CM

## 2023-10-16 PROCEDURE — 87635 SARS-COV-2 COVID-19 AMP PRB: CPT | Mod: ORL

## 2023-10-17 LAB — SARS-COV-2 RNA RESP QL NAA+PROBE: NEGATIVE

## 2023-10-19 ENCOUNTER — LAB REQUISITION (OUTPATIENT)
Dept: LAB | Facility: CLINIC | Age: 83
End: 2023-10-19
Payer: COMMERCIAL

## 2023-10-19 DIAGNOSIS — U07.1 COVID-19: ICD-10-CM

## 2023-10-19 PROCEDURE — 87635 SARS-COV-2 COVID-19 AMP PRB: CPT | Mod: ORL

## 2023-10-19 NOTE — PROGRESS NOTES
[facility] Institutional Assessment     Institutional Assessment for Health Risk Assessment with Chelsey Casillas completed on October 9, 2023 at [facility]    Type of residence:: Nursing home  Current living arrangement:: I live in a nursing home     Assessment completed with:: Patient, Care Team Member      Mental/Behavioral Health   Depression Screening:   PHQ-2 Total Score (Adult) - Positive if 3 or more points; Administer PHQ-9 if positive: 0       Mental health DX:: Yes (Depression)   Mental health DX how managed:: In Home Counseling, Medication (ACP, Sertraline, Risperdahl)    Falls Assessment:   Fallen 2 or more times in the past year?: No (one fall in past year no injury)   Any fall with injury in the past year?: No    ADL/IADL Dependencies:   Dependent ADLs:: Bathing, Dressing, Eating, Grooming, Incontinence, Wheelchair-with assist, Positioning, Transfers, Toileting  Dependent IADLs:: Cleaning, Cooking, Laundry, Shopping, Meal Preparation, Medication Management, Money Management, Transportation, Incontinence      Care Plan & Recommendations: Member seen today at their facility for Annual  Assessment for Health Plan.  Member is a 83 year old female with a PMD of Dementia. and secondary diagnosis that include Vascular Dementia, Low Back Pain, GERD, COPD, Anemia, Hypertension Member also has mental health diagnosis of Depression. BIMs score is 9/15 indicating Moderate Cognitive Impairment and PHQ 9 score is 1/27 triggering for trouble staying asleep at night. . Member is Alert but disoriented and has difficult with comprehending questions as asked and just talks about whatever she feels like. She is pleasant and does state she likes the facility and staff there treat her well.  During this visit immunizations were reviewed and found member is cyrrent in all required vaccines. Has not received her Shingles or RSV vaccines and her seasonal Flu and Covid are due. Member has  historically gotten all recommended vaccines. Will discuss with her . . Hearing, Vision and Dental reviewed and found member is current o dental exam but not o her vision andhearing. Consents are on file so will have these schedule for her. . POLST reviewed and member is DNR/DNI per her POLST. .      Discussed options/opportunities for transitions.    See Institutional Care Plan for detailed assessment information.    Obtained a copy of the facility care plan and MDS from facility electronic records. Requested of assisted social worker to put this care coordinator on care conference attendee list.    Placed the Health Plan facility face sheet in the member's facility chart.    Follow-Up Plan: Member informed of future contact, plan to f/u with member with a 6 month assessment, attend 1 care conference annually, and will follow any hospitalizations or transitions. Care Coordinator contact information shared with member/family and facility, and encouraged to call this care coordinator with any questions or concerns at any time.     [place] care continuum providers: Please see Snapshot and Care Management Flowsheets for Specific details of care plan.    This CC note routed to PCP, Savita Reynaga.    Yeny Chaudhry RN, PHN  Intuitional Care Coordinator [facility]  Cell [phone] Fax [phone]

## 2023-10-20 LAB — SARS-COV-2 RNA RESP QL NAA+PROBE: NEGATIVE

## 2023-10-23 ENCOUNTER — LAB REQUISITION (OUTPATIENT)
Dept: LAB | Facility: CLINIC | Age: 83
End: 2023-10-23
Payer: COMMERCIAL

## 2023-10-23 DIAGNOSIS — U07.1 COVID-19: ICD-10-CM

## 2023-10-23 PROCEDURE — 87635 SARS-COV-2 COVID-19 AMP PRB: CPT | Mod: ORL

## 2023-10-24 LAB — SARS-COV-2 RNA RESP QL NAA+PROBE: NEGATIVE

## 2023-10-26 ENCOUNTER — LAB REQUISITION (OUTPATIENT)
Dept: LAB | Facility: CLINIC | Age: 83
End: 2023-10-26
Payer: COMMERCIAL

## 2023-10-26 DIAGNOSIS — U07.1 COVID-19: ICD-10-CM

## 2023-10-26 PROCEDURE — 87635 SARS-COV-2 COVID-19 AMP PRB: CPT | Mod: ORL

## 2023-10-27 ENCOUNTER — NURSING HOME VISIT (OUTPATIENT)
Dept: GERIATRICS | Facility: CLINIC | Age: 83
End: 2023-10-27
Payer: COMMERCIAL

## 2023-10-27 VITALS
TEMPERATURE: 97.3 F | OXYGEN SATURATION: 97 % | HEART RATE: 87 BPM | BODY MASS INDEX: 34.79 KG/M2 | RESPIRATION RATE: 20 BRPM | SYSTOLIC BLOOD PRESSURE: 124 MMHG | HEIGHT: 60 IN | WEIGHT: 177.2 LBS | DIASTOLIC BLOOD PRESSURE: 71 MMHG

## 2023-10-27 DIAGNOSIS — I10 ESSENTIAL HYPERTENSION: ICD-10-CM

## 2023-10-27 DIAGNOSIS — I67.9 CEREBRAL VASCULAR DISEASE: ICD-10-CM

## 2023-10-27 DIAGNOSIS — D50.9 IRON DEFICIENCY ANEMIA, UNSPECIFIED IRON DEFICIENCY ANEMIA TYPE: ICD-10-CM

## 2023-10-27 DIAGNOSIS — M54.50 CHRONIC LOW BACK PAIN WITHOUT SCIATICA, UNSPECIFIED BACK PAIN LATERALITY: ICD-10-CM

## 2023-10-27 DIAGNOSIS — J44.9 CHRONIC OBSTRUCTIVE PULMONARY DISEASE, UNSPECIFIED COPD TYPE (H): ICD-10-CM

## 2023-10-27 DIAGNOSIS — E05.90 HYPERTHYROIDISM: ICD-10-CM

## 2023-10-27 DIAGNOSIS — K21.9 GASTROESOPHAGEAL REFLUX DISEASE, UNSPECIFIED WHETHER ESOPHAGITIS PRESENT: ICD-10-CM

## 2023-10-27 DIAGNOSIS — R29.6 RECURRENT FALLS: ICD-10-CM

## 2023-10-27 DIAGNOSIS — F01.C18 SEVERE VASCULAR DEMENTIA WITH OTHER BEHAVIORAL DISTURBANCE (H): ICD-10-CM

## 2023-10-27 DIAGNOSIS — M25.552 HIP PAIN, LEFT: ICD-10-CM

## 2023-10-27 DIAGNOSIS — M15.0 PRIMARY OSTEOARTHRITIS INVOLVING MULTIPLE JOINTS: ICD-10-CM

## 2023-10-27 DIAGNOSIS — Z86.73 HISTORY OF CVA (CEREBROVASCULAR ACCIDENT): ICD-10-CM

## 2023-10-27 DIAGNOSIS — R29.898 LEFT ARM WEAKNESS: ICD-10-CM

## 2023-10-27 DIAGNOSIS — F32.3 CURRENT SEVERE EPISODE OF MAJOR DEPRESSIVE DISORDER WITH PSYCHOTIC FEATURES, UNSPECIFIED WHETHER RECURRENT (H): Primary | ICD-10-CM

## 2023-10-27 DIAGNOSIS — G89.29 CHRONIC LOW BACK PAIN WITHOUT SCIATICA, UNSPECIFIED BACK PAIN LATERALITY: ICD-10-CM

## 2023-10-27 LAB — SARS-COV-2 RNA RESP QL NAA+PROBE: NEGATIVE

## 2023-10-27 PROCEDURE — 99309 SBSQ NF CARE MODERATE MDM 30: CPT

## 2023-10-27 NOTE — LETTER
10/27/2023        RE: Chelsey Casillas  [facility]  [address]   [place] [address]        [facility] GERIATRICS  Chief Complaint   Patient presents with    Annual Comprehensive Nursing Home     [place] Medical Record Number:  [id number]  Place of Service where encounter took place:  [facility]  () [[id number]]    HPI:    Chelsey Casillas  is a 83 year old  (1940), who is being seen today for an annual comprehensive visit. HPI information obtained from: {FGS HPI:555974}.   ***    ALLERGIES: Amlodipine  PAST MEDICAL HISTORY:   Past Medical History:   Diagnosis Date    Arthritis     Chronic low back pain 03/17/2015    Closed fracture of left distal humerus 2018    COPD (chronic obstructive pulmonary disease) (H)     Dementia with psychosis (H) 11/01/2016    Fall 02/27/2013    Falling episodes 09/24/2016    Functional urinary incontinence 03/07/2013    GERD (gastroesophageal reflux disease) 04/03/2018    History of CVA (cerebrovascular accident)     R MCA    HTN (hypertension) 04/03/2018    Knee pain 03/07/2013    Major depression, single episode 04/16/2015    Pain in both knees 01/30/2014    Physical deconditioning 03/07/2013    Severe major depression with psychotic features (H) 07/12/2016    Skin tear 03/07/2013    Weakness 06/18/2015      PAST SURGICAL HISTORY:  has no past surgical history on file.  ***    Current Outpatient Medications:     Acetaminophen (TYLENOL PO), Take 1,000 mg by mouth 3 times daily, Disp: , Rfl:     albuterol (PROAIR HFA/PROVENTIL HFA/VENTOLIN HFA) 108 (90 BASE) MCG/ACT Inhaler, Inhale 2 puffs into the lungs every 4 hours as needed for shortness of breath / dyspnea or wheezing , Disp: , Rfl:     diclofenac (VOLTAREN) 1 % topical gel, Apply 4 g topically 2 times daily And BID PRN for pain, Disp: , Rfl:     ferrous sulfate (FEROSUL) 325 (65 Fe) MG tablet, Take 1 tablet (325 mg) by mouth daily (with breakfast), Disp: , Rfl:      guaiFENesin-dextromethorphan (ROBITUSSIN DM) 100-10 MG/5ML syrup, Take 10 mLs by mouth every 4 hours as needed for cough, Disp: , Rfl:     Lidocaine (LIDOCARE) 4 % Patch, Place 1 patch onto the skin every 24 hours To prevent lidocaine toxicity, patient should be patch free for 12 hrs daily., Disp: , Rfl:     omeprazole 20 MG tablet, Take 20 mg by mouth daily, Disp: , Rfl:     risperiDONE (RISPERDAL) 1 MG tablet, Take 1 mg by mouth At Bedtime, Disp: , Rfl:     sertraline (ZOLOFT) 100 MG tablet, Take 1 tablet (100 mg) by mouth At Bedtime, Disp: 31 tablet, Rfl: 98    vitamin D3 (CHOLECALCIFEROL) 2000 units (50 mcg) tablet, Take 1 tablet (2,000 Units) by mouth At Bedtime, Disp: 30 tablet, Rfl:      MED REC REQUIRED{TIP  Click the link below to document or use med rec list, use list to pull in response :562930}  Post Medication Reconciliation Status: {MED REC LIST:440378}      Case Management:  I have reviewed the {fgsannualcareplan1:429133}. {fgs cancer screenin}. Patient's desire to return to the community is {FGS RETURN TO COMMUNITY:927221}. Current Level of Care is appropriate.{geroimmunization:363556}    Advance Directive Discussion:    I reviewed the current advanced directives as reflected in EPIC, the POLST and the facility chart, and verified the congruency of orders ***. I contacted the first party *** and {ADVANCED DIRECTIVE DISCUSSION:714739} plan of care. I {DID/DID NOT:232801} review the advance directives with the resident.     Team Discussion:  I communicated with the appropriate disciplines involved with the Plan of Care: {NURSING HOME DISCIPLINES:075222}.   Patient's goal is: {fgsgoal:542981}.  Information reviewed: Medications, vital signs, orders, and nursing notes.    ROS:  {qztpin44:073292}    Vitals:  /71   Pulse 87   Temp 97.3  F (36.3  C)   Resp 20   Ht 1.524 m (5')   Wt 80.4 kg (177 lb 3.2 oz)   SpO2 97%   BMI 34.61 kg/m   Body mass index is 34.61 kg/m .  Exam:  {Nursing  home physical exam :686769} {2 in each of 9 for comp exam}    Lab/Diagnostic data:   {fgslab:022470}    ASSESSMENT/PLAN  {FGS DX2:392876}  {HTN}    Orders:  {fgsorders:376808}  ***    Electronically signed by:  Elena Carr CNA ***           Sincerely,        ALCIRA Morillo CNP

## 2023-10-27 NOTE — PROGRESS NOTES
"[facility] GERIATRICS  Chief Complaint   Patient presents with    Annual Comprehensive Nursing Home     [place] Medical Record Number:  [id number]  Place of Service where encounter took place:  [facility]  () [[id number]]    HPI:    Chelsey Casillas  is a 83 year old  (1940), who is being seen today for an annual comprehensive visit. HPI information obtained from: facility chart records, facility staff, patient report, and [facility] chart review.     By chart review, patient was last hospitalized at [facility] September 2016. She previously resided at [facility] and admitted to [facility] September 2018. She had a subsequent fall with left humerus fracture that month, workup including CT head noted a chronic right MCA infarct at that time. 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 She has followed with [facility] Dr Hutchins and successfully off of Risperidone since March 2023. She also has a history of COPD without oxygen use. Anemia noted on routine lab work November 2022, no evidence of active bleeding and she was started on oral iron. No additional workup per goals of care for comfort. Transferred to the above facility for permanent placement.  Can be combative with staff at times, Risperdal resumed for delusional behaviors and aggression.  Spends most of her day up in the dining room drinking coffee, watching television.    Seen today for follow-up in long-term care up to wheelchair.  She reports she is doing \"fine,\" denying pain and acute concerns.  She reports she is eating well.  Reports a bowel movement yesterday.  Does not offer much additional history.  Regarding reports of aggression, she states she does not get along well with some staff citing \"difference in opinions,\" does not elaborate further.  She is denying chest pain, shortness of breath, changes in bowel or bladder habits, fever/chills.    ALLERGIES: " Amlodipine  PAST MEDICAL HISTORY:   Past Medical History:   Diagnosis Date    Arthritis     Chronic low back pain 03/17/2015    Closed fracture of left distal humerus 2018    COPD (chronic obstructive pulmonary disease) (H)     Dementia with psychosis (H) 11/01/2016    Fall 02/27/2013    Falling episodes 09/24/2016    Functional urinary incontinence 03/07/2013    GERD (gastroesophageal reflux disease) 04/03/2018    History of CVA (cerebrovascular accident)     R MCA    HTN (hypertension) 04/03/2018    Knee pain 03/07/2013    Major depression, single episode 04/16/2015    Pain in both knees 01/30/2014    Physical deconditioning 03/07/2013    Severe major depression with psychotic features (H) 07/12/2016    Skin tear 03/07/2013    Weakness 06/18/2015      PAST SURGICAL HISTORY:  has no past surgical history on file.      Current Outpatient Medications:     Acetaminophen (TYLENOL PO), Take 1,000 mg by mouth 3 times daily, Disp: , Rfl:     albuterol (PROAIR HFA/PROVENTIL HFA/VENTOLIN HFA) 108 (90 BASE) MCG/ACT Inhaler, Inhale 2 puffs into the lungs every 4 hours as needed for shortness of breath / dyspnea or wheezing , Disp: , Rfl:     diclofenac (VOLTAREN) 1 % topical gel, Apply 4 g topically 2 times daily And BID PRN for pain, Disp: , Rfl:     guaiFENesin (ROBITUSSIN) 20 mg/mL liquid, Take 200 mg by mouth every 4 hours as needed for cough, Disp: , Rfl:     Lidocaine (LIDOCARE) 4 % Patch, Place 1 patch onto the skin every 24 hours To prevent lidocaine toxicity, patient should be patch free for 12 hrs daily., Disp: , Rfl:     omeprazole 20 MG tablet, Take 20 mg by mouth daily, Disp: , Rfl:     risperiDONE (RISPERDAL) 0.25 MG tablet, Take 0.25 mg by mouth daily, Disp: , Rfl:     risperiDONE (RISPERDAL) 1 MG tablet, Take 1 mg by mouth At Bedtime, Disp: , Rfl:     sertraline (ZOLOFT) 100 MG tablet, Take 1 tablet (100 mg) by mouth At Bedtime, Disp: 31 tablet, Rfl: 98    vitamin D3 (CHOLECALCIFEROL) 2000 units (50 mcg)  tablet, Take 1 tablet (2,000 Units) by mouth At Bedtime, Disp: 30 tablet, Rfl:      MED REC REQUIRED  Post Medication Reconciliation Status:  Medication reconciliation previously completed during another office visit      Case Management:  I have reviewed the facility/SNF care plan/MDS, including the falls risk, nutrition and pain screening. I also reviewed the current immunizations, and preventive care.. Future cancer screening is not clinically indicated secondary to age/goals of care. Patient's desire to return to the community is not present. Current Level of Care is appropriate.mhgeroimmunization: Annual Influenza per facility protocol    Advance Directive Discussion:    I reviewed the current advanced directives as reflected in EPIC, the POLST and the facility chart, and verified the congruency of orders. I contacted the first party (self) and discussed the plan of care. I did review the advance directives with the resident.  Patient reports she does not have family to update, has an alternate contact friend Tasha Garcia listed, she declined for me to update Tasha today.    Team Discussion:  I communicated with the appropriate disciplines involved with the Plan of Care: Nursing   and   .   Patient's goal is: pain control and comfort.  Information reviewed: Medications, vital signs, orders, and nursing notes.    ROS:  Limited secondary to cognitive impairment but today pt reports the above and 10 point ROS of systems including Constitutional, Eyes, Respiratory, Cardiovascular, Gastroenterology, Genitourinary, Integumentary, Musculoskeletal, Psychiatric were all negative except for pertinent positives noted in my HPI.    Vitals:  /71   Pulse 87   Temp 97.3  F (36.3  C)   Resp 20   Ht 1.524 m (5')   Wt 80.4 kg (177 lb 3.2 oz)   SpO2 97%   BMI 34.61 kg/m   Body mass index is 34.61 kg/m .  Exam:  GENERAL APPEARANCE:  Alert, in no distress  RESP:  respiratory effort and palpation of chest  normal, lungs clear to auscultation , no respiratory distress  CV:  Palpation and auscultation of heart done , regular rate and rhythm, no murmur, rub, or gallop, no edema  ABDOMEN:  normal bowel sounds, soft, nontender, no hepatosplenomegaly or other masses  M/S:   Gait and station abnormal transfers with assist, wheelchair for mobility  SKIN:  Inspection of skin and subcutaneous tissue baseline, Palpation of skin and subcutaneous tissue baseline  NEURO:   [place] spontaneously, LUE weakness, follows commands  PSYCH:  memory impaired , affect and mood normal     Lab/Diagnostic data:   Labs done in SNF are in [facility]. Please refer to them using [facility]Care Everywhere. and Recent labs in EPIC reviewed by me today.     ASSESSMENT/PLAN  (F32.3) Current severe episode of major depressive disorder with psychotic features, unspecified whether recurrent (H)  (primary encounter diagnosis)  Comment: Chronic, can be combative with staff, suspicious.  Medication is necessary to maintain patient's safety, and safety of those around her.  Following with psychiatry.  - Management reviewed with nursing facility staff today  Plan: Continue sertraline 100 mg daily, Risperdal 0.25 mg every morning, 1 mg nightly.  Monitor mood, behaviors    (D50.9) Iron deficiency anemia, unspecified iron deficiency anemia type  Comment: Chronic, hemoglobin more recently normalized on PPI, iron. Iron discontinued.  Hemoglobin   Date Value Ref Range Status   08/09/2023 12.5 11.7 - 15.7 g/dL Final   05/15/2023 12.4 11.7 - 15.7 g/dL Final   01/06/2020 9.6 (A) 11.7 - 15.7 g/dL Final   06/20/2019 11.4 (A) 11.7 - 15.7 g/dL Final   Plan: Continue PPI every other day indefinitely.  Monitor hemoglobin periodically    (F01.C18) Severe vascular dementia with other behavioral disturbance (H)  (I67.9) Cerebral vascular disease  (Z86.73) History of CVA (cerebrovascular accident)  (R29.898) Left arm weakness  Comment: Chronic, intermittently combative as above.   Generally able to make needs known.  Plan: SNF for assist with ADLs, medication management, meals, activities    (M54.50,  G89.29) Chronic low back pain without sciatica, unspecified back pain laterality  (M25.552) Hip pain, left  (M15.9) Primary osteoarthritis involving multiple joints  Comment: Chronic pain, possibly contributes to aggression and improved with adjustment to pain regimen.  Plan: Continue Tylenol 1000 mg 3 times daily, Voltaren twice daily, ice, lidocaine patch, nonpharmacological measures    (J44.9) Chronic obstructive pulmonary disease, unspecified COPD type (H)  Comment: Chronic, no recent use of albuterol, respiratory status is stable  Plan: Continue albuterol nebs and guaifenesin as needed, monitor respiratory status    (K21.9) Gastroesophageal reflux disease, unspecified whether esophagitis present  Comment: Chronic, with anemia as above now normalized.  Plan: Continue omeprazole 20 mg every other day indefinitely    (I10) Essential hypertension  Comment: Chronic, fairly controlled.  Goal SBP less than 150 given age/goals of care, risk for complications of hypotension, history of falls  BP Readings from Last 3 Encounters:   10/27/23 124/71   09/01/23 (!) 143/91   08/28/23 132/67   Plan: Monitor BP off of antihypertensives per facility protocol with adjustments as needed    (E05.90) Hyperthyroidism  Comment: Chronic, no additional work-up per goals of care  - TSH 0.1 November 2022  Plan: Consider repeat TSH, free T4 if symptomatic    (R29.6) Recurrent falls  Comment: Chronic, by history.  Completed PT/OT following admission.  Plan: Standard fall precautions.  D3 for bone health        Electronically signed by:  ALCIRA Morillo CNP

## 2023-10-30 ENCOUNTER — LAB REQUISITION (OUTPATIENT)
Dept: LAB | Facility: CLINIC | Age: 83
End: 2023-10-30
Payer: COMMERCIAL

## 2023-10-30 DIAGNOSIS — U07.1 COVID-19: ICD-10-CM

## 2023-10-30 PROCEDURE — 87635 SARS-COV-2 COVID-19 AMP PRB: CPT | Mod: ORL

## 2023-10-31 LAB — SARS-COV-2 RNA RESP QL NAA+PROBE: NEGATIVE

## 2023-11-02 ENCOUNTER — LAB REQUISITION (OUTPATIENT)
Dept: LAB | Facility: CLINIC | Age: 83
End: 2023-11-02
Payer: COMMERCIAL

## 2023-11-02 DIAGNOSIS — U07.1 COVID-19: ICD-10-CM

## 2023-11-02 PROCEDURE — 87635 SARS-COV-2 COVID-19 AMP PRB: CPT | Mod: ORL

## 2023-11-03 ENCOUNTER — LAB REQUISITION (OUTPATIENT)
Dept: LAB | Facility: CLINIC | Age: 83
End: 2023-11-03
Payer: COMMERCIAL

## 2023-11-03 ENCOUNTER — TELEPHONE (OUTPATIENT)
Dept: GERIATRICS | Facility: CLINIC | Age: 83
End: 2023-11-03

## 2023-11-03 DIAGNOSIS — R06.89 OTHER ABNORMALITIES OF BREATHING: ICD-10-CM

## 2023-11-03 DIAGNOSIS — R06.2 WHEEZING: ICD-10-CM

## 2023-11-03 DIAGNOSIS — R06.02 SHORTNESS OF BREATH: ICD-10-CM

## 2023-11-03 LAB — SARS-COV-2 RNA RESP QL NAA+PROBE: NEGATIVE

## 2023-11-03 PROCEDURE — 87637 SARSCOV2&INF A&B&RSV AMP PRB: CPT | Mod: ORL

## 2023-11-04 ENCOUNTER — LAB REQUISITION (OUTPATIENT)
Dept: LAB | Facility: CLINIC | Age: 83
End: 2023-11-04
Payer: COMMERCIAL

## 2023-11-04 DIAGNOSIS — R46.89 OTHER SYMPTOMS AND SIGNS INVOLVING APPEARANCE AND BEHAVIOR: ICD-10-CM

## 2023-11-06 ENCOUNTER — LAB REQUISITION (OUTPATIENT)
Dept: LAB | Facility: CLINIC | Age: 83
End: 2023-11-06
Payer: COMMERCIAL

## 2023-11-06 DIAGNOSIS — U07.1 COVID-19: ICD-10-CM

## 2023-11-06 PROCEDURE — 87635 SARS-COV-2 COVID-19 AMP PRB: CPT | Mod: ORL

## 2023-11-07 LAB — SARS-COV-2 RNA RESP QL NAA+PROBE: NEGATIVE

## 2023-11-09 ENCOUNTER — LAB REQUISITION (OUTPATIENT)
Dept: LAB | Facility: CLINIC | Age: 83
End: 2023-11-09
Payer: COMMERCIAL

## 2023-11-09 DIAGNOSIS — U07.1 COVID-19: ICD-10-CM

## 2023-11-09 PROCEDURE — 87635 SARS-COV-2 COVID-19 AMP PRB: CPT | Mod: ORL

## 2023-11-10 LAB — SARS-COV-2 RNA RESP QL NAA+PROBE: NEGATIVE

## 2023-11-13 ENCOUNTER — LAB REQUISITION (OUTPATIENT)
Dept: LAB | Facility: CLINIC | Age: 83
End: 2023-11-13
Payer: COMMERCIAL

## 2023-11-13 DIAGNOSIS — U07.1 COVID-19: ICD-10-CM

## 2023-11-13 PROCEDURE — 87635 SARS-COV-2 COVID-19 AMP PRB: CPT | Mod: ORL

## 2023-11-14 LAB — SARS-COV-2 RNA RESP QL NAA+PROBE: NEGATIVE

## 2023-11-16 ENCOUNTER — LAB REQUISITION (OUTPATIENT)
Dept: LAB | Facility: CLINIC | Age: 83
End: 2023-11-16
Payer: COMMERCIAL

## 2023-11-16 DIAGNOSIS — U07.1 COVID-19: ICD-10-CM

## 2023-11-16 PROCEDURE — 87635 SARS-COV-2 COVID-19 AMP PRB: CPT | Mod: ORL

## 2023-11-17 LAB — SARS-COV-2 RNA RESP QL NAA+PROBE: NEGATIVE

## 2023-11-20 ENCOUNTER — LAB REQUISITION (OUTPATIENT)
Dept: LAB | Facility: CLINIC | Age: 83
End: 2023-11-20
Payer: COMMERCIAL

## 2023-11-20 DIAGNOSIS — U07.1 COVID-19: ICD-10-CM

## 2023-11-27 ENCOUNTER — LAB REQUISITION (OUTPATIENT)
Dept: LAB | Facility: CLINIC | Age: 83
End: 2023-11-27
Payer: COMMERCIAL

## 2023-11-27 DIAGNOSIS — U07.1 COVID-19: ICD-10-CM

## 2023-11-27 PROCEDURE — 87635 SARS-COV-2 COVID-19 AMP PRB: CPT | Mod: ORL

## 2023-11-28 LAB — SARS-COV-2 RNA RESP QL NAA+PROBE: NEGATIVE

## 2023-11-30 ENCOUNTER — LAB REQUISITION (OUTPATIENT)
Dept: LAB | Facility: CLINIC | Age: 83
End: 2023-11-30
Payer: COMMERCIAL

## 2023-11-30 DIAGNOSIS — U07.1 COVID-19: ICD-10-CM

## 2023-12-03 RX ORDER — GUAIFENESIN 200 MG/10ML
200 LIQUID ORAL EVERY 4 HOURS PRN
COMMUNITY

## 2023-12-03 RX ORDER — RISPERIDONE 0.25 MG/1
0.25 TABLET ORAL DAILY
COMMUNITY

## 2023-12-04 ENCOUNTER — LAB REQUISITION (OUTPATIENT)
Dept: LAB | Facility: CLINIC | Age: 83
End: 2023-12-04
Payer: COMMERCIAL

## 2023-12-04 DIAGNOSIS — U07.1 COVID-19: ICD-10-CM

## 2023-12-04 PROCEDURE — 87635 SARS-COV-2 COVID-19 AMP PRB: CPT | Mod: ORL

## 2023-12-05 LAB — SARS-COV-2 RNA RESP QL NAA+PROBE: NEGATIVE

## 2023-12-07 ENCOUNTER — LAB REQUISITION (OUTPATIENT)
Dept: LAB | Facility: CLINIC | Age: 83
End: 2023-12-07
Payer: COMMERCIAL

## 2023-12-07 DIAGNOSIS — U07.1 COVID-19: ICD-10-CM

## 2023-12-11 ENCOUNTER — LAB REQUISITION (OUTPATIENT)
Dept: LAB | Facility: CLINIC | Age: 83
End: 2023-12-11
Payer: COMMERCIAL

## 2023-12-11 DIAGNOSIS — U07.1 COVID-19: ICD-10-CM

## 2023-12-11 PROCEDURE — 87635 SARS-COV-2 COVID-19 AMP PRB: CPT | Mod: ORL

## 2023-12-12 LAB — SARS-COV-2 RNA RESP QL NAA+PROBE: NEGATIVE

## 2023-12-14 ENCOUNTER — LAB REQUISITION (OUTPATIENT)
Dept: LAB | Facility: CLINIC | Age: 83
End: 2023-12-14
Payer: COMMERCIAL

## 2023-12-14 DIAGNOSIS — U07.1 COVID-19: ICD-10-CM

## 2023-12-18 ENCOUNTER — NURSING HOME VISIT (OUTPATIENT)
Dept: GERIATRICS | Facility: CLINIC | Age: 83
End: 2023-12-18
Payer: COMMERCIAL

## 2023-12-18 ENCOUNTER — LAB REQUISITION (OUTPATIENT)
Dept: LAB | Facility: CLINIC | Age: 83
End: 2023-12-18
Payer: COMMERCIAL

## 2023-12-18 VITALS
BODY MASS INDEX: 37.34 KG/M2 | RESPIRATION RATE: 18 BRPM | HEIGHT: 60 IN | HEART RATE: 80 BPM | TEMPERATURE: 98.2 F | OXYGEN SATURATION: 96 % | DIASTOLIC BLOOD PRESSURE: 92 MMHG | WEIGHT: 190.2 LBS | SYSTOLIC BLOOD PRESSURE: 157 MMHG

## 2023-12-18 DIAGNOSIS — J44.9 CHRONIC OBSTRUCTIVE PULMONARY DISEASE, UNSPECIFIED COPD TYPE (H): ICD-10-CM

## 2023-12-18 DIAGNOSIS — I67.9 CEREBRAL VASCULAR DISEASE: ICD-10-CM

## 2023-12-18 DIAGNOSIS — U07.1 COVID-19: ICD-10-CM

## 2023-12-18 DIAGNOSIS — K21.9 GASTROESOPHAGEAL REFLUX DISEASE WITHOUT ESOPHAGITIS: Primary | ICD-10-CM

## 2023-12-18 DIAGNOSIS — F01.C18 SEVERE VASCULAR DEMENTIA WITH OTHER BEHAVIORAL DISTURBANCE (H): ICD-10-CM

## 2023-12-18 DIAGNOSIS — F33.9 RECURRENT MAJOR DEPRESSIVE DISORDER, REMISSION STATUS UNSPECIFIED (H): ICD-10-CM

## 2023-12-18 PROCEDURE — 99309 SBSQ NF CARE MODERATE MDM 30: CPT | Performed by: INTERNAL MEDICINE

## 2023-12-18 PROCEDURE — 87635 SARS-COV-2 COVID-19 AMP PRB: CPT | Mod: ORL

## 2023-12-18 NOTE — LETTER
12/18/2023        RE: Chelsey Casillas  [facility]  [id number] [place] [address] [place] [address]        No notes on file      Sincerely,        Inga Salmeron MD

## 2023-12-19 LAB — SARS-COV-2 RNA RESP QL NAA+PROBE: NEGATIVE

## 2023-12-21 ENCOUNTER — LAB REQUISITION (OUTPATIENT)
Dept: LAB | Facility: CLINIC | Age: 83
End: 2023-12-21
Payer: COMMERCIAL

## 2023-12-21 DIAGNOSIS — U07.1 COVID-19: ICD-10-CM

## 2023-12-28 ENCOUNTER — LAB REQUISITION (OUTPATIENT)
Dept: LAB | Facility: CLINIC | Age: 83
End: 2023-12-28
Payer: COMMERCIAL

## 2023-12-28 DIAGNOSIS — U07.1 COVID-19: ICD-10-CM

## 2024-01-04 ENCOUNTER — LAB REQUISITION (OUTPATIENT)
Dept: LAB | Facility: CLINIC | Age: 84
End: 2024-01-04
Payer: COMMERCIAL

## 2024-01-04 DIAGNOSIS — U07.1 COVID-19: ICD-10-CM

## 2024-01-05 PROBLEM — E66.812 CLASS 2 SEVERE OBESITY DUE TO EXCESS CALORIES WITH SERIOUS COMORBIDITY IN ADULT (H): Status: ACTIVE | Noted: 2024-01-05

## 2024-01-05 PROBLEM — E66.01 CLASS 2 SEVERE OBESITY DUE TO EXCESS CALORIES WITH SERIOUS COMORBIDITY IN ADULT (H): Status: ACTIVE | Noted: 2024-01-05

## 2024-01-05 NOTE — PROGRESS NOTES
"Chelsey Casillas is a 84 year old female seen December 18, 2023 at [facility] where she has resided for 7 months (admit 5/2023) seen for regulatory visit and to follow up progressive dementia with behavioral symptoms   Pt is seen on the unit, sleeping in her WC.   States \"I need some heartburn pills\"   Reports symptoms kept her awake last night and she is tired now.     Otherwise doing okay, likes to sit in the Dining Room watching TV and drinking coffee.        By chart review, pt admitted to LT in 2018 after she had had multiple hospitalizations for LE weakness and falls.  Noted to have sequelae of right MCA infarct on head imaging   After admission she had another fall in which she suffered a distal left humerus fracture.       She had anemia of unknown etiology in Nov 2022, now resolved   No workup undertaken given comfort focus.     Biggest concern has been depression with psychosis, distressing hallucinations and behavioral symptoms.   She was followed by Dr Hutchins, over time.   Improved and eventually able to be weaned off risperidone in March 2023     Nursing staff has reported pt can be combative with cares, such that risperidone was restarted   She is followed by [facility]     Past Medical History:   Diagnosis Date    Arthritis     Chronic low back pain 03/17/2015    Closed fracture of left distal humerus 2018    COPD (chronic obstructive pulmonary disease) (H)     Dementia with psychosis (H) 11/01/2016    Fall 02/27/2013    Falling episodes 09/24/2016    Functional urinary incontinence 03/07/2013    GERD (gastroesophageal reflux disease) 04/03/2018    History of CVA (cerebrovascular accident)     R MCA    HTN (hypertension) 04/03/2018    Knee pain 03/07/2013    Major depression, single episode 04/16/2015    Pain in both knees 01/30/2014    Physical deconditioning 03/07/2013    Severe major depression with psychotic features (H) 07/12/2016    Skin tear 03/07/2013    Weakness 06/18/2015     SH: Single " no family.   A friend Tasha Garcia is first contact  Lived in [facility] from September 2018 to May 2023   Prior to that she lived in the [facility] apartment, then at [facility] [address]   Past smoker     ROS: limited by pt's dementia  BIMS 9/15   PHQ9 5/27    Weight in Dec 2022 was 165 lbs     Wt Readings from Last 5 Encounters:   12/18/23 86.3 kg (190 lb 3.2 oz)   10/27/23 80.4 kg (177 lb 3.2 oz)   09/01/23 76.7 kg (169 lb)   08/28/23 74.4 kg (164 lb)   08/07/23 75.1 kg (165 lb 9.6 oz)     EXAM:  NAD  BP (!) 157/92   Pulse 80   Temp 98.2  F (36.8  C)   Resp 18   Ht 1.524 m (5')   Wt 86.3 kg (190 lb 3.2 oz)   SpO2 96%   BMI 37.15 kg/m     +kyphosis  Neck supple without adenopathy  Lungs decreased BS, no rales or wheeze  Heart RRR s1s2   Abd soft, NT, no distention or guarding, +BS  Ext without edema   Neuro: limited history, WC bound  Psych: affect okay, pleasant    Lab Results   Component Value Date     08/09/2023    POTASSIUM 3.9 08/09/2023    CHLORIDE 101 08/09/2023    CO2 27 08/09/2023    ANIONGAP 11 08/09/2023    GLC 86 08/09/2023    BUN 15.4 08/09/2023    CR 0.60 08/09/2023    GFRESTIMATED 90 08/09/2023    MILA 9.3 08/09/2023     Lab Results   Component Value Date    WBC 5.9 08/09/2023      HGB 12.5 08/09/2023      MCV 91 08/09/2023       08/09/2023     Head CT 2016      1. No acute intracranial pathology.   2. Chronic sequelae of right MCA infarct and bilateral cerebral small vessel ischemic disease.        IMP/PLAN:   (K21.9) Gastroesophageal reflux disease without esophagitis   Comment: as above, reports symptoms today   Anemia has resolved  Plan: remains on omeprazole 20 mg/day    Add prn TUMS for symptoms      (F33.9) Recurrent major depressive disorder, remission status unspecified (H)    Comment: longstanding   Plan: sertraline 100 mg/day   ACP to continue to follow       (J44.9) Chronic obstructive pulmonary disease, unspecified COPD type (H)  Comment:  smoker until this past year   Plan: albuterol MDI and guaifenesin PRN     (I67.9) Cerebral vascular disease  (Z86.73) History of CVA (cerebrovascular accident)  (R29.898) Left arm weakness  Comment: right MCA stroke years ago, has resided in LTC since 2018     Plan: LTC for assist with med admin, meals, activity and all cares     (F01.C18) Severe vascular dementia with other behavioral disturbance (H)  Comment: can strike out or be resistant   Plan: Risperidone restarted 1 mg /HS to allow for safe cares       Inga Salmeron MD

## 2024-01-08 ENCOUNTER — LAB REQUISITION (OUTPATIENT)
Dept: LAB | Facility: CLINIC | Age: 84
End: 2024-01-08
Payer: COMMERCIAL

## 2024-01-08 DIAGNOSIS — U07.1 COVID-19: ICD-10-CM

## 2024-01-08 PROCEDURE — 87635 SARS-COV-2 COVID-19 AMP PRB: CPT | Mod: ORL

## 2024-01-09 LAB — SARS-COV-2 RNA RESP QL NAA+PROBE: NEGATIVE

## 2024-01-11 ENCOUNTER — LAB REQUISITION (OUTPATIENT)
Dept: LAB | Facility: CLINIC | Age: 84
End: 2024-01-11
Payer: COMMERCIAL

## 2024-01-11 DIAGNOSIS — U07.1 COVID-19: ICD-10-CM

## 2024-01-11 PROCEDURE — 87635 SARS-COV-2 COVID-19 AMP PRB: CPT | Mod: ORL

## 2024-01-12 LAB — SARS-COV-2 RNA RESP QL NAA+PROBE: NEGATIVE

## 2024-01-15 ENCOUNTER — LAB REQUISITION (OUTPATIENT)
Dept: LAB | Facility: CLINIC | Age: 84
End: 2024-01-15
Payer: COMMERCIAL

## 2024-01-15 DIAGNOSIS — U07.1 COVID-19: ICD-10-CM

## 2024-01-15 PROCEDURE — 87635 SARS-COV-2 COVID-19 AMP PRB: CPT | Mod: ORL

## 2024-01-16 LAB — SARS-COV-2 RNA RESP QL NAA+PROBE: NEGATIVE

## 2024-01-18 ENCOUNTER — LAB REQUISITION (OUTPATIENT)
Dept: LAB | Facility: CLINIC | Age: 84
End: 2024-01-18
Payer: COMMERCIAL

## 2024-01-18 DIAGNOSIS — U07.1 COVID-19: ICD-10-CM

## 2024-01-18 PROCEDURE — 87635 SARS-COV-2 COVID-19 AMP PRB: CPT | Mod: ORL

## 2024-01-19 LAB — SARS-COV-2 RNA RESP QL NAA+PROBE: NEGATIVE

## 2024-01-22 ENCOUNTER — LAB REQUISITION (OUTPATIENT)
Dept: LAB | Facility: CLINIC | Age: 84
End: 2024-01-22
Payer: COMMERCIAL

## 2024-01-22 DIAGNOSIS — U07.1 COVID-19: ICD-10-CM

## 2024-01-22 PROCEDURE — 87635 SARS-COV-2 COVID-19 AMP PRB: CPT | Mod: ORL

## 2024-01-23 LAB — SARS-COV-2 RNA RESP QL NAA+PROBE: NEGATIVE

## 2024-01-25 ENCOUNTER — LAB REQUISITION (OUTPATIENT)
Dept: LAB | Facility: CLINIC | Age: 84
End: 2024-01-25
Payer: COMMERCIAL

## 2024-01-25 DIAGNOSIS — N18.9 CHRONIC KIDNEY DISEASE, UNSPECIFIED: ICD-10-CM

## 2024-01-25 DIAGNOSIS — U07.1 COVID-19: ICD-10-CM

## 2024-01-26 LAB
ANION GAP SERPL CALCULATED.3IONS-SCNC: 9 MMOL/L (ref 7–15)
BUN SERPL-MCNC: 15.3 MG/DL (ref 8–23)
CALCIUM SERPL-MCNC: 9.3 MG/DL (ref 8.8–10.2)
CHLORIDE SERPL-SCNC: 100 MMOL/L (ref 98–107)
CREAT SERPL-MCNC: 0.6 MG/DL (ref 0.51–0.95)
DEPRECATED HCO3 PLAS-SCNC: 29 MMOL/L (ref 22–29)
EGFRCR SERPLBLD CKD-EPI 2021: 89 ML/MIN/1.73M2
GLUCOSE SERPL-MCNC: 87 MG/DL (ref 70–99)
POTASSIUM SERPL-SCNC: 4.2 MMOL/L (ref 3.4–5.3)
SODIUM SERPL-SCNC: 138 MMOL/L (ref 135–145)

## 2024-01-26 PROCEDURE — 80048 BASIC METABOLIC PNL TOTAL CA: CPT | Mod: ORL

## 2024-01-26 PROCEDURE — P9604 ONE-WAY ALLOW PRORATED TRIP: HCPCS | Mod: ORL

## 2024-01-26 PROCEDURE — 36415 COLL VENOUS BLD VENIPUNCTURE: CPT | Mod: ORL

## 2024-01-29 ENCOUNTER — LAB REQUISITION (OUTPATIENT)
Dept: LAB | Facility: CLINIC | Age: 84
End: 2024-01-29
Payer: COMMERCIAL

## 2024-01-29 DIAGNOSIS — U07.1 COVID-19: ICD-10-CM

## 2024-01-31 DIAGNOSIS — R45.1 AGITATION: Primary | ICD-10-CM

## 2024-01-31 RX ORDER — LORAZEPAM 0.5 MG/1
0.25 TABLET ORAL DAILY PRN
COMMUNITY
End: 2024-02-12

## 2024-01-31 RX ORDER — LORAZEPAM 0.5 MG/1
0.25 TABLET ORAL DAILY PRN
Qty: 30 TABLET | Refills: 0 | OUTPATIENT
Start: 2024-01-31

## 2024-02-01 ENCOUNTER — LAB REQUISITION (OUTPATIENT)
Dept: LAB | Facility: CLINIC | Age: 84
End: 2024-02-01
Payer: COMMERCIAL

## 2024-02-01 DIAGNOSIS — U07.1 COVID-19: ICD-10-CM

## 2024-02-01 PROCEDURE — 87635 SARS-COV-2 COVID-19 AMP PRB: CPT | Mod: ORL

## 2024-02-02 LAB — SARS-COV-2 RNA RESP QL NAA+PROBE: NEGATIVE

## 2024-02-05 ENCOUNTER — LAB REQUISITION (OUTPATIENT)
Dept: LAB | Facility: CLINIC | Age: 84
End: 2024-02-05
Payer: COMMERCIAL

## 2024-02-05 DIAGNOSIS — U07.1 COVID-19: ICD-10-CM

## 2024-02-05 PROCEDURE — 87635 SARS-COV-2 COVID-19 AMP PRB: CPT | Mod: ORL

## 2024-02-06 LAB — SARS-COV-2 RNA RESP QL NAA+PROBE: NEGATIVE

## 2024-02-08 ENCOUNTER — LAB REQUISITION (OUTPATIENT)
Dept: LAB | Facility: CLINIC | Age: 84
End: 2024-02-08
Payer: COMMERCIAL

## 2024-02-08 DIAGNOSIS — U07.1 COVID-19: ICD-10-CM

## 2024-02-08 PROCEDURE — 87635 SARS-COV-2 COVID-19 AMP PRB: CPT | Mod: ORL

## 2024-02-09 LAB — SARS-COV-2 RNA RESP QL NAA+PROBE: NEGATIVE

## 2024-02-12 ENCOUNTER — LAB REQUISITION (OUTPATIENT)
Dept: LAB | Facility: CLINIC | Age: 84
End: 2024-02-12
Payer: COMMERCIAL

## 2024-02-12 ENCOUNTER — NURSING HOME VISIT (OUTPATIENT)
Dept: GERIATRICS | Facility: CLINIC | Age: 84
End: 2024-02-12
Payer: COMMERCIAL

## 2024-02-12 VITALS
RESPIRATION RATE: 18 BRPM | WEIGHT: 193 LBS | TEMPERATURE: 97.7 F | HEART RATE: 80 BPM | HEIGHT: 60 IN | OXYGEN SATURATION: 95 % | BODY MASS INDEX: 37.89 KG/M2 | SYSTOLIC BLOOD PRESSURE: 149 MMHG | DIASTOLIC BLOOD PRESSURE: 80 MMHG

## 2024-02-12 DIAGNOSIS — G81.94 LEFT HEMIPARESIS (H): ICD-10-CM

## 2024-02-12 DIAGNOSIS — F01.C18 SEVERE VASCULAR DEMENTIA WITH OTHER BEHAVIORAL DISTURBANCE (H): ICD-10-CM

## 2024-02-12 DIAGNOSIS — M15.0 PRIMARY OSTEOARTHRITIS INVOLVING MULTIPLE JOINTS: Primary | ICD-10-CM

## 2024-02-12 DIAGNOSIS — F32.3 CURRENT SEVERE EPISODE OF MAJOR DEPRESSIVE DISORDER WITH PSYCHOTIC FEATURES, UNSPECIFIED WHETHER RECURRENT (H): ICD-10-CM

## 2024-02-12 DIAGNOSIS — U07.1 COVID-19: ICD-10-CM

## 2024-02-12 DIAGNOSIS — J44.9 CHRONIC OBSTRUCTIVE PULMONARY DISEASE, UNSPECIFIED COPD TYPE (H): ICD-10-CM

## 2024-02-12 PROCEDURE — 87635 SARS-COV-2 COVID-19 AMP PRB: CPT | Mod: ORL

## 2024-02-12 PROCEDURE — 99309 SBSQ NF CARE MODERATE MDM 30: CPT

## 2024-02-12 NOTE — LETTER
"    2024        RE: Chelsey Casillas  [facility]  [address] [place] [address]        [facility]  Chief Complaint   Patient presents with     [facility]     [place] Medical Record Number:  [id number]  Place of Service where encounter took place:  [facility]  () [[id number]]    HPI:    Chelsey Casillas  is 84 year old (1940), who is being seen today for a federally mandated E/M visit.     By chart review, patient was last hospitalized at [facility] [place]. She previously resided at [facility] and admitted to [facility] 2018. She had a subsequent fall with left humerus fracture that month, workup including CT head noted a chronic right MCA infarct at that time. 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 She has followed with ACP Dr Hutchins and successfully off of Risperidone since 2023. She also has a history of COPD without oxygen use. Anemia noted on routine lab work 2022, no evidence of active bleeding and she was started on iron. No additional workup per goals of care for comfort. Admitted to the above facility for permanent placement.  Resumed on risperidone for combative behavior.  Psychiatry is following and recently started low-dose Ativan,  without use.    Today's concerns are:  Seen today for routine follow-up on chronic conditions up to her wheelchair on the unit.  She is denying acute concerns, reports she is feeling \"pretty good.\"  She is denying pain.  Otherwise does not offer much meaningful history, denying chest pain, shortness of breath, changes in bowel or bladder habits, fever/chills.    ALLERGIES:Amlodipine  PAST MEDICAL HISTORY:   Past Medical History:   Diagnosis Date     Arthritis      Chronic low back pain 2015     Closed fracture of left distal humerus      COPD (chronic obstructive pulmonary disease) " (H)      Dementia with psychosis (H) 11/01/2016     Fall 02/27/2013     Falling episodes 09/24/2016     Functional urinary incontinence 03/07/2013     GERD (gastroesophageal reflux disease) 04/03/2018     History of CVA (cerebrovascular accident)     R MCA     HTN (hypertension) 04/03/2018     Knee pain 03/07/2013     Major depression, single episode 04/16/2015     Pain in both knees 01/30/2014     Physical deconditioning 03/07/2013     Severe major depression with psychotic features (H) 07/12/2016     Skin tear 03/07/2013     Weakness 06/18/2015     PAST SURGICAL HISTORY:   has no past surgical history on file.  FAMILY HISTORY: Family history is unknown by patient.  SOCIAL HISTORY:  reports that she has quit smoking. Her smoking use included cigarettes. She has never used smokeless tobacco. She reports that she does not drink alcohol and does not use drugs.    MEDICATIONS:  MED REC REQUIRED  Post Medication Reconciliation Status:  Patient was not discharged from an inpatient facility or TCU         Review of your medicines            Accurate as of February 12, 2024  4:22 PM. If you have any questions, ask your nurse or doctor.                CONTINUE these medicines which have NOT CHANGED        Dose / Directions   albuterol 108 (90 Base) MCG/ACT inhaler  Commonly known as: PROAIR HFA/PROVENTIL HFA/VENTOLIN HFA      Dose: 2 puff  Inhale 2 puffs into the lungs every 4 hours as needed for shortness of breath / dyspnea or wheezing  Refills: 0     diclofenac 1 % topical gel  Commonly known as: VOLTAREN      Dose: 4 g  Apply 4 g topically 2 times daily And BID PRN for pain  Refills: 0     guaiFENesin 20 mg/mL liquid  Commonly known as: ROBITUSSIN      Dose: 200 mg  Take 200 mg by mouth every 4 hours as needed for cough  Refills: 0     Lidocaine 4 % Patch  Commonly known as: LIDOCARE      Dose: 1 patch  Place 1 patch onto the skin every 24 hours To prevent lidocaine toxicity, patient should be patch free for 12 hrs  daily.  Refills: 0     LORazepam 0.5 MG tablet  Commonly known as: ATIVAN      Dose: 0.25 mg  Take 0.25 mg by mouth daily as needed for anxiety  Refills: 0     omeprazole 20 MG tablet      Dose: 20 mg  Take 20 mg by mouth daily  Refills: 0     * risperiDONE 1 MG tablet  Commonly known as: risperDAL      Dose: 1 mg  Take 1 mg by mouth At Bedtime  Refills: 0     * risperiDONE 0.25 MG tablet  Commonly known as: risperDAL      Dose: 0.25 mg  Take 0.25 mg by mouth daily  Refills: 0     sertraline 100 MG tablet  Commonly known as: ZOLOFT  Used for: Recurrent major depressive disorder, in partial remission (H24)      Dose: 100 mg  Take 1 tablet (100 mg) by mouth At Bedtime  Quantity: 31 tablet  Refills: 98     TYLENOL PO      Dose: 1,000 mg  Take 1,000 mg by mouth 3 times daily  Refills: 0     vitamin D3 50 mcg (2000 units) tablet  Commonly known as: CHOLECALCIFEROL  Used for: Vitamin D deficiency      Dose: 1 tablet  Take 1 tablet (2,000 Units) by mouth At Bedtime  Quantity: 30 tablet  Refills: 0           * This list has 2 medication(s) that are the same as other medications prescribed for you. Read the directions carefully, and ask your doctor or other care provider to review them with you.                   Case Management:  I have reviewed the care plan and MDS and do agree with the plan. Patient's desire to return to the community is not present. Information reviewed:  Medications, vital signs, orders, and nursing notes.    ROS:  Limited secondary to cognitive impairment but today pt reports the above    Vitals:  BP (!) 149/80   Pulse 80   Temp 97.7  F (36.5  C)   Resp 18   Ht 1.524 m (5')   Wt 87.5 kg (193 lb)   SpO2 95%   BMI 37.69 kg/m    Body mass index is 37.69 kg/m .  Exam:  GENERAL APPEARANCE:  Alert, in no distress  RESP:  respiratory effort and palpation of chest normal, lungs clear to auscultation , no respiratory distress  CV:  Palpation and auscultation of heart done , regular rate and rhythm, no  murmur, rub, or gallop, no edema  ABDOMEN:  normal bowel sounds, soft, nontender, no hepatosplenomegaly or other masses  M/S:   Gait and station abnormal transfers with assist, wheelchair for mobility  SKIN:  Inspection of skin and subcutaneous tissue baseline, Palpation of skin and subcutaneous tissue baseline  NEURO:   Left hemiparesis  PSYCH:  memory impaired , affect and mood normal    Lab/Diagnostic data:   Labs done in SNF are in [facility]. Please refer to them using [employer]Care Everywhere. and Recent labs in [facility] reviewed by me today.     ASSESSMENT/PLAN  (M15.9) Primary osteoarthritis involving multiple joints  (primary encounter diagnosis)  (G81.94) Left hemiparesis (H)  Comment: Chronic, pain is well-controlled.  Following with OT for specialty wheelchair  Plan: Continue Tylenol, diclofenac.  D3 for bone health.  Follow-up with OT per recommendations    (J44.9) Chronic obstructive pulmonary disease, unspecified COPD type (H)  Comment: Chronic, without acute exacerbation  Plan: Continue albuterol, guaifenesin as needed, monitor respiratory status    (F01.C18) Severe vascular dementia with other behavioral disturbance (H)  Comment: Chronic, right MCA stroke many years ago with associated memory loss, low functional status  Plan: SNF for assist with ADLs, medication management, meals, activities      (F32.3) Current severe episode of major depressive disorder with psychotic features, unspecified whether recurrent (H)  Comment: Acute on chronic, with ongoing aggression.  Medications are necessary to allow for safe cares  -As needed lorazepam  without use  Plan: Continue risperidone, sertraline, monitor mood, behavior.  Continue nonpharmacological measures, follow-up with ACP per recommendations        Electronically signed by:  ALCIRA Morillo CNP          Sincerely,        ALCIRA Morillo CNP

## 2024-02-12 NOTE — PROGRESS NOTES
"[facility]  Chief Complaint   Patient presents with    [facility] Regulatory     [place] Medical Record Number:  [id number]  Place of Service where encounter took place:  [facility]  () [[id number]]    HPI:    Chelsey Casillas  is 84 year old (1940), who is being seen today for a federally mandated E/M visit.     By chart review, patient was last hospitalized at [facility]. She previously resided at [facility] and admitted to [facility] 2018. She had a subsequent fall with left humerus fracture that month, workup including CT head noted a chronic right MCA infarct at that time. 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 She has followed with ACP Dr Hutchins and successfully off of Risperidone since 2023. She also has a history of COPD without oxygen use. Anemia noted on routine lab work 2022, no evidence of active bleeding and she was started on iron. No additional workup per goals of care for comfort. Admitted to the above facility for permanent placement.  Resumed on risperidone for combative behavior.  Psychiatry is following and recently started low-dose Ativan,  without use.    Today's concerns are:  Seen today for routine follow-up on chronic conditions up to her wheelchair on the unit.  She is denying acute concerns, reports she is feeling \"pretty good.\"  She is denying pain.  Otherwise does not offer much meaningful history, denying chest pain, shortness of breath, changes in bowel or bladder habits, fever/chills.    ALLERGIES:Amlodipine  PAST MEDICAL HISTORY:   Past Medical History:   Diagnosis Date    Arthritis     Chronic low back pain 2015    Closed fracture of left distal humerus     COPD (chronic obstructive pulmonary disease) (H)     Dementia with psychosis (H) 2016    Fall 2013    Falling episodes 2016    Functional urinary " incontinence 03/07/2013    GERD (gastroesophageal reflux disease) 04/03/2018    History of CVA (cerebrovascular accident)     R MCA    HTN (hypertension) 04/03/2018    Knee pain 03/07/2013    Major depression, single episode 04/16/2015    Pain in both knees 01/30/2014    Physical deconditioning 03/07/2013    Severe major depression with psychotic features (H) 07/12/2016    Skin tear 03/07/2013    Weakness 06/18/2015     PAST SURGICAL HISTORY:   has no past surgical history on file.  FAMILY HISTORY: Family history is unknown by patient.  SOCIAL HISTORY:  reports that she has quit smoking. Her smoking use included cigarettes. She has never used smokeless tobacco. She reports that she does not drink alcohol and does not use drugs.    MEDICATIONS:  MED REC REQUIRED  Post Medication Reconciliation Status:  Patient was not discharged from an inpatient facility or TCU         Review of your medicines            Accurate as of February 12, 2024  4:22 PM. If you have any questions, ask your nurse or doctor.                CONTINUE these medicines which have NOT CHANGED        Dose / Directions   albuterol 108 (90 Base) MCG/ACT inhaler  Commonly known as: PROAIR HFA/PROVENTIL HFA/VENTOLIN HFA      Dose: 2 puff  Inhale 2 puffs into the lungs every 4 hours as needed for shortness of breath / dyspnea or wheezing  Refills: 0     diclofenac 1 % topical gel  Commonly known as: VOLTAREN      Dose: 4 g  Apply 4 g topically 2 times daily And BID PRN for pain  Refills: 0     guaiFENesin 20 mg/mL liquid  Commonly known as: ROBITUSSIN      Dose: 200 mg  Take 200 mg by mouth every 4 hours as needed for cough  Refills: 0     Lidocaine 4 % Patch  Commonly known as: LIDOCARE      Dose: 1 patch  Place 1 patch onto the skin every 24 hours To prevent lidocaine toxicity, patient should be patch free for 12 hrs daily.  Refills: 0     LORazepam 0.5 MG tablet  Commonly known as: ATIVAN      Dose: 0.25 mg  Take 0.25 mg by mouth daily as needed for  anxiety  Refills: 0     omeprazole 20 MG tablet      Dose: 20 mg  Take 20 mg by mouth daily  Refills: 0     * risperiDONE 1 MG tablet  Commonly known as: risperDAL      Dose: 1 mg  Take 1 mg by mouth At Bedtime  Refills: 0     * risperiDONE 0.25 MG tablet  Commonly known as: risperDAL      Dose: 0.25 mg  Take 0.25 mg by mouth daily  Refills: 0     sertraline 100 MG tablet  Commonly known as: ZOLOFT  Used for: Recurrent major depressive disorder, in partial remission (H24)      Dose: 100 mg  Take 1 tablet (100 mg) by mouth At Bedtime  Quantity: 31 tablet  Refills: 98     TYLENOL PO      Dose: 1,000 mg  Take 1,000 mg by mouth 3 times daily  Refills: 0     vitamin D3 50 mcg (2000 units) tablet  Commonly known as: CHOLECALCIFEROL  Used for: Vitamin D deficiency      Dose: 1 tablet  Take 1 tablet (2,000 Units) by mouth At Bedtime  Quantity: 30 tablet  Refills: 0           * This list has 2 medication(s) that are the same as other medications prescribed for you. Read the directions carefully, and ask your doctor or other care provider to review them with you.                   Case Management:  I have reviewed the care plan and MDS and do agree with the plan. Patient's desire to return to the community is not present. Information reviewed:  Medications, vital signs, orders, and nursing notes.    ROS:  Limited secondary to cognitive impairment but today pt reports the above    Vitals:  BP (!) 149/80   Pulse 80   Temp 97.7  F (36.5  C)   Resp 18   Ht 1.524 m (5')   Wt 87.5 kg (193 lb)   SpO2 95%   BMI 37.69 kg/m    Body mass index is 37.69 kg/m .  Exam:  GENERAL APPEARANCE:  Alert, in no distress  RESP:  respiratory effort and palpation of chest normal, lungs clear to auscultation , no respiratory distress  CV:  Palpation and auscultation of heart done , regular rate and rhythm, no murmur, rub, or gallop, no edema  ABDOMEN:  normal bowel sounds, soft, nontender, no hepatosplenomegaly or other masses  M/S:   Gait and  station abnormal transfers with assist, wheelchair for mobility  SKIN:  Inspection of skin and subcutaneous tissue baseline, Palpation of skin and subcutaneous tissue baseline  NEURO:   Left hemiparesis  PSYCH:  memory impaired , affect and mood normal    Lab/Diagnostic data:   Labs done in SNF are in [facility]. Please refer to them using [employer]/Care Everywhere. and Recent labs in [facility] reviewed by me today.     ASSESSMENT/PLAN  (M15.9) Primary osteoarthritis involving multiple joints  (primary encounter diagnosis)  (G81.94) Left hemiparesis (H)  Comment: Chronic, pain is well-controlled.  Following with OT for specialty wheelchair  Plan: Continue Tylenol, diclofenac.  D3 for bone health.  Follow-up with OT per recommendations    (J44.9) Chronic obstructive pulmonary disease, unspecified COPD type (H)  Comment: Chronic, without acute exacerbation  Plan: Continue albuterol, guaifenesin as needed, monitor respiratory status    (F01.C18) Severe vascular dementia with other behavioral disturbance (H)  Comment: Chronic, right MCA stroke many years ago with associated memory loss, low functional status  Plan: SNF for assist with ADLs, medication management, meals, activities      (F32.3) Current severe episode of major depressive disorder with psychotic features, unspecified whether recurrent (H)  Comment: Acute on chronic, with ongoing aggression.  Medications are necessary to allow for safe cares  -As needed lorazepam  without use  Plan: Continue risperidone, sertraline, monitor mood, behavior.  Continue nonpharmacological measures, follow-up with ACP per recommendations        Electronically signed by:  ALCIRA Morillo CNP

## 2024-02-13 LAB — SARS-COV-2 RNA RESP QL NAA+PROBE: NEGATIVE

## 2024-02-15 ENCOUNTER — LAB REQUISITION (OUTPATIENT)
Dept: LAB | Facility: CLINIC | Age: 84
End: 2024-02-15
Payer: COMMERCIAL

## 2024-02-15 DIAGNOSIS — U07.1 COVID-19: ICD-10-CM

## 2024-02-19 ENCOUNTER — LAB REQUISITION (OUTPATIENT)
Dept: LAB | Facility: CLINIC | Age: 84
End: 2024-02-19
Payer: COMMERCIAL

## 2024-02-19 DIAGNOSIS — U07.1 COVID-19: ICD-10-CM

## 2024-02-22 ENCOUNTER — LAB REQUISITION (OUTPATIENT)
Dept: LAB | Facility: CLINIC | Age: 84
End: 2024-02-22
Payer: COMMERCIAL

## 2024-02-22 DIAGNOSIS — U07.1 COVID-19: ICD-10-CM

## 2024-02-22 PROCEDURE — 87635 SARS-COV-2 COVID-19 AMP PRB: CPT | Mod: ORL

## 2024-02-23 LAB — SARS-COV-2 RNA RESP QL NAA+PROBE: NEGATIVE

## 2024-02-26 ENCOUNTER — LAB REQUISITION (OUTPATIENT)
Dept: LAB | Facility: CLINIC | Age: 84
End: 2024-02-26
Payer: COMMERCIAL

## 2024-02-26 ENCOUNTER — DOCUMENTATION ONLY (OUTPATIENT)
Dept: GERIATRICS | Facility: CLINIC | Age: 84
End: 2024-02-26
Payer: COMMERCIAL

## 2024-02-26 DIAGNOSIS — U07.1 COVID-19: ICD-10-CM

## 2024-02-26 PROCEDURE — 87635 SARS-COV-2 COVID-19 AMP PRB: CPT | Mod: ORL

## 2024-02-27 LAB — SARS-COV-2 RNA RESP QL NAA+PROBE: NEGATIVE

## 2024-02-29 ENCOUNTER — LAB REQUISITION (OUTPATIENT)
Dept: LAB | Facility: CLINIC | Age: 84
End: 2024-02-29
Payer: COMMERCIAL

## 2024-02-29 DIAGNOSIS — U07.1 COVID-19: ICD-10-CM

## 2024-02-29 PROCEDURE — 87635 SARS-COV-2 COVID-19 AMP PRB: CPT | Mod: ORL

## 2024-03-01 LAB — SARS-COV-2 RNA RESP QL NAA+PROBE: NEGATIVE

## 2024-03-04 ENCOUNTER — LAB REQUISITION (OUTPATIENT)
Dept: LAB | Facility: CLINIC | Age: 84
End: 2024-03-04
Payer: COMMERCIAL

## 2024-03-04 DIAGNOSIS — U07.1 COVID-19: ICD-10-CM

## 2024-03-04 PROCEDURE — 87635 SARS-COV-2 COVID-19 AMP PRB: CPT | Mod: ORL

## 2024-03-05 LAB — SARS-COV-2 RNA RESP QL NAA+PROBE: NEGATIVE

## 2024-04-05 ENCOUNTER — NURSING HOME VISIT (OUTPATIENT)
Dept: GERIATRICS | Facility: CLINIC | Age: 84
End: 2024-04-05
Payer: COMMERCIAL

## 2024-04-05 VITALS
WEIGHT: 186.4 LBS | RESPIRATION RATE: 18 BRPM | DIASTOLIC BLOOD PRESSURE: 84 MMHG | OXYGEN SATURATION: 92 % | HEART RATE: 89 BPM | SYSTOLIC BLOOD PRESSURE: 149 MMHG | HEIGHT: 60 IN | TEMPERATURE: 97.9 F | BODY MASS INDEX: 36.6 KG/M2

## 2024-04-05 DIAGNOSIS — F33.9 RECURRENT MAJOR DEPRESSIVE DISORDER, REMISSION STATUS UNSPECIFIED (H): ICD-10-CM

## 2024-04-05 DIAGNOSIS — I67.9 CEREBRAL VASCULAR DISEASE: ICD-10-CM

## 2024-04-05 DIAGNOSIS — K21.9 GASTROESOPHAGEAL REFLUX DISEASE WITHOUT ESOPHAGITIS: ICD-10-CM

## 2024-04-05 DIAGNOSIS — J44.9 CHRONIC OBSTRUCTIVE PULMONARY DISEASE, UNSPECIFIED COPD TYPE (H): ICD-10-CM

## 2024-04-05 DIAGNOSIS — F01.C18 SEVERE VASCULAR DEMENTIA WITH OTHER BEHAVIORAL DISTURBANCE (H): Primary | ICD-10-CM

## 2024-04-05 DIAGNOSIS — M15.0 PRIMARY OSTEOARTHRITIS INVOLVING MULTIPLE JOINTS: ICD-10-CM

## 2024-04-05 PROCEDURE — 99309 SBSQ NF CARE MODERATE MDM 30: CPT | Performed by: INTERNAL MEDICINE

## 2024-04-05 NOTE — LETTER
4/5/2024        RE: Chelsey Casillas  [facility]  [address] [place] [address]        No notes on file      Sincerely,        Igna Salmeron MD

## 2024-04-09 ENCOUNTER — PATIENT OUTREACH (OUTPATIENT)
Dept: GERIATRIC MEDICINE | Facility: CLINIC | Age: 84
End: 2024-04-09
Payer: COMMERCIAL

## 2024-04-09 NOTE — PROGRESS NOTES
[facility] Care Coordination Contact  [facility] Six-Month Assessment    6 month assessment completed on 04/08/2024 with Chelsey Casillas.    ER visits: No  Hospitalizations: No  TCU stays: No  Significant health status changes: No significant health changes noted.  Facility staff deny any concerns related to care.  Falls/Injuries: No  ADL/IADL changes: No    Reviewed Institutional Assessment and updated as needed.     Will see member in 6 months for an annual health risk assessment.   Encouraged member to call CC with any questions or concerns in the meantime.     Yeny MANCILLA/ADEBAYO [facility]  Long Term Care/ Care Coordinator  [address] [place] [address]   [address]  [place] [address]  [email or web address]   [email or web address]     Cell: [phone]  Office: [phone]  Fax: [phone]

## 2024-04-28 PROBLEM — E66.01 CLASS 2 SEVERE OBESITY DUE TO EXCESS CALORIES WITH SERIOUS COMORBIDITY IN ADULT (H): Status: RESOLVED | Noted: 2024-01-05 | Resolved: 2024-04-28

## 2024-04-28 PROBLEM — E66.812 CLASS 2 SEVERE OBESITY DUE TO EXCESS CALORIES WITH SERIOUS COMORBIDITY IN ADULT (H): Status: RESOLVED | Noted: 2024-01-05 | Resolved: 2024-04-28

## 2024-04-28 NOTE — PROGRESS NOTES
"Chelsey Casillas, \"Paige\" is a 84 year old female seen April 5, 2024 at [facility] where she has resided for 11 months (admit 5/2023) seen for regulatory visit and to follow up progressive dementia   Pt is seen on the unit up to , has just won a prize at [employer] and very pleased.   States she is feeling well, denies pain or other symptoms   No new concerns reported by nursing staff.        By chart review, pt admitted to LTC in 2018 after she had had multiple hospitalizations for LE weakness and falls.  Noted to have sequelae of right MCA infarct on head imaging   After admission she had another fall in which she suffered a distal left humerus fracture.       She had anemia of unknown etiology in Nov 2022, now resolved   No workup undertaken given comfort focus.     Biggest concern has been depression with psychosis, distressing hallucinations and behavioral symptoms.   She was followed by Dr Hutchins, over time.   Improved and eventually able to be weaned off risperidone in March 2023     Due to recurrence of significant behaviors, risperidone was restarted later in 2023   She is followed by ACP     Past Medical History:   Diagnosis Date    Arthritis     Chronic low back pain 03/17/2015    Closed fracture of left distal humerus 2018    COPD (chronic obstructive pulmonary disease) (H)     Dementia with psychosis (H) 11/01/2016    Fall 02/27/2013    Falling episodes 09/24/2016    Functional urinary incontinence 03/07/2013    GERD (gastroesophageal reflux disease) 04/03/2018    History of CVA (cerebrovascular accident)     R MCA    HTN (hypertension) 04/03/2018    Knee pain 03/07/2013    Major depression, single episode 04/16/2015    Pain in both knees 01/30/2014    Physical deconditioning 03/07/2013    Severe major depression with psychotic features (H) 07/12/2016    Skin tear 03/07/2013    Weakness 06/18/2015     SH: Single no family.   A friend Tasha Garcia is first contact  Lived in [facility] in [place] from September 2018 to May 2023   Prior to that she lived in the [facility] apartment, then at [facility] [address]   Past smoker     ROS: limited by pt's dementia  BIMS 9/15   PHQ9 5/27    Weight in Dec 2022 was 165 lbs     Wt Readings from Last 5 Encounters:   04/05/24 84.6 kg (186 lb 6.4 oz)   02/12/24 87.5 kg (193 lb)   12/18/23 86.3 kg (190 lb 3.2 oz)   10/27/23 80.4 kg (177 lb 3.2 oz)   09/01/23 76.7 kg (169 lb)     EXAM:  NAD  BP (!) 149/84   Pulse 89   Temp 97.9  F (36.6  C)   Resp 18   Ht 1.524 m (5')   Wt 84.6 kg (186 lb 6.4 oz)   SpO2 92%   BMI 36.40 kg/m     +kyphosis  Neck supple without adenopathy  Lungs decreased BS, no rales or wheeze  Heart RRR s1s2   Abd soft, NT, no distention or guarding, +BS  Ext without edema   Neuro: limited history, WC bound  Psych: affect okay, pleasant    Lab Results   Component Value Date     01/26/2024    POTASSIUM 4.2 01/26/2024    CHLORIDE 100 01/26/2024    CO2 29 01/26/2024    ANIONGAP 9 01/26/2024    GLC 87 01/26/2024    BUN 15.3 01/26/2024    CR 0.60 01/26/2024    GFRESTIMATED 89 01/26/2024    MILA 9.3 01/26/2024     Head CT 2016      1. No acute intracranial pathology.   2. Chronic sequelae of right MCA infarct and bilateral cerebral small vessel ischemic disease.        IMP/PLAN:   (K21.9) Gastroesophageal reflux disease without esophagitis   Comment: symptomatic  Plan: omeprazole 20 mg/day    Prn TUMS for symptoms      (F33.9) Recurrent major depressive disorder, remission status unspecified (H)    Comment: longstanding   Plan: sertraline 100 mg/day   ACP to continue to follow       (J44.9) Chronic obstructive pulmonary disease, unspecified COPD type (H)  Comment: smoker until admission   Plan: albuterol MDI and guaifenesin PRN     (I67.9) Cerebral vascular disease  (Z86.73) History of CVA (cerebrovascular accident)  (R29.688) Left arm weakness  Comment: right MCA stroke years ago, has resided in LTC since 2018     Plan: LTC for assist  with med admin, meals, activity and all cares     (F01.C18) Severe vascular dementia with other behavioral disturbance (H)  Comment: can strike out or be resistant   Plan: LTC support for med admin, meals, activity and ADLs   Risperidone 1 mg /HS and 0.25 mg/AM to allow for safe cares       (M15.9) Primary osteoarthritis involving multiple joints  Comment: intermittent pain, WC bound    Plan: acetaminophen 1000 mg tid, diclofenac gel      Inga Salmeron MD

## 2024-05-03 ENCOUNTER — LAB REQUISITION (OUTPATIENT)
Dept: LAB | Facility: CLINIC | Age: 84
End: 2024-05-03
Payer: COMMERCIAL

## 2024-05-03 DIAGNOSIS — U07.1 COVID-19: ICD-10-CM

## 2024-05-03 PROCEDURE — 87635 SARS-COV-2 COVID-19 AMP PRB: CPT | Mod: ORL

## 2024-05-04 LAB — SARS-COV-2 RNA RESP QL NAA+PROBE: NEGATIVE

## 2024-05-06 ENCOUNTER — LAB REQUISITION (OUTPATIENT)
Dept: LAB | Facility: CLINIC | Age: 84
End: 2024-05-06
Payer: COMMERCIAL

## 2024-05-06 DIAGNOSIS — U07.1 COVID-19: ICD-10-CM

## 2024-05-06 PROCEDURE — 87635 SARS-COV-2 COVID-19 AMP PRB: CPT | Mod: ORL

## 2024-05-07 LAB — SARS-COV-2 RNA RESP QL NAA+PROBE: NEGATIVE

## 2024-05-09 ENCOUNTER — LAB REQUISITION (OUTPATIENT)
Dept: LAB | Facility: CLINIC | Age: 84
End: 2024-05-09
Payer: COMMERCIAL

## 2024-05-09 DIAGNOSIS — U07.1 COVID-19: ICD-10-CM

## 2024-05-09 PROCEDURE — 87635 SARS-COV-2 COVID-19 AMP PRB: CPT

## 2024-05-10 LAB — SARS-COV-2 RNA RESP QL NAA+PROBE: POSITIVE

## 2024-05-10 NOTE — RESULT ENCOUNTER NOTE
Asymptomatic per nursing. Nursing to monitor and update provider if develops symptoms.  ALCIRA Morillo CNP on 5/10/2024 at 1:37 PM

## 2024-05-11 ENCOUNTER — TELEPHONE (OUTPATIENT)
Dept: GERIATRICS | Facility: CLINIC | Age: 84
End: 2024-05-11
Payer: COMMERCIAL

## 2024-05-11 NOTE — CONFIDENTIAL NOTE
Chelsey Casillas is a 84 year old  (1940), Nurse called today to report: patient tested positive for covid on 5/9, now symptomatic, RA    Start molnupiravir 800mg BID x5d     Electronically signed by:   Sabas Villasenor NP

## 2024-05-13 ENCOUNTER — NURSING HOME VISIT (OUTPATIENT)
Dept: GERIATRICS | Facility: CLINIC | Age: 84
End: 2024-05-13
Payer: COMMERCIAL

## 2024-05-13 VITALS — HEIGHT: 66 IN | BODY MASS INDEX: 29.89 KG/M2 | WEIGHT: 186 LBS

## 2024-05-13 DIAGNOSIS — U07.1 INFECTION DUE TO 2019 NOVEL CORONAVIRUS: Primary | ICD-10-CM

## 2024-05-13 DIAGNOSIS — Z71.89 ACP (ADVANCE CARE PLANNING): ICD-10-CM

## 2024-05-13 PROCEDURE — 99309 SBSQ NF CARE MODERATE MDM 30: CPT

## 2024-05-13 NOTE — PROGRESS NOTES
"[facility] GERIATRICS    Chief Complaint   Patient presents with    RECHECK     HPI:  Chelsey Casillas is a 84 year old  (1940), who is being seen today for an episodic care visit at: [facility]  () [[id number]]. Today's concern is: Seen today for follow-up on COVID infection, positive 5/9 by PCR amid facility outbreak. Patient developed symptoms this weekend and was started on molnupiravir 5/11. She subsequently developed new oxygen needs and was started on supplemental oxygen. Seen today in her room in LTC up to wheelchair. She finds the isolation gown humerus and makes a joke about \"best dressed\" caregiver. She reports she feels like she has COVID. She has a dry cough. She does not want to use supplemental oxygen. She is sleeping \"ok.\" Appetite is fair. She has felt feverish. She is denying CP, SOB, changes in b/b habits. Bedside RN reports patient is declining oxygen.     Allergies, and PMH/PSH reviewed in EPIC today.  REVIEW OF SYSTEMS:  Limited secondary to cognitive impairment but today pt reports the above and 10 point ROS of systems including Constitutional, Eyes, Respiratory, Cardiovascular, Gastroenterology, Genitourinary, Integumentary, Musculoskeletal, Psychiatric were all negative except for pertinent positives noted in my HPI.    Objective:   Ht 1.676 m (5' 6\")   Wt 84.4 kg (186 lb)   BMI 30.02 kg/m    GENERAL APPEARANCE:  Alert, in no distress  RESP:  respiratory effort and palpation of chest normal, lungs clear to auscultation , no respiratory distress  CV:  Palpation and auscultation of heart done , regular rate and rhythm, no murmur, rub, or gallop, no edema  ABDOMEN:  normal bowel sounds, soft, nontender, no hepatosplenomegaly or other masses  M/S:   Gait and station abnormal transfers with assist, wheelchair for mobility  SKIN:  Inspection of skin and subcutaneous tissue baseline, Palpation of skin and subcutaneous tissue baseline  NEURO:   left hemiparesis, follows " "simple commands  PSYCH:  memory impaired , affect and mood normal    Labs done in SNF are in [place] EPIC. Please refer to them using EPIC/Care Everywhere. and Recent labs in EPIC reviewed by me today.     Assessment/Plan:  (U07.1) Infection due to 2019 novel coronavirus  (primary encounter diagnosis)  Comment: acute COVID infection with systemic symptoms. Briefly requiring supplemental O2 but in review with nurse manager today sounds as if this was somewhat positional and was relieved when patient was placed in bed with HOB elevated. She is stating 97% on RA today off of supplemental O2. She is on day 3 of molnupiravir. She is high risk for severe disease, goals of care discussion as below. Reports subjective fever.  Plan: continue molnupiravir through 5/16, monitor symptoms, respiratory status. Discontinue oxygen. Continue supportive measures, tylenol, guaifenesin PRN    (Z71.89) ACP (advance care planning)  Comment: discussed goals with patient today, confirmed DNR/DNI per POLST. Patient is declining supplemental O2, not currently requiring at time of visit. We discussed that she is high risk for severe disease but current treatment with molnupiravir and her past vaccinations are providing her some protections. She voices understanding of this risk. We discussed the possibility that her clinical status were to deteriorate and she would require oxygen again, or higher level of care in a hospital.  Patient states today that she would not want to go to a hospital or use supplemental oxygen except on a case by case basis, for symptoms relief. She would prefer to remain in facility and kept comfortable, even if this meant not \"getting better\"  or recovering from an acute illness. This is consistent with previous goals of care discussions with myself, as documented by previous providers and POLST. Consider hospice if she were to take a downturn.   Plan: DNR/DNI, with intermediate measures per POLST. Patient does not " want to wear supplemental O2 unless for comfort. Ongoing goals of care discussions.       MED REC REQUIRED  Post Medication Reconciliation Status: patient was not discharged from an inpatient facility or TCU    Electronically signed by: ALCIRA Morillo CNP

## 2024-05-13 NOTE — LETTER
"    5/13/2024        RE: Chelsey Casillas  [facility]  [address]   [place] [address]        [facility]    Chief Complaint   Patient presents with     RECHECK     HPI:  Chelsey Casillas is a 84 year old  (1940), who is being seen today for an episodic care visit at: [facility]  () [[id number]]. Today's concern is: Seen today for follow-up on COVID infection, positive 5/9 by PCR amid facility outbreak. Patient developed symptoms this weekend and was started on molnupiravir 5/11. She subsequently developed new oxygen needs and was started on supplemental oxygen. Seen today in her room in LTC up to wheelchair. She finds the isolation gown humerus and makes a joke about \"best dressed\" caregiver. She reports she feels like she has COVID. She has a dry cough. She does not want to use supplemental oxygen. She is sleeping \"ok.\" Appetite is fair. She has felt feverish. She is denying CP, SOB, changes in b/b habits. Bedside RN reports patient is declining oxygen.     Allergies, and PMH/PSH reviewed in EPIC today.  REVIEW OF SYSTEMS:  Limited secondary to cognitive impairment but today pt reports the above and 10 point ROS of systems including Constitutional, Eyes, Respiratory, Cardiovascular, Gastroenterology, Genitourinary, Integumentary, Musculoskeletal, Psychiatric were all negative except for pertinent positives noted in my HPI.    Objective:   Ht 1.676 m (5' 6\")   Wt 84.4 kg (186 lb)   BMI 30.02 kg/m    GENERAL APPEARANCE:  Alert, in no distress  RESP:  respiratory effort and palpation of chest normal, lungs clear to auscultation , no respiratory distress  CV:  Palpation and auscultation of heart done , regular rate and rhythm, no murmur, rub, or gallop, no edema  ABDOMEN:  normal bowel sounds, soft, nontender, no hepatosplenomegaly or other masses  M/S:   Gait and station abnormal transfers with assist, wheelchair for mobility  SKIN:  Inspection of skin " "and subcutaneous tissue baseline, Palpation of skin and subcutaneous tissue baseline  NEURO:   left hemiparesis, follows simple commands  PSYCH:  memory impaired , affect and mood normal    Labs done in SNF are in [place] EPIC. Please refer to them using EPIC/Care Everywhere. and Recent labs in EPIC reviewed by me today.     Assessment/Plan:  (U07.1) Infection due to 2019 novel coronavirus  (primary encounter diagnosis)  Comment: acute COVID infection with systemic symptoms. Briefly requiring supplemental O2 but in review with nurse manager today sounds as if this was somewhat positional and was relieved when patient was placed in bed with HOB elevated. She is stating 97% on RA today off of supplemental O2. She is on day 3 of molnupiravir. She is high risk for severe disease, goals of care discussion as below. Reports subjective fever.  Plan: continue molnupiravir through 5/16, monitor symptoms, respiratory status. Discontinue oxygen. Continue supportive measures, tylenol, guaifenesin PRN    (Z71.89) ACP (advance care planning)  Comment: discussed goals with patient today, confirmed DNR/DNI per POLST. Patient is declining supplemental O2, not currently requiring at time of visit. We discussed that she is high risk for severe disease but current treatment with molnupiravir and her past vaccinations are providing her some protections. She voices understanding of this risk. We discussed the possibility that her clinical status were to deteriorate and she would require oxygen again, or higher level of care in a hospital.  Patient states today that she would not want to go to a hospital or use supplemental oxygen except on a case by case basis, for symptoms relief. She would prefer to remain in facility and kept comfortable, even if this meant not \"getting better\"  or recovering from an acute illness. This is consistent with previous goals of care discussions with myself, as documented by previous providers and POLST. " Consider hospice if she were to take a downturn.   Plan: DNR/DNI, with intermediate measures per POLST. Patient does not want to wear supplemental O2 unless for comfort. Ongoing goals of care discussions.       MED REC REQUIRED  Post Medication Reconciliation Status: patient was not discharged from an inpatient facility or TCU    Electronically signed by: ALCIRA Morillo CNP         Sincerely,        ALCIRA Morillo CNP

## 2024-05-16 ENCOUNTER — NURSING HOME VISIT (OUTPATIENT)
Dept: GERIATRICS | Facility: CLINIC | Age: 84
End: 2024-05-16
Payer: COMMERCIAL

## 2024-05-16 VITALS
HEIGHT: 60 IN | RESPIRATION RATE: 18 BRPM | SYSTOLIC BLOOD PRESSURE: 123 MMHG | HEART RATE: 83 BPM | DIASTOLIC BLOOD PRESSURE: 72 MMHG | BODY MASS INDEX: 36.52 KG/M2 | TEMPERATURE: 97.9 F | OXYGEN SATURATION: 96 % | WEIGHT: 186 LBS

## 2024-05-16 DIAGNOSIS — U07.1 INFECTION DUE TO 2019 NOVEL CORONAVIRUS: Primary | ICD-10-CM

## 2024-05-16 DIAGNOSIS — R05.1 ACUTE COUGH: ICD-10-CM

## 2024-05-16 PROCEDURE — 99309 SBSQ NF CARE MODERATE MDM 30: CPT

## 2024-05-16 NOTE — LETTER
"    5/16/2024        RE: Chelsey Casillas  [facility]  [address]   [place] [address]        [facility]    Chief Complaint   Patient presents with     Nursing Home Acute     HPI:  Chelsey Casillas is a 84 year old  (1940), who is being seen today for an episodic care visit at: [facility]  () [[id number]].     Today's concern is: Seen today for follow-up on COVID infection at patient request. She was positive by PCR 5/9 amid facility outbreak and started on paxlovid 5/11 with cough. Briefly some concern for hypoxia but this resolved with repositioning. Today, she reports she is feeling unwell with a bothersome cough. This has been getting worse. Appetite is fair though she did not each much breakfast because she did not care for it. She is sleeping well. When asked about fever/chills she describes feeling \"warm inside.\" She denies shortness of breath, orthopnea, changes in b/b habits, NVD.    Allergies, and PMH/PSH reviewed in EPIC today.  REVIEW OF SYSTEMS:  Limited secondary to cognitive impairment but today pt reports the above    Objective:   /72   Pulse 83   Temp 97.9  F (36.6  C)   Resp 18   Ht 1.524 m (5')   Wt 84.4 kg (186 lb)   SpO2 96%   BMI 36.33 kg/m    GENERAL APPEARANCE:  Alert, in no distress  RESP:  respiratory effort and palpation of chest normal, no respiratory distress, crackles TAMEKA clears with coughing, hacking cough  CV:  Palpation and auscultation of heart done , regular rate and rhythm, no murmur, rub, or gallop, no edema  ABDOMEN:  normal bowel sounds, soft, nontender, no hepatosplenomegaly or other masses  M/S:   Gait and station abnormal transfers with assist, wheelchair for mobility  SKIN:  Inspection of skin and subcutaneous tissue baseline, Palpation of skin and subcutaneous tissue baseline  NEURO:   left sided weakness, follows simple commands  PSYCH:  memory impaired , affect and mood normal    Labs done in SNF " are in [facility]. Please refer to them using [employer]/Care Everywhere. and Recent labs in [facility] reviewed by me today.     Assessment/Plan:  (U07.1) Infection due to 2019 novel coronavirus  (primary encounter diagnosis)  (R05.1) Acute cough  Comment: acute COVID infection amid facility outbreak. Completes day 5 of molnupiravir today. She demonstrates acute exacerbation of symptoms with a bothersome cough and will order tessalon. No further reports or documented hypoxia and she is denying shortness of breath. Ongoing subjective fever but no documented episodes.  Plan: Tessalon 100 mg TID PRN. Continue tylenol, guaifenisen. Monitor respiratory status, symptoms. Continue full course of molnupiravir through today      MED REC REQUIRED  Post Medication Reconciliation Status: patient was not discharged from an inpatient facility or TCU      Orders:  Tessalon 100 mg TID PRN    Electronically signed by: ALCIRA Morillo CNP         Sincerely,        ALCIRA Morillo CNP

## 2024-05-16 NOTE — PROGRESS NOTES
"[facility] GERIATRICS    Chief Complaint   Patient presents with    Nursing Home Acute     HPI:  Chelsey Casillas is a 84 year old  (1940), who is being seen today for an episodic care visit at: [facility]  () [[id number]].     Today's concern is: Seen today for follow-up on COVID infection at patient request. She was positive by PCR 5/9 amid facility outbreak and started on paxlovid 5/11 with cough. Briefly some concern for hypoxia but this resolved with repositioning. Today, she reports she is feeling unwell with a bothersome cough. This has been getting worse. Appetite is fair though she did not each much breakfast because she did not care for it. She is sleeping well. When asked about fever/chills she describes feeling \"warm inside.\" She denies shortness of breath, orthopnea, changes in b/b habits, NVD.    Allergies, and PMH/PSH reviewed in EPIC today.  REVIEW OF SYSTEMS:  Limited secondary to cognitive impairment but today pt reports the above    Objective:   /72   Pulse 83   Temp 97.9  F (36.6  C)   Resp 18   Ht 1.524 m (5')   Wt 84.4 kg (186 lb)   SpO2 96%   BMI 36.33 kg/m    GENERAL APPEARANCE:  Alert, in no distress  RESP:  respiratory effort and palpation of chest normal, no respiratory distress, crackles TAMEKA clears with coughing, hacking cough  CV:  Palpation and auscultation of heart done , regular rate and rhythm, no murmur, rub, or gallop, no edema  ABDOMEN:  normal bowel sounds, soft, nontender, no hepatosplenomegaly or other masses  M/S:   Gait and station abnormal transfers with assist, wheelchair for mobility  SKIN:  Inspection of skin and subcutaneous tissue baseline, Palpation of skin and subcutaneous tissue baseline  NEURO:   left sided weakness, follows simple commands  PSYCH:  memory impaired , affect and mood normal    Labs done in SNF are in [facility]. Please refer to them using [employer]/Care Everywhere. and Recent labs in [facility] reviewed by me today. "     Assessment/Plan:  (U07.1) Infection due to 2019 novel coronavirus  (primary encounter diagnosis)  (R05.1) Acute cough  Comment: acute COVID infection amid facility outbreak. Completes day 5 of molnupiravir today. She demonstrates acute exacerbation of symptoms with a bothersome cough and will order tessalon. No further reports or documented hypoxia and she is denying shortness of breath. Ongoing subjective fever but no documented episodes.  Plan: Tessalon 100 mg TID PRN. Continue tylenol, guaifenisen. Monitor respiratory status, symptoms. Continue full course of molnupiravir through today      MED REC REQUIRED  Post Medication Reconciliation Status: patient was not discharged from an inpatient facility or TCU      Orders:  Tessalon 100 mg TID PRN    Electronically signed by: ALCIRA Morillo CNP

## 2024-05-23 ENCOUNTER — NURSING HOME VISIT (OUTPATIENT)
Dept: GERIATRICS | Facility: CLINIC | Age: 84
End: 2024-05-23
Payer: COMMERCIAL

## 2024-05-23 VITALS
TEMPERATURE: 97.6 F | RESPIRATION RATE: 18 BRPM | WEIGHT: 186 LBS | DIASTOLIC BLOOD PRESSURE: 78 MMHG | SYSTOLIC BLOOD PRESSURE: 140 MMHG | BODY MASS INDEX: 36.52 KG/M2 | HEART RATE: 80 BPM | OXYGEN SATURATION: 96 % | HEIGHT: 60 IN

## 2024-05-23 DIAGNOSIS — M25.551 HIP PAIN, RIGHT: Primary | ICD-10-CM

## 2024-05-23 PROCEDURE — 99308 SBSQ NF CARE LOW MDM 20: CPT

## 2024-05-23 NOTE — LETTER
"    5/23/2024        RE: Chelsey Casillas  [facility]  [address]   [place] [address]        [facility] GERIATRICS    Chief Complaint   Patient presents with     RECHECK     HPI:  Chelsey Casillas is a 84 year old  (1940), who is being seen today for an episodic care visit at: [facility]  () [[id number]]. Today's concern is: Seen today up on the unit in LTC. Initially greeted patient in passing and she called me over to discuss right hip pain. Started this morning when she got up. Starts in the hip and radiates slightly into the thigh. She is in her specialty chair with legs dangling as her foot rest is not in place, she is not sure whether this is contributing. Historically has had hip pain but in the left, \"not in this hip.\" She denies fall or injury. Recently off of quarantine restrictions due to COVID infection earlier this month. Reports mild residual cough, otherwise feeling better. She denies CP, SOB, calf pain, fever/chills.    Allergies, and PMH/PSH reviewed in [facility] today.  REVIEW OF SYSTEMS:  Limited secondary to cognitive impairment but today pt reports the above    Objective:   BP (!) 140/78   Pulse 80   Temp 97.6  F (36.4  C)   Resp 18   Ht 1.524 m (5')   Wt 84.4 kg (186 lb)   SpO2 96%   BMI 36.33 kg/m    GENERAL APPEARANCE:  Alert, in no distress  RESP:  respiratory effort and palpation of chest normal, lungs clear to auscultation , no respiratory distress  CV:  regular rate and rhythm, no murmur, rub, or gallop, no edema  ABDOMEN:  normal bowel sounds, soft, nontender, no hepatosplenomegaly or other masses  M/S:   Gait and station abnormal transfers with assist, right hip pain with PROM  SKIN:  Inspection of skin and subcutaneous tissue baseline, Palpation of skin and subcutaneous tissue baseline  NEURO:   left hemiparesis  PSYCH:  memory impaired , affect and mood normal    Labs done in SNF are in [facility]. Please refer to them using " EPIC/Care Everywhere. and Recent labs in [facility] reviewed by me today.     Assessment/Plan:  (M25.159) Hip pain, right  (primary encounter diagnosis)  Comment: acute, suspect musculoskeletal, likely positional with legs dangling from her chair, recent immobility with COVID infection may be contributing. Discussed with nursing, tylenol administered and her leg rest was placed on the chair. Will check Xray. Less likely DVT based on location.   Plan: Xray right hip\2 view. Consult ortho PRN. Continue tylenol, start troalmine 10% BID x 5 days and BID PRN.      MED REC REQUIRED  Post Medication Reconciliation Status: patient was not discharged from an inpatient facility or TCU      Orders:  Xray right hip  Trolamine 10% BID x 5 days and BID PRN    Electronically signed by: ALCIRA Morillo CNP         Sincerely,        ALCIRA Morillo CNP

## 2024-05-23 NOTE — PROGRESS NOTES
"[facility] GERIATRICS    Chief Complaint   Patient presents with    RECHECK     HPI:  Chelsey Casillas is a 84 year old  (1940), who is being seen today for an episodic care visit at: [facility]  () [[id number]]. Today's concern is: Seen today up on the unit in LTC. Initially greeted patient in passing and she called me over to discuss right hip pain. Started this morning when she got up. Starts in the hip and radiates slightly into the thigh. She is in her specialty chair with legs dangling as her foot rest is not in place, she is not sure whether this is contributing. Historically has had hip pain but in the left, \"not in this hip.\" She denies fall or injury. Recently off of quarantine restrictions due to COVID infection earlier this month. Reports mild residual cough, otherwise feeling better. She denies CP, SOB, calf pain, fever/chills.    Allergies, and PMH/PSH reviewed in EPIC today.  REVIEW OF SYSTEMS:  Limited secondary to cognitive impairment but today pt reports the above    Objective:   BP (!) 140/78   Pulse 80   Temp 97.6  F (36.4  C)   Resp 18   Ht 1.524 m (5')   Wt 84.4 kg (186 lb)   SpO2 96%   BMI 36.33 kg/m    GENERAL APPEARANCE:  Alert, in no distress  RESP:  respiratory effort and palpation of chest normal, lungs clear to auscultation , no respiratory distress  CV:  regular rate and rhythm, no murmur, rub, or gallop, no edema  ABDOMEN:  normal bowel sounds, soft, nontender, no hepatosplenomegaly or other masses  M/S:   Gait and station abnormal transfers with assist, right hip pain with PROM  SKIN:  Inspection of skin and subcutaneous tissue baseline, Palpation of skin and subcutaneous tissue baseline  NEURO:   left hemiparesis  PSYCH:  memory impaired , affect and mood normal    Labs done in SNF are in [facility]. Please refer to them using [employer]/Care Everywhere. and Recent labs in [facility] reviewed by me today.     Assessment/Plan:  (M25.475) Hip pain, right  " (primary encounter diagnosis)  Comment: acute, suspect musculoskeletal, likely positional with legs dangling from her chair, recent immobility with COVID infection may be contributing. Discussed with nursing, tylenol administered and her leg rest was placed on the chair. Will check Xray. Less likely DVT based on location.   Plan: Xray right hip\2 view. Consult ortho PRN. Continue tylenol, start troalmine 10% BID x 5 days and BID PRN.      MED REC REQUIRED  Post Medication Reconciliation Status: patient was not discharged from an inpatient facility or TCU      Orders:  Xray right hip  Trolamine 10% BID x 5 days and BID PRN    Electronically signed by: ALCIRA Morillo CNP

## 2024-06-11 ENCOUNTER — NURSING HOME VISIT (OUTPATIENT)
Dept: GERIATRICS | Facility: CLINIC | Age: 84
End: 2024-06-11
Payer: COMMERCIAL

## 2024-06-11 VITALS
OXYGEN SATURATION: 95 % | DIASTOLIC BLOOD PRESSURE: 76 MMHG | HEART RATE: 82 BPM | TEMPERATURE: 97.9 F | SYSTOLIC BLOOD PRESSURE: 142 MMHG | WEIGHT: 182 LBS | HEIGHT: 60 IN | BODY MASS INDEX: 35.73 KG/M2 | RESPIRATION RATE: 18 BRPM

## 2024-06-11 DIAGNOSIS — I10 ESSENTIAL HYPERTENSION: ICD-10-CM

## 2024-06-11 DIAGNOSIS — Z86.73 HISTORY OF CVA (CEREBROVASCULAR ACCIDENT): ICD-10-CM

## 2024-06-11 DIAGNOSIS — G89.29 CHRONIC LOW BACK PAIN WITHOUT SCIATICA, UNSPECIFIED BACK PAIN LATERALITY: ICD-10-CM

## 2024-06-11 DIAGNOSIS — K21.9 CHRONIC GASTROESOPHAGEAL REFLUX DISEASE: ICD-10-CM

## 2024-06-11 DIAGNOSIS — I67.9 CEREBRAL VASCULAR DISEASE: ICD-10-CM

## 2024-06-11 DIAGNOSIS — M54.50 CHRONIC LOW BACK PAIN WITHOUT SCIATICA, UNSPECIFIED BACK PAIN LATERALITY: ICD-10-CM

## 2024-06-11 DIAGNOSIS — G81.94 LEFT HEMIPARESIS (H): ICD-10-CM

## 2024-06-11 DIAGNOSIS — D50.9 IRON DEFICIENCY ANEMIA, UNSPECIFIED IRON DEFICIENCY ANEMIA TYPE: ICD-10-CM

## 2024-06-11 DIAGNOSIS — J44.9 CHRONIC OBSTRUCTIVE PULMONARY DISEASE, UNSPECIFIED COPD TYPE (H): ICD-10-CM

## 2024-06-11 DIAGNOSIS — S71.101D OPEN WOUND OF RIGHT THIGH, SUBSEQUENT ENCOUNTER: ICD-10-CM

## 2024-06-11 DIAGNOSIS — F32.3 CURRENT SEVERE EPISODE OF MAJOR DEPRESSIVE DISORDER WITH PSYCHOTIC FEATURES, UNSPECIFIED WHETHER RECURRENT (H): ICD-10-CM

## 2024-06-11 DIAGNOSIS — M25.552 HIP PAIN, LEFT: ICD-10-CM

## 2024-06-11 DIAGNOSIS — M25.551 HIP PAIN, RIGHT: Primary | ICD-10-CM

## 2024-06-11 DIAGNOSIS — F01.C18 SEVERE VASCULAR DEMENTIA WITH OTHER BEHAVIORAL DISTURBANCE (H): ICD-10-CM

## 2024-06-11 DIAGNOSIS — E05.90 HYPERTHYROIDISM: ICD-10-CM

## 2024-06-11 PROCEDURE — 99309 SBSQ NF CARE MODERATE MDM 30: CPT

## 2024-06-11 NOTE — PROGRESS NOTES
[facility] GERIATRICS  Chief Complaint   Patient presents with    Annual Comprehensive Nursing Home     [place] Medical Record Number:  [id number]  Place of Service where encounter took place:  [facility]  () [[id number]]    HPI:    Chelsey Casillas  is a 84 year old  (1940), who is being seen today for an annual comprehensive visit. HPI information obtained from: facility chart records, facility staff, patient report, and [facility] chart review.     By chart review, patient was last hospitalized at [facility] September 2016. She previously resided at [facility] and admitted to [facility] September 2018. She had a subsequent fall with left humerus fracture that month, workup including CT head noted a chronic right MCA infarct at that time. 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 She has followed with ACP Dr Hutchins and successfully off of Risperidone since March 2023. She also has a history of COPD without oxygen use. Anemia noted on routine lab work November 2022, no evidence of active bleeding and she was started on iron. No additional workup per goals of care for comfort. Admitted to the above facility for permanent placement.  Resumed on risperidone for combative behavior.  Psychiatry is following and she receives lorazepam on bath days for combativeness, has been helpful.     Seen today for routine follow-up and for follow-up on right hip pain which started around 5/23. Nursing have since reported a right thigh wound which appears to be from scratching. Sarna was started. She has been noted scratching by nursing. Today, she reports ongoing hip pain and endorses itching. She is not sure whether lotion is being used.  She describes the pain as in the hip joint although she thinks it is related to the itching, history limited by dementia. She does not have additional concerns and is denying CP, SOB, changes in b/b habits,  fever/chills.    ALLERGIES: Amlodipine  PAST MEDICAL HISTORY:   Past Medical History:   Diagnosis Date    Arthritis     Chronic low back pain 03/17/2015    Closed fracture of left distal humerus 2018    COPD (chronic obstructive pulmonary disease) (H)     Dementia with psychosis (H) 11/01/2016    Fall 02/27/2013    Falling episodes 09/24/2016    Functional urinary incontinence 03/07/2013    GERD (gastroesophageal reflux disease) 04/03/2018    History of CVA (cerebrovascular accident)     R MCA    HTN (hypertension) 04/03/2018    Knee pain 03/07/2013    Major depression, single episode 04/16/2015    Pain in both knees 01/30/2014    Physical deconditioning 03/07/2013    Severe major depression with psychotic features (H) 07/12/2016    Skin tear 03/07/2013    Weakness 06/18/2015      PAST SURGICAL HISTORY:  has no past surgical history on file.      Current Outpatient Medications:     Acetaminophen (TYLENOL PO), Take 1,000 mg by mouth 3 times daily, Disp: , Rfl:     albuterol (PROAIR HFA/PROVENTIL HFA/VENTOLIN HFA) 108 (90 BASE) MCG/ACT Inhaler, Inhale 2 puffs into the lungs every 4 hours as needed for shortness of breath / dyspnea or wheezing , Disp: , Rfl:     ferrous sulfate (FE TABS) 325 (65 Fe) MG EC tablet, Take 325 mg by mouth daily, Disp: , Rfl:     gabapentin (NEURONTIN) 100 MG capsule, Take 100 mg by mouth 3 times daily, Disp: , Rfl:     guaiFENesin (ROBITUSSIN) 20 mg/mL liquid, Take 200 mg by mouth every 4 hours as needed for cough, Disp: , Rfl:     Lidocaine (LIDOCARE) 4 % Patch, Place 1 patch onto the skin every 24 hours To prevent lidocaine toxicity, patient should be patch free for 12 hrs daily., Disp: , Rfl:     LORazepam (ATIVAN) 0.5 MG tablet, Take 0.25 mg by mouth once a week, Disp: , Rfl:     omeprazole 20 MG tablet, Take 20 mg by mouth daily, Disp: , Rfl:     pramoxine (PRAX) 1 % LOTN lotion, Place rectally 2 times daily, Disp: , Rfl:     risperiDONE (RISPERDAL) 0.25 MG tablet, Take 0.25 mg by  mouth daily, Disp: , Rfl:     risperiDONE (RISPERDAL) 1 MG tablet, TAKE 1 TABLET BY MOUTH AT BEDTIME, Disp: 30 tablet, Rfl: 11    sertraline (ZOLOFT) 100 MG tablet, Take 1 tablet (100 mg) by mouth At Bedtime, Disp: 31 tablet, Rfl: 98    vitamin D3 (CHOLECALCIFEROL) 2000 units (50 mcg) tablet, Take 1 tablet (2,000 Units) by mouth At Bedtime, Disp: 30 tablet, Rfl:     diclofenac (VOLTAREN) 1 % topical gel, Apply 4 g topically 2 times daily And BID PRN for pain, Disp: , Rfl:      MED REC REQUIRED  Post Medication Reconciliation Status: patient was not discharged from an inpatient facility or TCU      Case Management:  I have reviewed the facility/SNF care plan/MDS, including the falls risk, nutrition and pain screening. I also reviewed the current immunizations, and preventive care.. Future cancer screening is not clinically indicated secondary to age/goals of care. Patient's desire to return to the community is present, but is not able due to care needs . Current Level of Care is appropriate.mhgeroimmunization: Provider working with patient, first contact, and facility to coordinate    Advance Directive Discussion:    I reviewed the current advanced directives as reflected in EPIC, the POLST and the facility chart, and verified the congruency of orders . I contacted the first party Paige and discussed the plan of care. I did review the advance directives with the resident.     Team Discussion:  I communicated with the appropriate disciplines involved with the Plan of Care: Nursing  , WOC   Patient's goal is: pain control and comfort.  Information reviewed: Medications, vital signs, orders, and nursing notes.    ROS:  Limited secondary to cognitive impairment but today pt reports the above and 10 point ROS of systems including Constitutional, Eyes, Respiratory, Cardiovascular, Gastroenterology, Genitourinary, Integumentary, Musculoskeletal, Psychiatric were all negative except for pertinent positives noted in my  HPI.    Vitals:  BP (!) 142/76   Pulse 82   Temp 97.9  F (36.6  C)   Resp 18   Ht 1.524 m (5')   Wt 82.6 kg (182 lb)   SpO2 95%   BMI 35.54 kg/m   Body mass index is 35.54 kg/m .  Exam:  GENERAL APPEARANCE:  Alert, in no distress  RESP:  respiratory effort and palpation of chest normal, lungs clear to auscultation   CV:  regular rate and rhythm, no murmur, rub, or gallop, no edema  ABDOMEN:  normal bowel sounds, soft, nontender, no hepatosplenomegaly or other masses  M/S:   Gait and station abnormal transfers with assist, specialty wheelchair for mobility  SKIN:  unable to examine right posterior thigh due to patient positioning, reviewed images from [facility] 6/8, several horizontal fissure type wounds which seem to follow natural skin folds, mild underlying ecchymosis, no redness, generalized dry skin   NEURO:   left hemiparesis, follows commands, limited history and verbal resposne  PSYCH:  memory impaired , flat affect      Lab/Diagnostic data:   Labs done in SNF are in [facility]. Please refer to them using [employer]/Care Everywhere. and Recent labs in EPIC reviewed by me today.     ASSESSMENT/PLAN  (M25.551) Hip pain, right  (primary encounter diagnosis)  (S71.101D) Open wound of right thigh, subsequent encounter  Comment: acute, patient is reporting hip pain but considering wounds and reports of scratching/itching I believe pain is actually relate to thigh wound, possible pressure is contributing as patient's foot rests have been misplaced. She does have dry skin but suspect itching may be more neurogenic, will start gabapentin for pain/itching. Reviewed management with WOC and nurse manager, needs foot rests replaced.   Plan: continue sarna BID for itching/hydration, start gabapentin 100 mg TID. Continue tylenol 1000 mg TID, voltaren PRN. Local wound care per nursing.     (F32.3) Current severe episode of major depressive disorder with psychotic features, unspecified whether recurrent (H)  Comment: chronic,  ongoing  Plan: continue sertraline 100 mg daily, monitor symptoms, follow-up with psychiatry as directed    (D50.9) Iron deficiency anemia, unspecified iron deficiency anemia type  Comment: chronic, hgb normalized and iron supplementation was stopped  Plan: repeat CBC with next labs    (F01.C18) Severe vascular dementia with other behavioral disturbance (H)  (I67.9) Cerebral vascular disease  (Z86.73) History of CVA (cerebrovascular accident)  (G81.94) Left hemiparesis (H)  Comment: chronic, can be combative and resistant to cares. Getting lorazepam on bath days. Dependent for most cares, transfers  Plan: continue lorazepam once weekly prior to bathing, non pharmacological measures. LTC for assist with ADLs, medication management, meals, activities.       (M54.50,  G89.29) Chronic low back pain without sciatica, unspecified back pain laterality  (M25.552) Hip pain, left  Comment: chronic, improved  Plan: continue tylenol TID, lidocaine patch PRN, non-pharmacological interventions    (J44.9) Chronic obstructive pulmonary disease, unspecified COPD type (H)  Comment: chronic  Plan: continue albuterol MDI PRN    (K21.9) Chronic gastroesophageal reflux disease  Comment: chronic, with history of GI blood loss.   Plan: continue PPI indefinitely     (I10) Essential hypertension  Comment: chronic, fairly controlled. Avoid hypotension due to patient age, frailty, and risk for complications. Liberalized SBP goal <160 given frailty and goals of care  BP Readings from Last 3 Encounters:   06/11/24 (!) 142/76   05/23/24 (!) 140/78   05/16/24 123/72   Plan: monitor BP per facility protocol    (E05.90) Hyperthyroidism  Comment: chronic, by history. No additional workup per goals of care  Plan: follow-up PRN        Orders:  Gabapentin 100 mg TID    Electronically signed by:  ALCIRA Morillo CNP

## 2024-06-11 NOTE — LETTER
6/11/2024      Chelsey Casillas  [facility]  [id number] [place] [address] [address] [place] [address]        No notes on file      Sincerely,        ALCIRA Morillo CNP

## 2024-06-17 ENCOUNTER — NURSING HOME VISIT (OUTPATIENT)
Dept: GERIATRICS | Facility: CLINIC | Age: 84
End: 2024-06-17
Payer: COMMERCIAL

## 2024-06-17 VITALS
RESPIRATION RATE: 18 BRPM | WEIGHT: 182 LBS | OXYGEN SATURATION: 95 % | HEIGHT: 60 IN | TEMPERATURE: 97.6 F | DIASTOLIC BLOOD PRESSURE: 99 MMHG | BODY MASS INDEX: 35.73 KG/M2 | SYSTOLIC BLOOD PRESSURE: 168 MMHG | HEART RATE: 95 BPM

## 2024-06-17 DIAGNOSIS — M25.551 HIP PAIN, RIGHT: ICD-10-CM

## 2024-06-17 DIAGNOSIS — L89.310 PRESSURE INJURY OF RIGHT BUTTOCK, UNSTAGEABLE (H): Primary | ICD-10-CM

## 2024-06-17 DIAGNOSIS — F03.92 DEMENTIA WITH PSYCHOSIS (H): ICD-10-CM

## 2024-06-17 PROCEDURE — 99309 SBSQ NF CARE MODERATE MDM 30: CPT

## 2024-06-17 RX ORDER — GABAPENTIN 100 MG/1
100 CAPSULE ORAL 3 TIMES DAILY
COMMUNITY

## 2024-06-17 NOTE — LETTER
" 6/17/2024      Chelsey Casillas  [facility]  [address]   [place] [address]        [facility] GERIATRICS    Chief Complaint   Patient presents with     Nursing Home Acute     HPI:  Chelsey Casillas is a 84 year old  (1940), who is being seen today for an episodic care visit at: [facility]  () [[id number]]. Today's concern is: Seen today for follow-up at nursing request for right hip pain/wound on right thigh. Per nursing, wound initially noted due to scratching in the area, some concern last week about a possible mass or abscess. Seen today in her room in LTC resting abed. She is irritable and name calling, would like everyone to \"hurry up\" as she does not feel like being in bed. She reports ongoing right hip pain, tender under the right thigh but not laterally or with range of motion. She has mepilex on bilateral thighs. She reports itching is mildly improved. She does not offer much additional history due to agitation and dementia.    Allergies, and PMH/PSH reviewed in [employer] today.  REVIEW OF SYSTEMS:  Limited secondary to cognitive impairment but today pt reports the above    Objective:   BP (!) 168/99   Pulse 95   Temp 97.6  F (36.4  C)   Resp 18   Ht 1.524 m (5')   Wt 82.6 kg (182 lb)   SpO2 95%   BMI 35.54 kg/m    GENERAL APPEARANCE:  Alert, in no distress  M/S:   Gait and station abnormal transfers with assist, wheelchair for mobility  SKIN:  right thigh wound with unstageable pressure ulcer, firm tissue underneath but no obvious mass or fluid collection, mildly tender, moderate slough, minimal serosanguinous drainage. Left thigh with a mepliex but skin underneath is CDI  NEURO:   left hemiparesis, follows simple commands  PSYCH:  memory impaired , irritable, name calling and using profanities    Labs done in SNF are in [facility]. Please refer to them using [employer]/Care Everywhere. and Recent labs in [employer] reviewed by me today. "     Assessment/Plan:  (L89.310) Pressure injury of right buttock, unstageable (H)  (primary encounter diagnosis)  (M25.551) Hip pain, right  Comment: Acute, ongoing. With recent hip pain; source seems to be a pressure ulcer.  Not infected appearing. Per nursing, pain seems improved as patient is requesting to be up in her chair after requesting to be in bed last week. There was initially some itching at the site and patient had been scratching, no evidence of scratching today. Pain and itching seems improved with gabapentin, suspect this was neuropathic itching from pressure.  She is missing footrests on her custom chair and I suspect this is contributing to increased pressure, OT following for seat cushion assessment.   -Reviewed management with WOC/DON and nurse manager today, may need to find an alternative chair while awaiting replacement foot rests  Plan: continue local wound care - WOC following, offloading, repositioning, tylenol, gabapentin. OT eval and treat for seat cushion    (F03.92) Dementia with psychosis (H)  Comment: chronic, quite agitated today when her daily routine was interrupted. Can be combative with cares. Psychiatry following.   Plan: continue risperidone 1 mg at bedtime and 0.25 mg QAM. Follow-up with psychiatry as directed        MED REC REQUIRED  Post Medication Reconciliation Status: patient was not discharged from an inpatient facility or TCU        Electronically signed by: ALCIRA Morillo CNP         Sincerely,        ALCIRA Morillo CNP

## 2024-06-17 NOTE — PROGRESS NOTES
"[facility] GERIATRICS    Chief Complaint   Patient presents with    Nursing Home Acute     HPI:  Chelsey Casillas is a 84 year old  (1940), who is being seen today for an episodic care visit at: [facility]  () [[id number]]. Today's concern is: Seen today for follow-up at nursing request for right hip pain/wound on right thigh. Per nursing, wound initially noted due to scratching in the area, some concern last week about a possible mass or abscess. Seen today in her room in LTC resting abed. She is irritable and name calling, would like everyone to \"hurry up\" as she does not feel like being in bed. She reports ongoing right hip pain, tender under the right thigh but not laterally or with range of motion. She has mepilex on bilateral thighs. She reports itching is mildly improved. She does not offer much additional history due to agitation and dementia.    Allergies, and PMH/PSH reviewed in EPIC today.  REVIEW OF SYSTEMS:  Limited secondary to cognitive impairment but today pt reports the above    Objective:   BP (!) 168/99   Pulse 95   Temp 97.6  F (36.4  C)   Resp 18   Ht 1.524 m (5')   Wt 82.6 kg (182 lb)   SpO2 95%   BMI 35.54 kg/m    GENERAL APPEARANCE:  Alert, in no distress  M/S:   Gait and station abnormal transfers with assist, wheelchair for mobility  SKIN:  right thigh wound with unstageable pressure ulcer, firm tissue underneath but no obvious mass or fluid collection, mildly tender, moderate slough, minimal serosanguinous drainage. Left thigh with a mepliex but skin underneath is CDI  NEURO:   left hemiparesis, follows simple commands  PSYCH:  memory impaired , irritable, name calling and using profanities    Labs done in SNF are in [facility]. Please refer to them using [employer]/Care Everywhere. and Recent labs in [facility] reviewed by me today.     Assessment/Plan:  (L89.310) Pressure injury of right buttock, unstageable (H)  (primary encounter diagnosis)  (M25.961) Hip pain, " right  Comment: Acute, ongoing. With recent hip pain; source seems to be a pressure ulcer.  Not infected appearing. Per nursing, pain seems improved as patient is requesting to be up in her chair after requesting to be in bed last week. There was initially some itching at the site and patient had been scratching, no evidence of scratching today. Pain and itching seems improved with gabapentin, suspect this was neuropathic itching from pressure.  She is missing footrests on her custom chair and I suspect this is contributing to increased pressure, OT following for seat cushion assessment.   -Reviewed management with WOC/DON and nurse manager today, may need to find an alternative chair while awaiting replacement foot rests  Plan: continue local wound care - WOC following, offloading, repositioning, tylenol, gabapentin. OT eval and treat for seat cushion    (F03.92) Dementia with psychosis (H)  Comment: chronic, quite agitated today when her daily routine was interrupted. Can be combative with cares. Psychiatry following.   Plan: continue risperidone 1 mg at bedtime and 0.25 mg QAM. Follow-up with psychiatry as directed        MED REC REQUIRED  Post Medication Reconciliation Status: patient was not discharged from an inpatient facility or TCU        Electronically signed by: ALCIRA Morillo CNP

## 2024-06-21 ENCOUNTER — TELEPHONE (OUTPATIENT)
Dept: GERIATRICS | Facility: CLINIC | Age: 84
End: 2024-06-21
Payer: COMMERCIAL

## 2024-06-21 NOTE — TELEPHONE ENCOUNTER
[facility] Geriatrics Triage Nurse Telephone Encounter    Provider: ALCIRA Morillo CNP  Facility: [facility]  Facility Type:  [facility]    Caller: Sherice  Call Back Number: [phone]    Allergies:    Allergies   Allergen Reactions    Amlodipine Other (See Comments)        Reason for call: Wound nurse reporting that pressure injury to lower buttock has now opened up and has moderate purulent drainage. Wound bed is 100% slough. Wounds are very tender for pt. Silver algelate foam dressing used for wound care. Vitals stable - afebrile. Concern for skin infection. Of note, pt's creatinine clearance is 94.    Verbal Order/Direction given by Provider:   1) CBC and BMP 6/24. diagnosis: drug monitoring, cellulitis  2) Start bactrim /160 mg 1 tab PO BID x7 days for cellulitis  3) Nursing to call back if signs of worsening or if she develops fevers or other vital sign abnormalities.     Provider giving Order:  ALCIRA Hernandez CNP    Verbal Order given to: Sherice Moeller RN

## 2024-06-23 ENCOUNTER — LAB REQUISITION (OUTPATIENT)
Dept: LAB | Facility: CLINIC | Age: 84
End: 2024-06-23
Payer: COMMERCIAL

## 2024-06-23 DIAGNOSIS — L03.90 CELLULITIS, UNSPECIFIED: ICD-10-CM

## 2024-06-23 DIAGNOSIS — U07.1 COVID-19: ICD-10-CM

## 2024-06-24 ENCOUNTER — NURSING HOME VISIT (OUTPATIENT)
Dept: GERIATRICS | Facility: CLINIC | Age: 84
End: 2024-06-24
Payer: COMMERCIAL

## 2024-06-24 VITALS
SYSTOLIC BLOOD PRESSURE: 143 MMHG | WEIGHT: 182.6 LBS | RESPIRATION RATE: 18 BRPM | HEIGHT: 60 IN | OXYGEN SATURATION: 95 % | BODY MASS INDEX: 35.85 KG/M2 | TEMPERATURE: 96.7 F | DIASTOLIC BLOOD PRESSURE: 61 MMHG | HEART RATE: 77 BPM

## 2024-06-24 DIAGNOSIS — E66.01 CLASS 2 SEVERE OBESITY DUE TO EXCESS CALORIES WITH SERIOUS COMORBIDITY AND BODY MASS INDEX (BMI) OF 35.0 TO 35.9 IN ADULT (H): ICD-10-CM

## 2024-06-24 DIAGNOSIS — D64.9 CHRONIC ANEMIA: ICD-10-CM

## 2024-06-24 DIAGNOSIS — L89.310 PRESSURE INJURY OF RIGHT BUTTOCK, UNSTAGEABLE (H): Primary | ICD-10-CM

## 2024-06-24 DIAGNOSIS — L03.115 CELLULITIS OF RIGHT LOWER EXTREMITY: ICD-10-CM

## 2024-06-24 DIAGNOSIS — R44.3 HALLUCINATIONS: ICD-10-CM

## 2024-06-24 DIAGNOSIS — E66.812 CLASS 2 SEVERE OBESITY DUE TO EXCESS CALORIES WITH SERIOUS COMORBIDITY AND BODY MASS INDEX (BMI) OF 35.0 TO 35.9 IN ADULT (H): ICD-10-CM

## 2024-06-24 DIAGNOSIS — G81.94 LEFT HEMIPARESIS (H): ICD-10-CM

## 2024-06-24 LAB
ANION GAP SERPL CALCULATED.3IONS-SCNC: 13 MMOL/L (ref 7–15)
BUN SERPL-MCNC: 12.4 MG/DL (ref 8–23)
CALCIUM SERPL-MCNC: 9.6 MG/DL (ref 8.8–10.2)
CHLORIDE SERPL-SCNC: 100 MMOL/L (ref 98–107)
CREAT SERPL-MCNC: 0.73 MG/DL (ref 0.51–0.95)
DEPRECATED HCO3 PLAS-SCNC: 25 MMOL/L (ref 22–29)
EGFRCR SERPLBLD CKD-EPI 2021: 82 ML/MIN/1.73M2
ERYTHROCYTE [DISTWIDTH] IN BLOOD BY AUTOMATED COUNT: 16.1 % (ref 10–15)
GLUCOSE SERPL-MCNC: 95 MG/DL (ref 70–99)
HCT VFR BLD AUTO: 32.8 % (ref 35–47)
HGB BLD-MCNC: 9.6 G/DL (ref 11.7–15.7)
MCH RBC QN AUTO: 21.8 PG (ref 26.5–33)
MCHC RBC AUTO-ENTMCNC: 29.3 G/DL (ref 31.5–36.5)
MCV RBC AUTO: 74 FL (ref 78–100)
PLATELET # BLD AUTO: 240 10E3/UL (ref 150–450)
POTASSIUM SERPL-SCNC: 4.3 MMOL/L (ref 3.4–5.3)
RBC # BLD AUTO: 4.41 10E6/UL (ref 3.8–5.2)
SODIUM SERPL-SCNC: 138 MMOL/L (ref 135–145)
WBC # BLD AUTO: 5.5 10E3/UL (ref 4–11)

## 2024-06-24 PROCEDURE — 80048 BASIC METABOLIC PNL TOTAL CA: CPT | Mod: ORL

## 2024-06-24 PROCEDURE — 85027 COMPLETE CBC AUTOMATED: CPT | Mod: ORL

## 2024-06-24 PROCEDURE — 99309 SBSQ NF CARE MODERATE MDM 30: CPT

## 2024-06-24 PROCEDURE — 36415 COLL VENOUS BLD VENIPUNCTURE: CPT | Mod: ORL

## 2024-06-24 NOTE — PROGRESS NOTES
[facility] GERIATRICS    Chief Complaint   Patient presents with    Nursing Home Acute     HPI:  Chelsey Casillas is a 84 year old  (1940), who is being seen today for an episodic care visit at: [facility]  () [[id number]].       Today's concern is: Seen today for follow-up on possible wound infection with increased purulent drainage. She was started on bactrim 6/21. Today, seen up on the unit in her specialty wheelchair. She reports right thigh pain is resolved today. She reports she is seeing white rabbits all around the unit which are not moving. She reports they are white which means they are tame rabbits. She thinks she might pick one out for herself. She is denying CP, SOB, changes in b/b habits, fever/chills.     Allergies, and PMH/PSH reviewed in [employer] today.  REVIEW OF SYSTEMS:  Limited secondary to cognitive impairment but today pt reports the above    Objective:   BP (!) 143/61   Pulse 77   Temp (!) 96.7  F (35.9  C)   Resp 18   Ht 1.524 m (5')   Wt 82.8 kg (182 lb 9.6 oz)   SpO2 95%   BMI 35.66 kg/m    GENERAL APPEARANCE:  Alert, in no distress  RESP:  respiratory effort and palpation of chest normal, lungs clear to auscultation , no respiratory distress  CV:  regular rate and rhythm, no murmur, rub, or gallop, no edema  ABDOMEN:  normal bowel sounds, soft, nontender, no hepatosplenomegaly or other masses  M/S:   Gait and station abnormal transfers with assist, specialty wheelchair for mobility  SKIN:  right thigh with two distinct areas of ulceration approx 0.5x0.5, medial wound is open and tunneling, lateral wound with large amount of slough, no steve wound errythema  NEURO:   left hemiparesis, follows simple commands  PSYCH:  insight and judgement impaired, memory impaired , hallucinating rabbits    Labs done in SNF are in [facility]. Please refer to them using EPIC/Care Everywhere. and Recent labs in [facility] reviewed by me today.     Assessment/Plan:  (L89.310) Pressure  injury of right buttock, unstageable (H)  (primary encounter diagnosis)  (L03.115) Cellulitis of right lower extremity  Comment: acute infection of subacute pressure wound, resolving. Pain is improved. Labs today without leukocytosis.  -management reviewed with [facility] nurse on site today  Plan: continue bactrim through 6/26, local wound care as directed, offloading and repositioning per nursing, OT following for chair cushion/positioning    (E66.01,  Z68.35) Class 2 severe obesity due to excess calories with serious comorbidity and body mass index (BMI) of 35.0 to 35.9 in adult (H)  (G81.94) Left hemiparesis (H)  Comment: chronic, obesity complicates cares  Plan: caloric restriction and activity as toelrated    (R44.3) Hallucinations  Comment: acute on chronic, hallucinating rabbits on the unit today. Infection may be contributing although this is resolving as above. She has a history of hallucinations which have been disturbing, not overly bothersome to her today. I sent a message to psychiatry PA Eliazar Whiting updating him on possible increase in hallucinations ahead of his next visit.   Plan: continue risperidone 0.25 mg Q am and 1 mg QHS, follow-up with psychiatry as directed    (D64.9) Chronic anemia  Comment: chronic, hgb previously normalized and iron was stopped in November. No signs of active bleeding, will resume iron, repeat CBC later this week to ensure stability  Plan: Resume ferrous sulfate 325 mg daily. Repeat CBC 6/26, monitor for s/sx bleeding      MED REC REQUIRED  Post Medication Reconciliation Status: patient was not discharged from an inpatient facility or TCU      Orders:  Iron sulfate 325 mg daily  Repeat CBC 6/26  Wound Care-Right posterior thigh: Clean right posterior thigh with VADHE (use syringe if necessary to irrigate tract. Pack with iodine gauze strip and cover with foam dressing. Change daily    Electronically signed by: ALCIRA Morillo CNP

## 2024-06-24 NOTE — LETTER
6/24/2024      Chelsey Casillas  [facility]  [address] [place] [address]        [facility] GERIATRICS    Chief Complaint   Patient presents with     Nursing Home Acute     HPI:  Chelsey Casillas is a 84 year old  (1940), who is being seen today for an episodic care visit at: [facility]  () [[id number]].       Today's concern is: Seen today for follow-up on possible wound infection with increased purulent drainage. She was started on bactrim 6/21. Today, seen up on the unit in her specialty wheelchair. She reports right thigh pain is resolved today. She reports she is seeing white rabbits all around the unit which are not moving. She reports they are white which means they are tame rabbits. She thinks she might pick one out for herself. She is denying CP, SOB, changes in b/b habits, fever/chills.     Allergies, and PMH/PSH reviewed in EPIC today.  REVIEW OF SYSTEMS:  Limited secondary to cognitive impairment but today pt reports the above    Objective:   BP (!) 143/61   Pulse 77   Temp (!) 96.7  F (35.9  C)   Resp 18   Ht 1.524 m (5')   Wt 82.8 kg (182 lb 9.6 oz)   SpO2 95%   BMI 35.66 kg/m    GENERAL APPEARANCE:  Alert, in no distress  RESP:  respiratory effort and palpation of chest normal, lungs clear to auscultation , no respiratory distress  CV:  regular rate and rhythm, no murmur, rub, or gallop, no edema  ABDOMEN:  normal bowel sounds, soft, nontender, no hepatosplenomegaly or other masses  M/S:   Gait and station abnormal transfers with assist, specialty wheelchair for mobility  SKIN:  right thigh with two distinct areas of ulceration approx 0.5x0.5, medial wound is open and tunneling, lateral wound with large amount of slough, no steve wound errythema  NEURO:   left hemiparesis, follows simple commands  PSYCH:  insight and judgement impaired, memory impaired , hallucinating rabbits    Labs done in SNF are in [facility]. Please refer to them  using EPIC/Care Everywhere. and Recent labs in EPIC reviewed by me today.     Assessment/Plan:  (L89.310) Pressure injury of right buttock, unstageable (H)  (primary encounter diagnosis)  (L03.115) Cellulitis of right lower extremity  Comment: acute infection of subacute pressure wound, resolving. Pain is improved. Labs today without leukocytosis.  -management reviewed with [facility] nurse on site today  Plan: continue bactrim through 6/26, local wound care as directed, offloading and repositioning per nursing, OT following for chair cushion/positioning    (E66.01,  Z68.35) Class 2 severe obesity due to excess calories with serious comorbidity and body mass index (BMI) of 35.0 to 35.9 in adult (H)  (G81.94) Left hemiparesis (H)  Comment: chronic, obesity complicates cares  Plan: caloric restriction and activity as toelrated    (R44.3) Hallucinations  Comment: acute on chronic, hallucinating rabbits on the unit today. Infection may be contributing although this is resolving as above. She has a history of hallucinations which have been disturbing, not overly bothersome to her today. I sent a message to psychiatry PA Eliazar Whiting updating him on possible increase in hallucinations ahead of his next visit.   Plan: continue risperidone 0.25 mg Q am and 1 mg QHS, follow-up with psychiatry as directed    (D64.9) Chronic anemia  Comment: chronic, hgb previously normalized and iron was stopped in November. No signs of active bleeding, will resume iron, repeat CBC later this week to ensure stability  Plan: Resume ferrous sulfate 325 mg daily. Repeat CBC 6/26, monitor for s/sx bleeding      MED REC REQUIRED  Post Medication Reconciliation Status: patient was not discharged from an inpatient facility or TCU      Orders:  Iron sulfate 325 mg daily  Repeat CBC 6/26  Wound Care-Right posterior thigh: Clean right posterior thigh with VADHE (use syringe if necessary to irrigate tract. Pack with iodine gauze strip and cover with foam  dressing. Change daily    Electronically signed by: ALCIRA Morillo CNP         Sincerely,        ALCIRA Morillo CNP

## 2024-06-25 ENCOUNTER — LAB REQUISITION (OUTPATIENT)
Dept: LAB | Facility: CLINIC | Age: 84
End: 2024-06-25
Payer: COMMERCIAL

## 2024-06-25 DIAGNOSIS — D64.9 ANEMIA, UNSPECIFIED: ICD-10-CM

## 2024-06-26 LAB
ERYTHROCYTE [DISTWIDTH] IN BLOOD BY AUTOMATED COUNT: 16.4 % (ref 10–15)
HCT VFR BLD AUTO: 32 % (ref 35–47)
HGB BLD-MCNC: 9.1 G/DL (ref 11.7–15.7)
MCH RBC QN AUTO: 21.5 PG (ref 26.5–33)
MCHC RBC AUTO-ENTMCNC: 28.4 G/DL (ref 31.5–36.5)
MCV RBC AUTO: 76 FL (ref 78–100)
PLATELET # BLD AUTO: 227 10E3/UL (ref 150–450)
RBC # BLD AUTO: 4.24 10E6/UL (ref 3.8–5.2)
WBC # BLD AUTO: 5.9 10E3/UL (ref 4–11)

## 2024-06-26 PROCEDURE — 36415 COLL VENOUS BLD VENIPUNCTURE: CPT | Mod: ORL

## 2024-06-26 PROCEDURE — P9604 ONE-WAY ALLOW PRORATED TRIP: HCPCS | Mod: ORL

## 2024-06-26 PROCEDURE — 85027 COMPLETE CBC AUTOMATED: CPT | Mod: ORL

## 2024-06-26 NOTE — RESULT ENCOUNTER NOTE
Yamileth Casillas  1940     1. Repeat CBC 7/8/24 dx: anemia      ALCIRA Morillo CNP on 6/26/2024 at 12:38 PM

## 2024-06-27 ENCOUNTER — TELEPHONE (OUTPATIENT)
Dept: WOUND CARE | Facility: CLINIC | Age: 84
End: 2024-06-27
Payer: COMMERCIAL

## 2024-06-27 NOTE — TELEPHONE ENCOUNTER
Sherice at [facility] looking to get patient seen for pressure wound on right thigh.  JOSH.  OK to schedule.   [phone]

## 2024-06-27 NOTE — TELEPHONE ENCOUNTER
Spoke with nurse Smiley at [facility]. Patient current [facility] patient and has open wound on right posterior thigh. [facility] transfer. Patient with Dementia. Friend Tasha Garcia will attend appointment with patient. Note: Patient has non known legal document designating a medical decision maker on file.

## 2024-07-01 DIAGNOSIS — F03.90 DEMENTIA WITHOUT BEHAVIORAL DISTURBANCE (H): Primary | ICD-10-CM

## 2024-07-01 RX ORDER — RISPERIDONE 1 MG/1
1 TABLET ORAL AT BEDTIME
Qty: 30 TABLET | Refills: 11 | Status: SHIPPED | OUTPATIENT
Start: 2024-07-01

## 2024-07-07 ENCOUNTER — LAB REQUISITION (OUTPATIENT)
Dept: LAB | Facility: CLINIC | Age: 84
End: 2024-07-07
Payer: COMMERCIAL

## 2024-07-07 DIAGNOSIS — D64.9 ANEMIA, UNSPECIFIED: ICD-10-CM

## 2024-07-07 RX ORDER — PRAMOXINE HYDROCHLORIDE 10 MG/ML
LOTION TOPICAL 2 TIMES DAILY
COMMUNITY

## 2024-07-07 RX ORDER — LORAZEPAM 0.5 MG/1
0.25 TABLET ORAL WEEKLY
COMMUNITY

## 2024-07-07 RX ORDER — FERROUS SULFATE 325(65) MG
325 TABLET, DELAYED RELEASE (ENTERIC COATED) ORAL DAILY
COMMUNITY

## 2024-07-08 LAB
ERYTHROCYTE [DISTWIDTH] IN BLOOD BY AUTOMATED COUNT: 18.6 % (ref 10–15)
HCT VFR BLD AUTO: 34.2 % (ref 35–47)
HGB BLD-MCNC: 9.9 G/DL (ref 11.7–15.7)
MCH RBC QN AUTO: 22.2 PG (ref 26.5–33)
MCHC RBC AUTO-ENTMCNC: 28.9 G/DL (ref 31.5–36.5)
MCV RBC AUTO: 77 FL (ref 78–100)
PLATELET # BLD AUTO: 259 10E3/UL (ref 150–450)
RBC # BLD AUTO: 4.45 10E6/UL (ref 3.8–5.2)
WBC # BLD AUTO: 5.5 10E3/UL (ref 4–11)

## 2024-07-08 PROCEDURE — 36415 COLL VENOUS BLD VENIPUNCTURE: CPT | Mod: ORL

## 2024-07-08 PROCEDURE — 85027 COMPLETE CBC AUTOMATED: CPT | Mod: ORL

## 2024-07-08 PROCEDURE — P9604 ONE-WAY ALLOW PRORATED TRIP: HCPCS | Mod: ORL

## 2024-07-30 PROBLEM — L97.112: Status: ACTIVE | Noted: 2024-07-30

## 2024-07-31 ENCOUNTER — PATIENT OUTREACH (OUTPATIENT)
Dept: GERIATRIC MEDICINE | Facility: CLINIC | Age: 84
End: 2024-07-31
Payer: COMMERCIAL

## 2024-07-31 ENCOUNTER — DOCUMENTATION ONLY (OUTPATIENT)
Dept: OTHER | Facility: CLINIC | Age: 84
End: 2024-07-31
Payer: COMMERCIAL

## 2024-07-31 NOTE — PROGRESS NOTES
[facility] Care Coordination Contact    Call Received from Director of Nursing at [facility] Sherice Blas RN.  Paige was sent to a wound clinic appointment on 7/30/2024 accompanied by a friend.  Wound clinic was looking for somebody to give consent for treatment.  Kenia does not have a medical power of  or decision maker listed in epic, [facility], or nursing home facility chart.  She does have a financial decision maker through [employer] and a call has been placed to him Kiley to see if he is also a medical decision maker.  Kenia is 84 years old and does have dementia which has progressed to a point where she does need a decision maker assigned.    Yeny Chaudhry BSGISSEL/PHN [facility]  Long Term Care/ Care Coordinator  [address] [place] [address]   [address]  [place] [address]  [email or web address]   [email or web address]     Cell: [phone]  Office: [phone]  Fax: [phone]

## 2024-08-15 VITALS
HEART RATE: 78 BPM | DIASTOLIC BLOOD PRESSURE: 73 MMHG | SYSTOLIC BLOOD PRESSURE: 127 MMHG | RESPIRATION RATE: 18 BRPM | TEMPERATURE: 97.7 F | HEIGHT: 60 IN | BODY MASS INDEX: 34.36 KG/M2 | OXYGEN SATURATION: 95 % | WEIGHT: 175 LBS

## 2024-08-16 ENCOUNTER — NURSING HOME VISIT (OUTPATIENT)
Dept: GERIATRICS | Facility: CLINIC | Age: 84
End: 2024-08-16
Payer: COMMERCIAL

## 2024-08-16 DIAGNOSIS — K21.9 CHRONIC GASTROESOPHAGEAL REFLUX DISEASE: ICD-10-CM

## 2024-08-16 DIAGNOSIS — F03.92 DEMENTIA WITH PSYCHOSIS (H): ICD-10-CM

## 2024-08-16 DIAGNOSIS — J44.9 CHRONIC OBSTRUCTIVE PULMONARY DISEASE, UNSPECIFIED COPD TYPE (H): ICD-10-CM

## 2024-08-16 DIAGNOSIS — L89.310 PRESSURE INJURY OF RIGHT BUTTOCK, UNSTAGEABLE (H): ICD-10-CM

## 2024-08-16 DIAGNOSIS — D50.9 MICROCYTIC ANEMIA: Primary | ICD-10-CM

## 2024-08-16 DIAGNOSIS — M15.0 PRIMARY OSTEOARTHRITIS INVOLVING MULTIPLE JOINTS: ICD-10-CM

## 2024-08-16 DIAGNOSIS — I67.9 CEREBRAL VASCULAR DISEASE: ICD-10-CM

## 2024-08-16 PROCEDURE — 99309 SBSQ NF CARE MODERATE MDM 30: CPT | Performed by: INTERNAL MEDICINE

## 2024-08-16 NOTE — LETTER
8/16/2024      Chelsey Casillas  [address] [place] [address]        No notes on file      Sincerely,        Inga Salmeron MD

## 2024-08-27 ENCOUNTER — HOSPITAL ENCOUNTER (OUTPATIENT)
Dept: WOUND CARE | Facility: CLINIC | Age: 84
Discharge: HOME | End: 2024-08-27
Attending: FAMILY MEDICINE | Admitting: FAMILY MEDICINE
Payer: COMMERCIAL

## 2024-08-27 ENCOUNTER — NURSING HOME VISIT (OUTPATIENT)
Dept: GERIATRICS | Facility: CLINIC | Age: 84
End: 2024-08-27
Payer: COMMERCIAL

## 2024-08-27 VITALS
HEART RATE: 69 BPM | DIASTOLIC BLOOD PRESSURE: 80 MMHG | SYSTOLIC BLOOD PRESSURE: 148 MMHG | RESPIRATION RATE: 20 BRPM | TEMPERATURE: 96.4 F

## 2024-08-27 VITALS
DIASTOLIC BLOOD PRESSURE: 80 MMHG | WEIGHT: 176 LBS | HEIGHT: 60 IN | BODY MASS INDEX: 34.55 KG/M2 | SYSTOLIC BLOOD PRESSURE: 132 MMHG | TEMPERATURE: 98 F | OXYGEN SATURATION: 96 % | HEART RATE: 72 BPM | RESPIRATION RATE: 18 BRPM

## 2024-08-27 DIAGNOSIS — F03.92 DEMENTIA WITH PSYCHOSIS (H): ICD-10-CM

## 2024-08-27 DIAGNOSIS — L97.112 SKIN ULCER OF RIGHT THIGH WITH FAT LAYER EXPOSED (H): Primary | ICD-10-CM

## 2024-08-27 DIAGNOSIS — R41.89 COGNITIVE IMPAIRMENT: Primary | ICD-10-CM

## 2024-08-27 PROCEDURE — 99204 OFFICE O/P NEW MOD 45 MIN: CPT | Performed by: FAMILY MEDICINE

## 2024-08-27 PROCEDURE — G0463 HOSPITAL OUTPT CLINIC VISIT: HCPCS | Mod: 25

## 2024-08-27 PROCEDURE — 99308 SBSQ NF CARE LOW MDM 20: CPT

## 2024-08-27 PROCEDURE — 97602 WOUND(S) CARE NON-SELECTIVE: CPT

## 2024-08-27 NOTE — LETTER
" 8/27/2024      Chelsey Casillas  [facility]  [address]   [place] [address]        [facility] GERIATRICS    Chief Complaint   Patient presents with     Nursing Home Acute     HPI:  Chelsey Casillas is a 84 year old  (1940), who is being seen today for an episodic care visit at: [facility]  () [[id number]]. Today's concern is: Seen today for follow-up at nursing request to comment on patient's decision making capcity. Seen today up on the unit in LTC. She is attending a program this morning and is instructing staff on where to find her hair brush. She tells me the program is \"something with the kids [from the .]) Her preferred primary contact is her friend Diana Gibson indicates she does not have any living family. Per nursing, Tasha is pursuing legal guardianship. She reports her thigh wound is \"doing better\" with less itching. She would like to get this healed. She is denying CP, SOB, fever/chills.     Recent BIMS: 9-13/15  CPT 4.0 1/2022.     Allergies, and PMH/PSH reviewed in [place] today.  REVIEW OF SYSTEMS:  4 point ROS including Respiratory, CV, GI and , other than that noted in the HPI,  is negative    Objective:   /80   Pulse 72   Temp 98  F (36.7  C)   Resp 18   Ht 1.524 m (5')   Wt 79.8 kg (176 lb)   SpO2 96%   BMI 34.37 kg/m    GENERAL APPEARANCE:  Alert, in no distress  NEURO:   left hemiparesis, follows commands  PSYCH:  oriented to person, place, situation. Knows it is summer.     Labs done in SNF are in [facility] [place]. Please refer to them using [place]/Care Everywhere. and Recent labs in EPIC reviewed by me today.     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 Today, patient is alert and aware of her situation and able to express a goal of wound healing. She has a " history of psychosis which is not evident today. Her decision making is unreliable and likely to wax and wane. Based on most recent CPT scoring,recommend she make complex medical decisions with support. She is able to identify her friend Tasha as her alternative decision maker in absence of family.  Agree with designating Pat as  a legal decision maker, continue working with social work and primary team is happy to assist with this as needed.   Plan: follow-up with social work for guardianship completion. Consider formal neuropsych testing if needed for this, but unlikely it will otherwise change plan of care.       Electronically signed by: ALCIRA Morillo CNP         Sincerely,        ALCIRA Morillo CNP

## 2024-08-27 NOTE — PROGRESS NOTES
"[facility] GERIATRICS    Chief Complaint   Patient presents with    Nursing Home Acute     HPI:  Chelsey Casillas is a 84 year old  (1940), who is being seen today for an episodic care visit at: [facility]  () [[id number]]. Today's concern is: Seen today for follow-up at nursing request to comment on patient's decision making capcity. Seen today up on the unit in LTC. She is attending a program this morning and is instructing staff on where to find her hair brush. She tells me the program is \"something with the kids [from the .]) Her preferred primary contact is her friend Tasha. Paige indicates she does not have any living family. Per nursing, Tasha is pursuing legal guardianship. She reports her thigh wound is \"doing better\" with less itching. She would like to get this healed. She is denying CP, SOB, fever/chills.     Recent BIMS: 9-13/15  CPT 4.0 1/2022.     Allergies, and PMH/PSH reviewed in [facility] today.  REVIEW OF SYSTEMS:  4 point ROS including Respiratory, CV, GI and , other than that noted in the HPI,  is negative    Objective:   /80   Pulse 72   Temp 98  F (36.7  C)   Resp 18   Ht 1.524 m (5')   Wt 79.8 kg (176 lb)   SpO2 96%   BMI 34.37 kg/m    GENERAL APPEARANCE:  Alert, in no distress  NEURO:   left hemiparesis, follows commands  PSYCH:  oriented to person, place, situation. Knows it is summer.     Labs done in SNF are in [facility]. Please refer to them using [facility]/Care Everywhere. and Recent labs in Psychiatric reviewed by me today.     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 Today, patient is alert and aware of her situation and able to express a goal of wound healing. She has a history of psychosis which is not evident today. Her decision making is unreliable and likely to wax and wane. Based " on most recent CPT scoring,recommend she make complex medical decisions with support. She is able to identify her friend Tasha as her alternative decision maker in absence of family.  Agree with designating Pat as  a legal decision maker, continue working with social work and primary team is happy to assist with this as needed.   Plan: follow-up with social work for guardianship completion. Consider formal neuropsych testing if needed for this, but unlikely it will otherwise change plan of care.       Electronically signed by: ALCIRA Morillo CNP

## 2024-08-27 NOTE — DISCHARGE INSTRUCTIONS
08/27/2024   Paieg Casillas   1940    A DME order was not completed because the supplies are ordered by home care or at a care facility    Dressing changes outside of clinic are being performed by  Care Staff    [facility]:  [phone] fax: [phone]    Plan 08/27/2024   Please provide patient with protein drinks twice daily     Wound Dressing Change: Right Posterior Thigh   - Wash your hands with soap and water before you begin your dressing change and prepare a clean surface for dressings.  - Cleanse with mild unscented soap (such as Cetaphil, Cerave or Dove) and water  - Apply small amount of VASHE on gauze, lay into wound bed, let sit for 10 minutes, remove gauze (do not rinse)  - Primary dressing: gently pack with VASHE moistened plain packing strip to fill depth of wound. Ensure to leave a tail for easy removal.  - Secondary dressing: Cover with Mepilex dressing  Change daily and as needed for saturation/soilage/dislodgement     Repositioning:  Bed: Reposition MINIMALLY every 1-2 hours in bed to relieve pressure and promote perfusion to tissue.  Chair: When up to the chair, sit on a chair cushion when up to the chair. Do not sit for longer than one hour total before returning to bed for at least 60 minutes to relieve pressure and promote perfusion to the tissue.  Completely recline/tilt for 15 minutes each hour.    A diet high in protein is important for wound healing, we recommend getting 90 grams of protein per day.   Taking protein shakes or bars are a good way to get extra protein in your diet.     Good sources of protein:  Pork 26g per 3 oz  Whey protein powder - 24g per scoop (on average)  Greek yogurt - 23g per 8oz   Chicken or Turkey - 23g per 3oz  Fish - 20-25g per 3oz  Beef - 18-23g per 3oz  Tofu - 10g per 1/2 cup  Navy beans - 20g per cup  Cottage cheese - 14g per 1/2 cup   Lentils - 13g per 1/4 cup  Beef jerky 13g per 1oz  2% milk - 8g per cup  Peanut butter - 8g per 2  tablespoons  Eggs - 6g per egg  Mixed nuts - 6g per 2oz       Main Provider: Andreas De La Cruz M.D. August 27, 2024    Call us at [phone] if you have any questions about your wounds, if you have redness or swelling around your wound, have a fever of 101 degrees Fahrenheit or greater or if you have any other problems or concerns. We answer the phone Monday through Friday 8 am to 4 pm, please leave a message as we check the voicemail frequently throughout the day. If you have a concern over the weekend, please leave a message and we will return your call Monday. If the need is urgent, go to the ER or urgent care.    If you had a positive experience please indicate that on your patient satisfaction survey form that [facility] will be sending you.    It was a pleasure meeting with you today.  Thank you for allowing me and my team the privilege of caring for you today.  YOU are the reason we are here, and I truly hope we provided you with the excellent service you deserve.  Please let us know if there is anything else we can do for you so that we can be sure you are leaving completely satisfied with your care experience.      If you have any billing related questions please call the [facility] Business office at [phone]. The clinic staff does not handle billing related matters.    If you are scheduled to have a follow up appointment, you will receive a reminder call the day before your visit. On the appointment day please arrive 15 minutes prior to your appointment time. If you are unable to keep that appointment, please call the clinic to cancel or reschedule. If you are more than 10 minutes late or greater for your scheduled appointment time, the clinic policy is that you may be asked to reschedule.

## 2024-08-27 NOTE — PROGRESS NOTES
Wound Clinic Note          Visit date: 08/27/2024       Cheif Complaint:     Chelsey Casillas is a 84 year old  female had concerns including WOUND CARE.  She has a right posterior thigh wound.      HISTORY OF PRESENT ILLNESS:    Chelsey Casillas reports the ulcer has been present since April 2024.  The wound began without a clear cause.   The patient has dementia and has had a stroke in the past.  She is able to provide some history but most of the history comes from her friend Tasha who accompanies her to the wound clinic for her initial evaluation.  Her friend Tasha also makes all of the medical decisions for the patient.  Tasha reports she is not sure how the wound started.  The patient lives at a skilled nursing facility and in mid April the staff at the skilled nursing facility noticed this area on her right posterior thigh.  There is no other history of trauma to the area.  She has never had other difficult to heal wounds.      The staff at the skilled nursing facility have been bandaging the area once a day beginning with a Vashe soak followed by iodoform packing strip and a silicone adhesive bandage.  There is been light serous drainage from the wound.    They have been repositioning her every 2 hours around-the-clock.  They also have her lay down for an hour after each meal.      The pateint denies fevers or chills.  They report the pain from the wound has been 4/10 and has remained about the same recently.      Today the patient reports maintaining a regular diet without special attention to protein.        The patient denies a history of diabetes, smoking or chronic steroid use.         The patient has not had any symptoms of infection relating to the wound recently and is not currently on antibiotics.       Problem List:   Past Medical History:   Diagnosis Date    Arthritis     Chronic low back pain 03/17/2015    Closed fracture of left distal humerus 2018    COPD (chronic obstructive pulmonary  disease) (H)     Dementia with psychosis (H) 11/01/2016    Fall 02/27/2013    Falling episodes 09/24/2016    Functional urinary incontinence 03/07/2013    GERD (gastroesophageal reflux disease) 04/03/2018    History of CVA (cerebrovascular accident)     R MCA    HTN (hypertension) 04/03/2018    Knee pain 03/07/2013    Major depression, single episode 04/16/2015    Pain in both knees 01/30/2014    Physical deconditioning 03/07/2013    Severe major depression with psychotic features (H) 07/12/2016    Skin tear 03/07/2013    Weakness 06/18/2015              Family Hx: Family history is unknown by patient.       Surgical Hx: No past surgical history on file.       Allergies:    Allergies   Allergen Reactions    Amlodipine Other (See Comments)              Medication History:    Current Outpatient Medications   Medication Sig Dispense Refill    Acetaminophen (TYLENOL PO) Take 1,000 mg by mouth 3 times daily      albuterol (PROAIR HFA/PROVENTIL HFA/VENTOLIN HFA) 108 (90 BASE) MCG/ACT Inhaler Inhale 2 puffs into the lungs every 4 hours as needed for shortness of breath / dyspnea or wheezing       diclofenac (VOLTAREN) 1 % topical gel Apply 4 g topically 2 times daily And BID PRN for pain      ferrous sulfate (FE TABS) 325 (65 Fe) MG EC tablet Take 325 mg by mouth daily      gabapentin (NEURONTIN) 100 MG capsule Take 100 mg by mouth 3 times daily      guaiFENesin (ROBITUSSIN) 20 mg/mL liquid Take 200 mg by mouth every 4 hours as needed for cough      Lidocaine (LIDOCARE) 4 % Patch Place 1 patch onto the skin every 24 hours To prevent lidocaine toxicity, patient should be patch free for 12 hrs daily.      LORazepam (ATIVAN) 0.5 MG tablet Take 0.25 mg by mouth once a week      omeprazole 20 MG tablet Take 20 mg by mouth daily      pramoxine (PRAX) 1 % LOTN lotion Place rectally 2 times daily      risperiDONE (RISPERDAL) 0.25 MG tablet Take 0.25 mg by mouth daily      risperiDONE (RISPERDAL) 1 MG tablet TAKE 1 TABLET BY MOUTH  AT BEDTIME 30 tablet 11    sertraline (ZOLOFT) 100 MG tablet Take 1 tablet (100 mg) by mouth At Bedtime 31 tablet 98    vitamin D3 (CHOLECALCIFEROL) 2000 units (50 mcg) tablet Take 1 tablet (2,000 Units) by mouth At Bedtime 30 tablet      No current facility-administered medications for this encounter.         Tobacco History:  reports that she has quit smoking. Her smoking use included cigarettes. She has never used smokeless tobacco.       REVIEW OF SYMPTOMS:   The review of systems was negative except as noted in the HPI.           PHYSICAL EXAMINATION:     BP (!) 148/80 (BP Location: Right arm, Patient Position: Chair, Cuff Size: Adult Regular)   Pulse 69   Temp (!) 96.4  F (35.8  C) (Temporal)   Resp 20            GENERAL: The patient overall appears well and is no acute distress.   HEAD: normocephalic   EYES: Sclera and conjunctiva clear   NECK: no obvious masses   LUNGS: breathing is unlabored.   EXTREMITIES: No clubbing, cyanosis or edema   SKIN: No rashes or other abnormalities except as noted under the Wound section below.   NEUROLOGICAL: normal motor and sensory function       WOUND/ulcer: The wound appears healthy with no sign of infection.   Wound bed: granulation tissue  Periwound: healthy intact skin  The wound overall appears fairly benign and seems to be healing well.      Also see below for wound details:     Circumferential volume measures:             No data to display                Ulceration(s)/Wound(s):   Please see the media tab under the chart review for pictures of the wounds.  Nursing staff removed dressings and cleansed wound.    Wound (used by OP [facility] only) 07/30/24 0921 thigh Right posterior unspecified (Active)   Thickness/Stage full thickness 08/27/24 1309   Base granulating 08/27/24 1309   Periwound intact 08/27/24 1309   Periwound Temperature warm 08/27/24 1309   Periwound Skin Turgor soft 08/27/24 1309   Edges open 08/27/24 1309   Length (cm) 0.6 08/27/24 1309   Width (cm) 0.5  08/27/24 1309   Depth (cm) 0.7 08/27/24 1309   Wound (cm^2) 0.3 cm^2 08/27/24 1309   Wound Volume (cm^3) 0.21 cm^3 08/27/24 1309   Wound healing % 62.96 08/27/24 1309   Undermining [Depth (cm)/Location] 12-3 o'clock / 0.9 cm 08/27/24 1309   Drainage Characteristics/Odor serosanguineous 08/27/24 1309   Drainage Amount moderate 08/27/24 1309   Care, Wound non-select wound debridement performed. 08/27/24 1309           Recent Labs   Lab Test 11/17/22  0600   A1C 6.2*          Recent Labs   Lab Test 11/17/22  0600 01/06/20  0000   ALBUMIN 3.4 3.0*              No sharp debridement performed today.                  ASSESSMENT:   This is a 84 year old  female with a right posterior thigh wound.          PLAN:   The wound will be bandaged with Vashe moistened packing strips and a silicone adhesive bandage changed once a day.  Explained to the patient and her friend Tasha that the most important thing we can do to help this wound heal is to keep pressure off the area.  I have encouraged her to continue to allow the staff to lay her down in her bed after meals to keep pressure off of the area.  I have explained to the patient the importance of protein intake to wound healing.  I have explained that increasing protein intake will speed wound healing.  We discussed several types of food that are high in protein and the wound care nurse gave the patient a handout that summarizes this information.  In addition to further speed wound healing I have encouraged the patient to take a protein supplement.   The patient will return to the wound clinic in 4 to 6 weeks to see me again.        45 minutes spent on the date of the encounter doing chart review, history and exam, documentation and further activities per the note, this time excludes any procedure time      Andreas De La Cruz MD  08/27/2024   1:57 PM   [facility]  [phone]    This note was electronically signed by Andreas De L aCruz  MD      Further instructions from your care team         08/27/2024   Paige Casillas   1940    A DME order was not completed because the supplies are ordered by home care or at a care facility    Dressing changes outside of clinic are being performed by  Care Staff    [facility]:  [phone] fax: [phone]    Plan 08/27/2024   Please provide patient with protein drinks twice daily     Wound Dressing Change: Right Posterior Thigh   - Wash your hands with soap and water before you begin your dressing change and prepare a clean surface for dressings.  - Cleanse with mild unscented soap (such as Cetaphil, Cerave or Dove) and water  - Apply small amount of VASHE on gauze, lay into wound bed, let sit for 10 minutes, remove gauze (do not rinse)  - Primary dressing: gently pack with VASHE moistened plain packing strip to fill depth of wound. Ensure to leave a tail for easy removal.  - Secondary dressing: Cover with Mepilex dressing  Change daily and as needed for saturation/soilage/dislodgement     Repositioning:  Bed: Reposition MINIMALLY every 1-2 hours in bed to relieve pressure and promote perfusion to tissue.  Chair: When up to the chair, sit on a chair cushion when up to the chair. Do not sit for longer than one hour total before returning to bed for at least 60 minutes to relieve pressure and promote perfusion to the tissue.  Completely recline/tilt for 15 minutes each hour.    A diet high in protein is important for wound healing, we recommend getting 90 grams of protein per day.   Taking protein shakes or bars are a good way to get extra protein in your diet.     Good sources of protein:  Pork 26g per 3 oz  Whey protein powder - 24g per scoop (on average)  Greek yogurt - 23g per 8oz   Chicken or Turkey - 23g per 3oz  Fish - 20-25g per 3oz  Beef - 18-23g per 3oz  Tofu - 10g per 1/2 cup  Navy beans - 20g per cup  Cottage cheese - 14g per 1/2 cup   Lentils - 13g per 1/4 cup  Beef jerky 13g per  1oz  2% milk - 8g per cup  Peanut butter - 8g per 2 tablespoons  Eggs - 6g per egg  Mixed nuts - 6g per 2oz       Main Provider: Andreas De La Cruz M.D. August 27, 2024    Call us at [phone] if you have any questions about your wounds, if you have redness or swelling around your wound, have a fever of 101 degrees Fahrenheit or greater or if you have any other problems or concerns. We answer the phone Monday through Friday 8 am to 4 pm, please leave a message as we check the voicemail frequently throughout the day. If you have a concern over the weekend, please leave a message and we will return your call Monday. If the need is urgent, go to the ER or urgent care.    If you had a positive experience please indicate that on your patient satisfaction survey form that [facility] will be sending you.    It was a pleasure meeting with you today.  Thank you for allowing me and my team the privilege of caring for you today.  YOU are the reason we are here, and I truly hope we provided you with the excellent service you deserve.  Please let us know if there is anything else we can do for you so that we can be sure you are leaving completely satisfied with your care experience.      If you have any billing related questions please call the [facility] Business office at [phone]. The clinic staff does not handle billing related matters.    If you are scheduled to have a follow up appointment, you will receive a reminder call the day before your visit. On the appointment day please arrive 15 minutes prior to your appointment time. If you are unable to keep that appointment, please call the clinic to cancel or reschedule. If you are more than 10 minutes late or greater for your scheduled appointment time, the clinic policy is that you may be asked to reschedule.         ,

## 2024-08-27 NOTE — PROGRESS NOTES
Patient arrived for wound care visit. Wound Care Nurse time spent evaluating patient record, completed a full evaluation and documented wound(s) & steve-wound skin; provided recommendation based on treatment plan. Applied dressing, reviewed discharge instructions, patient education, and discussed plan of care with appropriate medical team staff members and patient and/or family members.

## 2024-08-30 ENCOUNTER — PATIENT OUTREACH (OUTPATIENT)
Dept: GERIATRIC MEDICINE | Facility: CLINIC | Age: 84
End: 2024-08-30
Payer: COMMERCIAL

## 2024-08-30 NOTE — PROGRESS NOTES
[facility] Care Coordination Communication    Email sent to [facility]  at [facility] to  schedule annual assessment. For member. Assessment  is scheduled for 09/09/24.. CC will complete chart review and reach out to family or primary contact as part of the assessment.     Yeny Chaudhry RN, PHN, [facility]  Care Coordinator [facility]  [phone]

## 2024-09-06 ENCOUNTER — DOCUMENTATION ONLY (OUTPATIENT)
Dept: OTHER | Facility: CLINIC | Age: 84
End: 2024-09-06
Payer: COMMERCIAL

## 2024-09-09 ENCOUNTER — PATIENT OUTREACH (OUTPATIENT)
Dept: GERIATRIC MEDICINE | Facility: CLINIC | Age: 84
End: 2024-09-09
Payer: COMMERCIAL

## 2024-09-10 NOTE — PROGRESS NOTES
"Chelsey Casillas, \"Paige\" is a 84 year old female seen August 16, 2024 at [facility] where she has resided for one year (admit 5/2023) seen for regulatory visit and to follow up progressive dementia    She is seen on the unit up to , got a new haircut.   Feeling okay, less itching of thigh, and \"getting better\"    Thinks maybe she has the same problem on the other leg, but no new/acute symptoms there.          By chart review, pt admitted to LTC in 2018 after she had had multiple hospitalizations for LE weakness and falls.  Noted to have sequelae of right MCA infarct on head imaging   After admission she had another fall in which she suffered a distal left humerus fracture.       She had anemia of unknown etiology in Nov 2022, now resolved   No workup undertaken given comfort focus.     Biggest concern has been depression with psychosis, distressing hallucinations and behavioral symptoms.   She was followed by Dr Hutchins, over time.   Improved and eventually able to be weaned off risperidone in March 2023     Due to recurrence of significant behaviors, risperidone was restarted later in 2023   She is followed by ACP   Pt had a mildly symptomatic May 2024 COVID19 infection, treated with molnupiravir.   In June 2024 she developed a right thigh wound and cellulitis, improved with abx   She is followed by the  Wound Clinic and photos reviewed        Past Medical History:   Diagnosis Date    Arthritis     Chronic low back pain 03/17/2015    Closed fracture of left distal humerus 2018    COPD (chronic obstructive pulmonary disease) (H)     Dementia with psychosis (H) 11/01/2016    Fall 02/27/2013    Falling episodes 09/24/2016    Functional urinary incontinence 03/07/2013    GERD (gastroesophageal reflux disease) 04/03/2018    History of CVA (cerebrovascular accident)     R MCA    HTN (hypertension) 04/03/2018    Knee pain 03/07/2013    Major depression, single episode 04/16/2015    Pain in both knees " 01/30/2014    Physical deconditioning 03/07/2013    Severe major depression with psychotic features (H) 07/12/2016    Skin tear 03/07/2013    Weakness 06/18/2015     SH: Single no family.   A friend Tasha Garcia is first contact  Lived in [facility] in [facility] from September 2018 to May 2023   Prior to that she lived in the [facility] apartment, then at [facility] on [address] [place]   Past smoker     ROS: limited by pt's dementia  BIMS 9/15   PHQ9 5/27    Weight in Dec 2022 was 165 lbs     Wt Readings from Last 5 Encounters:   08/27/24 79.8 kg (176 lb)   08/15/24 79.4 kg (175 lb)   07/30/24 77.5 kg (170 lb 12.8 oz)   06/24/24 82.8 kg (182 lb 9.6 oz)   06/17/24 82.6 kg (182 lb)     EXAM:  NAD  /73   Pulse 78   Temp 97.7  F (36.5  C)   Resp 18   Ht 1.524 m (5')   Wt 79.4 kg (175 lb)   SpO2 95%   BMI 34.18 kg/m     +kyphosis  Neck supple without adenopathy  Lungs decreased BS, no rales or wheeze  Heart RRR s1s2   Abd soft, NT, no distention or guarding, +BS  Ext without edema   Neuro: limited history, WC bound  Psych: affect okay, pleasant  Today's exam unchanged from above     Lab Results   Component Value Date     06/24/2024    POTASSIUM 4.3 06/24/2024    CHLORIDE 100 06/24/2024    CO2 25 06/24/2024    ANIONGAP 13 06/24/2024    GLC 95 06/24/2024    BUN 12.4 06/24/2024    CR 0.73 06/24/2024    GFRESTIMATED 82 06/24/2024    MILA 9.6 06/24/2024     Lab Results   Component Value Date    WBC 5.5 07/08/2024      HGB 9.9 07/08/2024      MCV 77 07/08/2024       07/08/2024     Head CT 2016      1. No acute intracranial pathology.   2. Chronic sequelae of right MCA infarct and bilateral cerebral small vessel ischemic disease.        IMP/PLAN:   (D50.9) Microcytic anemia   (K21.9) Gastroesophageal reflux disease without esophagitis   Comment: symptomatic with reflux symptoms   Has had intermittent drops in hgb    Pt has declined workup      Plan: omeprazole 20 mg/day    Fe sulfate 325 mg day    Prn TUMS for symptoms   Follow hgb PRN    (L89.310) Pressure injury of right buttock, unstageable (H)  Comment: / right thigh, healing   Plan: Follow up with Wound Clinic as scheduled      (F33.9) Recurrent major depressive disorder, remission status unspecified (H)    Comment: longstanding   Plan: sertraline 100 mg/day   ACP to continue to follow       (J44.9) Chronic obstructive pulmonary disease, unspecified COPD type (H)  Comment: smoker until admission   Plan: albuterol MDI and guaifenesin PRN     (I67.9) Cerebral vascular disease  (Z86.73) History of CVA (cerebrovascular accident)  (R29.898) Left arm weakness  Comment: right MCA stroke years ago, has resided in LTC since 2018     Plan: LTC for assist with med admin, meals, activity and all cares     (F01.C18) Severe vascular dementia with other behavioral disturbance (H)  Comment: can strike out or be resistant at times  Has active hallucinations at times   Failed initial trial of risperidone GDR  Plan: LTC support for med admin, meals, activity and ADLs   Risperidone 1 mg /HS and 0.25 mg/AM to allow for safe cares     Lorazepam 0.25 mg/ weekly for bath    (M15.9) Primary osteoarthritis involving multiple joints  Comment: intermittent pain, WC bound    Plan: acetaminophen 1000 mg tid, diclofenac gel for associated pain   Assist with transfers     Advance directive: DNR/DNI, symptomatic tx only     Inga Salmeron MD

## 2024-09-17 NOTE — PROGRESS NOTES
[facility] Care Coordination Contact  [facility] Institutional Assessment     Institutional Assessment for Health Risk Assessment with Chelsey Casillas completed on September 09, 2024 at [facility]    Type of residence:: Nursing home  Current living arrangement:: I live in a nursing home     Assessment completed with:: Patient, Care Team Member, Friend    Mental/Behavioral Health   Depression Screening:   PHQ-2 Total Score (Adult) - Positive if 3 or more points; Administer PHQ-9 if positive: 2       Mental health DX:: Yes   Mental health DX how managed:: In Home Counseling, Medication    Falls Assessment:   Fallen 2 or more times in the past year?: No   Any fall with injury in the past year?: No    ADL/IADL Dependencies:   Dependent ADLs:: Bathing, Dressing, Eating, Grooming, Incontinence, Wheelchair-with assist, Positioning, Transfers, Toileting  Dependent IADLs:: Cleaning, Cooking, Laundry, Shopping, Meal Preparation, Medication Management, Money Management, Transportation, Incontinence      Care Plan & Recommendations:  Paige is an 84-year-old female living in long-term care at [facility].  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  Immunization reviewed and indicating they are he is up-to-date on Tdap, pneumonia vaccines she did have the initial 2 dose COVID-vaccine but has not had any boosters has refused RSV and influenza vaccines as well.  Facility continues to offer seasonal vaccines annually.  Recently Paige was seen in the wound clinic and it was noted that she has had ongoing issues related to healthcare and financial power of   her friend Tasha has agreed to be her power of  at this time and has signed documents to this agreement.  Hearing vision and dental were reviewed she did have an oral screener done on 6/12/2024 with no concerns noted at this time has not had an audiology exam in the past year or a vision exam.  Paige is agreeable to have both these exams done while at the facility.  CODE STATUS was reviewed she is currently DNR/DNI and has a POLST on file that indicates per POLST is DNR/DNI/ Selective Treatment.Discussed options/opportunities for transitions.Plans to remain in long-term care.    See Institutional Care Plan for detailed assessment information.    Obtained a copy of the facility care plan and MDS from facility electronic records. Requested of correction social worker to put this care coordinator on care conference attendee list.    Placed the Health Plan facility face sheet in the member's facility chart.    Follow-Up Plan: Member informed of future contact, plan to f/u with member with a 6 month assessment, attend 1 care conference annually, and will follow any hospitalizations or transitions. Care Coordinator contact information shared with member/family and facility, and encouraged to call this care coordinator with any questions or concerns at any time.     [facility] care continuum providers: Please see Snapshot and Care Management Flowsheets for Specific details of care plan.    This CC note routed to PCP, Savita Reynaga.

## 2024-09-18 ENCOUNTER — PATIENT OUTREACH (OUTPATIENT)
Dept: GERIATRIC MEDICINE | Facility: CLINIC | Age: 84
End: 2024-09-18
Payer: COMMERCIAL

## 2024-09-18 NOTE — PROGRESS NOTES
[facility] Care Coordination Contact    Received after visit chart from care coordinator.  Completed following tasks: Mailed [employer] Post Visit letter to member/rep and NH facility    Paige Chu  Care Management Specialist  [facility]  [phone]

## 2024-09-18 NOTE — LETTER
September 18, 2024    Important [employer] Information    CHELSEY GRAHAM  [facility]  [id number] [place] [address] [address] [address]                                                                              Your Care Plan      Dear Chelsey,    I am your [employer] Care Coordinator and I visited you at [facility]  on 9/9/2024 to complete your [employer] [facility] Assessment.    [x] I reviewed your Facility Care Plan and assessment and all identified needs have goals present    [] I reviewed your Facility Care Plan and assessment and we identified these additional goal(s):                                                                                                                                                                                                                                                   I will plan to follow up:  [] Once a month  [] Every 3 months   [x] Every 6 months  [] Other      Questions?  If you have any questions or want to discuss your health care needs, call me at [phone] Monday-Friday between 8am and 5pm. TTY: [phone].     Sincerely,    Yeny Chaudhry RN, PHN  [phone]  [email or web address]      cc: member record, Skilled Nursing Facility    [employer]  is an [employer] that contracts with both Medicare and the [place] Medical Assistance (Medicaid) program to provide benefits of both programs to enrollees. Enrollment in [employer] depends on contract renewal.  [id number]    2023 [employer]

## 2024-10-04 ENCOUNTER — NURSING HOME VISIT (OUTPATIENT)
Dept: GERIATRICS | Facility: CLINIC | Age: 84
End: 2024-10-04
Payer: COMMERCIAL

## 2024-10-04 VITALS
DIASTOLIC BLOOD PRESSURE: 78 MMHG | BODY MASS INDEX: 35.34 KG/M2 | HEART RATE: 80 BPM | HEIGHT: 60 IN | TEMPERATURE: 97.6 F | WEIGHT: 180 LBS | OXYGEN SATURATION: 97 % | RESPIRATION RATE: 18 BRPM | SYSTOLIC BLOOD PRESSURE: 143 MMHG

## 2024-10-04 DIAGNOSIS — L89.310 PRESSURE INJURY OF RIGHT BUTTOCK, UNSTAGEABLE (H): ICD-10-CM

## 2024-10-04 DIAGNOSIS — R63.0 ANOREXIA: Primary | ICD-10-CM

## 2024-10-04 DIAGNOSIS — D64.9 CHRONIC ANEMIA: ICD-10-CM

## 2024-10-04 DIAGNOSIS — F01.C18 SEVERE VASCULAR DEMENTIA WITH OTHER BEHAVIORAL DISTURBANCE (H): ICD-10-CM

## 2024-10-04 DIAGNOSIS — K21.9 CHRONIC GASTROESOPHAGEAL REFLUX DISEASE: ICD-10-CM

## 2024-10-04 PROCEDURE — 99309 SBSQ NF CARE MODERATE MDM 30: CPT

## 2024-10-04 NOTE — LETTER
10/4/2024      Chelsey Casillas  [address] [place] [address]        No notes on file      Sincerely,        ALCIRA Morillo CNP

## 2024-10-04 NOTE — PROGRESS NOTES
[facility] GERIATRICS  Chief Complaint   Patient presents with    custodial Regulatory     [facility] Medical Record Number:  [id number]  Place of Service where encounter took place:  [facility]  () [[id number]]    HPI:    Chelsey Casillas  is 84 year old (1940), who is being seen today for a federally mandated E/M visit.     By chart review, patient was last hospitalized at [facility] September 2016. She previously resided at [facility] and admitted to [facility] September 2018. She had a subsequent fall with left humerus fracture that month, workup including CT head noted a chronic right MCA infarct at that time. 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 She has followed with ACP Dr Hutchins and successfully off of Risperidone since March 2023. She also has a history of COPD without oxygen use. Anemia noted on routine lab work November 2022, no evidence of active bleeding and she was started on iron. No additional workup per goals of care for comfort. Admitted to the above facility for permanent placement.  Resumed on risperidone for combative behavior.  Psychiatry is following and she receives lorazepam on bath days for combativeness, has been helpful.     Today's concerns are:  Seen today for routine follow-up about the unit in long-term care.  History is limited by dementia.  She did not eat much breakfast, reports she did not like it and is feeling sick.  She is unable to elaborate as to any specific symptoms, reports seems to be left alone.  She denies chest pain, shortness of breath, changes in bowel or bladder habits, fever/chills.    ALLERGIES:Amlodipine  PAST MEDICAL HISTORY:   Past Medical History:   Diagnosis Date    Arthritis     Chronic low back pain 03/17/2015    Closed fracture of left distal humerus 2018    COPD (chronic obstructive pulmonary disease) (H)     Dementia with psychosis (H) 11/01/2016    Fall  02/27/2013    Falling episodes 09/24/2016    Functional urinary incontinence 03/07/2013    GERD (gastroesophageal reflux disease) 04/03/2018    History of CVA (cerebrovascular accident)     R MCA    HTN (hypertension) 04/03/2018    Knee pain 03/07/2013    Major depression, single episode 04/16/2015    Pain in both knees 01/30/2014    Physical deconditioning 03/07/2013    Severe major depression with psychotic features (H) 07/12/2016    Skin tear 03/07/2013    Weakness 06/18/2015     PAST SURGICAL HISTORY:   has no past surgical history on file.  FAMILY HISTORY: Family history is unknown by patient.  SOCIAL HISTORY:  reports that she has quit smoking. Her smoking use included cigarettes. She has never used smokeless tobacco. She reports that she does not drink alcohol and does not use drugs.    MEDICATIONS:  MED REC REQUIRED  Post Medication Reconciliation Status: patient was not discharged from an inpatient facility or TCU         Review of your medicines            Accurate as of October 4, 2024 11:59 PM. If you have any questions, ask your nurse or doctor.                CONTINUE these medicines which have NOT CHANGED        Dose / Directions   albuterol 108 (90 Base) MCG/ACT inhaler  Commonly known as: PROAIR HFA/PROVENTIL HFA/VENTOLIN HFA      Dose: 2 puff  Inhale 2 puffs into the lungs every 4 hours as needed for shortness of breath / dyspnea or wheezing  Refills: 0     diclofenac 1 % topical gel  Commonly known as: VOLTAREN      Dose: 4 g  Apply 4 g topically 2 times daily And BID PRN for pain  Refills: 0     ferrous sulfate 325 (65 Fe) MG EC tablet  Commonly known as: FE TABS      Dose: 325 mg  Take 325 mg by mouth daily  Refills: 0     gabapentin 100 MG capsule  Commonly known as: NEURONTIN      Dose: 100 mg  Take 100 mg by mouth 3 times daily  Refills: 0     guaiFENesin 20 mg/mL liquid  Commonly known as: ROBITUSSIN      Dose: 200 mg  Take 200 mg by mouth every 4 hours as needed for cough  Refills: 0      Lidocaine 4 % Patch  Commonly known as: LIDOCARE      Dose: 1 patch  Place 1 patch onto the skin every 24 hours To prevent lidocaine toxicity, patient should be patch free for 12 hrs daily.  Refills: 0     LORazepam 0.5 MG tablet  Commonly known as: ATIVAN      Dose: 0.25 mg  Take 0.25 mg by mouth once a week  Refills: 0     omeprazole 20 MG tablet      Dose: 20 mg  Take 20 mg by mouth daily  Refills: 0     pramoxine 1 % Lotn lotion  Commonly known as: PRAX      Place rectally 2 times daily  Refills: 0     * risperiDONE 1 MG tablet  Commonly known as: risperDAL  Used for: Dementia without behavioral disturbance (H)      Dose: 1 mg  TAKE 1 TABLET BY MOUTH AT BEDTIME  Quantity: 30 tablet  Refills: 11     * risperiDONE 0.25 MG tablet  Commonly known as: risperDAL      Dose: 0.25 mg  Take 0.25 mg by mouth daily  Refills: 0     sertraline 100 MG tablet  Commonly known as: ZOLOFT  Used for: Recurrent major depressive disorder, in partial remission (H)      Dose: 100 mg  Take 1 tablet (100 mg) by mouth At Bedtime  Quantity: 31 tablet  Refills: 98     TYLENOL PO      Dose: 1,000 mg  Take 1,000 mg by mouth 3 times daily  Refills: 0     vitamin D3 50 mcg (2000 units) tablet  Commonly known as: CHOLECALCIFEROL  Used for: Vitamin D deficiency      Dose: 1 tablet  Take 1 tablet (2,000 Units) by mouth At Bedtime  Quantity: 30 tablet  Refills: 0           * This list has 2 medication(s) that are the same as other medications prescribed for you. Read the directions carefully, and ask your doctor or other care provider to review them with you.                   Case Management:  I have reviewed the care plan and MDS and do agree with the plan. Patient's desire to return to the community is not present. Information reviewed:  Medications, vital signs, orders, and nursing notes.    ROS:  Limited secondary to cognitive impairment but today pt reports the above    Vitals:  BP (!) 143/78   Pulse 80   Temp 97.6  F (36.4  C)   Resp 18    Ht 1.524 m (5')   Wt 81.6 kg (180 lb)   SpO2 97%   BMI 35.15 kg/m    Body mass index is 35.15 kg/m .  Exam:  GENERAL APPEARANCE:  Alert, in no distress  RESP:  respiratory effort and palpation of chest normal, lungs clear to auscultation , no respiratory distress  CV:  regular rate and rhythm, no murmur, rub, or gallop, no edema  ABDOMEN:  normal bowel sounds, soft, nontender, no hepatosplenomegaly or other masses  M/S:   Gait and station abnormal transfers with assist, wheelchair for mobility  SKIN:  Inspection of skin and subcutaneous tissue baseline, Palpation of skin and subcutaneous tissue baseline  NEURO:   Left hemiparesis, follows simple commands, limited history  PSYCH:  memory impaired , flat affect, somewhat irritable    Lab/Diagnostic data:   Labs done in SNF are in [facility]. Please refer to them using DoYouRemember/Care Everywhere. and Recent labs in EPIC reviewed by me today.     ASSESSMENT/PLAN  (R63.0) Anorexia  (primary encounter diagnosis)  Comment: Acute, reports feeling vaguely unwell today but unable to elaborate as to any symptoms, also did not like the meal which may be contributing.  Vital signs stable and she is afebrile.  Weights have been variable but she is up about 20 pounds since admission 5/2023  Plan: CBC, BMP tomorrow.  Monitor symptoms, offer food preferences, RD following    (D64.9) Chronic anemia  (K21.9) Chronic gastroesophageal reflux disease  Comment: Chronic, has had transient drops in hemoglobin historically, declined intervention  Plan: Continue iron, PPI, repeat CBC, monitor hemoglobin periodically, and for signs and symptoms of bleeding    (L89.310) Pressure injury of right buttock, unstageable (H)  Comment: Chronic, pressure +/- itching, now improved.  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  Can be combative and disruptive at times.  Failed risperidone GDR with increased aggression towards staff.  Management reviewed with [facility] care coordinator, patient's friend Tasha has been designated as her healthcare agent, assist with decision-making.   Plan: Continue Risperdal Adelmann 1 mg daily at at bedtime, 0.25 mg daily in the morning, lorazepam weekly with baths, long-term care for assist with ADLs, medication management, meals, activities.  Follow-up with psychiatry as directed.   Comfort focused treatment per goals of care        Orders  CBC, BMP 10/7    Electronically signed by:  ALCIRA Morillo CNP

## 2024-10-06 ENCOUNTER — LAB REQUISITION (OUTPATIENT)
Dept: LAB | Facility: CLINIC | Age: 84
End: 2024-10-06
Payer: COMMERCIAL

## 2024-10-06 DIAGNOSIS — I10 ESSENTIAL (PRIMARY) HYPERTENSION: ICD-10-CM

## 2024-10-06 DIAGNOSIS — D64.9 ANEMIA, UNSPECIFIED: ICD-10-CM

## 2024-10-07 LAB
ANION GAP SERPL CALCULATED.3IONS-SCNC: 14 MMOL/L (ref 7–15)
BUN SERPL-MCNC: 14.2 MG/DL (ref 8–23)
CALCIUM SERPL-MCNC: 9.1 MG/DL (ref 8.8–10.4)
CHLORIDE SERPL-SCNC: 103 MMOL/L (ref 98–107)
CREAT SERPL-MCNC: 0.61 MG/DL (ref 0.51–0.95)
EGFRCR SERPLBLD CKD-EPI 2021: 89 ML/MIN/1.73M2
ERYTHROCYTE [DISTWIDTH] IN BLOOD BY AUTOMATED COUNT: 17.7 % (ref 10–15)
GLUCOSE SERPL-MCNC: 87 MG/DL (ref 70–99)
HCO3 SERPL-SCNC: 24 MMOL/L (ref 22–29)
HCT VFR BLD AUTO: 38.8 % (ref 35–47)
HGB BLD-MCNC: 11.9 G/DL (ref 11.7–15.7)
MCH RBC QN AUTO: 25.3 PG (ref 26.5–33)
MCHC RBC AUTO-ENTMCNC: 30.7 G/DL (ref 31.5–36.5)
MCV RBC AUTO: 82 FL (ref 78–100)
PLATELET # BLD AUTO: 181 10E3/UL (ref 150–450)
POTASSIUM SERPL-SCNC: 4 MMOL/L (ref 3.4–5.3)
RBC # BLD AUTO: 4.71 10E6/UL (ref 3.8–5.2)
SODIUM SERPL-SCNC: 141 MMOL/L (ref 135–145)
WBC # BLD AUTO: 5.6 10E3/UL (ref 4–11)

## 2024-10-07 PROCEDURE — 36415 COLL VENOUS BLD VENIPUNCTURE: CPT | Mod: ORL

## 2024-10-07 PROCEDURE — 80048 BASIC METABOLIC PNL TOTAL CA: CPT | Mod: ORL

## 2024-10-07 PROCEDURE — 85027 COMPLETE CBC AUTOMATED: CPT | Mod: ORL

## 2024-10-07 PROCEDURE — P9603 ONE-WAY ALLOW PRORATED MILES: HCPCS | Mod: ORL

## 2024-10-24 ENCOUNTER — TELEPHONE (OUTPATIENT)
Dept: WOUND CARE | Facility: CLINIC | Age: 84
End: 2024-10-24

## 2024-10-24 ENCOUNTER — NURSING HOME VISIT (OUTPATIENT)
Dept: GERIATRICS | Facility: CLINIC | Age: 84
End: 2024-10-24
Payer: COMMERCIAL

## 2024-10-24 VITALS — HEIGHT: 60 IN | WEIGHT: 180 LBS | BODY MASS INDEX: 35.34 KG/M2

## 2024-10-24 DIAGNOSIS — S71.101D OPEN WOUND OF RIGHT THIGH, SUBSEQUENT ENCOUNTER: Primary | ICD-10-CM

## 2024-10-24 PROCEDURE — 99309 SBSQ NF CARE MODERATE MDM 30: CPT

## 2024-10-24 NOTE — TELEPHONE ENCOUNTER
[facility]    Who is the name of the provider?:  KAYLA De La Cruz    What is the location you see this provider at/preferred location?: [facility]ound [facility]  Person calling / Facility: Sherice Edge  Phone number:  [phone]   Nurse call back needed:  ??   Can we leave a detailed message on this number?  YES     Reason for call:  Requesting virtual appointment for wound on back of right thigh.  Healed and reopened and deepening.  Virtual best due to dementia.    Pharmacy location:  YES

## 2024-10-24 NOTE — TELEPHONE ENCOUNTER
Writer spoke with Sherice at [facility]. Dr. De La Cruz does not provide virtual appointments. Sherice inquired if [facility] would be able to do virtual. Writer following up with [facility] to see capability to provide this service for patient.

## 2024-10-30 NOTE — TELEPHONE ENCOUNTER
Spoke with Sherice moreno [facility] regarding in person visit. Scheduled for Friday 11-1-24.

## 2024-11-01 ENCOUNTER — HOSPITAL ENCOUNTER (OUTPATIENT)
Dept: WOUND CARE | Facility: CLINIC | Age: 84
Discharge: HOME | End: 2024-11-01
Payer: COMMERCIAL

## 2024-11-01 VITALS
TEMPERATURE: 98.9 F | RESPIRATION RATE: 17 BRPM | SYSTOLIC BLOOD PRESSURE: 128 MMHG | DIASTOLIC BLOOD PRESSURE: 76 MMHG | HEART RATE: 97 BPM

## 2024-11-01 DIAGNOSIS — L97.112 SKIN ULCER OF RIGHT THIGH WITH FAT LAYER EXPOSED (H): Primary | ICD-10-CM

## 2024-11-01 PROCEDURE — 17250 CHEM CAUT OF GRANLTJ TISSUE: CPT | Mod: 59

## 2024-11-01 PROCEDURE — 11042 DBRDMT SUBQ TIS 1ST 20SQCM/<: CPT

## 2024-11-01 NOTE — PROGRESS NOTES
[facility]    ASSESSMENT:   (L97.112) Skin ulcer of right thigh with fat layer exposed (H)    Dementia    PLAN/DISCUSSION:   Removed skin bridge w/ 15 blade, silver nitrate, suspect patient had packing that was tightly packed in. Discussed to lightly pack wound.   Wound bed itself is clean with tunneling.   Continue with protein supplementation  Offload as much as possible  Wound care plan: Vashe soaked packing strip with Mepilex, change every other day. Dressing will be performed by facility staff See bottom of note for detailed wound care and patient instructions  Dietary recommendations discussed, see AVS       HISTORY OF PRESENT ILLNESS:   Chelsey Casillas is a 84 year old female with  a ulceration of her right posterior thigh that started in April of 2024, it has since healed and then reopened. Denies fevers or chills. Hx of dementia and stroke. Has not been offloading as much as we would like, minimal protein supplementation.   Patient Active Problem List   Diagnosis    Chronic low back pain    Dementia without behavioral disturbance (H)    Fall    Falling episodes    Functional urinary incontinence    GERD (gastroesophageal reflux disease)    HTN (hypertension)    Knee pain    Pain in both knees    Major depression, single episode    Physical deconditioning    Primary osteoarthritis involving multiple joints    Chronic obstructive pulmonary disease, unspecified COPD type (H)    Recurrent major depressive disorder, remission status unspecified (H)    History of CVA (cerebrovascular accident)    Closed fracture of left distal humerus    Class 2 severe obesity due to excess calories with serious comorbidity in adult (H)    Skin ulcer of right thigh with fat layer exposed (H)       Current Outpatient Medications   Medication Sig Dispense Refill    Acetaminophen (TYLENOL PO) Take 1,000 mg by mouth 3 times daily      albuterol (PROAIR HFA/PROVENTIL HFA/VENTOLIN HFA) 108 (90 BASE) MCG/ACT  Inhaler Inhale 2 puffs into the lungs every 4 hours as needed for shortness of breath / dyspnea or wheezing       diclofenac (VOLTAREN) 1 % topical gel Apply 4 g topically 2 times daily And BID PRN for pain      ferrous sulfate (FE TABS) 325 (65 Fe) MG EC tablet Take 325 mg by mouth daily      gabapentin (NEURONTIN) 100 MG capsule Take 100 mg by mouth 3 times daily      guaiFENesin (ROBITUSSIN) 20 mg/mL liquid Take 200 mg by mouth every 4 hours as needed for cough      Lidocaine (LIDOCARE) 4 % Patch Place 1 patch onto the skin every 24 hours To prevent lidocaine toxicity, patient should be patch free for 12 hrs daily.      LORazepam (ATIVAN) 0.5 MG tablet Take 0.25 mg by mouth once a week      omeprazole 20 MG tablet Take 20 mg by mouth daily      pramoxine (PRAX) 1 % LOTN lotion Place rectally 2 times daily      risperiDONE (RISPERDAL) 0.25 MG tablet Take 0.25 mg by mouth daily      risperiDONE (RISPERDAL) 1 MG tablet TAKE 1 TABLET BY MOUTH AT BEDTIME 30 tablet 11    sertraline (ZOLOFT) 100 MG tablet Take 1 tablet (100 mg) by mouth At Bedtime 31 tablet 98    vitamin D3 (CHOLECALCIFEROL) 2000 units (50 mcg) tablet Take 1 tablet (2,000 Units) by mouth At Bedtime 30 tablet      No current facility-administered medications for this encounter.       VITALS: /76 (BP Location: Right arm, Patient Position: Sitting, Cuff Size: Adult Regular)   Pulse 97   Temp 98.9  F (37.2  C) (Temporal)   Resp 17      PHYSICAL EXAM:  GENERAL: Patient is alert and oriented and in no acute distress  INTEGUMENTARY:   Wound (used by OP [facility] only) 07/30/24 0921 thigh Right posterior unspecified (Active)   Thickness/Stage full thickness 11/01/24 1121   Base granulating 11/01/24 1121   Periwound intact 11/01/24 1121   Periwound Temperature warm 11/01/24 1121   Periwound Skin Turgor soft 11/01/24 1121   Edges open 11/01/24 1121   Length (cm) 0.7 11/01/24 1121   Width (cm) 0.6 11/01/24 1121   Depth (cm) 1.2 11/01/24 1121   Wound (cm^2) 0.42  cm^2 11/01/24 1121   Wound Volume (cm^3) 0.5 cm^3 11/01/24 1121   Wound healing % 48.15 11/01/24 1121   Undermining [Depth (cm)/Location] 9-5 o'clock / 1.8cm 11/01/24 1121   Drainage Characteristics/Odor serosanguineous 11/01/24 1121   Drainage Amount moderate 11/01/24 1121   Care, Wound debrided;chemical cautery applied 11/01/24 1121             PROCEDURES: 4% topical lidocaine was applied to the wound bed. Patient was determined to be capable of making their own medical decisions and informed consent was obtained. Using a 15 blade a surgical debridement was performed down to and including subcutaneous tissue of <20cm2. Hemostasis was achieved with pressure. The patient tolerated the procedure well.      MDM: 45 minutes were spent on the date of the visit reviewing previous chart notes, evaluating patient and developing the treatment plan, this excludes any time spent on procedures.    PATIENT INSTRUCTIONS      Further instructions from your care team         11/01/2024   Paige Casillas   1940    A DME order was not completed because the supplies are ordered by home care or at a care facility    Dressing changes outside of clinic are being performed by  Staff at [facility]:  [phone] fax: [phone]    Plan 11/01/2024   Patient needs a high protein diet supplemented with protein drinks twice daily.  Patient needs to offload the right posterior thigh as directed below.  Patient needs a pressure redistribution surface beneath her when sitting (like a ROHO mosaic cushion)  We removed the skin bridge across the wound to facilitate easier packing of the wound.  We applied Silver Nitrate to the hypergranulation tissue today.  This may lead to temporary staining of the skin with increased drainage and discolored gray/black drainage.  This may be present for a few days and is a normal process.       Wound Dressing Change: Right Posterior Thigh   - Wash your hands with soap and water  before you begin your dressing change and prepare a clean surface for dressings.  - Cleanse with mild unscented soap (such as Cetaphil, Cerave or Dove) and water  - Apply small amount of VASHE on gauze, lay into wound bed, let sit for 10 minutes, remove gauze (do not rinse)  - Primary dressing: Lightly pack with VASHE moistened plain packing strip to fill depth of wound. Ensure to leave a tail for easy removal. DO NOT OVER PACK  - Secondary dressing: Cover with Mepilex dressing  Change every other day and as needed for saturation/soilage/dislodgement      Repositioning:  Bed: Reposition MINIMALLY every 1-2 hours in bed to relieve pressure and promote perfusion to tissue.  Chair: When up to the chair, sit on a chair cushion when up to the chair. Do not sit for longer than one hour total before returning to bed for at least 60 minutes to relieve pressure and promote perfusion to the tissue.       A diet high in protein is important for wound healing, we recommend getting 90 grams of protein per day.   Taking protein shakes or bars are a good way to get extra protein in your diet.      Good sources of protein:  Pork 26g per 3 oz  Whey protein powder - 24g per scoop (on average)  Greek yogurt - 23g per 8oz   Chicken or Turkey - 23g per 3oz  Fish - 20-25g per 3oz  Beef - 18-23g per 3oz  Tofu - 10g per 1/2 cup  Navy beans - 20g per cup  Cottage cheese - 14g per 1/2 cup   Lentils - 13g per 1/4 cup  Beef jerky 13g per 1oz  2% milk - 8g per cup  Peanut butter - 8g per 2 tablespoons  Eggs - 6g per egg  Mixed nuts - 6g per 2oz    It is recommended that you obtain a Roho Mosaic Cushion. It can be purchased online on Amazon, or through your local [employer] (Durable Medical Equipment) store. They come in a variety of sizes - you can choose which size works best for your chair.   You only need to purchase one as it can be transferred between chairs.          Main Provider: Soledad Espinosa NP November 1, 2024    Call us at [phone] if  you have any questions about your wounds, if you have redness or swelling around your wound, have a fever of 101 degrees Fahrenheit or greater or if you have any other problems or concerns. We answer the phone Monday through Friday 8 am to 4 pm, please leave a message as we check the voicemail frequently throughout the day. If you have a concern over the weekend, please leave a message and we will return your call Monday. If the need is urgent, go to the ER or urgent care.    If you had a positive experience please indicate that on your patient satisfaction survey form that [facility] will be sending you.    It was a pleasure meeting with you today.  Thank you for allowing me and my team the privilege of caring for you today.  YOU are the reason we are here, and I truly hope we provided you with the excellent service you deserve.  Please let us know if there is anything else we can do for you so that we can be sure you are leaving completely satisfied with your care experience.      If you have any billing related questions please call the [facility] Business office at [phone]. The clinic staff does not handle billing related matters.    If you are scheduled to have a follow up appointment, you will receive a reminder call the day before your visit. On the appointment day please arrive 15 minutes prior to your appointment time. If you are unable to keep that appointment, please call the clinic to cancel or reschedule. If you are more than 10 minutes late or greater for your scheduled appointment time, the clinic policy is that you may be asked to reschedule.          Electronically signed by Soledad Espinosa CNP on November 1, 2024

## 2024-11-01 NOTE — DISCHARGE INSTRUCTIONS
11/01/2024   Paige Casillas   1940    A DME order was not completed because the supplies are ordered by home care or at a care facility    Dressing changes outside of clinic are being performed by  Staff at [facility]:  [phone] fax: [phone]    Plan 11/01/2024   Patient needs a high protein diet supplemented with protein drinks twice daily.  Patient needs to offload the right posterior thigh as directed below.  Patient needs a pressure redistribution surface beneath her when sitting (like a ROHO mosaic cushion)  We removed the skin bridge across the wound to facilitate easier packing of the wound.  We applied Silver Nitrate to the hypergranulation tissue today.  This may lead to temporary staining of the skin with increased drainage and discolored gray/black drainage.  This may be present for a few days and is a normal process.       Wound Dressing Change: Right Posterior Thigh   - Wash your hands with soap and water before you begin your dressing change and prepare a clean surface for dressings.  - Cleanse with mild unscented soap (such as Cetaphil, Cerave or Dove) and water  - Apply small amount of VASHE on gauze, lay into wound bed, let sit for 10 minutes, remove gauze (do not rinse)  - Primary dressing: Lightly pack with VASHE moistened plain packing strip to fill depth of wound. Ensure to leave a tail for easy removal. DO NOT OVER PACK  - Secondary dressing: Cover with Mepilex dressing  Change every other day and as needed for saturation/soilage/dislodgement      Repositioning:  Bed: Reposition MINIMALLY every 1-2 hours in bed to relieve pressure and promote perfusion to tissue.  Chair: When up to the chair, sit on a chair cushion when up to the chair. Do not sit for longer than one hour total before returning to bed for at least 60 minutes to relieve pressure and promote perfusion to the tissue.       A diet high in protein is important for wound healing, we recommend  getting 90 grams of protein per day.   Taking protein shakes or bars are a good way to get extra protein in your diet.      Good sources of protein:  Pork 26g per 3 oz  Whey protein powder - 24g per scoop (on average)  Greek yogurt - 23g per 8oz   Chicken or Turkey - 23g per 3oz  Fish - 20-25g per 3oz  Beef - 18-23g per 3oz  Tofu - 10g per 1/2 cup  Navy beans - 20g per cup  Cottage cheese - 14g per 1/2 cup   Lentils - 13g per 1/4 cup  Beef jerky 13g per 1oz  2% milk - 8g per cup  Peanut butter - 8g per 2 tablespoons  Eggs - 6g per egg  Mixed nuts - 6g per 2oz    It is recommended that you obtain a Roho Mosaic Cushion. It can be purchased online on Amazon, or through your local [employer] (Durable Medical Equipment) store. They come in a variety of sizes - you can choose which size works best for your chair.   You only need to purchase one as it can be transferred between chairs.          Main Provider: Soledad Espinosa NP November 1, 2024    Call us at [phone] if you have any questions about your wounds, if you have redness or swelling around your wound, have a fever of 101 degrees Fahrenheit or greater or if you have any other problems or concerns. We answer the phone Monday through Friday 8 am to 4 pm, please leave a message as we check the voicemail frequently throughout the day. If you have a concern over the weekend, please leave a message and we will return your call Monday. If the need is urgent, go to the ER or urgent care.    If you had a positive experience please indicate that on your patient satisfaction survey form that [facility] will be sending you.    It was a pleasure meeting with you today.  Thank you for allowing me and my team the privilege of caring for you today.  YOU are the reason we are here, and I truly hope we provided you with the excellent service you deserve.  Please let us know if there is anything else we can do for you so that we can be sure you are leaving completely satisfied  with your care experience.      If you have any billing related questions please call the [facility] Business office at [phone]. The clinic staff does not handle billing related matters.    If you are scheduled to have a follow up appointment, you will receive a reminder call the day before your visit. On the appointment day please arrive 15 minutes prior to your appointment time. If you are unable to keep that appointment, please call the clinic to cancel or reschedule. If you are more than 10 minutes late or greater for your scheduled appointment time, the clinic policy is that you may be asked to reschedule.

## 2024-11-04 ENCOUNTER — LAB REQUISITION (OUTPATIENT)
Dept: LAB | Facility: CLINIC | Age: 84
End: 2024-11-04
Payer: COMMERCIAL

## 2024-11-04 ENCOUNTER — NURSING HOME VISIT (OUTPATIENT)
Dept: GERIATRICS | Facility: CLINIC | Age: 84
End: 2024-11-04
Payer: COMMERCIAL

## 2024-11-04 VITALS
HEART RATE: 80 BPM | RESPIRATION RATE: 18 BRPM | OXYGEN SATURATION: 93 % | WEIGHT: 177 LBS | HEIGHT: 60 IN | TEMPERATURE: 97 F | BODY MASS INDEX: 34.75 KG/M2 | DIASTOLIC BLOOD PRESSURE: 83 MMHG | SYSTOLIC BLOOD PRESSURE: 131 MMHG

## 2024-11-04 DIAGNOSIS — R05.9 COUGH, UNSPECIFIED: ICD-10-CM

## 2024-11-04 DIAGNOSIS — R05.1 ACUTE COUGH: Primary | ICD-10-CM

## 2024-11-04 DIAGNOSIS — J44.9 CHRONIC OBSTRUCTIVE PULMONARY DISEASE, UNSPECIFIED COPD TYPE (H): ICD-10-CM

## 2024-11-04 DIAGNOSIS — R49.0 HOARSE VOICE QUALITY: ICD-10-CM

## 2024-11-04 PROCEDURE — 99309 SBSQ NF CARE MODERATE MDM 30: CPT

## 2024-11-04 PROCEDURE — 87637 SARSCOV2&INF A&B&RSV AMP PRB: CPT | Mod: ORL

## 2024-11-04 NOTE — PROGRESS NOTES
[facility] GERIATRICS    Chief Complaint   Patient presents with    Nursing Home Acute     HPI:  Chelsey Casillas is a 84 year old  (1940), who is being seen today for an episodic care visit at: [facility]  () [[id number]]. Today's concern is:     Seen today up on the unit, coughing, She has a hoarse voice and is not sure why but would like her coffee.Throat is less sore, more dry. She reports this started a day or two ago, denies fever/chills.  She thinks maybe she as been sick. She is denying CP, SOB, changes in b/b habits.    Allergies, and PMH/PSH reviewed in [facility] today.  REVIEW OF SYSTEMS:  Limited secondary to cognitive impairment but today pt reports the above    Objective:   /83   Pulse 80   Temp 97  F (36.1  C)   Resp 18   Ht 1.524 m (5')   Wt 80.3 kg (177 lb)   SpO2 93%   BMI 34.57 kg/m    GENERAL APPEARANCE:  Alert, in no distress  ENT: Hoarse voice  RESP:  respiratory effort and palpation of chest normal, no respiratory distress, cough harsh wet, expiratory wheezes  CV:  regular rate and rhythm, no murmur, rub, or gallop, no edema  NEURO:   left hemiparesis  PSYCH:  memory impaired , affect and mood normal    Labs done in SNF are in [facility]. Please refer to them using [employer]Care Everywhere. and Recent labs in [facility] reviewed by me today.     Assessment/Plan:  (R05.1) Acute cough  (primary encounter diagnosis)  (R49.0) Hoarse voice quality  (J44.9) Chronic obstructive pulmonary disease, unspecified COPD type (H)  Comment: acute hoarse voice and cough, denies SOB so less likely COPD exacerbation but could be URI or developing pneumonia. Not toxic appearing but given comorbidity and cognition limiting reporting, I think it is worth checking CXR. No known recent ill contacts, will check viral multiplex.  Plan: Multiplex PCR. CXR 2 view. Monitor symptoms. Continue albuterol, tessalon, guaifenisen PRN      MED REC REQUIRED  Post Medication Reconciliation Status:  patient was not discharged from an inpatient facility or TCU      Orders:  Multiplex PCR  CXR 2 view    Electronically signed by: ALCIRA Morillo CNP

## 2024-11-04 NOTE — LETTER
11/4/2024      Chelsey Casillas  [facility]  [address] [place] [address]        [facility] GERIATRICS    Chief Complaint   Patient presents with     Nursing Home Acute     HPI:  Chelsey Casillas is a 84 year old  (1940), who is being seen today for an episodic care visit at: [facility]  () [[id number]]. Today's concern is:     Seen today up on the unit, coughing, She has a hoarse voice and is not sure why but would like her coffee.Throat is less sore, more dry. She reports this started a day or two ago, denies fever/chills.  She thinks maybe she as been sick. She is denying CP, SOB, changes in b/b habits.    Allergies, and PMH/PSH reviewed in [facility] today.  REVIEW OF SYSTEMS:  Limited secondary to cognitive impairment but today pt reports the above    Objective:   /83   Pulse 80   Temp 97  F (36.1  C)   Resp 18   Ht 1.524 m (5')   Wt 80.3 kg (177 lb)   SpO2 93%   BMI 34.57 kg/m    GENERAL APPEARANCE:  Alert, in no distress  ENT: Hoarse voice  RESP:  respiratory effort and palpation of chest normal, no respiratory distress, cough harsh wet, expiratory wheezes  CV:  regular rate and rhythm, no murmur, rub, or gallop, no edema  NEURO:   left hemiparesis  PSYCH:  memory impaired , affect and mood normal    Labs done in SNF are in [facility]. Please refer to them using [facility]/Care Everywhere. and Recent labs in [facility] reviewed by me today.     Assessment/Plan:  (R05.1) Acute cough  (primary encounter diagnosis)  (R49.0) Hoarse voice quality  (J44.9) Chronic obstructive pulmonary disease, unspecified COPD type (H)  Comment: acute hoarse voice and cough, denies SOB so less likely COPD exacerbation but could be URI or developing pneumonia. Not toxic appearing but given comorbidity and cognition limiting reporting, I think it is worth checking CXR. No known recent ill contacts, will check viral multiplex.  Plan: Multiplex PCR. CXR 2 view. Monitor symptoms.  Continue albuterol, tessalon, guaifenisen PRN      MED REC REQUIRED  Post Medication Reconciliation Status: patient was not discharged from an inpatient facility or TCU      Orders:  Multiplex PCR  CXR 2 view    Electronically signed by: ALCIRA Morillo CNP         Sincerely,        ALCIRA Morillo CNP

## 2024-11-12 NOTE — PROGRESS NOTES
[facility] GERIATRICS    Chief Complaint   Patient presents with    Nursing Home Acute     HPI:  Chelsey Casillas is a 84 year old  (1940), who is being seen today for an episodic care visit at: [facility]  () [[id number]]. Today's concern is: Seen today for follow-up up on the unit in LTC. Per nursing, patients wound is declining and seems to have opened up after previously healing well. Today, she is denying acute concerns. Reports wound is slow healing. Denies associated pain or itching. Prefers to be up in her wheelchair most of the day. Intakes vary. She is denying CP, fever/chills.    Allergies, and PMH/PSH reviewed in [facility] today.  REVIEW OF SYSTEMS:  Limited secondary to cognitive impairment but today pt reports the above    Objective:   Ht 1.524 m (5')   Wt 81.6 kg (180 lb)   BMI 35.15 kg/m    GENERAL APPEARANCE:  Alert, in no distress  RESP:  no respiratory distress  CV:  no edema  M/S:   Gait and station abnormal transfers with assist, wheelchair for mobility  SKIN:  wound images reviewed in [facility], right medial thigh wound with two open areas with skin bridge, beefy red wound bed  PSYCH:  memory impaired , affect and mood normal    Labs done in SNF are in [facility]. Please refer to them using [employer]Care Everywhere. and Recent labs in [facility] reviewed by me today.     Assessment/Plan:  (S71.493L) Open wound of right thigh, subsequent encounter  (primary encounter diagnosis)  Comment: chronic wound, suspect wound tunneling was being packed too tightly or not at all limiting healing, now opened up, may benefit from removal of skin bridge to allow for packing/healing. Pressure contributing. No evidence of abscess previously. Management reviewed with WOC, wound clinic can not see her virtually but will attempt to get an appointment with a provider who will do virtual follow-up due to patient's underlying cognitive impairment and mobility deficits.   Plan: continue wound care with light  packing, follow-up with WOC RN in facility and wound clinic as able, continue offloading and repositioning, encourage PO intake and high protein. Gabapentin TID for pain/itching, tylenol TID.      MED REC REQUIRED  Post Medication Reconciliation Status: patient was not discharged from an inpatient facility or TCU      Electronically signed by: ALCIRA Morillo CNP

## 2024-12-02 ENCOUNTER — HOSPITAL ENCOUNTER (OUTPATIENT)
Dept: WOUND CARE | Facility: CLINIC | Age: 84
Discharge: HOME | End: 2024-12-02
Payer: COMMERCIAL

## 2024-12-02 ENCOUNTER — TELEPHONE (OUTPATIENT)
Dept: WOUND CARE | Facility: CLINIC | Age: 84
End: 2024-12-02

## 2024-12-02 VITALS — HEART RATE: 68 BPM | SYSTOLIC BLOOD PRESSURE: 143 MMHG | TEMPERATURE: 98 F | DIASTOLIC BLOOD PRESSURE: 71 MMHG

## 2024-12-02 DIAGNOSIS — L97.112 SKIN ULCER OF RIGHT THIGH WITH FAT LAYER EXPOSED (H): Primary | ICD-10-CM

## 2024-12-02 PROCEDURE — 17250 CHEM CAUT OF GRANLTJ TISSUE: CPT

## 2024-12-02 NOTE — DISCHARGE INSTRUCTIONS
12/02/2024   Paige Casillas   1940    A DME order was not completed because the supplies are ordered by home care or at a care facility     Dressing changes outside of clinic are being performed by Staff at [facility]:  [phone] fax: [phone]     Plan 12/02/2024   Noted today that the dressing we removed was dated 11/27/24; please ensure every shift monitoring of this dressing along with skin integrity checks and that dressings are performed per scheduled frequency. Thank you.   Patient needs a high protein diet supplemented with protein drinks twice daily.  Patient needs to offload the right posterior thigh as directed below.  Patient needs a pressure redistribution surface beneath her when sitting (like a ROHO mosaic cushion)--on back order 12/2/24  We applied Silver Nitrate to the hypergranulation tissue today 12/02/24.  This may lead to temporary staining of the skin with increased drainage and discolored gray/black drainage.  This may be present for a few days and is a normal process.        Wound Dressing Change: Right Posterior Thigh   - Wash your hands with soap and water before you begin your dressing change and prepare a clean surface for dressings.  - Cleanse with mild unscented soap (such as Cetaphil, Cerave or Dove) and water; shower ok but please remove packing prior to shower.  - Apply small amount of VASHE on gauze, lay into wound bed, let sit for 10 minutes, remove gauze (do not rinse) then:  - Primary dressing: Lightly pack with Fibracol collagen to fill depth of wound. This may dissolve in between dressing changes.  - PRN apply light layer of zinc moisture barrier paste to periwound skin to treat maceration  - Secondary dressing: Cover with Mepilex dressing  Change three times a week (Monday, Wednesday and Friday) after showers and as needed for saturation/soilage/dislodgement      Repositioning:  Bed: Reposition MINIMALLY every 1-2 hours in bed to relieve  pressure and promote perfusion to tissue.  Chair: When up to the chair, sit on a chair cushion when up to the chair. Do not sit for longer than one hour total before returning to bed for at least 60 minutes to relieve pressure and promote perfusion to the tissue.       A diet high in protein is important for wound healing, we recommend getting 90 grams of protein per day.   Taking protein shakes or bars are a good way to get extra protein in your diet.      Good sources of protein:  Pork 26g per 3 oz  Whey protein powder - 24g per scoop (on average)  Greek yogurt - 23g per 8oz   Chicken or Turkey - 23g per 3oz  Fish - 20-25g per 3oz  Beef - 18-23g per 3oz  Tofu - 10g per 1/2 cup  Navy beans - 20g per cup  Cottage cheese - 14g per 1/2 cup   Lentils - 13g per 1/4 cup  Beef jerky 13g per 1oz  2% milk - 8g per cup  Peanut butter - 8g per 2 tablespoons  Eggs - 6g per egg  Mixed nuts - 6g per 2oz     It is recommended that you obtain a Roho Mosaic Cushion. It can be purchased online on [employer], or through your local [employer] (Durable Medical Equipment) store. They come in a variety of sizes - you can choose which size works best for your chair.   You only need to purchase one as it can be transferred between chairs.              Main Provider: Soledad Espinosa NP December 2, 2024    Call us at [phone] if you have any questions about your wounds, if you have redness or swelling around your wound, have a fever of 101 degrees Fahrenheit or greater or if you have any other problems or concerns. We answer the phone Monday through Friday 8 am to 4 pm, please leave a message as we check the voicemail frequently throughout the day. If you have a concern over the weekend, please leave a message and we will return your call Monday. If the need is urgent, go to the ER or urgent care.    If you had a positive experience please indicate that on your patient satisfaction survey form that [facility] will be sending you.    It was a  pleasure meeting with you today.  Thank you for allowing me and my team the privilege of caring for you today.  YOU are the reason we are here, and I truly hope we provided you with the excellent service you deserve.  Please let us know if there is anything else we can do for you so that we can be sure you are leaving completely satisfied with your care experience.      If you have any billing related questions please call the [facility] Business office at [phone]. The clinic staff does not handle billing related matters.    If you are scheduled to have a follow up appointment, you will receive a reminder call the day before your visit. On the appointment day please arrive 15 minutes prior to your appointment time. If you are unable to keep that appointment, please call the clinic to cancel or reschedule. If you are more than 10 minutes late or greater for your scheduled appointment time, the clinic policy is that you may be asked to reschedule.

## 2024-12-02 NOTE — PROGRESS NOTES
[facility]    ASSESSMENT:   Skin ulcer of right thigh with fat layer exposed (H)    Dementia    PLAN/DISCUSSION:   Improved wound in terms of size, silver nitrate applied.  Continue with protein supplementation  Continue to offload as much as possible  Wound care plan: Fibracol. Dressing will be performed by facility staff See bottom of note for detailed wound care and patient instructions  Dietary recommendations discussed, see AVS       HISTORY OF PRESENT ILLNESS:   Chelsey Casillas is a 84 year old female with  a ulceration of her right posterior thigh that started in April of 2024, it has since healed and then reopened. Denies fevers or chills. Hx of dementia and stroke. Has not been offloading as much as we would like, minimal protein supplementation.       Patient Active Problem List   Diagnosis    Chronic low back pain    Dementia without behavioral disturbance (H)    Fall    Falling episodes    Functional urinary incontinence    GERD (gastroesophageal reflux disease)    HTN (hypertension)    Knee pain    Pain in both knees    Major depression, single episode    Physical deconditioning    Primary osteoarthritis involving multiple joints    Chronic obstructive pulmonary disease, unspecified COPD type (H)    Recurrent major depressive disorder, remission status unspecified (H)    History of CVA (cerebrovascular accident)    Closed fracture of left distal humerus    Class 2 severe obesity due to excess calories with serious comorbidity in adult (H)    Skin ulcer of right thigh with fat layer exposed (H)       Current Outpatient Medications   Medication Sig Dispense Refill    Acetaminophen (TYLENOL PO) Take 1,000 mg by mouth 3 times daily      albuterol (PROAIR HFA/PROVENTIL HFA/VENTOLIN HFA) 108 (90 BASE) MCG/ACT Inhaler Inhale 2 puffs into the lungs every 4 hours as needed for shortness of breath / dyspnea or wheezing       diclofenac (VOLTAREN) 1 % topical gel Apply 4 g topically 2 times  daily And BID PRN for pain      ferrous sulfate (FE TABS) 325 (65 Fe) MG EC tablet Take 325 mg by mouth daily      gabapentin (NEURONTIN) 100 MG capsule Take 100 mg by mouth 3 times daily      guaiFENesin (ROBITUSSIN) 20 mg/mL liquid Take 200 mg by mouth every 4 hours as needed for cough      Lidocaine (LIDOCARE) 4 % Patch Place 1 patch onto the skin every 24 hours To prevent lidocaine toxicity, patient should be patch free for 12 hrs daily.      LORazepam (ATIVAN) 0.5 MG tablet Take 0.25 mg by mouth once a week      omeprazole 20 MG tablet Take 20 mg by mouth daily      pramoxine (PRAX) 1 % LOTN lotion Place rectally 2 times daily      risperiDONE (RISPERDAL) 0.25 MG tablet Take 0.25 mg by mouth daily      risperiDONE (RISPERDAL) 1 MG tablet TAKE 1 TABLET BY MOUTH AT BEDTIME 30 tablet 11    sertraline (ZOLOFT) 100 MG tablet Take 1 tablet (100 mg) by mouth At Bedtime 31 tablet 98    vitamin D3 (CHOLECALCIFEROL) 2000 units (50 mcg) tablet Take 1 tablet (2,000 Units) by mouth At Bedtime 30 tablet      No current facility-administered medications for this encounter.       VITALS: BP (!) 143/71 (BP Location: Right arm, Patient Position: Sitting, Cuff Size: Adult Regular)   Pulse 68   Temp 98  F (36.7  C)      PHYSICAL EXAM:  GENERAL: Patient is alert and oriented and in no acute distress  INTEGUMENTARY:   Wound (used by OP [facility] only) 07/30/24 0921 thigh Right posterior unspecified (Active)   Thickness/Stage full thickness 12/02/24 1219   Base granulating;epithelialization 12/02/24 1219   Periwound intact 12/02/24 1219   Periwound Temperature warm 12/02/24 1219   Periwound Skin Turgor soft 12/02/24 1219   Edges open 12/02/24 1219   Length (cm) 0.9 12/02/24 1219   Width (cm) 0.8 12/02/24 1219   Depth (cm) 0.3 12/02/24 1219   Wound (cm^2) 0.72 cm^2 12/02/24 1219   Wound Volume (cm^3) 0.22 cm^3 12/02/24 1219   Wound healing % 11.11 12/02/24 1219   Undermining [Depth (cm)/Location] 2-3 oclock/ 0.3 cm 12/02/24 1219    Drainage Characteristics/Odor serosanguineous 12/02/24 1219   Drainage Amount moderate 12/02/24 1219   Care, Wound chemical cautery applied;non-select wound debridement performed. 12/02/24 1219             PROCEDURES: Chemical debridement was performed with silver nitrate.     MDM: 30 minutes were spent on the date of the visit reviewing previous chart notes, evaluating patient and developing the treatment plan, this excludes any time spent on procedures    PATIENT INSTRUCTIONS      Further instructions from your care team         12/02/2024   Paige Casilals   1940    A DME order was not completed because the supplies are ordered by home care or at a care facility     Dressing changes outside of clinic are being performed by Staff at [facility]:  [phone] fax: [phone]     Plan 12/02/2024   Noted today that the dressing we removed was dated 11/27/24; please ensure every shift monitoring of this dressing along with skin integrity checks and that dressings are performed per scheduled frequency. Thank you.   Patient needs a high protein diet supplemented with protein drinks twice daily.  Patient needs to offload the right posterior thigh as directed below.  Patient needs a pressure redistribution surface beneath her when sitting (like a ROHO mosaic cushion)--on back order 12/2/24  We applied Silver Nitrate to the hypergranulation tissue today 12/02/24.  This may lead to temporary staining of the skin with increased drainage and discolored gray/black drainage.  This may be present for a few days and is a normal process.        Wound Dressing Change: Right Posterior Thigh   - Wash your hands with soap and water before you begin your dressing change and prepare a clean surface for dressings.  - Cleanse with mild unscented soap (such as Cetaphil, Cerave or Dove) and water; shower ok but please remove packing prior to shower.  - Apply small amount of VASHE on gauze, lay into wound  bed, let sit for 10 minutes, remove gauze (do not rinse) then:  - Primary dressing: Lightly pack with Fibracol collagen to fill depth of wound. This may dissolve in between dressing changes.  - PRN apply light layer of zinc moisture barrier paste to periwound skin to treat maceration  - Secondary dressing: Cover with Mepilex dressing  Change three times a week (Monday, Wednesday and Friday) after showers and as needed for saturation/soilage/dislodgement      Repositioning:  Bed: Reposition MINIMALLY every 1-2 hours in bed to relieve pressure and promote perfusion to tissue.  Chair: When up to the chair, sit on a chair cushion when up to the chair. Do not sit for longer than one hour total before returning to bed for at least 60 minutes to relieve pressure and promote perfusion to the tissue.       A diet high in protein is important for wound healing, we recommend getting 90 grams of protein per day.   Taking protein shakes or bars are a good way to get extra protein in your diet.      Good sources of protein:  Pork 26g per 3 oz  Whey protein powder - 24g per scoop (on average)  Greek yogurt - 23g per 8oz   Chicken or Turkey - 23g per 3oz  Fish - 20-25g per 3oz  Beef - 18-23g per 3oz  Tofu - 10g per 1/2 cup  Navy beans - 20g per cup  Cottage cheese - 14g per 1/2 cup   Lentils - 13g per 1/4 cup  Beef jerky 13g per 1oz  2% milk - 8g per cup  Peanut butter - 8g per 2 tablespoons  Eggs - 6g per egg  Mixed nuts - 6g per 2oz     It is recommended that you obtain a Roho Mosaic Cushion. It can be purchased online on Amazon, or through your local [employer] (Durable Medical Equipment) store. They come in a variety of sizes - you can choose which size works best for your chair.   You only need to purchase one as it can be transferred between chairs.              Main Provider: Soledad Espinosa NP December 2, 2024    Call us at [phone] if you have any questions about your wounds, if you have redness or swelling around your wound,  have a fever of 101 degrees Fahrenheit or greater or if you have any other problems or concerns. We answer the phone Monday through Friday 8 am to 4 pm, please leave a message as we check the voicemail frequently throughout the day. If you have a concern over the weekend, please leave a message and we will return your call Monday. If the need is urgent, go to the ER or urgent care.    If you had a positive experience please indicate that on your patient satisfaction survey form that [facility] will be sending you.    It was a pleasure meeting with you today.  Thank you for allowing me and my team the privilege of caring for you today.  YOU are the reason we are here, and I truly hope we provided you with the excellent service you deserve.  Please let us know if there is anything else we can do for you so that we can be sure you are leaving completely satisfied with your care experience.      If you have any billing related questions please call the [facility] Business office at [phone]. The clinic staff does not handle billing related matters.    If you are scheduled to have a follow up appointment, you will receive a reminder call the day before your visit. On the appointment day please arrive 15 minutes prior to your appointment time. If you are unable to keep that appointment, please call the clinic to cancel or reschedule. If you are more than 10 minutes late or greater for your scheduled appointment time, the clinic policy is that you may be asked to reschedule.         Electronically signed by Soledad Espinosa CNP on December 2, 2024

## 2024-12-02 NOTE — TELEPHONE ENCOUNTER
Discussed with Soledad Espinosa NP who gave the ok to have the next appointment changed to a video visit. Returned call to Shahriar and informed her of this. Next appointment updated.

## 2024-12-02 NOTE — TELEPHONE ENCOUNTER
[facility]    Who is the name of the provider?:    KAYLA Espinosa  What is the location you see this provider at/preferred location?: [facility] [place] [facility] [place]  Person calling / Facility: WILLY Anand  Phone number:  [phone]   Nurse call back needed:  ??  Can we leave a detailed message on this number?  YES     Reason for call:  Can the 12/30/24 appointment be changed to virtual or phone instead of inpatient?    Pharmacy location:  NA

## 2024-12-30 ENCOUNTER — HOSPITAL ENCOUNTER (OUTPATIENT)
Dept: WOUND CARE | Facility: CLINIC | Age: 84
Discharge: HOME | End: 2024-12-30
Payer: COMMERCIAL

## 2024-12-30 DIAGNOSIS — L97.112 SKIN ULCER OF RIGHT THIGH WITH FAT LAYER EXPOSED (H): Primary | ICD-10-CM

## 2024-12-30 PROCEDURE — G0463 HOSPITAL OUTPT CLINIC VISIT: HCPCS | Mod: GT

## 2024-12-30 NOTE — PROGRESS NOTES
Patient and caregiver present for virtual visit for wound care visit.   Certified Wound Care Nurse time spent evaluating patient record, and completed a full evaluation (virtual) ; provided recommendation based on treatment plan.   Reviewed discharge instructions, patient education, and discussed plan of care with appropriate medical team staff members and patient and/or family members.

## 2024-12-30 NOTE — PROGRESS NOTES
[facility]-virtual    Telephone time: 12:22-12:28    ASSESSMENT:   Skin ulcer of right thigh with fat layer exposed (H)    Dementia     PLAN/DISCUSSION:   Wound is healed  Continue to offload  Dietary recommendations discussed, see AVS       HISTORY OF PRESENT ILLNESS:   Chelsey Casillas is a 84 year old female with  a ulceration of her right posterior thigh that started in April of 2024, it has since healed and then reopened. Denies fevers or chills. Hx of dementia and stroke. Has not been offloading as much as we would like, minimal protein supplementation.       Patient Active Problem List   Diagnosis    Chronic low back pain    Dementia without behavioral disturbance (H)    Fall    Falling episodes    Functional urinary incontinence    GERD (gastroesophageal reflux disease)    HTN (hypertension)    Knee pain    Pain in both knees    Major depression, single episode    Physical deconditioning    Primary osteoarthritis involving multiple joints    Chronic obstructive pulmonary disease, unspecified COPD type (H)    Recurrent major depressive disorder, remission status unspecified (H)    History of CVA (cerebrovascular accident)    Closed fracture of left distal humerus    Class 2 severe obesity due to excess calories with serious comorbidity in adult (H)    Skin ulcer of right thigh with fat layer exposed (H)       Current Outpatient Medications   Medication Sig Dispense Refill    Acetaminophen (TYLENOL PO) Take 1,000 mg by mouth 3 times daily      albuterol (PROAIR HFA/PROVENTIL HFA/VENTOLIN HFA) 108 (90 BASE) MCG/ACT Inhaler Inhale 2 puffs into the lungs every 4 hours as needed for shortness of breath / dyspnea or wheezing       diclofenac (VOLTAREN) 1 % topical gel Apply 4 g topically 2 times daily And BID PRN for pain      ferrous sulfate (FE TABS) 325 (65 Fe) MG EC tablet Take 325 mg by mouth daily      gabapentin (NEURONTIN) 100 MG capsule Take 100 mg by mouth 3 times daily       guaiFENesin (ROBITUSSIN) 20 mg/mL liquid Take 200 mg by mouth every 4 hours as needed for cough      Lidocaine (LIDOCARE) 4 % Patch Place 1 patch onto the skin every 24 hours To prevent lidocaine toxicity, patient should be patch free for 12 hrs daily.      LORazepam (ATIVAN) 0.5 MG tablet Take 0.25 mg by mouth once a week      omeprazole 20 MG tablet Take 20 mg by mouth daily      pramoxine (PRAX) 1 % LOTN lotion Place rectally 2 times daily      risperiDONE (RISPERDAL) 0.25 MG tablet Take 0.25 mg by mouth daily      risperiDONE (RISPERDAL) 1 MG tablet TAKE 1 TABLET BY MOUTH AT BEDTIME 30 tablet 11    sertraline (ZOLOFT) 100 MG tablet Take 1 tablet (100 mg) by mouth At Bedtime 31 tablet 98    vitamin D3 (CHOLECALCIFEROL) 2000 units (50 mcg) tablet Take 1 tablet (2,000 Units) by mouth At Bedtime 30 tablet      No current facility-administered medications for this encounter.       VITALS: There were no vitals taken for this visit.     PHYSICAL EXAM:  GENERAL: Patient is alert and oriented and in no acute distress    INTEGUMENTARY:   Wound (used by OP [facility] only) 07/30/24 0921 thigh Right posterior unspecified (Active)   Thickness/Stage full thickness 12/30/24 1212   Base closed/resurfaced 12/30/24 1212   Periwound intact 12/30/24 1212   Periwound Temperature warm 12/30/24 1212   Periwound Skin Turgor soft 12/30/24 1212   Edges open 12/30/24 1212   Length (cm) 0 12/30/24 1212   Width (cm) 0 12/30/24 1212   Depth (cm) 0 12/30/24 1212   Wound (cm^2) 0 cm^2 12/30/24 1212   Wound Volume (cm^3) 0 cm^3 12/30/24 1212   Wound healing % 100 12/30/24 1212   Undermining [Depth (cm)/Location] 2-3 oclock/ 0.3 cm 12/02/24 1219   Drainage Characteristics/Odor serosanguineous 12/02/24 1219   Drainage Amount none 12/30/24 1212   Care, Wound chemical cautery applied;non-select wound debridement performed. 12/02/24 1219       No images are attached to the encounter.        MDM: 30 minutes were spent on the date of the visit reviewing  previous chart notes, evaluating patient and developing the treatment plan, this excludes any time spent on procedures    PATIENT INSTRUCTIONS      Further instructions from your care team         12/30/2024   Paige Casillas   1940      A DME order was not completed not needed     Dressing changes outside of clinic are being performed by Staff at [facility] [place]:  [phone] fax: [phone]     Plan 12/30/2024   No need to return here.   Please ensure every shift monitoring for skin integrity checks. Thank you.   Patient needs a pressure redistribution surface beneath her always when sitting (like a ROHO mosaic cushion)     healed: Right Posterior Thigh   Resumed routine hygiene and moisturizing  Observe daily and PRN with repositioning and toileting  NO cover dressing needed     Repositioning: recommend to continue offloading pressure points;  Bed: Reposition MINIMALLY every 1-2 hours in bed to relieve pressure and promote perfusion to tissue.  Chair: When up to the chair, sit on a chair cushion when up to the chair. Offload with ROHO cushion. Check skin integrity with every return to bed to watch for new breakdown.     A diet high in protein is important for wound healing, we recommend getting 90 grams of protein per day.   Taking protein shakes or bars are a good way to get extra protein in your diet.      Good sources of protein:  Pork 26g per 3 oz  Whey protein powder - 24g per scoop (on average)  Greek yogurt - 23g per 8oz   Chicken or Turkey - 23g per 3oz  Fish - 20-25g per 3oz  Beef - 18-23g per 3oz  Tofu - 10g per 1/2 cup  Navy beans - 20g per cup  Cottage cheese - 14g per 1/2 cup   Lentils - 13g per 1/4 cup  Beef jerky 13g per 1oz  2% milk - 8g per cup  Peanut butter - 8g per 2 tablespoons  Eggs - 6g per egg  Mixed nuts - 6g per 2oz     It is recommended that you obtain a Roho Mosaic Cushion. It can be purchased online on Amazon, or through your local DME ([employer]) store. They come in a variety of sizes - you can choose which size works best for your chair.   You only need to purchase one as it can be transferred between chairs.                     Main Provider: Soledad Espinosa NP December 30, 2024    Call us at [phone] if you have any questions about your wounds, if you have redness or swelling around your wound, have a fever of 101 degrees Fahrenheit or greater or if you have any other problems or concerns. We answer the phone Monday through Friday 8 am to 4 pm, please leave a message as we check the voicemail frequently throughout the day. If you have a concern over the weekend, please leave a message and we will return your call Monday. If the need is urgent, go to the ER or urgent care.    If you had a positive experience please indicate that on your patient satisfaction survey form that [facility] will be sending you.    It was a pleasure meeting with you today.  Thank you for allowing me and my team the privilege of caring for you today.  YOU are the reason we are here, and I truly hope we provided you with the excellent service you deserve.  Please let us know if there is anything else we can do for you so that we can be sure you are leaving completely satisfied with your care experience.      If you have any billing related questions please call the [facility] Business office at [phone]. The clinic staff does not handle billing related matters.    If you are scheduled to have a follow up appointment, you will receive a reminder call the day before your visit. On the appointment day please arrive 15 minutes prior to your appointment time. If you are unable to keep that appointment, please call the clinic to cancel or reschedule. If you are more than 10 minutes late or greater for your scheduled appointment time, the clinic policy is that you may be asked to reschedule.         Electronically signed by Soledad Espinosa CNP on December 30, 2024

## 2024-12-30 NOTE — DISCHARGE INSTRUCTIONS
12/30/2024   Paige Casillas   1940      A DME order was not completed not needed     Dressing changes outside of clinic are being performed by Staff at [facility]:  [phone] fax: [phone]     Plan 12/30/2024   No need to return here.   Please ensure every shift monitoring for skin integrity checks. Thank you.   Patient needs a pressure redistribution surface beneath her always when sitting (like a ROHO mosaic cushion)     healed: Right Posterior Thigh   Resumed routine hygiene and moisturizing  Observe daily and PRN with repositioning and toileting  NO cover dressing needed     Repositioning: recommend to continue offloading pressure points;  Bed: Reposition MINIMALLY every 1-2 hours in bed to relieve pressure and promote perfusion to tissue.  Chair: When up to the chair, sit on a chair cushion when up to the chair. Offload with ROHO cushion. Check skin integrity with every return to bed to watch for new breakdown.     A diet high in protein is important for wound healing, we recommend getting 90 grams of protein per day.   Taking protein shakes or bars are a good way to get extra protein in your diet.      Good sources of protein:  Pork 26g per 3 oz  Whey protein powder - 24g per scoop (on average)  Greek yogurt - 23g per 8oz   Chicken or Turkey - 23g per 3oz  Fish - 20-25g per 3oz  Beef - 18-23g per 3oz  Tofu - 10g per 1/2 cup  Navy beans - 20g per cup  Cottage cheese - 14g per 1/2 cup   Lentils - 13g per 1/4 cup  Beef jerky 13g per 1oz  2% milk - 8g per cup  Peanut butter - 8g per 2 tablespoons  Eggs - 6g per egg  Mixed nuts - 6g per 2oz     It is recommended that you obtain a Roho Mosaic Cushion. It can be purchased online on Amazon, or through your local DME ([employer]) store. They come in a variety of sizes - you can choose which size works best for your chair.   You only need to purchase one as it can be transferred between chairs.                      Main Provider: Soledad Espinosa NP December 30, 2024    Call us at [phone] if you have any questions about your wounds, if you have redness or swelling around your wound, have a fever of 101 degrees Fahrenheit or greater or if you have any other problems or concerns. We answer the phone Monday through Friday 8 am to 4 pm, please leave a message as we check the voicemail frequently throughout the day. If you have a concern over the weekend, please leave a message and we will return your call Monday. If the need is urgent, go to the ER or urgent care.    If you had a positive experience please indicate that on your patient satisfaction survey form that [facility] will be sending you.    It was a pleasure meeting with you today.  Thank you for allowing me and my team the privilege of caring for you today.  YOU are the reason we are here, and I truly hope we provided you with the excellent service you deserve.  Please let us know if there is anything else we can do for you so that we can be sure you are leaving completely satisfied with your care experience.      If you have any billing related questions please call the [facility] Business office at [phone]. The clinic staff does not handle billing related matters.    If you are scheduled to have a follow up appointment, you will receive a reminder call the day before your visit. On the appointment day please arrive 15 minutes prior to your appointment time. If you are unable to keep that appointment, please call the clinic to cancel or reschedule. If you are more than 10 minutes late or greater for your scheduled appointment time, the clinic policy is that you may be asked to reschedule.

## 2025-02-12 ENCOUNTER — NURSING HOME VISIT (OUTPATIENT)
Dept: GERIATRICS | Facility: CLINIC | Age: 85
End: 2025-02-12
Payer: COMMERCIAL

## 2025-02-12 VITALS
OXYGEN SATURATION: 96 % | HEIGHT: 60 IN | SYSTOLIC BLOOD PRESSURE: 143 MMHG | DIASTOLIC BLOOD PRESSURE: 84 MMHG | HEART RATE: 68 BPM | RESPIRATION RATE: 18 BRPM | BODY MASS INDEX: 31.8 KG/M2 | TEMPERATURE: 97.7 F | WEIGHT: 162 LBS

## 2025-02-12 NOTE — PROGRESS NOTES
[facility] GERIATRICS  Chief Complaint   Patient presents with    half-way Regulatory     [place] Medical Record Number:  [id number]  Place of Service where encounter took place:  [facility]  () [[id number]]    HPI:    Chelsey Casillas  is 85 year old (1940), who is being seen today for a federally mandated E/M visit. Today's concerns are:  {FGS DX:174553}    ALLERGIES:Amlodipine  PAST MEDICAL HISTORY:   Past Medical History:   Diagnosis Date    Arthritis     Chronic low back pain 03/17/2015    Closed fracture of left distal humerus 2018    COPD (chronic obstructive pulmonary disease) (H)     Dementia with psychosis (H) 11/01/2016    Fall 02/27/2013    Falling episodes 09/24/2016    Functional urinary incontinence 03/07/2013    GERD (gastroesophageal reflux disease) 04/03/2018    History of CVA (cerebrovascular accident)     R MCA    HTN (hypertension) 04/03/2018    Knee pain 03/07/2013    Major depression, single episode 04/16/2015    Pain in both knees 01/30/2014    Physical deconditioning 03/07/2013    Severe major depression with psychotic features (H) 07/12/2016    Skin tear 03/07/2013    Weakness 06/18/2015     PAST SURGICAL HISTORY:   has no past surgical history on file.  FAMILY HISTORY: Family history is unknown by patient.  SOCIAL HISTORY:  reports that she has quit smoking. Her smoking use included cigarettes. She has never used smokeless tobacco. She reports that she does not drink alcohol and does not use drugs.    MEDICATIONS:  MED REC REQUIRED{TIP  Click the link below to document or use med rec list, use list to pull in response :644202}  Post Medication Reconciliation Status: {MED REC LIST:520879}         Review of your medicines            Accurate as of February 12, 2025  2:07 PM. If you have any questions, ask your nurse or doctor.                CONTINUE these medicines which have NOT CHANGED        Dose / Directions   albuterol 108 (90 Base) MCG/ACT  inhaler  Commonly known as: PROAIR HFA/PROVENTIL HFA/VENTOLIN HFA      Dose: 2 puff  Inhale 2 puffs into the lungs every 4 hours as needed for shortness of breath / dyspnea or wheezing  Refills: 0     diclofenac 1 % topical gel  Commonly known as: VOLTAREN      Dose: 4 g  Apply 4 g topically 2 times daily And BID PRN for pain  Refills: 0     ferrous sulfate 325 (65 Fe) MG EC tablet  Commonly known as: FE TABS      Dose: 325 mg  Take 325 mg by mouth daily  Refills: 0     gabapentin 100 MG capsule  Commonly known as: NEURONTIN      Dose: 100 mg  Take 100 mg by mouth 3 times daily  Refills: 0     guaiFENesin 20 mg/mL liquid  Commonly known as: ROBITUSSIN      Dose: 200 mg  Take 200 mg by mouth every 4 hours as needed for cough  Refills: 0     Lidocaine 4 % Patch  Commonly known as: LIDOCARE      Dose: 1 patch  Place 1 patch onto the skin every 24 hours To prevent lidocaine toxicity, patient should be patch free for 12 hrs daily.  Refills: 0     LORazepam 0.5 MG tablet  Commonly known as: ATIVAN      Dose: 0.25 mg  Take 0.25 mg by mouth once a week  Refills: 0     omeprazole 20 MG tablet      Dose: 20 mg  Take 20 mg by mouth daily  Refills: 0     pramoxine 1 % Lotn lotion  Commonly known as: PRAX      Place rectally 2 times daily  Refills: 0     * risperiDONE 1 MG tablet  Commonly known as: risperDAL  Used for: Dementia without behavioral disturbance (H)      Dose: 1 mg  TAKE 1 TABLET BY MOUTH AT BEDTIME  Quantity: 30 tablet  Refills: 11     * risperiDONE 0.25 MG tablet  Commonly known as: risperDAL      Dose: 0.25 mg  Take 0.25 mg by mouth daily  Refills: 0     sertraline 100 MG tablet  Commonly known as: ZOLOFT  Used for: Recurrent major depressive disorder, in partial remission      Dose: 100 mg  Take 1 tablet (100 mg) by mouth At Bedtime  Quantity: 31 tablet  Refills: 98     TYLENOL PO      Dose: 1,000 mg  Take 1,000 mg by mouth 3 times daily  Refills: 0     vitamin D3 50 mcg (2000 units) tablet  Commonly known as:  CHOLECALCIFEROL  Used for: Vitamin D deficiency      Dose: 1 tablet  Take 1 tablet (2,000 Units) by mouth At Bedtime  Quantity: 30 tablet  Refills: 0           * This list has 2 medication(s) that are the same as other medications prescribed for you. Read the directions carefully, and ask your doctor or other care provider to review them with you.               ***    Case Management:  I have reviewed the care plan and MDS and do agree with the plan. Patient's desire to return to the community is {FGS RETURN TO COMMUNITY:243208}. Information reviewed:  Medications, vital signs, orders, and nursing notes.    ROS:  {ROS FGS:524361}    Vitals:  BP (!) 143/84   Pulse 68   Temp 97.7  F (36.5  C)   Resp 18   Ht 1.524 m (5')   Wt 73.5 kg (162 lb)   SpO2 96%   BMI 31.64 kg/m    Body mass index is 31.64 kg/m .  Exam:  {Nursing home physical exam :134410}    Lab/Diagnostic data:   {fgslab:050167}    ASSESSMENT/PLAN  {FGS DX2:901849}    {fgsorders:669990}  ***    Electronically signed by:  Elena Carr CNA***

## 2025-02-12 NOTE — LETTER
2/12/2025      Chelsey Casillas  [facility]  [address] [place] [address]        No notes on file      Sincerely,        ALCIRA Morillo CNP    Electronically signed

## 2025-02-28 PROBLEM — F32.3 CURRENT SEVERE EPISODE OF MAJOR DEPRESSIVE DISORDER WITH PSYCHOTIC FEATURES, UNSPECIFIED WHETHER RECURRENT (H): Status: ACTIVE | Noted: 2025-02-28

## 2025-02-28 PROBLEM — L97.112: Status: RESOLVED | Noted: 2024-07-30 | Resolved: 2025-02-28

## 2025-03-25 ENCOUNTER — PATIENT OUTREACH (OUTPATIENT)
Dept: GERIATRIC MEDICINE | Facility: CLINIC | Age: 85
End: 2025-03-25
Payer: COMMERCIAL

## 2025-03-25 NOTE — PROGRESS NOTES
[facility] Care Coordination Contact  [facility] Six-Month Assessment    6 month assessment completed on 03/10/25 with Chelsey.    ER visits: No  Hospitalizations: No  TCU stays: No  Significant health status changes: 03/10/25  Falls/Injuries: No  ADL/IADL changes: No    Reviewed Institutional Assessment and updated as needed.     Will see member in 6 months for an annual health risk assessment.   Encouraged member to call CC with any questions or concerns in the meantime.     Yeny Chaudhry BSN/PHN/WCC/CMC [facility]  Long Term Care/ Care Coordinator  [address] [place] [address]   [address]  [place] [address]  [email or web address]   [email or web address]     Cell: [phone]  Office: [phone]  Fax: [phone]

## 2025-04-04 ENCOUNTER — NURSING HOME VISIT (OUTPATIENT)
Dept: GERIATRICS | Facility: CLINIC | Age: 85
End: 2025-04-04
Payer: COMMERCIAL

## 2025-04-04 VITALS
BODY MASS INDEX: 31.41 KG/M2 | OXYGEN SATURATION: 95 % | WEIGHT: 160 LBS | DIASTOLIC BLOOD PRESSURE: 75 MMHG | SYSTOLIC BLOOD PRESSURE: 145 MMHG | HEART RATE: 79 BPM | RESPIRATION RATE: 18 BRPM | HEIGHT: 60 IN | TEMPERATURE: 97.6 F

## 2025-04-04 DIAGNOSIS — I69.354 HEMIPARESIS AFFECTING LEFT SIDE AS LATE EFFECT OF CEREBROVASCULAR ACCIDENT (CVA) (H): ICD-10-CM

## 2025-04-04 DIAGNOSIS — J44.9 CHRONIC OBSTRUCTIVE PULMONARY DISEASE, UNSPECIFIED COPD TYPE (H): Primary | ICD-10-CM

## 2025-04-04 DIAGNOSIS — F01.C18 SEVERE VASCULAR DEMENTIA WITH OTHER BEHAVIORAL DISTURBANCE (H): ICD-10-CM

## 2025-04-04 DIAGNOSIS — F32.3 CURRENT SEVERE EPISODE OF MAJOR DEPRESSIVE DISORDER WITH PSYCHOTIC FEATURES, UNSPECIFIED WHETHER RECURRENT (H): ICD-10-CM

## 2025-04-04 DIAGNOSIS — K21.9 CHRONIC GASTROESOPHAGEAL REFLUX DISEASE: ICD-10-CM

## 2025-04-04 PROCEDURE — 99309 SBSQ NF CARE MODERATE MDM 30: CPT | Performed by: INTERNAL MEDICINE

## 2025-04-04 NOTE — LETTER
4/4/2025      Chelsey Casillas  [address] [place] [address]        No notes on file      Sincerely,        Inga Salmeron MD    Electronically signed

## 2025-04-13 ENCOUNTER — TELEPHONE (OUTPATIENT)
Dept: GERIATRICS | Facility: CLINIC | Age: 85
End: 2025-04-13
Payer: COMMERCIAL

## 2025-04-13 NOTE — TELEPHONE ENCOUNTER
DON called as she was in the building.  She is also a WOC RN and called about Chelsey.    Left heel DTI present and thinking it is she is more contracted.  Treatment will be prescribed as not open at this time.  Suggest that therapies may help of way to offload heels given contractures.    Ok to write up orders for above.    ALCIRA Vasquez CNP

## 2025-04-20 NOTE — PROGRESS NOTES
Chelsey Casillas is a 85 year old female seen April 4, 2025 at [facility] where she has resided for 2 years (admit 5/2023) seen for regulatory visit and to follow up progressive dementia  Pt is seen in her room lying flat in bed.   Needs assist for bed mobility and transfer up to her WC.   Reports she is feeling okay, voices no concerns today.       By chart review, pt admitted to LT in 2018 after she had had multiple hospitalizations for LE weakness and falls.  Noted to have sequelae of right MCA infarct on head imaging   After admission she had another fall in which she suffered a distal left humerus fracture.       She had anemia of unknown etiology in Nov 2022, now resolved   No workup undertaken given comfort focus.     Biggest concern has been depression with psychosis, distressing hallucinations and behavioral symptoms.   She was followed by Dr Hutchins, over time.   Improved and eventually able to be weaned off risperidone in March 2023     Due to recurrence of significant behaviors, risperidone was restarted later in 2023   She is followed by ACP   Pt had a mildly symptomatic May 2024 COVID19 infection, treated with molnupiravir.   In June 2024 she developed a right thigh wound and cellulitis, improved with abx   She was followed by the  [facility] and photos reviewed; at 12/30/24 visit wound reported to be healed.         Past Medical History:   Diagnosis Date    Arthritis     Chronic low back pain 03/17/2015    Closed fracture of left distal humerus 2018    COPD (chronic obstructive pulmonary disease) (H)     Dementia with psychosis (H) 11/01/2016    Fall 02/27/2013    Falling episodes 09/24/2016    Functional urinary incontinence 03/07/2013    GERD (gastroesophageal reflux disease) 04/03/2018    History of CVA (cerebrovascular accident)     R MCA    HTN (hypertension) 04/03/2018    Knee pain 03/07/2013    Major depression, single episode 04/16/2015    Pain in both knees 01/30/2014    Physical  deconditioning 03/07/2013    Severe major depression with psychotic features (H) 07/12/2016    Skin tear 03/07/2013    Weakness 06/18/2015     SH: Single no family.   A friend Tasha Garcia is first contact  Lived in [facility] in [facility] from September 2018 to May 2023   Prior to that she lived in the [facility] apartment, then at [facility]   Past smoker     ROS: limited by pt's dementia  BIMS 9/15   PHQ9 5/27    Weight in Dec 2022 was 165 lbs   >>>in Feb 2024 was 193 lbs     Wt Readings from Last 5 Encounters:   04/04/25 72.6 kg (160 lb)   02/12/25 73.5 kg (162 lb)   12/16/24 78.9 kg (174 lb)   11/04/24 80.3 kg (177 lb)   10/24/24 81.6 kg (180 lb)     EXAM:  NAD  BP (!) 145/75   Pulse 79   Temp 97.6  F (36.4  C)   Resp 18   Ht 1.524 m (5')   Wt 72.6 kg (160 lb)   SpO2 95%   BMI 31.25 kg/m     +kyphosis  Neck supple without adenopathy  Lungs decreased BS, no rales or wheeze  Heart RRR s1s2   Abd soft, NT, no distention or guarding, +BS  Ext without edema   Neuro: limited history, WC bound  Psych: affect okay, pleasant and smiling    Labs reviewed, CBC and BMP within normal limits in October        Head CT 2016      1. No acute intracranial pathology.   2. Chronic sequelae of right MCA infarct and bilateral cerebral small vessel ischemic disease.        IMP/PLAN:   (K21.9) Gastroesophageal reflux disease without esophagitis   Comment: symptomatic with reflux symptoms   Plan: omeprazole 20 mg/day    Remains on Fe sulfate 325 mg day for prior anemia, but with improvement in hgb and Fe potentially worsening GI symptoms, would consider stopping Fe and rechecking levels in a month or two.     Prn TUMS for symptoms   Follow hgb PRN     (F33.9) Recurrent major depressive disorder, remission status unspecified (H)    Comment: longstanding   Plan: sertraline 100 mg/day   ACP to continue to follow       (J44.9) Chronic obstructive pulmonary disease, unspecified COPD type (H)  Comment: smoker  until admission   Plan: albuterol MDI and guaifenesin PRN     (I67.9) Cerebral vascular disease  (Z86.73) History of CVA (cerebrovascular accident)  (I69.354) Hemiparesis affecting left side as late effect of cerebrovascular accident (CVA) (H)  Comment: right MCA stroke years ago, has resided in LTC since 2018     Plan: LTC for assist with med admin, meals, activity and all cares     (F01.C18) 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

## 2025-06-12 DIAGNOSIS — Z53.9 DIAGNOSIS NOT YET DEFINED: Primary | ICD-10-CM

## 2025-06-12 PROCEDURE — G0180 MD CERTIFICATION HHA PATIENT: HCPCS | Performed by: INTERNAL MEDICINE

## 2025-06-13 ENCOUNTER — NURSING HOME VISIT (OUTPATIENT)
Dept: GERIATRICS | Facility: CLINIC | Age: 85
End: 2025-06-13
Payer: COMMERCIAL

## 2025-06-13 VITALS
HEART RATE: 98 BPM | HEIGHT: 62 IN | OXYGEN SATURATION: 94 % | RESPIRATION RATE: 14 BRPM | BODY MASS INDEX: 28.52 KG/M2 | DIASTOLIC BLOOD PRESSURE: 84 MMHG | WEIGHT: 155 LBS | TEMPERATURE: 98.7 F | SYSTOLIC BLOOD PRESSURE: 127 MMHG

## 2025-06-13 DIAGNOSIS — Z51.5 HOSPICE CARE PATIENT: ICD-10-CM

## 2025-06-13 DIAGNOSIS — F01.C18 SEVERE VASCULAR DEMENTIA WITH OTHER BEHAVIORAL DISTURBANCE (H): ICD-10-CM

## 2025-06-13 DIAGNOSIS — L89.620 PRESSURE INJURY OF LEFT HEEL, UNSTAGEABLE (H): Primary | ICD-10-CM

## 2025-06-13 DIAGNOSIS — Z86.73 HISTORY OF CVA (CEREBROVASCULAR ACCIDENT): ICD-10-CM

## 2025-06-13 RX ORDER — METRONIDAZOLE 500 MG/1
500 TABLET ORAL DAILY
COMMUNITY
Start: 2025-06-13

## 2025-06-13 NOTE — PROGRESS NOTES
"[facility] GERIATRICS  Chief Complaint   Patient presents with    [facility] [place]:  [id number]  Place of Service where encounter took place:  [facility]  () [[id number]]    HPI:    Chelsey Casillas  is 85 year old (1940), who is being seen today for a federally mandated E/M visit.     By chart review, patient was last hospitalized at [facility] September 2016. She previously resided at [facility] and admitted to LTC September 2018. She had a subsequent fall with left humerus fracture that month, workup including CT head noted a chronic right MCA infarct at that time. 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 She has followed with ACP Dr Hutchins and successfully off of Risperidone since March 2023. She also has a history of COPD without oxygen use. Anemia noted on routine lab work November 2022, no evidence of active bleeding and she was started on iron. No additional workup per goals of care for comfort. Admitted to the above facility for permanent placement. Resumed on risperidone for combative behavior. Psychiatry is following and she receives lorazepam on bath days for combativeness, has been helpful.  Symptomatic COVID infection May 2024, treated with molnupiravir. Followed with wound clinic for right thigh wound which eventually healed. Now with left heel pressure wound. Admitted to Hospice 5/31 per goals of care.      Today's concerns are:  Seen today for follow-up on multiple conditions in her room in LTC resting abed. Not getting out of bed as much lately because she \"[doesn't] want to.\" She denies pain. Would like a mocha. Denies CP, SOB, changes in b/b habits, fever/chills.    Management reviewed with WOC on site, increased green drainage from heel wound, sloughy, wondering about topical Flagyl for pseudomonas infection.    ALLERGIES:Amlodipine  PAST MEDICAL HISTORY: "   Past Medical History:   Diagnosis Date    Arthritis     Chronic low back pain 03/17/2015    Closed fracture of left distal humerus 2018    COPD (chronic obstructive pulmonary disease) (H)     Dementia with psychosis (H) 11/01/2016    Fall 02/27/2013    Falling episodes 09/24/2016    Functional urinary incontinence 03/07/2013    GERD (gastroesophageal reflux disease) 04/03/2018    History of CVA (cerebrovascular accident)     R MCA    HTN (hypertension) 04/03/2018    Knee pain 03/07/2013    Major depression, single episode 04/16/2015    Pain in both knees 01/30/2014    Physical deconditioning 03/07/2013    Severe major depression with psychotic features (H) 07/12/2016    Skin tear 03/07/2013    Weakness 06/18/2015     PAST SURGICAL HISTORY:   has no past surgical history on file.  FAMILY HISTORY: Family history is unknown by patient.  SOCIAL HISTORY:  reports that she has quit smoking. Her smoking use included cigarettes. She has never used smokeless tobacco. She reports that she does not drink alcohol and does not use drugs.    MEDICATIONS:  MED REC REQUIRED  Post Medication Reconciliation Status: patient was not discharged from an inpatient facility or TCU         Review of your medicines            Accurate as of June 13, 2025 10:14 AM. If you have any questions, ask your nurse or doctor.                CONTINUE these medicines which have NOT CHANGED        Dose / Directions   albuterol 108 (90 Base) MCG/ACT inhaler  Commonly known as: PROAIR HFA/PROVENTIL HFA/VENTOLIN HFA      Dose: 2 puff  Inhale 2 puffs into the lungs every 4 hours as needed for shortness of breath / dyspnea or wheezing  Refills: 0     ferrous sulfate 325 (65 Fe) MG EC tablet  Commonly known as: FE TABS      Dose: 325 mg  Take 325 mg by mouth daily  Refills: 0     gabapentin 100 MG capsule  Commonly known as: NEURONTIN      Dose: 100 mg  Take 100 mg by mouth 3 times daily  Refills: 0     guaiFENesin 20 mg/mL liquid  Commonly known as:  "ROBITUSSIN      Dose: 200 mg  Take 200 mg by mouth every 4 hours as needed for cough  Refills: 0     Lidocaine 4 % Patch  Commonly known as: LIDOCARE      Dose: 1 patch  Place 1 patch onto the skin every 24 hours To prevent lidocaine toxicity, patient should be patch free for 12 hrs daily.  Refills: 0     LORazepam 0.5 MG tablet  Commonly known as: ATIVAN      Dose: 0.25 mg  Take 0.25 mg by mouth once a week  Refills: 0     omeprazole 20 MG tablet      Dose: 20 mg  Take 20 mg by mouth daily  Refills: 0     risperiDONE 1 MG tablet  Commonly known as: risperDAL  Used for: Dementia without behavioral disturbance (H)      Dose: 1 mg  TAKE 1 TABLET BY MOUTH AT BEDTIME  Quantity: 30 tablet  Refills: 11     sertraline 100 MG tablet  Commonly known as: ZOLOFT  Used for: Recurrent major depressive disorder, in partial remission      Dose: 100 mg  Take 1 tablet (100 mg) by mouth At Bedtime  Quantity: 31 tablet  Refills: 98     TYLENOL PO      Dose: 1,000 mg  Take 1,000 mg by mouth 3 times daily  Refills: 0     vitamin D3 50 mcg (2000 units) tablet  Commonly known as: CHOLECALCIFEROL  Used for: Vitamin D deficiency      Dose: 1 tablet  Take 1 tablet (2,000 Units) by mouth At Bedtime  Quantity: 30 tablet  Refills: 0               Case Management:  I have reviewed the care plan and MDS and do agree with the plan. Patient's desire to return to the community is not present. Information reviewed:  Medications, vital signs, orders, and nursing notes.    ROS:  Limited secondary to cognitive impairment but today pt reports the above    Vitals:  /84   Pulse 98   Temp 98.7  F (37.1  C)   Resp 14   Ht 1.575 m (5' 2\")   Wt 70.3 kg (155 lb)   SpO2 94%   BMI 28.35 kg/m    Body mass index is 28.35 kg/m .  Exam:  GENERAL APPEARANCE:  Alert, in no distress  RESP:  respiratory effort and palpation of chest normal, lungs clear to auscultation , no respiratory distress  CV:  regular rate and rhythm, no murmur, rub, or gallop, no " edema  ABDOMEN:  normal bowel sounds, soft, nontender, no hepatosplenomegaly or other masses  M/S:   Gait and station abnormal transfers with assist  SKIN:  left heel wound reviewed in [facility], 80% eschar, periwound dry and flaking no redness  NEURO:   left hemiparesis, mild dysarthria  PSYCH:  insight and judgement impaired, flat affect    Lab/Diagnostic data:   Labs done in SNF are in [facility]. Please refer to them using [facility]/Care Everywhere., Recent labs in EPIC reviewed by me today. , and Patient is on hospice/palliative care and labs are not recommended    ASSESSMENT/PLAN  (L89.620) Pressure injury of left heel, unstageable (H)  (primary encounter diagnosis)  Comment: chronic, declining, severe immobility contributing. Poor healing prognosis in the setting of terminal illness. Concern for pseudomonas infection per Woc will add topical flagyl   Plan: Flagyl 500 mg crush and apply topically to wound bed daily with treatment x 7 days. WOC following. Continue topical treatment as directed, follow-up with WOC as directed    (F01.C18) Severe vascular dementia with other behavioral disturbance (H)  Comment: chronic, limited insight, poor initiation, and STML. Friend Pat is legal decision maker  Plan: Treat to comfort per hospice, LTC for cares    (Z86.73) History of CVA (cerebrovascular accident)  (Z51.5) Hospice care patient  Comment: chronic left hemiparesis, dependent for all cares, getting out of bed less and now on hospice per goals of care  Plan: treat to comfort per hospice, appreciate their involvement. LTC for assist with ADLs, medication management, meals, activities.     Orders  Flagyl 500 mg topically crushed apply to wound bed x 7 days    Electronically signed by:  Elena Carr CNA\

## 2025-06-13 NOTE — LETTER
" 6/13/2025      Chelsey Casillas  [facility]  [address] [place] [address]        [facility] GERIATRICS  Chief Complaint   Patient presents with     care home [facility]     [place] Medical Record Number:  [id number]  Place of Service where encounter took place:  [facility]  () [[id number]]    HPI:    Chelsey Casillas  is 85 year old (1940), who is being seen today for a federally mandated E/M visit.     By chart review, patient was last hospitalized at [facility] September 2016. She previously resided at [facility] and admitted to LT September 2018. She had a subsequent fall with left humerus fracture that month, workup including CT head noted a chronic right MCA infarct at that time. 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 She has followed with ACP Dr Hutchins and successfully off of Risperidone since March 2023. She also has a history of COPD without oxygen use. Anemia noted on routine lab work November 2022, no evidence of active bleeding and she was started on iron. No additional workup per goals of care for comfort. Admitted to the above facility for permanent placement. Resumed on risperidone for combative behavior. Psychiatry is following and she receives lorazepam on bath days for combativeness, has been helpful.  Symptomatic COVID infection May 2024, treated with molnupiravir. Followed with wound clinic for right thigh wound which eventually healed. Now with left heel pressure wound. Admitted to Hospice 5/31 per goals of care.      Today's concerns are:  Seen today for follow-up on multiple conditions in her room in LTC resting abed. Not getting out of bed as much lately because she \"[doesn't] want to.\" She denies pain. Would like a mocha. Denies CP, SOB, changes in b/b habits, fever/chills.    Management reviewed with WOC on site, increased green drainage from heel wound, " sloughy, wondering about topical Flagyl for pseudomonas infection.    ALLERGIES:Amlodipine  PAST MEDICAL HISTORY:   Past Medical History:   Diagnosis Date     Arthritis      Chronic low back pain 03/17/2015     Closed fracture of left distal humerus 2018     COPD (chronic obstructive pulmonary disease) (H)      Dementia with psychosis (H) 11/01/2016     Fall 02/27/2013     Falling episodes 09/24/2016     Functional urinary incontinence 03/07/2013     GERD (gastroesophageal reflux disease) 04/03/2018     History of CVA (cerebrovascular accident)     R MCA     HTN (hypertension) 04/03/2018     Knee pain 03/07/2013     Major depression, single episode 04/16/2015     Pain in both knees 01/30/2014     Physical deconditioning 03/07/2013     Severe major depression with psychotic features (H) 07/12/2016     Skin tear 03/07/2013     Weakness 06/18/2015     PAST SURGICAL HISTORY:   has no past surgical history on file.  FAMILY HISTORY: Family history is unknown by patient.  SOCIAL HISTORY:  reports that she has quit smoking. Her smoking use included cigarettes. She has never used smokeless tobacco. She reports that she does not drink alcohol and does not use drugs.    MEDICATIONS:  MED REC REQUIRED  Post Medication Reconciliation Status: patient was not discharged from an inpatient facility or TCU         Review of your medicines            Accurate as of June 13, 2025 10:14 AM. If you have any questions, ask your nurse or doctor.                CONTINUE these medicines which have NOT CHANGED        Dose / Directions   albuterol 108 (90 Base) MCG/ACT inhaler  Commonly known as: PROAIR HFA/PROVENTIL HFA/VENTOLIN HFA      Dose: 2 puff  Inhale 2 puffs into the lungs every 4 hours as needed for shortness of breath / dyspnea or wheezing  Refills: 0     ferrous sulfate 325 (65 Fe) MG EC tablet  Commonly known as: FE TABS      Dose: 325 mg  Take 325 mg by mouth daily  Refills: 0     gabapentin 100 MG capsule  Commonly known as:  "NEURONTIN      Dose: 100 mg  Take 100 mg by mouth 3 times daily  Refills: 0     guaiFENesin 20 mg/mL liquid  Commonly known as: ROBITUSSIN      Dose: 200 mg  Take 200 mg by mouth every 4 hours as needed for cough  Refills: 0     Lidocaine 4 % Patch  Commonly known as: LIDOCARE      Dose: 1 patch  Place 1 patch onto the skin every 24 hours To prevent lidocaine toxicity, patient should be patch free for 12 hrs daily.  Refills: 0     LORazepam 0.5 MG tablet  Commonly known as: ATIVAN      Dose: 0.25 mg  Take 0.25 mg by mouth once a week  Refills: 0     omeprazole 20 MG tablet      Dose: 20 mg  Take 20 mg by mouth daily  Refills: 0     risperiDONE 1 MG tablet  Commonly known as: risperDAL  Used for: Dementia without behavioral disturbance (H)      Dose: 1 mg  TAKE 1 TABLET BY MOUTH AT BEDTIME  Quantity: 30 tablet  Refills: 11     sertraline 100 MG tablet  Commonly known as: ZOLOFT  Used for: Recurrent major depressive disorder, in partial remission      Dose: 100 mg  Take 1 tablet (100 mg) by mouth At Bedtime  Quantity: 31 tablet  Refills: 98     TYLENOL PO      Dose: 1,000 mg  Take 1,000 mg by mouth 3 times daily  Refills: 0     vitamin D3 50 mcg (2000 units) tablet  Commonly known as: CHOLECALCIFEROL  Used for: Vitamin D deficiency      Dose: 1 tablet  Take 1 tablet (2,000 Units) by mouth At Bedtime  Quantity: 30 tablet  Refills: 0               Case Management:  I have reviewed the care plan and MDS and do agree with the plan. Patient's desire to return to the community is not present. Information reviewed:  Medications, vital signs, orders, and nursing notes.    ROS:  Limited secondary to cognitive impairment but today pt reports the above    Vitals:  /84   Pulse 98   Temp 98.7  F (37.1  C)   Resp 14   Ht 1.575 m (5' 2\")   Wt 70.3 kg (155 lb)   SpO2 94%   BMI 28.35 kg/m    Body mass index is 28.35 kg/m .  Exam:  GENERAL APPEARANCE:  Alert, in no distress  RESP:  respiratory effort and palpation of chest " normal, lungs clear to auscultation , no respiratory distress  CV:  regular rate and rhythm, no murmur, rub, or gallop, no edema  ABDOMEN:  normal bowel sounds, soft, nontender, no hepatosplenomegaly or other masses  M/S:   Gait and station abnormal transfers with assist  SKIN:  left heel wound reviewed in [facility], 80% eschar, periwound dry and flaking no redness  NEURO:   left hemiparesis, mild dysarthria  PSYCH:  insight and judgement impaired, flat affect    Lab/Diagnostic data:   Labs done in SNF are in [facility]. Please refer to them using [employer]/Care Everywhere., Recent labs in EPIC reviewed by me today. , and Patient is on hospice/palliative care and labs are not recommended    ASSESSMENT/PLAN  (L89.620) Pressure injury of left heel, unstageable (H)  (primary encounter diagnosis)  Comment: chronic, declining, severe immobility contributing. Poor healing prognosis in the setting of terminal illness. Concern for pseudomonas infection per Woc will add topical flagyl   Plan: Flagyl 500 mg crush and apply topically to wound bed daily with treatment x 7 days. WOC following. Continue topical treatment as directed, follow-up with WOC as directed    (F01.C18) Severe vascular dementia with other behavioral disturbance (H)  Comment: chronic, limited insight, poor initiation, and STML. Friend Pat is legal decision maker  Plan: Treat to comfort per hospice, LTC for cares    (Z86.73) History of CVA (cerebrovascular accident)  (Z51.5) Hospice care patient  Comment: chronic left hemiparesis, dependent for all cares, getting out of bed less and now on hospice per goals of care  Plan: treat to comfort per hospice, appreciate their involvement. LTC for assist with ADLs, medication management, meals, activities.     Orders  Flagyl 500 mg topically crushed apply to wound bed x 7 days    Electronically signed by:  Elena Carr CNA\          Sincerely,        ALCIRA Carey CNP    Electronically signed

## 2025-06-25 RX ORDER — ACETAMINOPHEN 650 MG/1
650 SUPPOSITORY RECTAL EVERY 4 HOURS PRN
COMMUNITY

## 2025-06-25 RX ORDER — IPRATROPIUM BROMIDE AND ALBUTEROL SULFATE 2.5; .5 MG/3ML; MG/3ML
1 SOLUTION RESPIRATORY (INHALATION) 3 TIMES DAILY PRN
COMMUNITY

## 2025-06-25 RX ORDER — BISACODYL 10 MG
10 SUPPOSITORY, RECTAL RECTAL DAILY PRN
COMMUNITY

## 2025-06-25 RX ORDER — AMOXICILLIN 250 MG
1 CAPSULE ORAL 2 TIMES DAILY PRN
COMMUNITY

## 2025-06-25 RX ORDER — MORPHINE SULFATE 30 MG/1
5 TABLET ORAL EVERY 4 HOURS PRN
COMMUNITY

## 2025-06-25 RX ORDER — HYOSCYAMINE SULFATE 0.12 MG/1
0.12 TABLET ORAL EVERY 4 HOURS PRN
COMMUNITY

## 2025-06-25 RX ORDER — LORAZEPAM 0.5 MG/1
0.5 TABLET ORAL EVERY 4 HOURS PRN
COMMUNITY

## 2025-08-20 ENCOUNTER — PATIENT OUTREACH (OUTPATIENT)
Dept: GERIATRIC MEDICINE | Facility: CLINIC | Age: 85
End: 2025-08-20
Payer: COMMERCIAL

## 2025-08-22 ENCOUNTER — NURSING HOME VISIT (OUTPATIENT)
Dept: GERIATRICS | Facility: CLINIC | Age: 85
End: 2025-08-22
Payer: COMMERCIAL

## 2025-08-22 VITALS
DIASTOLIC BLOOD PRESSURE: 75 MMHG | WEIGHT: 159 LBS | SYSTOLIC BLOOD PRESSURE: 138 MMHG | HEIGHT: 62 IN | RESPIRATION RATE: 18 BRPM | OXYGEN SATURATION: 94 % | TEMPERATURE: 97.2 F | BODY MASS INDEX: 29.26 KG/M2 | HEART RATE: 78 BPM

## 2025-08-22 DIAGNOSIS — M15.0 PRIMARY OSTEOARTHRITIS INVOLVING MULTIPLE JOINTS: ICD-10-CM

## 2025-08-22 DIAGNOSIS — J44.9 CHRONIC OBSTRUCTIVE PULMONARY DISEASE, UNSPECIFIED COPD TYPE (H): Primary | ICD-10-CM

## 2025-08-22 DIAGNOSIS — I67.9 CEREBRAL VASCULAR DISEASE: ICD-10-CM

## 2025-08-22 DIAGNOSIS — F01.C18 SEVERE VASCULAR DEMENTIA WITH OTHER BEHAVIORAL DISTURBANCE (H): ICD-10-CM

## 2025-08-22 DIAGNOSIS — Z51.5 HOSPICE CARE PATIENT: ICD-10-CM

## 2025-08-22 PROCEDURE — 99309 SBSQ NF CARE MODERATE MDM 30: CPT | Performed by: INTERNAL MEDICINE
